# Patient Record
Sex: FEMALE | Race: WHITE | NOT HISPANIC OR LATINO | Employment: OTHER | URBAN - METROPOLITAN AREA
[De-identification: names, ages, dates, MRNs, and addresses within clinical notes are randomized per-mention and may not be internally consistent; named-entity substitution may affect disease eponyms.]

---

## 2017-01-06 ENCOUNTER — OFFICE VISIT (OUTPATIENT)
Dept: URGENT CARE | Facility: CLINIC | Age: 62
End: 2017-01-06
Payer: MEDICARE

## 2017-01-06 VITALS
TEMPERATURE: 99.2 F | OXYGEN SATURATION: 97 % | SYSTOLIC BLOOD PRESSURE: 116 MMHG | HEIGHT: 63 IN | HEART RATE: 95 BPM | RESPIRATION RATE: 18 BRPM | WEIGHT: 100.8 LBS | DIASTOLIC BLOOD PRESSURE: 76 MMHG | BODY MASS INDEX: 17.86 KG/M2

## 2017-01-06 DIAGNOSIS — J40 BRONCHITIS: ICD-10-CM

## 2017-01-06 DIAGNOSIS — R53.83 OTHER FATIGUE: ICD-10-CM

## 2017-01-06 DIAGNOSIS — R05.9 COUGH: ICD-10-CM

## 2017-01-06 PROCEDURE — 99203 OFFICE O/P NEW LOW 30 MIN: CPT | Performed by: PHYSICIAN ASSISTANT

## 2017-01-06 RX ORDER — AZITHROMYCIN 250 MG/1
TABLET, FILM COATED ORAL
Qty: 6 TAB | Refills: 0 | Status: SHIPPED | OUTPATIENT
Start: 2017-01-06 | End: 2017-04-26

## 2017-01-06 ASSESSMENT — ENCOUNTER SYMPTOMS
WHEEZING: 0
SHORTNESS OF BREATH: 0
FEVER: 0
CHILLS: 0
NAUSEA: 0
VOMITING: 0
DIARRHEA: 0
ABDOMINAL PAIN: 0

## 2017-01-06 NOTE — PROGRESS NOTES
Subjective:      Lupe Webb is a 61 y.o. female who presents with Other            Other  Pertinent negatives include no abdominal pain, chills, fever, nausea, rash or vomiting.   Pt comes to clinic c/o fatigue, PMH of sjogrens, last 4-5d of fever, body aches, neck discomfort, subj fever/chills, did get flu shot this year, has had increased oral dryness over last few weeks, did see specialist in [facility] few weeks ago, c/o chest congest, PMH of sjogrens flares - unsure what has caused this. Denies ear pain, c /o left sided salivary gland swelling. Denies fever reducing meds today. Denies nausea/vomiting/abdpain/ddiarrhea/rash. Denies PMH of asthma, c/o mild wheeze, c/o chest congestion, denies PMH of pneumonia, denies seasonal allerg. Denies much cough.     Denies sorethroat, irritated throat, denies earpain.     Has seen ENT here,     Review of Systems   Constitutional: Negative for fever and chills.   Respiratory: Negative for shortness of breath and wheezing.    Gastrointestinal: Negative for nausea, vomiting, abdominal pain and diarrhea.   Skin: Negative for rash.       PMH:  has a past medical history of Indigestion; Dental disorder; Anesthesia; Other specified disorder of intestines; Unspecified cataract; Heart burn; Pain (4/7/14); Arthritis; Lupus (HCC); Sjogren's syndrome; and Psychiatric problem.  MEDS:   Current outpatient prescriptions:   •  mycophenolate (CELLCEPT) 250 MG Cap, Take 500 mg by mouth 2 times a day. Total of 2000mg daily, Disp: , Rfl:   •  Buprenorphine 15 MCG/HR PTWK, Apply  to skin as directed every 7 days., Disp: , Rfl:   •  MAGNESIUM PO, Take  by mouth every day., Disp: , Rfl:   •  CycloSPORINE (RESTASIS OP), 1 Drop by Ophthalmic route 2 Times a Day. Both eyes, Disp: , Rfl:   •  zonisamide (ZONEGRAN) 100 MG CAPS, Take 300 mg by mouth every bedtime., Disp: , Rfl:   •  predniSONE (DELTASONE) 1 MG TABS, Take 1 mg by mouth every day. 7 pills per day, Disp: , Rfl:   •  zolpidem (AMBIEN) 5 MG  "TABS, Take 5 mg by mouth at bedtime as needed., Disp: , Rfl:   ALLERGIES:   Allergies   Allergen Reactions   • Contrast Media With Iodine [Iodine]      Rash, same with topical iodine     • Shellfish Allergy Vomiting     Rash, hives,    • Penicillins Vomiting     SURGHX:   Past Surgical History   Procedure Laterality Date   • Cataract phaco with iol  4/9/2014     Performed by Destiney Mccurdy M.D. at SURGERY SAME DAY [facility] ORS   • Cataract phaco with iol  4/18/2014     Performed by Destiney Mccurdy M.D. at SURGERY SAME DAY [facility] ORS   • Lumbar laminectomy diskectomy Left 8/5/2015     Procedure: POSTERIOR L5-S1 HEMILAMINOTOMY AND MICRODISCECTOMY;  Surgeon: Kaleb Lucia M.D.;  Location: SURGERY [facility];  Service:    • Pr enlarge breast with implant     • Tumor excision with biopsy  1998     pallet   • Breast implant revision Bilateral 11/2/2016     Procedure: BREAST IMPLANT - EXCHANGE OF SALINE TO SILICONE WITH REPOSITIONING OF LATERAL FOLDS;  Surgeon: Darryl Garcia M.D.;  Location: SURGERY [facility];  Service:      SOCHX:  reports that she has never smoked. She has never used smokeless tobacco. She reports that she drinks about 1.8 oz of alcohol per week. She reports that she does not use illicit drugs.  FH: Family history was reviewed, no pertinent findings to report    I have worn a mask for the entire encounter with this patient.      Objective:     /76 mmHg  Pulse 95  Temp(Src) 37.3 °C (99.2 °F)  Resp 18  Ht 1.588 m (5' 2.5\")  Wt 45.723 kg (100 lb 12.8 oz)  BMI 18.13 kg/m2  SpO2 97%     Physical Exam   Constitutional: She is oriented to person, place, and time. She appears well-developed and well-nourished. No distress.   HENT:   Head: Normocephalic and atraumatic.   Right Ear: Tympanic membrane, external ear and ear canal normal.   Left Ear: Tympanic membrane, external ear and ear canal normal.   Nose: Nose normal. Right sinus exhibits no maxillary sinus tenderness and no frontal " sinus tenderness. Left sinus exhibits no maxillary sinus tenderness and no frontal sinus tenderness.   Mouth/Throat: Uvula is midline and mucous membranes are normal. Posterior oropharyngeal erythema present. No oropharyngeal exudate, posterior oropharyngeal edema or tonsillar abscesses.   Eyes: Conjunctivae and lids are normal. Right eye exhibits no discharge. Left eye exhibits no discharge. No scleral icterus.   Neck: Neck supple.   Pulmonary/Chest: Effort normal. No accessory muscle usage. No respiratory distress. She has no decreased breath sounds. She has wheezes. She has rhonchi (mild). She has no rales.   Musculoskeletal: Normal range of motion.   Lymphadenopathy:     She has no cervical adenopathy.   Neurological: She is alert and oriented to person, place, and time. She is not disoriented. She exhibits normal muscle tone. Coordination normal.   Skin: Skin is warm and dry. She is not diaphoretic. No erythema. No pallor.   Psychiatric: Her speech is normal and behavior is normal.   Nursing note and vitals reviewed.              Assessment/Plan:     1. Bronchitis  Supportive care is reviewed with patient/caregiver - recommend to push PO fluids and electrolytes, Nsaids/tylenol, netti pot/saline irrig, humidifier in home, flonase, ponaris, antihistamines, f/u w/ rheumatology, full course of abx, stress to trend continued resolution following completion of abx  Return to clinic with lack of resolution or progression of symptoms.  ER precautions with any worsening symptoms are reviewed with patient/caregiver and they do express understanding  OTC medications for s/sx    - azithromycin (ZITHROMAX) 250 MG Tab; Take as directed on package. Dispense one package.  Dispense: 6 Tab; Refill: 0    2. Cough      3. Other fatigue

## 2017-01-06 NOTE — MR AVS SNAPSHOT
"        Lupe Webb   2017 2:30 PM   Office Visit   MRN: [id number]    Department:  [facility]   Dept Phone:  [phone]    Description:  Female : 1955   Provider:  Artie Saucedo PA-C           Reason for Visit     Other x has an auto-immune system disease, feels liks she is having a flare up, having pain, fatigue, some cough, having a clogged salivary duct that is starting to unclog and giving her a raw throat      Allergies as of 2017     Allergen Noted Reactions    Contrast Media With Iodine [Iodine] 2014       Rash, same with topical iodine      Shellfish Allergy 2014   Vomiting    Rash, hives,     Penicillins 2014   Vomiting      You were diagnosed with     Bronchitis   [660707]       Cough   [786.2.ICD-9-CM]       Other fatigue   [1870055]         Vital Signs     Blood Pressure Pulse Temperature Respirations Height Weight    116/76 mmHg 95 37.3 °C (99.2 °F) 18 1.588 m (5' 2.5\") 45.723 kg (100 lb 12.8 oz)    Body Mass Index Oxygen Saturation Smoking Status             18.13 kg/m2 97% Never Smoker          Basic Information     Date Of Birth Sex Race Ethnicity Preferred Language    1955 Female White Non- English      Your appointments     2017 12:30 PM   US SONOCINE with [facility] 2   [facility] ([address])    [address] [place] [id number] [phone]           Check in 30 minutes prior.            2017  1:00 PM   BONE DENSITY (DEXASCAN) with [facility] 1   [facility] ([address])    [address] [place] [id number] [phone]           No calcium supplements 24 hours prior to exam, including antacids or multivitamins w/calcium.  Pt may eat and drink normally.  No injection procedures prior to Dexa on the same day.  No barium contrast, no CTs with IV or oral contrast, no Pet/CTs and no Nuc Med injections for 7 days prior to a Dexa (including Barium " Swallow, Upper GI, Small Bowel Series).  If scheduling a Dexa on the same day as a CT with IV or oral contrast, any test with barium, Pet/CT or a Nuc Med with injection, always schedule the Dexa before the other study.              Problem List              ICD-10-CM Priority Class Noted - Resolved    Senile nuclear sclerosis H25.10   4/9/2014 - Present    Cataract H26.9   4/18/2014 - Present    Displacement of lumbar intervertebral disc without myelopathy M51.26   8/5/2015 - Present    Encounter for cosmetic procedure Z41.1   11/2/2016 - Present      Health Maintenance     Patient has no pending health maintenance at this time      Current Immunizations     No immunizations on file.      Below and/or attached are the medications your provider expects you to take. Review all of your home medications and newly ordered medications with your provider and/or pharmacist. Follow medication instructions as directed by your provider and/or pharmacist. Please keep your medication list with you and share with your provider. Update the information when medications are discontinued, doses are changed, or new medications (including over-the-counter products) are added; and carry medication information at all times in the event of emergency situations     Allergies:  CONTRAST MEDIA WITH IODINE - (reactions not documented)     SHELLFISH ALLERGY - Vomiting     PENICILLINS - Vomiting               Medications  Valid as of: January 06, 2017 -  3:20 PM    Generic Name Brand Name Tablet Size Instructions for use    Azithromycin (Tab) ZITHROMAX 250 MG Take as directed on package. Dispense one package.        Buprenorphine (PATCH WEEKLY) Buprenorphine 15 MCG/HR Apply  to skin as directed every 7 days.        CycloSPORINE   1 Drop by Ophthalmic route 2 Times a Day. Both eyes        Magnesium   Take  by mouth every day.        Mycophenolate Mofetil (Cap) CELLCEPT 250 MG Take 500 mg by mouth 2 times a day. Total of 2000mg daily         PredniSONE (Tab) DELTASONE 1 MG Take 1 mg by mouth every day. 7 pills per day        Zolpidem Tartrate (Tab) AMBIEN 5 MG Take 5 mg by mouth at bedtime as needed.        Zonisamide (Cap) ZONEGRAN 100 MG Take 300 mg by mouth every bedtime.        .                 Medicines prescribed today were sent to:     [facility] - [place], [place] - [address]    [address] [place] [address]    Phone: [phone] Fax: [phone]    Open 24 Hours?: No      Medication refill instructions:       If your prescription bottle indicates you have medication refills left, it is not necessary to call your provider’s office. Please contact your pharmacy and they will refill your medication.    If your prescription bottle indicates you do not have any refills left, you may request refills at any time through one of the following ways: The online Livelens system (except Urgent Care), by calling your provider’s office, or by asking your pharmacy to contact your provider’s office with a refill request. Medication refills are processed only during regular business hours and may not be available until the next business day. Your provider may request additional information or to have a follow-up visit with you prior to refilling your medication.   *Please Note: Medication refills are assigned a new Rx number when refilled electronically. Your pharmacy may indicate that no refills were authorized even though a new prescription for the same medication is available at the pharmacy. Please request the medicine by name with the pharmacy before contacting your provider for a refill.           Livelens Access Code: [email or web address]  Expires: 2/5/2017  3:20 PM    Livelens  A secure, online tool to manage your health information     [employer]’s Livelens® is a secure, online tool that connects you to your personalized health information from the privacy of your home -- day or night - making it very easy for you to manage your healthcare.  Once the activation process is completed, you can even access your medical information using the FirstBest jaron, which is available for free in the Apple Jaron store or Google Play store.     FirstBest provides the following levels of access (as shown below):   My Chart Features   [facility] Primary [facility] Doctor [facility]  Specialists [facility]  Urgent  Care Non-[facility]  Primary Care  Doctor   Email your healthcare team securely and privately 24/7 X X X    Manage appointments: schedule your next appointment; view details of past/upcoming appointments X      Request prescription refills. X      View recent personal medical records, including lab and immunizations X X X X   View health record, including health history, allergies, medications X X X X   Read reports about your outpatient visits, procedures, consult and ER notes X X X X   See your discharge summary, which is a recap of your hospital and/or ER visit that includes your diagnosis, lab results, and care plan. X X       How to register for FirstBest:  1. Go to  [email or web address].  2. Click on the Sign Up Now box, which takes you to the New Member Sign Up page. You will need to provide the following information:  a. Enter your FirstBest Access Code exactly as it appears at the top of this page. (You will not need to use this code after you’ve completed the sign-up process. If you do not sign up before the expiration date, you must request a new code.)   b. Enter your date of birth.   c. Enter your home email address.   d. Click Submit, and follow the next screen’s instructions.  3. Create a FirstBest ID. This will be your FirstBest login ID and cannot be changed, so think of one that is secure and easy to remember.  4. Create a FirstBest password. You can change your password at any time.  5. Enter your Password Reset Question and Answer. This can be used at a later time if you forget your password.   6. Enter your e-mail address. This allows you to receive e-mail notifications  when new information is available in [employer].  7. Click Sign Up. You can now view your health information.    For assistance activating your [employer] account, call [phone]

## 2017-01-12 ENCOUNTER — HOSPITAL ENCOUNTER (OUTPATIENT)
Dept: LAB | Facility: MEDICAL CENTER | Age: 62
End: 2017-01-12
Attending: SPECIALIST
Payer: MEDICARE

## 2017-01-12 LAB
ALBUMIN SERPL BCP-MCNC: 4.4 G/DL (ref 3.2–4.9)
ALBUMIN/GLOB SERPL: 2 G/DL
ALP SERPL-CCNC: 30 U/L (ref 30–99)
ALT SERPL-CCNC: 16 U/L (ref 2–50)
ANION GAP SERPL CALC-SCNC: 6 MMOL/L (ref 0–11.9)
APPEARANCE UR: CLEAR
AST SERPL-CCNC: 19 U/L (ref 12–45)
BASOPHILS # BLD AUTO: 0.03 K/UL (ref 0–0.12)
BASOPHILS NFR BLD AUTO: 0.5 % (ref 0–1.8)
BILIRUB SERPL-MCNC: 0.7 MG/DL (ref 0.1–1.5)
BILIRUB UR QL STRIP.AUTO: NEGATIVE
BUN SERPL-MCNC: 14 MG/DL (ref 8–22)
C3 SERPL-MCNC: 134 MG/DL (ref 87–200)
C4 SERPL-MCNC: 45 MG/DL (ref 19–52)
CALCIUM SERPL-MCNC: 9.5 MG/DL (ref 8.5–10.5)
CHLORIDE SERPL-SCNC: 108 MMOL/L (ref 96–112)
CHOLEST SERPL-MCNC: 190 MG/DL (ref 100–199)
CO2 SERPL-SCNC: 28 MMOL/L (ref 20–33)
COLOR UR AUTO: YELLOW
CREAT SERPL-MCNC: 0.75 MG/DL (ref 0.5–1.4)
EOSINOPHIL # BLD: 0.13 K/UL (ref 0–0.51)
EOSINOPHIL NFR BLD AUTO: 2.1 % (ref 0–6.9)
ERYTHROCYTE [DISTWIDTH] IN BLOOD BY AUTOMATED COUNT: 44.2 FL (ref 35.9–50)
GLOBULIN SER CALC-MCNC: 2.2 G/DL (ref 1.9–3.5)
GLUCOSE SERPL-MCNC: 93 MG/DL (ref 65–99)
GLUCOSE UR STRIP.AUTO-MCNC: NEGATIVE MG/DL
HCT VFR BLD AUTO: 42.8 % (ref 37–47)
HDLC SERPL-MCNC: 67 MG/DL
HGB BLD-MCNC: 14.2 G/DL (ref 12–16)
IMM GRANULOCYTES # BLD AUTO: 0.04 K/UL (ref 0–0.11)
IMM GRANULOCYTES NFR BLD AUTO: 0.7 % (ref 0–0.9)
KETONES UR STRIP.AUTO-MCNC: NEGATIVE MG/DL
LDLC SERPL CALC-MCNC: 99 MG/DL
LEUKOCYTE ESTERASE UR QL STRIP.AUTO: NEGATIVE
LYMPHOCYTES # BLD: 2.75 K/UL (ref 1–4.8)
LYMPHOCYTES NFR BLD AUTO: 45.4 % (ref 22–41)
MCH RBC QN AUTO: 32.6 PG (ref 27–33)
MCHC RBC AUTO-ENTMCNC: 33.2 G/DL (ref 33.6–35)
MCV RBC AUTO: 98.2 FL (ref 81.4–97.8)
MICRO URNS: NORMAL
MONOCYTES # BLD: 0.56 K/UL (ref 0–0.85)
MONOCYTES NFR BLD AUTO: 9.2 % (ref 0–13.4)
NEUTROPHILS # BLD: 2.55 K/UL (ref 2–7.15)
NEUTROPHILS NFR BLD AUTO: 42.1 % (ref 44–72)
NITRITE UR QL STRIP.AUTO: NEGATIVE
NRBC # BLD AUTO: 0 K/UL
NRBC BLD-RTO: 0 /100 WBC
PH UR: 6 [PH]
PLATELET # BLD AUTO: 400 K/UL (ref 164–446)
PMV BLD AUTO: 9.8 FL (ref 9–12.9)
POTASSIUM SERPL-SCNC: 3.9 MMOL/L (ref 3.6–5.5)
PROT SERPL-MCNC: 6.6 G/DL (ref 6–8.2)
PROT UR QL STRIP: NEGATIVE MG/DL
RBC # BLD AUTO: 4.36 M/UL (ref 4.2–5.4)
RBC UR QL AUTO: NEGATIVE
SODIUM SERPL-SCNC: 142 MMOL/L (ref 135–145)
SP GR UR STRIP.AUTO: 1.02
TRIGL SERPL-MCNC: 122 MG/DL (ref 0–149)
WBC # BLD AUTO: 6.1 K/UL (ref 4.8–10.8)

## 2017-01-12 PROCEDURE — 81003 URINALYSIS AUTO W/O SCOPE: CPT

## 2017-01-12 PROCEDURE — 86160 COMPLEMENT ANTIGEN: CPT

## 2017-01-12 PROCEDURE — 80053 COMPREHEN METABOLIC PANEL: CPT

## 2017-01-12 PROCEDURE — 85025 COMPLETE CBC W/AUTO DIFF WBC: CPT

## 2017-01-12 PROCEDURE — 80061 LIPID PANEL: CPT

## 2017-01-12 PROCEDURE — 36415 COLL VENOUS BLD VENIPUNCTURE: CPT

## 2017-01-16 ENCOUNTER — HOSPITAL ENCOUNTER (OUTPATIENT)
Dept: RADIOLOGY | Facility: MEDICAL CENTER | Age: 62
End: 2017-01-16
Attending: SPECIALIST
Payer: MEDICARE

## 2017-01-16 DIAGNOSIS — M81.0 AGE-RELATED OSTEOPOROSIS WITHOUT CURRENT PATHOLOGICAL FRACTURE: ICD-10-CM

## 2017-01-16 PROCEDURE — 77080 DXA BONE DENSITY AXIAL: CPT

## 2017-04-26 ENCOUNTER — OFFICE VISIT (OUTPATIENT)
Dept: INTERNAL MEDICINE | Facility: IMAGING CENTER | Age: 62
End: 2017-04-26
Payer: MEDICARE

## 2017-04-26 ENCOUNTER — TELEPHONE (OUTPATIENT)
Dept: MEDICAL GROUP | Facility: PHYSICIAN GROUP | Age: 62
End: 2017-04-26

## 2017-04-26 VITALS
BODY MASS INDEX: 19.14 KG/M2 | WEIGHT: 104 LBS | SYSTOLIC BLOOD PRESSURE: 130 MMHG | OXYGEN SATURATION: 100 % | DIASTOLIC BLOOD PRESSURE: 74 MMHG | RESPIRATION RATE: 14 BRPM | TEMPERATURE: 99.1 F | HEIGHT: 62 IN | HEART RATE: 64 BPM

## 2017-04-26 DIAGNOSIS — G89.4 CHRONIC PAIN SYNDROME: Chronic | ICD-10-CM

## 2017-04-26 DIAGNOSIS — Z79.52 CURRENT CHRONIC USE OF SYSTEMIC STEROIDS: Chronic | ICD-10-CM

## 2017-04-26 DIAGNOSIS — I73.00 RAYNAUD'S DISEASE WITHOUT GANGRENE: Chronic | ICD-10-CM

## 2017-04-26 DIAGNOSIS — M15.9 OSTEOARTHRITIS OF MULTIPLE JOINTS, UNSPECIFIED OSTEOARTHRITIS TYPE: Chronic | ICD-10-CM

## 2017-04-26 DIAGNOSIS — K59.1 FUNCTIONAL DIARRHEA: Chronic | ICD-10-CM

## 2017-04-26 DIAGNOSIS — M35.00 SJOGREN'S SYNDROME (HCC): Chronic | ICD-10-CM

## 2017-04-26 DIAGNOSIS — M50.30 DDD (DEGENERATIVE DISC DISEASE), CERVICAL: Chronic | ICD-10-CM

## 2017-04-26 DIAGNOSIS — G47.00 INSOMNIA, UNSPECIFIED TYPE: ICD-10-CM

## 2017-04-26 DIAGNOSIS — M32.9 LUPUS ARTHRITIS (HCC): Chronic | ICD-10-CM

## 2017-04-26 DIAGNOSIS — M85.80 OSTEOPENIA: Chronic | ICD-10-CM

## 2017-04-26 DIAGNOSIS — K21.9 GASTROESOPHAGEAL REFLUX DISEASE WITHOUT ESOPHAGITIS: Chronic | ICD-10-CM

## 2017-04-26 PROBLEM — E73.9 LACTOSE INTOLERANCE: Chronic | Status: ACTIVE | Noted: 2017-04-26

## 2017-04-26 PROCEDURE — 3017F COLORECTAL CA SCREEN DOC REV: CPT | Performed by: FAMILY MEDICINE

## 2017-04-26 PROCEDURE — 3014F SCREEN MAMMO DOC REV: CPT | Performed by: FAMILY MEDICINE

## 2017-04-26 PROCEDURE — G8432 DEP SCR NOT DOC, RNG: HCPCS | Performed by: FAMILY MEDICINE

## 2017-04-26 PROCEDURE — 1036F TOBACCO NON-USER: CPT | Performed by: FAMILY MEDICINE

## 2017-04-26 PROCEDURE — 99205 OFFICE O/P NEW HI 60 MIN: CPT | Performed by: FAMILY MEDICINE

## 2017-04-26 PROCEDURE — G8420 CALC BMI NORM PARAMETERS: HCPCS | Performed by: FAMILY MEDICINE

## 2017-04-26 RX ORDER — SILDENAFIL 25 MG/1
25 TABLET, FILM COATED ORAL DAILY
COMMUNITY
End: 2018-05-21

## 2017-04-26 RX ORDER — ALPRAZOLAM 0.5 MG/1
0.25 TABLET ORAL NIGHTLY PRN
Qty: 30 TAB | Refills: 0 | Status: SHIPPED | OUTPATIENT
Start: 2017-04-26 | End: 2017-08-04

## 2017-04-26 RX ORDER — METAXALONE 800 MG/1
800 TABLET ORAL 3 TIMES DAILY
COMMUNITY
End: 2018-08-02

## 2017-04-26 NOTE — TELEPHONE ENCOUNTER
Phone Number Called: [phone] (home)       Message: Pt would like a referral to hematologist.     Left Message for patient to call back: N\A

## 2017-04-26 NOTE — MR AVS SNAPSHOT
"        Lupe Webb   2017 4:00 PM   Office Visit   MRN: [id number]    Department:  [facility]   Dept Phone:  [phone]    Description:  Female : 1955   Provider:  Jina De Santiago M.D.           Reason for Visit     Establish Care           Allergies as of 2017     Allergen Noted Reactions    Contrast Media With Iodine [Iodine] 2014       Rash, same with topical iodine      Shellfish Allergy 2014   Vomiting    Rash, hives,     Penicillins 2014   Vomiting      You were diagnosed with     Sjogren's syndrome (CMS-HCC)   [529673]       Insomnia, unspecified type   [2464181]       DDD (degenerative disc disease), cervical   [618961]         Vital Signs     Blood Pressure Pulse Temperature Respirations Height Weight    130/74 mmHg 64 37.3 °C (99.1 °F) 14 1.575 m (5' 2\") 47.174 kg (104 lb)    Body Mass Index Oxygen Saturation Smoking Status             19.02 kg/m2 100% Never Smoker          Basic Information     Date Of Birth Sex Race Ethnicity Preferred Language    1955 Female White Non- English      Your appointments     May 01, 2017  3:00 PM   NEW MISC Infusion 2 HR with RN 4   [facility] ([facility])    [address]  [place] [address]   [phone]            2017 12:30 PM   US SONOCINE with [facility]  2   [facility] ([address])    [address]  [address]  [place] [address]   [phone]           Check in 30 minutes prior.              Problem List              ICD-10-CM Priority Class Noted - Resolved    Senile nuclear sclerosis H25.10   2014 - Present    Cataract H26.9   2014 - Present    Displacement of lumbar intervertebral disc without myelopathy M51.26   2015 - Present    Sjogren's syndrome (CMS-HCC) (Chronic) M35.00   1990 - Present    DDD (degenerative disc disease), cervical (Chronic) M50.30   2017 - Present      Health Maintenance        Date Due Completion Dates   " IMM DTaP/Tdap/Td Vaccine (1 - Tdap) 3/28/1974 ---    PAP SMEAR 3/28/1976 ---    COLONOSCOPY 3/28/2005 ---    IMM ZOSTER VACCINE 3/28/2015 ---    MAMMOGRAM 9/23/2017 9/23/2016            Current Immunizations     Influenza Vaccine Quad Inj (Preserved) 9/27/2016      Below and/or attached are the medications your provider expects you to take. Review all of your home medications and newly ordered medications with your provider and/or pharmacist. Follow medication instructions as directed by your provider and/or pharmacist. Please keep your medication list with you and share with your provider. Update the information when medications are discontinued, doses are changed, or new medications (including over-the-counter products) are added; and carry medication information at all times in the event of emergency situations     Allergies:  CONTRAST MEDIA WITH IODINE - (reactions not documented)     SHELLFISH ALLERGY - Vomiting     PENICILLINS - Vomiting               Medications  Valid as of: April 26, 2017 -  5:04 PM    Generic Name Brand Name Tablet Size Instructions for use    ALPRAZolam (Tab) XANAX 0.5 MG Take 0.5 Tabs by mouth at bedtime as needed for Sleep.        CycloSPORINE   1 Drop by Ophthalmic route 2 Times a Day. Both eyes        Metaxalone (Tab) SKELAXIN 800 MG Take 800 mg by mouth 3 times a day.        NS SOLN 50 mL with methotrexate PF 25 MG/ML SOLN 30 mg/m2   30 mg/m2 by Intravenous route Once. Indications: weekly        Omega-3 Fatty Acids   Take  by mouth.        PredniSONE (Tab) DELTASONE 1 MG Take 7 mg by mouth every day. 7 pills per day        Sildenafil Citrate (Tab) VIAGRA 25 MG Take 20 mg by mouth every day. Indications: Raynaud's Phenomenon of Unknown Cause        Zolpidem Tartrate (Tab) AMBIEN 5 MG Take  by mouth at bedtime as needed.        Zonisamide (Cap) ZONEGRAN 100 MG Take 300 mg by mouth every bedtime. Indications: neuropathy        .                 Medicines prescribed today were sent to:       [facility] - [place], [place] - [address]    [address] [place] [address]    Phone: [phone] Fax: [phone]    Open 24 Hours?: No      Medication refill instructions:       If your prescription bottle indicates you have medication refills left, it is not necessary to call your provider’s office. Please contact your pharmacy and they will refill your medication.    If your prescription bottle indicates you do not have any refills left, you may request refills at any time through one of the following ways: The online [employer] system (except Urgent Care), by calling your provider’s office, or by asking your pharmacy to contact your provider’s office with a refill request. Medication refills are processed only during regular business hours and may not be available until the next business day. Your provider may request additional information or to have a follow-up visit with you prior to refilling your medication.   *Please Note: Medication refills are assigned a new Rx number when refilled electronically. Your pharmacy may indicate that no refills were authorized even though a new prescription for the same medication is available at the pharmacy. Please request the medicine by name with the pharmacy before contacting your provider for a refill.        Referral     A referral request has been sent to our patient care coordination department. Please allow 3-5 business days for us to process this request and contact you either by phone or mail. If you do not hear from us by the 5th business day, please call us at [phone].           Observable Networks Access Code: Activation code not generated  Current Observable Networks Status: Active

## 2017-04-27 NOTE — PROGRESS NOTES
Chief Complaint   Patient presents with   • Establish Care       HPI:  Patient is a 62 y.o. female here with her  to establish care at our office. She has a very complex medical history of chronic Sjogren's/ chronic osteo and lupus arthritis of all major joints/ chronic pain syndrome previously on chronic narcotic control/ chronic Raynauds syndrome/ chronic GERD/ chronic poor appetite followed by multiple specialists here locally and in [place], [place]. She has started weekly methotrexate injections within the past month under the direction of Dr. Inessa Cruz in [place]. She had previously seen Dr. Edwards for her rheumatoid issues and taken cellcept in the past. She also weaned herself off chronic narcotic medication within the past month and became violently ill from the ensuing withdrawal. She suffers from chronic stomach pain and GERD resistant to PPI therapy. Pt is followed by a Sjogrens specialist in [place], [place] and recently went for successful left salivary gland manipulation (which she does regularly). Her chronic Raynauds syndrome is well controlled on daily sildenafil. She has chronic osteopenia and has had previous treatment with reclast and prolia - her rheumatologist would like her to do two years of forteo therapy next, and the patient is against this idea currently. Pt is lactose intolerant and maintains a long-term vegetarian diet but admits to eating very little/chronic diarrhea/ poor appetite issues. She has chronic cervical spine DDD and has had excellent success with PT from [facility] and would like a new referral for treatment to attempt to control her pain. Pt has had increased insomnia since weaning herself off her long-term narcotic medication and does not have good response from ambien. She has secondary anxiety from not sleeping and from all of the events occuring during the past month. Despite all of these medical issues, the patient is an active skier and remains very  "involved with her family and friends. She is extremely motivated to improve her overall situation in what ever manner is appropriate.     Patient Active Problem List    Diagnosis Date Noted   • DDD (degenerative disc disease), cervical 04/26/2017   • Osteoarthritis of multiple joints 04/26/2017   • Lupus arthritis (CMS-HCC) 04/26/2017   • Chronic pain syndrome 04/26/2017   • Gastroesophageal reflux disease without esophagitis 04/26/2017   • Lactose intolerance 04/26/2017   • Raynaud's disease without gangrene 04/26/2017   • Osteopenia 04/26/2017   • Sjogren's syndrome (CMS-HCC) 04/26/1990       Past medical, surgical, family, and social history was reviewed and updated in Epic chart by me today.     Medications and allergies reviewed and updated in Epic chart by me today.     ROS:  Pertinent positives listed above in HPI. All other systems have been reviewed and are negative.    PE:   /74 mmHg  Pulse 64  Temp(Src) 37.3 °C (99.1 °F)  Resp 14  Ht 1.575 m (5' 2\")  Wt 47.174 kg (104 lb)  BMI 19.02 kg/m2  SpO2 100%  Vital signs reviewed with patient.     Gen: Well developed; well nourished; no acute distress; age appropriate appearance; wears glasses  HEENT: Normocephalic; atraumatic; PEERLA b/l; sclera clear b/l; b/l external auditory canals WNL; b/l TM WNL; oropharynx clear; oral mucosa moist; tongue midline; dentition adequate; b/l mild cerumen noted  Neck: No adenopathy; no thyromegaly  CV: Regular rate and rhythm; S1/ S2 present; no murmur, gallop or rub noted  Pulm: No respiratory distress; clear to ascultation b/l; no wheezing or stridor noted b/l  Abd: Adequate bowel sounds noted; soft and nontender; no rebound, rigidity, nor distention  Extremities: No peripheral edema b/l LE extremities/ no clubbing nor cyanosis noted; very thin   Skin: Warm and dry; no rashes noted   Neuro: No focal deficits noted   Psych: AAOx4; mood and affect are appropriate    A/P:  1. Chronic sjogren's syndrome " (CMS-HCC)  Stable but pt has multiple associated sequela from this/ pt to continue f/u with specialist in [place], [place]    2. Insomnia, unspecified type  Due to recent events over past months, pt has increased anxiety and insomnia. Will trial low dose xanax for PRN QHS use.   - alprazolam (XANAX) 0.5 MG Tab; Take 0.5 Tabs by mouth at bedtime as needed for Sleep.  Dispense: 30 Tab; Refill:0    3. Chronic DDD (degenerative disc disease), cervical  Stable/ pt requesting new PT referral for ongoing therapies to control pain/ takes skelaxin PRN  - REFERRAL TO PHYSICAL THERAPY Reason for Therapy: Eval/Treat/Report    4. Chronic osteoarthritis of multiple joints  Ongoing chronic source of pain and discomfort    5. Chronic lupus arthritis (CMS-HCC)  Pt receiving weekly methotrexate under the direction of Dr. Inessa Cruz/ previously taken cellcept    6. Chronic pain syndrome  She discontinued pain medications within the past month and withdrawal period is slowly resolving. She previously saw Dr. Shankar regularly.     7. Chronic gastroesophageal reflux disease without esophagitis  Ongoing issue resistant to PPI therapy/ last EGD 2014 at [facility]    8. Chronic raynaud's disease without gangrene  Stable/ controlled with daily sildenafil    9. Chronic osteopenia  Stable on dexa results/ pt has been previously treated with reclast and prolia and does not want forteo treatment.     10. Chronic systemic steroid dependence   Stable/ pt to continue taking prednisone 7mg daily.    11. Chronic functional diarrhea/poor appetite   Unchanged despite treatments in the past.     12. Health Care Maintenance  Pt will speak with Dr. Cruz regarding Tdap vaccine/ she cannot have zoster vaccine/ she is UTD with all other health care maintenance issues at this time and was given MyChart information today. She will bring in most recent labs done at outside lab when results are available.    Face to face time: 60 minutes with greater than 50% of time  spent with direct patient care and medical management.   Recommend pt f/u in 3-6 months/ PRN sooner if current condition changes.

## 2017-05-01 ENCOUNTER — APPOINTMENT (OUTPATIENT)
Dept: ONCOLOGY | Facility: MEDICAL CENTER | Age: 62
End: 2017-05-01
Attending: INTERNAL MEDICINE
Payer: COMMERCIAL

## 2017-05-03 ENCOUNTER — OFFICE VISIT (OUTPATIENT)
Dept: INTERNAL MEDICINE | Facility: IMAGING CENTER | Age: 62
End: 2017-05-03
Payer: MEDICARE

## 2017-05-03 VITALS
RESPIRATION RATE: 14 BRPM | OXYGEN SATURATION: 99 % | BODY MASS INDEX: 19.14 KG/M2 | SYSTOLIC BLOOD PRESSURE: 120 MMHG | DIASTOLIC BLOOD PRESSURE: 66 MMHG | HEART RATE: 55 BPM | HEIGHT: 62 IN | TEMPERATURE: 98.1 F | WEIGHT: 104 LBS

## 2017-05-03 DIAGNOSIS — S21.009A INCISIONAL BREAST WOUND: ICD-10-CM

## 2017-05-03 PROCEDURE — 1036F TOBACCO NON-USER: CPT | Performed by: FAMILY MEDICINE

## 2017-05-03 PROCEDURE — G8432 DEP SCR NOT DOC, RNG: HCPCS | Performed by: FAMILY MEDICINE

## 2017-05-03 PROCEDURE — 3017F COLORECTAL CA SCREEN DOC REV: CPT | Performed by: FAMILY MEDICINE

## 2017-05-03 PROCEDURE — G8420 CALC BMI NORM PARAMETERS: HCPCS | Performed by: FAMILY MEDICINE

## 2017-05-03 PROCEDURE — 3014F SCREEN MAMMO DOC REV: CPT | Performed by: FAMILY MEDICINE

## 2017-05-03 PROCEDURE — 99213 OFFICE O/P EST LOW 20 MIN: CPT | Performed by: FAMILY MEDICINE

## 2017-05-03 NOTE — PROGRESS NOTES
"Chief Complaint   Patient presents with   • Other     check incision right breast     HPI:  Patient is a 62 y.o. female established patient who presents today with her  for evaluation of non-healing R breast implant incision. She originally had b/l breast augmentation with implants approximately 17 years ago and subsequently had them replaced under the direction of Dr. Garcia. Unfortunately due to the patient's chronic immune compromised status, she has never healed well on the R side and has had three episodes of \"rejecting sutures\"/ non resolving small incision scabs that intermittently fall off and drain clear fluid. This past friday the patient had a scab fall off of her incision line that is under her R breast and saw Dr. Garcia who subsequently sutured the small defect area closed and placed her on PO abx. The patient has another small scab along her suture line that she worries about falling off and draining again. Dr. Garcia has told the pt and her  that the R implant and subcutaneous tissues are intact and the area of concern is superficial at the skin surface. Pt denies associated N/V/D. Pt does report that she is sleeping better and feels better overall (since discontinuing her narcotic patch) since our visit last week.     Patient Active Problem List    Diagnosis Date Noted   • DDD (degenerative disc disease), cervical 04/26/2017   • Osteoarthritis of multiple joints 04/26/2017   • Lupus arthritis (CMS-HCC) 04/26/2017   • Chronic pain syndrome 04/26/2017   • Gastroesophageal reflux disease without esophagitis 04/26/2017   • Lactose intolerance 04/26/2017   • Raynaud's disease without gangrene 04/26/2017   • Osteopenia 04/26/2017   • Functional diarrhea 04/26/2017   • Current chronic use of systemic steroids 04/26/2017   • Sjogren's syndrome (CMS-HCC) 04/26/1990     Past medical, surgical, family, and social history was reviewed in Epic chart by me today.     Medications and allergies reviewed " "in Epic chart by me today.     ROS:  Pertinent positives listed above in HPI. All other systems have been reviewed and are negative.    PE:   /66 mmHg  Pulse 55  Temp(Src) 36.7 °C (98.1 °F)  Resp 14  Ht 1.575 m (5' 2.01\")  Wt 47.174 kg (104 lb)  BMI 19.02 kg/m2  SpO2 99%  Vital signs reviewed with patient.     Gen: Well developed; well nourished; no acute distress; age appropriate appearance   Chest: b/l implants present/ on underside of R breast there is a approx. 2.5 inch original incision line that is pink/ in the middle there is a very small dense white scab with no signs of surrounding infection/ more medial to sternal end of incision line are multiple fine sutures in place - suture line is well approximated but skin is slightly more erythematous/ no drainage noted/ area is sensitive to touch/ no underlying fluid collection appreciated/ L breast original incision line is well healed and WNL.  Neuro: No focal deficits noted   Psych: AAOx4; mood and affect are appropriate    A/P:  1. R incisional breast wound  Long discussion with pt and  about options for further care. I recommended starting with local wound clinic evaluation and second opinion from another plastic surgeon about incision wound healing techniques. They would like to start with wound care clinic referral and will keep their scheduled f/u next week with Dr. Garcia/ pt will finish PO abx/ pt counseled about limiting upper body workouts to minimize stretch at chest area. I reassured them that if the local wound care referral was not information, I would seek other options for them out of state if needed.  - REFERRAL TO WOUND CLINIC     Pt to f/u with me PRN for above issue or other conditions that may come up/ maintain regular wellness appointments.          "

## 2017-05-03 NOTE — MR AVS SNAPSHOT
"        Lupe Webb   5/3/2017 10:00 AM   Office Visit   MRN: [id number]    [facility]   Dept Phone:  [phone]    Description:  Female : 1955   Provider:  Jina De Santiago M.D.           Reason for Visit     Other check incision right breast      Allergies as of 5/3/2017     Allergen Noted Reactions    Contrast Media With Iodine [Iodine] 2014       Rash, same with topical iodine      Shellfish Allergy 2014   Vomiting    Rash, hives,     Penicillins 2014   Vomiting      You were diagnosed with     Incisional breast wound   [[id number]]         Vital Signs     Blood Pressure Pulse Temperature Respirations Height Weight    120/66 mmHg 55 36.7 °C (98.1 °F) 14 1.575 m (5' 2.01\") 47.174 kg (104 lb)    Body Mass Index Oxygen Saturation Smoking Status             19.02 kg/m2 99% Never Smoker          Basic Information     Date Of Birth Sex Race Ethnicity Preferred Language    1955 Female White Non- English      Your appointments     2017 12:30 PM   [facility] with [address] BREAST [facility] ([facility])    [address]  [address]  [facility] [address] [phone]           Check in 30 minutes prior.              Problem List              ICD-10-CM Priority Class Noted - Resolved    Sjogren's syndrome (CMS-HCC) (Chronic) M35.00   1990 - Present    DDD (degenerative disc disease), cervical (Chronic) M50.30   2017 - Present    Osteoarthritis of multiple joints (Chronic) M15.9   2017 - Present    Lupus arthritis (CMS-HCC) (Chronic) M32.9   2017 - Present    Chronic pain syndrome (Chronic) G89.4   2017 - Present    Gastroesophageal reflux disease without esophagitis (Chronic) K21.9   2017 - Present    Lactose intolerance (Chronic) E73.9   2017 - Present    Raynaud's disease without gangrene (Chronic) I73.00   2017 - Present    Osteopenia (Chronic) M85.80   2017 - Present    Functional " diarrhea (Chronic) K59.1   4/26/2017 - Present    Current chronic use of systemic steroids (Chronic) Z79.52   4/26/2017 - Present      Health Maintenance        Date Due Completion Dates    IMM DTaP/Tdap/Td Vaccine (1 - Tdap) 4/1/2019 (Originally 3/28/1974) ---    IMM ZOSTER VACCINE 4/1/2019 (Originally 3/28/2015) ---    MAMMOGRAM 9/23/2017 9/23/2016    PAP SMEAR 1/2/2020 1/2/2017 (Done)    Override on 1/2/2017: Done    COLONOSCOPY 1/1/2024 1/1/2014 (N/S)    Override on 1/1/2014: (N/S)            Current Immunizations     Influenza Vaccine Quad Inj (Preserved) 9/27/2016      Below and/or attached are the medications your provider expects you to take. Review all of your home medications and newly ordered medications with your provider and/or pharmacist. Follow medication instructions as directed by your provider and/or pharmacist. Please keep your medication list with you and share with your provider. Update the information when medications are discontinued, doses are changed, or new medications (including over-the-counter products) are added; and carry medication information at all times in the event of emergency situations     Allergies:  CONTRAST MEDIA WITH IODINE - (reactions not documented)     SHELLFISH ALLERGY - Vomiting     PENICILLINS - Vomiting               Medications  Valid as of: May 03, 2017 -  3:44 PM    Generic Name Brand Name Tablet Size Instructions for use    ALPRAZolam (Tab) XANAX 0.5 MG Take 0.5 Tabs by mouth at bedtime as needed for Sleep.        CycloSPORINE   1 Drop by Ophthalmic route 2 Times a Day. Both eyes        Metaxalone (Tab) SKELAXIN 800 MG Take 800 mg by mouth 3 times a day.        NS SOLN 50 mL with methotrexate PF 25 MG/ML SOLN 30 mg/m2   30 mg/m2 by Intravenous route Once. Indications: weekly        Omega-3 Fatty Acids   Take  by mouth.        PredniSONE (Tab) DELTASONE 1 MG Take 7 mg by mouth every day. 7 pills per day        Sildenafil Citrate (Tab) VIAGRA 25 MG Take 20 mg by  mouth every day. Indications: Raynaud's Phenomenon of Unknown Cause        Zolpidem Tartrate (Tab) AMBIEN 5 MG Take  by mouth at bedtime as needed.        Zonisamide (Cap) ZONEGRAN 100 MG Take 300 mg by mouth every bedtime. Indications: neuropathy        .                 Medicines prescribed today were sent to:     [id number]  [place], [place] - [address]    [address] [place] [address]    Phone: [phone] Fax: [phone]    Open 24 Hours?: No      Medication refill instructions:       If your prescription bottle indicates you have medication refills left, it is not necessary to call your provider’s office. Please contact your pharmacy and they will refill your medication.    If your prescription bottle indicates you do not have any refills left, you may request refills at any time through one of the following ways: The online [employer] system (except Urgent Care), by calling your provider’s office, or by asking your pharmacy to contact your provider’s office with a refill request. Medication refills are processed only during regular business hours and may not be available until the next business day. Your provider may request additional information or to have a follow-up visit with you prior to refilling your medication.   *Please Note: Medication refills are assigned a new Rx number when refilled electronically. Your pharmacy may indicate that no refills were authorized even though a new prescription for the same medication is available at the pharmacy. Please request the medicine by name with the pharmacy before contacting your provider for a refill.        Referral     A referral request has been sent to our patient care coordination department. Please allow 3-5 business days for us to process this request and contact you either by phone or mail. If you do not hear from us by the 5th business day, please call us at [phone].        Other Notes About Your Plan     Sjogren's specialist in  [place]  Rheum: Dr. Inessa Cruz [place]  Pain: Dr. Shankar (not actively seeing)  GYN: Dr. Sierra  GI: Dr. Clemente  PT: [facility]           MyChart Access Code: Activation code not generated  Current MyChart Status: Active

## 2017-05-15 ENCOUNTER — OFFICE VISIT (OUTPATIENT)
Dept: WOUND CARE | Facility: MEDICAL CENTER | Age: 62
End: 2017-05-15
Attending: FAMILY MEDICINE
Payer: MEDICARE

## 2017-05-15 DIAGNOSIS — S21.009A INCISIONAL BREAST WOUND: ICD-10-CM

## 2017-05-15 DIAGNOSIS — I73.00 RAYNAUD'S DISEASE WITHOUT GANGRENE: Chronic | ICD-10-CM

## 2017-05-15 DIAGNOSIS — M32.9 LUPUS ARTHRITIS (HCC): Chronic | ICD-10-CM

## 2017-05-15 DIAGNOSIS — M35.00 SJOGREN'S SYNDROME, WITH UNSPECIFIED ORGAN INVOLVEMENT (HCC): Chronic | ICD-10-CM

## 2017-05-15 PROCEDURE — 3017F COLORECTAL CA SCREEN DOC REV: CPT | Performed by: FAMILY MEDICINE

## 2017-05-15 PROCEDURE — 1036F TOBACCO NON-USER: CPT | Performed by: FAMILY MEDICINE

## 2017-05-15 PROCEDURE — G8420 CALC BMI NORM PARAMETERS: HCPCS | Performed by: FAMILY MEDICINE

## 2017-05-15 PROCEDURE — 3014F SCREEN MAMMO DOC REV: CPT | Performed by: FAMILY MEDICINE

## 2017-05-15 PROCEDURE — G8432 DEP SCR NOT DOC, RNG: HCPCS | Performed by: FAMILY MEDICINE

## 2017-05-15 PROCEDURE — 99203 OFFICE O/P NEW LOW 30 MIN: CPT | Performed by: FAMILY MEDICINE

## 2017-05-15 PROCEDURE — 302717 HCHG ABSORBANT-ANTI MICROBIAL 7 DAY

## 2017-05-15 PROCEDURE — A6402 STERILE GAUZE <= 16 SQ IN: HCPCS

## 2017-05-15 PROCEDURE — 97161 PT EVAL LOW COMPLEX 20 MIN: CPT

## 2017-05-15 ASSESSMENT — ENCOUNTER SYMPTOMS
VOMITING: 0
FEVER: 0
HEADACHES: 1
CHILLS: 0
BACK PAIN: 1
NAUSEA: 0

## 2017-05-15 NOTE — CERTIFICATION
Advanced Wound Care  [facility]  [address]  [address]  [place] [address]  [phone] Fax: [phone]      Initial Evaluation  For Certification Period: 5/15/17 - 6/15/17      Referring Physician: Jina De Santiago MD  Primary Physician:        Consulting Physicians:   Dr Garcia = plastic surgeon      Wound(s): right breast      Start of Care:     5/15/17  Subjective:        HPI:     Pt has been referred to the wound clinic for non-healing right breast implant x3 revisions.     Pt had augmentation surgery 17 years ago.  Implants were replaced by Dr Garcia 11/2016.  Pt failed to heal well on the right side.  On 4/28/17 pt had a scab on the incision line that fell off and Dr Garcia placed her on abx and sutured the site.  Pt reports that debridement and suturing have occurred on several occasions.         Pain:      0/10      Past Medical History:  Past Medical History   Diagnosis Date   • Indigestion    • Anesthesia      problem coming out of it, very shaky   • Other specified disorder of intestines      diarrhea, constipation   • Heart burn    • Pain 4/7/14     5.5/10, 10/25/16 generalized pain throughout body   • Lupus (CMS-HCC)    • Sjogren's syndrome    • Arthritis      osteo/lupus arthritis, feet, hands, neck, back   • DDD (degenerative disc disease), cervical    • Unspecified cataract      IOLOU   • History of shingles    • Chronic pain syndrome      Current Medications:    Current outpatient prescriptions:   •  NS SOLN 50 mL with methotrexate PF 25 MG/ML SOLN 30 mg/m2, 30 mg/m2 by Intravenous route Once. Indications: weekly, Disp: , Rfl:   •  Omega-3 Fatty Acids (SALMON OIL PO), Take  by mouth., Disp: , Rfl:   •  metaxalone (SKELAXIN) 800 MG Tab, Take 800 mg by mouth 3 times a day., Disp: , Rfl:   •  sildenafil citrate (VIAGRA) 25 MG Tab, Take 20 mg by mouth every day. Indications: Raynaud's Phenomenon of Unknown Cause, Disp: , Rfl:   •  alprazolam (XANAX) 0.5 MG Tab, Take 0.5 Tabs by  mouth at bedtime as needed for Sleep., Disp: 30 Tab, Rfl: 0  •  CycloSPORINE (RESTASIS OP), 1 Drop by Ophthalmic route 2 Times a Day. Both eyes, Disp: , Rfl:   •  zonisamide (ZONEGRAN) 100 MG CAPS, Take 300 mg by mouth every bedtime. Indications: neuropathy, Disp: , Rfl:   •  predniSONE (DELTASONE) 1 MG TABS, Take 7 mg by mouth every day. 7 pills per day, Disp: , Rfl:   •  zolpidem (AMBIEN) 5 MG TABS, Take  by mouth at bedtime as needed., Disp: , Rfl:   Allergies: Contrast media with iodine; Shellfish allergy; and Penicillins  Past Surgical History:   Past Surgical History   Procedure Laterality Date   • Cataract phaco with iol  4/9/2014     Performed by Destiney Mccurdy M.D. at SURGERY [facility]   • Cataract phaco with iol  4/18/2014     Performed by Destiney Mccurdy M.D. 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  She is able to stay active.      Objective:    Tests and Measures:    Orthotic, protective, supportive devices:      Wound Characteristics                                                    Location: right breast    Initial Evaluation  Date: 5/15/17   Tissue Type and %: No wound bed visable   Periwound: dry   Drainage: dry   Exposed structures Sutures - dissolvable per pt   Wound Edges:   approximated   Odor: none   S&S of Infection:   none   Edema: none   Sensation: Decreased around incision                     Measurements: Initial  Evaluation  Date: 5/15/17   Length (cm) 0   Width (cm) 4   Depth (cm) 0   Area (cm2) 0   Tract/undermine         Procedures:  New pt visit level IV   Debridement :  none   Cleansed with:        nss                                                                  Periwound protected with: non sting skin prep   Primary dressing: silver acti 7   Secondary Dressing: biatin foam and transparent picture frame   Other: pt shopping for bra with better band that will not rub sutured area     Patient Education: showed  how to do dressing change.  Pt will be out of town for 2 weeks    Professional Collaboration: Dr Ridley in to assess      Assessment:      Wound etiology: surgical, irritation from bra band    Wound Progress:  Area is stable    Rationale for Treatment: minimize local bacteria    Patient tolerance/compliance:  Pt eager for area to completely resolve without complication    Complicating factors: immunocompromised    Need for ongoing Advanced Wound Care services: wound observation to assess for poor progression to complete healing      Plan:      Treatment Plan and Recommendations:  Diagnosis/ICD9: surgical incision  Procedures/CPT: visit level 1, 2, 3      Frequency: 2x/week - reduce to 1x/week if progress is uneventful      Treatment Goals:  LTG 4 Weeks   incision resolved without complication in 4 weeks                                                        At the time of each visit a thorough assessment of the patient is completed to assure the  appropriateness of our plan of care.  The dressings or modalities may need to be adapted   from the original plan to address any significant changes in the wound environment.          Clinician Signature:_______________________________Date__________________      Physician Signature:______________________________Date:__________________

## 2017-05-15 NOTE — PROGRESS NOTES
Subjective:      Lupe Webb is a 62 y.o. female who presents with a breast wound.    HPI   Pt seen with Inessa Oneal PT for new pt evaluation.    Pt. Presents with a new breast wound.  She had breast implants in 2000.    She had new implants put in in 11/2016 with Dr. Garcia.  She tolerated the   procedure well however a few weeks later she noticed a 5mm partial thicknes   wound in the mid lateral aspect of the right breast.  Since then she has had   a lot of issues healing this wound.  She has had several debridements with re-suturing.    She has done 2 rounds of Bactrim and 2 rounds of Keflex with no resolve (the wound area has not been cultured).    She has a history of RA and has been on methotrexate and prednisone.    She saw her rheumatologist last week and she had her stopped the   methotrexate as that is causing immune suppression.    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    Medication  NS SOLN 50 mL with methotrexate PF 25 MG/ML SOLN 30 mg/m2   Omega-3 Fatty Acids (SALMON OIL PO)   metaxalone (SKELAXIN) 800 MG Tab   sildenafil citrate (VIAGRA) 25 MG Tab   alprazolam (XANAX) 0.5 MG Tab   CycloSPORINE (RESTASIS OP)   zonisamide (ZONEGRAN) 100 MG CAPS   predniSONE (DELTASONE) 1 MG TABS, 7 pills daily   zolpidem (AMBIEN) 5 MG TABS    Allergies  Iodine- vomitting  Shellfish- vomitting  PCN- vomitting    SH- Pt on disability. Previous . Lives with .  Denies any tobacco use.   No alcohol.  No drug  use.    Review of Systems   Constitutional: Negative for fever and chills.   Gastrointestinal: Negative for nausea and vomiting.   Musculoskeletal: Positive for back pain.   Neurological: Positive for headaches.          Objective:       Physical Exam   Constitutional: She is oriented to person, place, and time. She appears well-developed and well-nourished.   HENT:   Head: Normocephalic and atraumatic.   Pulmonary/Chest: Effort normal.   Musculoskeletal:        Arms:  Right breast incision.  Sutures are intact.  Wound looks clean and dry at this point.  No erythema or drainage noted.   Neurological: She is alert and oriented to person, place, and time.   Psychiatric: She has a normal mood and affect.          Assessment/Plan:     Right breast wound- likely due to immune suppression from medication (prednisone and methotrexate).     Also it appears that there is constant friction from her bra.  Advised to get surgical bra so that there isnt constant friction to the wound area.    Advised to discontinue antibiotic.  OK to cont. Methotrexate.    Pt leaving for 10 days on Friday.  When she returns will treat twice weekly.    Dressing by Inessa.

## 2017-05-18 ENCOUNTER — NON-PROVIDER VISIT (OUTPATIENT)
Dept: WOUND CARE | Facility: MEDICAL CENTER | Age: 62
End: 2017-05-18
Attending: FAMILY MEDICINE
Payer: MEDICARE

## 2017-05-18 PROCEDURE — A6212 FOAM DRG <=16 SQ IN W/BORDER: HCPCS

## 2017-05-18 PROCEDURE — 99212 OFFICE O/P EST SF 10 MIN: CPT

## 2017-05-18 NOTE — WOUND TEAM
Advanced Wound Care  [facility]  [address]  [address]  [place] [address]  [phone] Fax: [phone]      Encounter  For Certification Period: 5/15/17 - 6/15/17      Referring Physician: Jina De Santiago MD  Primary Physician:        Consulting Physicians:   Dr Garcia = plastic surgeon      Wound(s): right breast      Start of Care:     5/15/17  Subjective:        HPI:     Pt has been referred to the wound clinic for non-healing right breast implant x3 revisions.     Pt had augmentation surgery 17 years ago.  Implants were replaced by Dr Garcia 11/2016.  Pt failed to heal well on the right side.  On 4/28/17 pt had a scab on the incision line that fell off and Dr Garcia placed her on abx and sutured the site.  Pt reports that debridement and suturing have occurred on several occasions.         Pain:      0/10      Past Medical History:  Past Medical History   Diagnosis Date   • Indigestion    • Anesthesia      problem coming out of it, very shaky   • Other specified disorder of intestines      diarrhea, constipation   • Heart burn    • Pain 4/7/14     5.5/10, 10/25/16 generalized pain throughout body   • Lupus (CMS-HCC)    • Sjogren's syndrome    • Arthritis      osteo/lupus arthritis, feet, hands, neck, back   • DDD (degenerative disc disease), cervical    • Unspecified cataract      IOLOU   • History of shingles    • Chronic pain syndrome      Current Medications:    Current outpatient prescriptions:   •  NS SOLN 50 mL with methotrexate PF 25 MG/ML SOLN 30 mg/m2, 30 mg/m2 by Intravenous route Once. Indications: weekly, Disp: , Rfl:   •  Omega-3 Fatty Acids (SALMON OIL PO), Take  by mouth., Disp: , Rfl:   •  metaxalone (SKELAXIN) 800 MG Tab, Take 800 mg by mouth 3 times a day., Disp: , Rfl:   •  sildenafil citrate (VIAGRA) 25 MG Tab, Take 20 mg by mouth every day. Indications: Raynaud's Phenomenon of Unknown Cause, Disp: , Rfl:   •  alprazolam (XANAX) 0.5 MG Tab, Take 0.5 Tabs by mouth at  bedtime as needed for Sleep., Disp: 30 Tab, Rfl: 0  •  CycloSPORINE (RESTASIS OP), 1 Drop by Ophthalmic route 2 Times a Day. Both eyes, Disp: , Rfl:   •  zonisamide (ZONEGRAN) 100 MG CAPS, Take 300 mg by mouth every bedtime. Indications: neuropathy, Disp: , Rfl:   •  predniSONE (DELTASONE) 1 MG TABS, Take 7 mg by mouth every day. 7 pills per day, Disp: , Rfl:   •  zolpidem (AMBIEN) 5 MG TABS, Take  by mouth at bedtime as needed., Disp: , Rfl:   Allergies: Contrast media with iodine; Shellfish allergy; and Penicillins  Past Surgical History:   Past Surgical History   Procedure Laterality Date   • Cataract phaco with iol  4/9/2014     Performed by Destiney Mccurdy M.D. at SURGERY SAME DAY [facility]   • Cataract phaco with iol  4/18/2014     Performed by Destiney Mccurdy M.D. at SURGERY SAME DAY 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  She is able to stay active.      Objective:    Tests and Measures:    Orthotic, protective, supportive devices:      Wound Characteristics                                                    Location: right breast    Initial Evaluation  Date: 5/15/17 5/18/17   Tissue Type and %: No wound bed visable No open wound   Periwound: dry dry   Drainage: dry dry   Exposed structures Sutures - dissolvable per pt sutures   Wound Edges:   approximated approximated   Odor: none none   S&S of Infection:   none none   Edema: none none   Sensation: Decreased  around incision                      Measurements: Initial Evaluation  Date: 5/15/17   Length (cm) 0   Width (cm) 4   Depth (cm) 0   Area (cm2) 0   Tract/undermine         Procedures:  New pt visit level IV   Debridement :  none   Cleansed with:        nss                                                                  Periwound protected with: non sting skin prep   Primary dressing: silver acti 7   Secondary Dressing: biatin foam and transparent picture frame   Other: pt shopping for bra with better band that will not rub sutured area     Patient Education: showed  how to do dressing change.  Pt will be out of town for 2 weeks    Professional Collaboration: Dr Ridley in to assess      Assessment:      Wound etiology: surgical, irritation from bra band    Wound Progress:  Area is stable    Rationale for Treatment: minimize local bacteria    Patient tolerance/compliance:  Pt eager for area to completely resolve without complication    Complicating factors: immunocompromised    Need for ongoing Advanced Wound Care services: wound observation to assess for poor progression to complete healing      Plan:      Treatment Plan and Recommendations:  Diagnosis/ICD9: surgical incision  Procedures/CPT: visit level 1, 2, 3      Frequency: 2x/week - reduce to 1x/week if progress is uneventful      Treatment Goals:  LTG 4 Weeks   incision resolved without complication in 4 weeks                                                        At the time of each visit a thorough assessment of the patient is completed to assure the  appropriateness of our plan of care.  The dressings or modalities may need to be adapted   from the original plan to address any significant changes in the wound environment.          Clinician Signature:_______________________________Date__________________      Physician Signature:______________________________Date:__________________

## 2017-05-22 ENCOUNTER — APPOINTMENT (OUTPATIENT)
Dept: WOUND CARE | Facility: MEDICAL CENTER | Age: 62
End: 2017-05-22
Attending: FAMILY MEDICINE
Payer: MEDICARE

## 2017-05-31 ENCOUNTER — OFFICE VISIT (OUTPATIENT)
Dept: WOUND CARE | Facility: MEDICAL CENTER | Age: 62
End: 2017-05-31
Attending: FAMILY MEDICINE
Payer: MEDICARE

## 2017-05-31 DIAGNOSIS — S21.009A INCISIONAL BREAST WOUND: ICD-10-CM

## 2017-05-31 DIAGNOSIS — T81.89XS NONHEALING SURGICAL WOUND, SEQUELA: ICD-10-CM

## 2017-05-31 PROBLEM — T81.89XA NONHEALING SURGICAL WOUND: Status: ACTIVE | Noted: 2017-05-31

## 2017-05-31 PROCEDURE — 1036F TOBACCO NON-USER: CPT | Performed by: FAMILY MEDICINE

## 2017-05-31 PROCEDURE — 99212 OFFICE O/P EST SF 10 MIN: CPT | Performed by: FAMILY MEDICINE

## 2017-05-31 PROCEDURE — G8432 DEP SCR NOT DOC, RNG: HCPCS | Performed by: FAMILY MEDICINE

## 2017-05-31 PROCEDURE — 97602 WOUND(S) CARE NON-SELECTIVE: CPT

## 2017-05-31 PROCEDURE — G8420 CALC BMI NORM PARAMETERS: HCPCS | Performed by: FAMILY MEDICINE

## 2017-05-31 PROCEDURE — A6212 FOAM DRG <=16 SQ IN W/BORDER: HCPCS

## 2017-05-31 PROCEDURE — A6402 STERILE GAUZE <= 16 SQ IN: HCPCS

## 2017-05-31 PROCEDURE — 304879 HCHG DRESSING-COLLAGEN 4.34 SHEET

## 2017-05-31 PROCEDURE — 3017F COLORECTAL CA SCREEN DOC REV: CPT | Performed by: FAMILY MEDICINE

## 2017-05-31 PROCEDURE — 3014F SCREEN MAMMO DOC REV: CPT | Performed by: FAMILY MEDICINE

## 2017-05-31 PROCEDURE — A6209 FOAM DRSG <=16 SQ IN W/O BDR: HCPCS

## 2017-05-31 NOTE — WOUND TEAM
Advanced Wound Care  [facility]  [address]  [address]  [place] [address]  [phone] Fax: [phone]      Encounter  For Certification Period: 5/15/17 - 6/15/17      Referring Physician: Jina De Santiago MD  Primary Physician:        Consulting Physicians:   Dr Garcia = plastic surgeon      Wound(s): right breast      Start of Care:     5/15/17  Subjective:        HPI:     Pt has been referred to the wound clinic for non-healing right breast implant x3 revisions.     Pt had augmentation surgery 17 years ago.  Implants were replaced by Dr Garcia 11/2016.  Pt failed to heal well on the right side.  On 4/28/17 pt had a scab on the incision line that fell off and Dr Garcia placed her on abx and sutured the site.  Pt reports that debridement and suturing have occurred on several occasions.         Pain: denies today    Past Medical History:  Past Medical History   Diagnosis Date   • Indigestion    • Anesthesia      problem coming out of it, very shaky   • Other specified disorder of intestines      diarrhea, constipation   • Heart burn    • Pain 4/7/14     5.5/10, 10/25/16 generalized pain throughout body   • Lupus (CMS-HCC)    • Sjogren's syndrome    • Arthritis      osteo/lupus arthritis, feet, hands, neck, back   • DDD (degenerative disc disease), cervical    • Unspecified cataract      IOLOU   • History of shingles    • Chronic pain syndrome      Current Medications:    Current outpatient prescriptions:   •  NS SOLN 50 mL with methotrexate PF 25 MG/ML SOLN 30 mg/m2, 30 mg/m2 by Intravenous route Once. Indications: weekly, Disp: , Rfl:   •  Omega-3 Fatty Acids (SALMON OIL PO), Take  by mouth., Disp: , Rfl:   •  metaxalone (SKELAXIN) 800 MG Tab, Take 800 mg by mouth 3 times a day., Disp: , Rfl:   •  sildenafil citrate (VIAGRA) 25 MG Tab, Take 20 mg by mouth every day. Indications: Raynaud's Phenomenon of Unknown Cause, Disp: , Rfl:   •  alprazolam (XANAX) 0.5 MG Tab, Take 0.5 Tabs by mouth at  bedtime as needed for Sleep., Disp: 30 Tab, Rfl: 0  •  CycloSPORINE (RESTASIS OP), 1 Drop by Ophthalmic route 2 Times a Day. Both eyes, Disp: , Rfl:   •  zonisamide (ZONEGRAN) 100 MG CAPS, Take 300 mg by mouth every bedtime. Indications: neuropathy, Disp: , Rfl:   •  predniSONE (DELTASONE) 1 MG TABS, Take 7 mg by mouth every day. 7 pills per day, Disp: , Rfl:   •  zolpidem (AMBIEN) 5 MG TABS, Take  by mouth at bedtime as needed., Disp: , Rfl:   Allergies: Contrast media with iodine; Shellfish allergy; and Penicillins  Past Surgical History:   Past Surgical History   Procedure Laterality Date   • Cataract phaco with iol  4/9/2014     Performed by Destiney Mccurdy M.D. at SURGERY SAME DAY [facility]   • Cataract phaco with iol  4/18/2014     Performed by Destiney Mccurdy M.D. at SURGERY SAME DAY 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  She is able to stay active.      Objective:    Tests and Measures: n/a today    Orthotic, protective, supportive devices: none     Wound Characteristics                                                    Location: right breast    Initial Evaluation  Date: 5/15/17 Encounter note  Date: 5/31/17   Tissue Type and %: No wound bed visable Capsule visible at wound bed, no granular tissue visible. Flashlight used to see intact capsule, 2 small open draining areas at medial and lateral edge of incision, epithelial tissue bridge between   Periwound:  dry Intact, scar tissue   Drainage: dry Minimal ss   Exposed structures Sutures - dissolvable per pt capsule   Wound Edges:   Approximated Open and either end of incision, closed between   Odor: None None   S&S of Infection:   None None   Edema: None None   Sensation: Decreased around incision inc                     Measurements: Initial Evaluation  Date: 5/15/17 Encounter note  Date: 05/31/2017   Length (cm) 0 0.3   Width (cm) 4 3   Depth (cm) 0 ~ 0.1   Area (cm2) 0 0.9 cm2   Tract/undermine  Apparent undermining circumferentially ~ 0.3, unable to probe due to capsule in place     Do not probe into wound area, capsule present   Procedures:                Debridement :  Non selective with gauze to remove crusted biofilm   Cleansed with:  NS                                                                 Periwound protected with: non sting skin prep, moisture barrier cream   Primary dressing: endoform moistened with NS and HFB ready   Secondary Dressing: biatin foam, hypafix tape and transparent tegaderm picture frame               Other: none     Patient Education: Pt just returned from 10 day trip and surgical incision has opened and is draining. Spouse had been applying acticoat 7 and adhesive foam while they were gone. Pt informed clinician that she has not told Dr. Garcia that she has been coming here for care. Pt was referred here by her PCP.  Clinician called MD and informed him that incision is open and revealing an intact capsule. Instructed by MD to fax most recent progress note to him and he will see her in the office tomorrow. Pt to keep dressing CDI and depending on assessment of surgeon, will continue with once weekly wound care if surgeon approves of dressing care. Pt and spouse verbalized understanding to all.    Professional Collaboration: Dr Ridley in to assess wound and consult on progress. Will trial new dressing as outlined above and pt to return to surgeon tomorrow.      Assessment:      Wound  etiology: surgical, irritation from bra band    Wound Progress:  Incision open    Rationale for Treatment: HFB ready for antimicrobial properties and absorption; and endoform to provide matrix scaffolding to facilitate granulation.      Patient tolerance/compliance:  Pt eager for area to completely resolve without complication    Complicating factors: immunocompromised    Need for ongoing Advanced Wound Care services: wound observation to assess for poor progression to complete healing      Plan:      Treatment Plan and Recommendations:  Diagnosis/ICD9: surgical incision  Procedures/CPT: non selective 12149      Frequency: 2x/week - reduce to 1x/week if progress is uneventful      Treatment Goals:  LTG 4 Weeks   incision resolved without complication in 4 weeks                                                        At the time of each visit a thorough assessment of the patient is completed to assure the  appropriateness of our plan of care.  The dressings or modalities may need to be adapted   from the original plan to address any significant changes in the wound environment.          Clinician Signature:_______________________________Date__________________      Physician Signature:______________________________Date:__________________

## 2017-06-02 ENCOUNTER — OFFICE VISIT (OUTPATIENT)
Dept: WOUND CARE | Facility: MEDICAL CENTER | Age: 62
End: 2017-06-02
Attending: FAMILY MEDICINE
Payer: MEDICARE

## 2017-06-02 VITALS
OXYGEN SATURATION: 97 % | TEMPERATURE: 100 F | SYSTOLIC BLOOD PRESSURE: 130 MMHG | DIASTOLIC BLOOD PRESSURE: 67 MMHG | HEART RATE: 74 BPM

## 2017-06-02 DIAGNOSIS — M50.30 DDD (DEGENERATIVE DISC DISEASE), CERVICAL: Chronic | ICD-10-CM

## 2017-06-02 DIAGNOSIS — S21.009A INCISIONAL BREAST WOUND: ICD-10-CM

## 2017-06-02 DIAGNOSIS — R68.2 DRY MOUTH: ICD-10-CM

## 2017-06-02 DIAGNOSIS — K21.9 GASTROESOPHAGEAL REFLUX DISEASE WITHOUT ESOPHAGITIS: Chronic | ICD-10-CM

## 2017-06-02 DIAGNOSIS — Z79.52 CURRENT CHRONIC USE OF SYSTEMIC STEROIDS: Chronic | ICD-10-CM

## 2017-06-02 DIAGNOSIS — M32.9 LUPUS ARTHRITIS (HCC): Chronic | ICD-10-CM

## 2017-06-02 DIAGNOSIS — M35.00 SJOGREN'S SYNDROME, WITH UNSPECIFIED ORGAN INVOLVEMENT (HCC): Chronic | ICD-10-CM

## 2017-06-02 DIAGNOSIS — T81.89XA NONHEALING SURGICAL WOUND, INITIAL ENCOUNTER: Primary | ICD-10-CM

## 2017-06-02 DIAGNOSIS — I73.00 RAYNAUD'S DISEASE WITHOUT GANGRENE: Chronic | ICD-10-CM

## 2017-06-02 PROCEDURE — G8432 DEP SCR NOT DOC, RNG: HCPCS | Performed by: FAMILY MEDICINE

## 2017-06-02 PROCEDURE — 1036F TOBACCO NON-USER: CPT | Performed by: FAMILY MEDICINE

## 2017-06-02 PROCEDURE — G8420 CALC BMI NORM PARAMETERS: HCPCS | Performed by: FAMILY MEDICINE

## 2017-06-02 PROCEDURE — 3017F COLORECTAL CA SCREEN DOC REV: CPT | Performed by: FAMILY MEDICINE

## 2017-06-02 PROCEDURE — 3014F SCREEN MAMMO DOC REV: CPT | Performed by: FAMILY MEDICINE

## 2017-06-02 PROCEDURE — 99205 OFFICE O/P NEW HI 60 MIN: CPT | Performed by: FAMILY MEDICINE

## 2017-06-02 PROCEDURE — 99202 OFFICE O/P NEW SF 15 MIN: CPT | Performed by: FAMILY MEDICINE

## 2017-06-02 ASSESSMENT — ENCOUNTER SYMPTOMS
FOCAL WEAKNESS: 0
FALLS: 0
DOUBLE VISION: 0
COUGH: 0
NERVOUS/ANXIOUS: 0
PALPITATIONS: 0
EYE DISCHARGE: 0
SHORTNESS OF BREATH: 0
CHILLS: 0
MYALGIAS: 0
VOMITING: 0
FEVER: 0
NAUSEA: 0
POLYDIPSIA: 0
BRUISES/BLEEDS EASILY: 0
SORE THROAT: 0
SPUTUM PRODUCTION: 0
ABDOMINAL PAIN: 0
BLURRED VISION: 0
SEIZURES: 0
DEPRESSION: 0

## 2017-06-02 ASSESSMENT — PAIN SCALES - GENERAL: PAINLEVEL_OUTOF13: 0

## 2017-06-02 NOTE — PROGRESS NOTES
"Subjective:      Lupe Webb is a 62 y.o. disabled female who presents with non-healing R inferior breast incisional wound          HPI  Lupe referred to [facility] by Dr. Jina De Santiago @ [facility] for the evaluation and treatment of her persistent R inferior breast wound, s/p BK Mammoplasty Agumentation re-do in 11/2/2016 by Dr. Garcia.  Since the time of the surgery, the patient has had difficulty healing her inferior R breast incision.  She has had multiple debridements and closure attempts (6-7x) with Dr. Garcia, last 6/1/17, in which the wound edges were glued together.  She states that the wound has opened without provocation in the past.  She has been treated with Bactrim > 2 rounds and Keflex > 2 rounds. In addition, she continues to be seen at [facility].  Last measurements at [facility] prior to debridement and closure attempt on 6/1 was 0.3 x 3 cm.      Lupe has multiple complicating factors to her wound healing including Lupus and Sjogren's syndrome, and is currently maintained on methotrexate and prednisone for these, under the direction of Inessa Cruz MD in [place], [place].  She notes that she did go off of Cellcept for her initial surgery with Dr. Garcia (1 week buffer at both ends) and she currently remains off cellcept.    MTX seems to help much better than cellcept.  She is using this to wean off prednisone (8mg --> currently @ 5 mg).  She notes that she's been on Prednisone for  >8 years.      She states that she sees the dermatologist frequently for skin cancer - and has had difficult healing from their excisional procedures.  She states she originally thought this was due to Raynaud's and Peripheral Neuropathy.   However, she does note that she healed well from back surgery when her incision was \"glued together\".  She notes that her skin is very thin.    My personal communication with Dr. Garcia on 6/1 revealed that there were very few surgical options left to improve the " patient's wound healing.  He suspects the chronic inflmmation and vasculitis from her Lupus is impairing her ability to heal.  He noted that the patient's wound is not infected at this time.  My personal communication with Dr. Cruz' office finds that they are OK with the patient stopping MTX for the time being that she is undergoing HBOT treatment.     Consults:  Plastics - Darryl Garcia MD - [address], [place] [address].  [phone]  PCP - Jina De Santiago MD - [address] [facility] [address] [facility] [address], [place] [address].  [phone]  Rheumatology - Inessa Cruz MD -  [address], [place] [address].  [phone]    Radiology   9/23/16 - Mammo with CAD BK  IMPRESSION:      1.  Breasts are heterogeneously dense, which may obscure small masses. No gross evidence of malignancy.     2. The visible implant contours are intact.     3.  Screening mammogram in one year is recommended.     R2 - Category 2:  Benign Finding(s)    Past Medical History   Diagnosis Date   • Indigestion    • Anesthesia      problem coming out of it, very shaky   • Other specified disorder of intestines      diarrhea, constipation   • Heart burn    • Pain 4/7/14     5.5/10, 10/25/16 generalized pain throughout body   • Lupus (CMS-HCC)    • Sjogren's syndrome    • Arthritis      osteo/lupus arthritis, feet, hands, neck, back   • DDD (degenerative disc disease), cervical    • Unspecified cataract      IOLOU   • History of shingles    • Chronic pain syndrome      Past Surgical History   Procedure Laterality Date   • Cataract phaco with iol  4/9/2014     Performed by Destiney Mccurdy M.D. at SURGERY SAME DAY [facility]   • Cataract phaco with iol  4/18/2014     Performed by Destiney Mccurdy M.D. at SURGERY SAME DAY [facility]   • Lumbar laminectomy diskectomy Left 8/5/2015     Procedure: POSTERIOR L5-S1 HEMILAMINOTOMY AND MICRODISCECTOMY;  Surgeon: Kaleb Lucia M.D.;  Location: SURGERY [facility];  Service:    • Pr enlarge breast with  implant     • Tumor excision with biopsy  1998     pallet   • Breast implant revision Bilateral 11/2/2016     Procedure: BREAST IMPLANT - EXCHANGE OF SALINE TO SILICONE WITH REPOSITIONING OF LATERAL FOLDS;  Surgeon: Darryl Garcia M.D.;  Location: SURGERY [facility];  Service:      Allergies   Allergen Reactions   • Contrast Media With Iodine [Iodine]      Rash, same with topical iodine     • Shellfish Allergy Vomiting     Rash, hives,    • Penicillins Vomiting       Current outpatient prescriptions:   •  NS SOLN 50 mL with methotrexate PF 25 MG/ML SOLN 30 mg/m2, 30 mg/m2 by Intravenous route Once. Indications: weekly, Disp: , Rfl:   •  Omega-3 Fatty Acids (SALMON OIL PO), Take  by mouth., Disp: , Rfl:   •  metaxalone (SKELAXIN) 800 MG Tab, Take 800 mg by mouth 3 times a day., Disp: , Rfl:   •  sildenafil citrate (VIAGRA) 25 MG Tab, Take 20 mg by mouth every day. Indications: Raynaud's Phenomenon of Unknown Cause, Disp: , Rfl:   •  alprazolam (XANAX) 0.5 MG Tab, Take 0.5 Tabs by mouth at bedtime as needed for Sleep., Disp: 30 Tab, Rfl: 0  •  CycloSPORINE (RESTASIS OP), 1 Drop by Ophthalmic route 2 Times a Day. Both eyes, Disp: , Rfl:   •  zonisamide (ZONEGRAN) 100 MG CAPS, Take 300 mg by mouth every bedtime. Indications: neuropathy, Disp: , Rfl:   •  predniSONE (DELTASONE) 1 MG TABS, Take 7 mg by mouth every day. 7 pills per day, Disp: , Rfl:   •  zolpidem (AMBIEN) 5 MG TABS, Take  by mouth at bedtime as needed., Disp: , Rfl:     Social History     Social History   • Marital Status:      Spouse Name: Otis    • Number of Children: 3   • Years of Education: N/A     Occupational History   • Not on file.     Social History Main Topics   • Smoking status: Never Smoker    • Smokeless tobacco: Never Used   • Alcohol Use: No   • Drug Use: No   • Sexual Activity:     Partners: Female     Other Topics Concern   • Not on file     Social History Narrative    She is on Disability - Lupus / Sjogren's /  Raynaud's / Fibromyalgia / DJD    5 grandkids.      Originally from the [facility]  [place]         Family History   Problem Relation Age of Onset   • Cancer Mother 78     lymphoma   • Heart Disease Father    • Cancer Sister      breast cancer treated with radiation   • Other Sister      eye melanoma treated       Review of Systems   Constitutional: Negative for fever, chills and malaise/fatigue.   HENT: Negative for congestion, ear pain, nosebleeds, sore throat and tinnitus.         She experiences pressure in her salivary glands   Eyes: Negative for blurred vision, double vision and discharge.   Respiratory: Negative for cough, sputum production and shortness of breath.    Cardiovascular: Negative for chest pain and palpitations.   Gastrointestinal: Negative for nausea, vomiting and abdominal pain.   Musculoskeletal: Negative for myalgias and falls.   Neurological: Negative for focal weakness and seizures.   Endo/Heme/Allergies: Negative for polydipsia. Does not bruise/bleed easily.   Psychiatric/Behavioral: Negative for depression. The patient is not nervous/anxious.        Hyperbaric specific ROS    CLOTHING  I spoke to the patient about prohibited clothing and items within the chamber. Specifically, we discussed 100% cotton clothing only.  Patient will remove/omit cosmetics and hair products (including wigs, hair pieces and weaves), jewelry, prosthetics (such as dentures and hearing aides) and contact lenses, dentures, hearing aids, medication patches.    HEENT  The patient denies recent dental work or infected teeth.  There is no history of recent sinus infection or surgeries.  The patient does not have a current URI.  The patient reports that they are able to clear their ears with pressure changes without difficulty.      PSYCH  The patient denies any history of confinement anxiety or claustrophobia in the past.    GI  The patient will avoid carbonated beverages and caffeinated substances for  24 hours prior to HBOT treatment    SKIN  The patient has avoided the use of skin creams / lotions, cosmetics, hair products, and medication patches    PULMONARY  The patient denies history of untreated pneumothorax or spontaneous pneumothorax.      MEDICATIONS  Patient is not currently taking chemotherapy medications (Adriamycin, Bleomycin, Cis Platinum) or Antabuse / Disulfiram         Objective:     /67 mmHg  Pulse 74  Temp(Src) 37.8 °C (100 °F)  SpO2 97%     Physical Exam   Constitutional: She is oriented to person, place, and time. Vital signs are normal. She appears well-developed and well-nourished. She is active and cooperative.  Non-toxic appearance. She does not have a sickly appearance. She does not appear ill. No distress. She is not intubated.   HENT:   Head: Normocephalic and atraumatic.   Right Ear: Hearing, tympanic membrane, external ear and ear canal normal.   Left Ear: Hearing, tympanic membrane, external ear and ear canal normal.   Nose: Nose normal. No rhinorrhea. Right sinus exhibits no maxillary sinus tenderness and no frontal sinus tenderness. Left sinus exhibits no maxillary sinus tenderness and no frontal sinus tenderness.   Mouth/Throat: Uvula is midline, oropharynx is clear and moist and mucous membranes are normal. No oral lesions. No uvula swelling. No oropharyngeal exudate, posterior oropharyngeal edema, posterior oropharyngeal erythema or tonsillar abscesses.   Eyes: Conjunctivae, EOM and lids are normal. Pupils are equal, round, and reactive to light. Right eye exhibits no discharge and no hordeolum. Left eye exhibits no discharge and no hordeolum. No scleral icterus.   Neck: Normal range of motion and phonation normal. Carotid bruit is not present. No tracheal deviation present. No thyroid mass and no thyromegaly present.   Cardiovascular: Normal rate, regular rhythm, S1 normal, S2 normal and normal heart sounds.   No extrasystoles are present. Exam reveals no gallop.    No  murmur heard.  Pulmonary/Chest: Effort normal and breath sounds normal. No accessory muscle usage. No apnea, no tachypnea and no bradypnea. She is not intubated. No respiratory distress. She has no decreased breath sounds. She has no wheezes. She has no rhonchi. She has no rales.   Abdominal: Soft. Normal appearance and bowel sounds are normal. She exhibits no pulsatile liver and no ascites. There is no hepatosplenomegaly, splenomegaly or hepatomegaly. There is no tenderness.   Musculoskeletal: She exhibits no edema.   Lymphadenopathy:        Head (right side): Submandibular adenopathy present. No submental, no tonsillar, no preauricular and no posterior auricular adenopathy present.        Head (left side): Submandibular adenopathy present. No submental, no tonsillar, no preauricular and no posterior auricular adenopathy present.     She has no cervical adenopathy.        Right cervical: No superficial cervical adenopathy present.       Left cervical: No superficial cervical adenopathy present.     She has no axillary adenopathy.        Right: No supraclavicular adenopathy present.        Left: No supraclavicular adenopathy present.   Neurological: She is alert and oriented to person, place, and time. She has normal reflexes. No cranial nerve deficit.   Skin: Skin is warm and dry. No rash noted. She is not diaphoretic. No erythema. No pallor.   R inferior breast wound not examined today   Psychiatric: She has a normal mood and affect. Her speech is normal and behavior is normal.   Vitals reviewed.              Assessment/Plan:   1. Nonhealing surgical wound, initial encounter  Appropriate candidate for HBOT - likely that the patient's wound healing is impeded by a multitude of factors, including vasculitis from Sjogren's / Raynaud's / Lupus, as well as her chronic use of Methotrexate and Prednisone, in addition to her age, postmenopausal status, and prior surgery & scar to the wound site.  HBOT should be beneficial  for her wound healing in terms of it's angiogenic and anti-vasculitic properties.  Have recommended to the patient that she d/c MTX and continue to decrease prednisone, per instructions from Dr. Cruz.  Anticipate HBOT treatment at pressure of 2.4 BRISEIDA x 90 minutes x 40 treatments or until wound has closed.     2. Incisional breast wound  Per Dr. Garcia, s/p Bilateral Saline-->Silicone breast implant Re-do 11/2/16 with recurrent non-healing surgical wound    3. Sjogren's syndrome, with unspecified organ involvement (CMS-HCC)  On Methotrexate and Prednisone per Dr. Cruz    4. Raynaud's disease without gangrene  On Methotrexate and Prednisone per Dr. Cruz    5. Lupus arthritis (CMS-HCC)  On Methotrexate and Prednisone per Dr. Cruz    6. Current chronic use of systemic steroids  On Methotrexate and Prednisone per Dr. Cruz    7. Dry mouth      8. DDD (degenerative disc disease), cervical      9. Gastroesophageal reflux disease without esophagitis          RISKS  The patient has been informed of the risk of fire within the chamber, barotrauma (especially involving the ears, sinuses, teeth).  They have been informed of the possible worsening of cataracts and of the transient myopia associated with long term hyperbaric treatment.  They have been informed of the possibility of O2 seizures and O2 toxicity. In addition, I have recommended Vitamin E, 400 IU PO daily for prophylaxis of O2 toxicity.   They have been informed of the possibility of Pulmonary toxicity and CNS toxicity, including N/V, Seizures, Muscle twitches, Vertigo, and other mental status changes.

## 2017-06-02 NOTE — MR AVS SNAPSHOT
Lupe Webb   2017 10:30 AM   Office Visit   MRN: [id number]    Department:  Hyperbaric Oxygen Ther   Dept Phone:  [phone]    Description:  Female : 1955   Provider:  Sean Espinoza M.D.           Allergies as of 2017     Allergen Noted Reactions    Contrast Media With Iodine [Iodine] 2014       Rash, same with topical iodine      Shellfish Allergy 2014   Vomiting    Rash, hives,     Penicillins 2014   Vomiting      You were diagnosed with     Nonhealing surgical wound, initial encounter   [222654]  -  Primary     Incisional breast wound   [521110]       Sjogren's syndrome, with unspecified organ involvement (CMS-HCC)   [5406171]       Raynaud's disease without gangrene   [7123471]       Lupus arthritis (CMS-HCC)   [339120]       Current chronic use of systemic steroids   [2863925]       Dry mouth   [638237]       DDD (degenerative disc disease), cervical   [149339]       Gastroesophageal reflux disease without esophagitis   [051516]         Vital Signs     Blood Pressure Pulse Temperature Oxygen Saturation Smoking Status       130/67 mmHg 74 37.8 °C (100 °F) 97% Never Smoker        Basic Information     Date Of Birth Sex Race Ethnicity Preferred Language    1955 Female White Non- English      Your appointments     Sep 08, 2017  2:30 PM   [facility] with [address] ([facility] [address])    [address] [id number] [phone]           Some exams require specific prep instructions that would have been given to you at time of scheduling. If you have any additional questions about the prep instructions, please call Imaging Scheduling at [phone] and press #[phone].              Problem List              ICD-10-CM Priority Class Noted - Resolved    Sjogren's syndrome (CMS-HCC) (Chronic) M35.00   1990 - Present    DDD (degenerative disc disease), cervical (Chronic) M50.30   2017 - Present    Osteoarthritis of multiple joints (Chronic) M15.9   4/26/2017 - Present    Lupus arthritis (CMS-HCC) (Chronic) M32.9   4/26/2017 - Present    Chronic pain syndrome (Chronic) G89.4   4/26/2017 - Present    Gastroesophageal reflux disease without esophagitis (Chronic) K21.9   4/26/2017 - Present    Lactose intolerance (Chronic) E73.9   4/26/2017 - Present    Raynaud's disease without gangrene (Chronic) I73.00   4/26/2017 - Present    Osteopenia (Chronic) M85.80   4/26/2017 - Present    Functional diarrhea (Chronic) K59.1   4/26/2017 - Present    Current chronic use of systemic steroids (Chronic) Z79.52   4/26/2017 - Present    Incisional breast wound S21.009A   5/15/2017 - Present    Nonhealing surgical wound T81.89XA   5/31/2017 - Present      Health Maintenance        Date Due Completion Dates    IMM DTaP/Tdap/Td Vaccine (1 - Tdap) 4/1/2019 (Originally 3/28/1974) ---    IMM ZOSTER VACCINE 4/1/2019 (Originally 3/28/2015) ---    MAMMOGRAM 9/23/2017 9/23/2016    PAP SMEAR 1/2/2020 1/2/2017 (Done)    Override on 1/2/2017: Done    COLONOSCOPY 8/21/2022 8/21/2012            Current Immunizations     Influenza Vaccine Quad Inj (Preserved) 9/27/2016      Below and/or attached are the medications your provider expects you to take. Review all of your home medications and newly ordered medications with your provider and/or pharmacist. Follow medication instructions as directed by your provider and/or pharmacist. Please keep your medication list with you and share with your provider. Update the information when medications are discontinued, doses are changed, or new medications (including over-the-counter products) are added; and carry medication information at all times in the event of emergency situations     Allergies:  CONTRAST MEDIA WITH IODINE - (reactions not documented)     SHELLFISH ALLERGY - Vomiting     PENICILLINS - Vomiting               Medications  Valid as of: June 02, 2017 - 12:16 PM    Generic Name Brand Name  Tablet Size Instructions for use    ALPRAZolam (Tab) XANAX 0.5 MG Take 0.5 Tabs by mouth at bedtime as needed for Sleep.        CycloSPORINE   1 Drop by Ophthalmic route 2 Times a Day. Both eyes        Metaxalone (Tab) SKELAXIN 800 MG Take 800 mg by mouth 3 times a day.        NS SOLN 50 mL with methotrexate PF 25 MG/ML SOLN 30 mg/m2   30 mg/m2 by Intravenous route Once. Indications: weekly        Omega-3 Fatty Acids   Take  by mouth.        PredniSONE (Tab) DELTASONE 1 MG Take 7 mg by mouth every day. 7 pills per day        Sildenafil Citrate (Tab) VIAGRA 25 MG Take 20 mg by mouth every day. Indications: Raynaud's Phenomenon of Unknown Cause        Zolpidem Tartrate (Tab) AMBIEN 5 MG Take  by mouth at bedtime as needed.        Zonisamide (Cap) ZONEGRAN 100 MG Take 300 mg by mouth every bedtime. Indications: neuropathy        .                 Medicines prescribed today were sent to:     [facility]  [facility] - [place], [place] - [address] [facility]    [address] [place] [address]    Phone: [phone] Fax: [phone]    Open 24 Hours?: No      Medication refill instructions:       If your prescription bottle indicates you have medication refills left, it is not necessary to call your provider’s office. Please contact your pharmacy and they will refill your medication.    If your prescription bottle indicates you do not have any refills left, you may request refills at any time through one of the following ways: The online [employer] system (except Urgent Care), by calling your provider’s office, or by asking your pharmacy to contact your provider’s office with a refill request. Medication refills are processed only during regular business hours and may not be available until the next business day. Your provider may request additional information or to have a follow-up visit with you prior to refilling your medication.   *Please Note: Medication refills are assigned a new Rx number when refilled electronically. Your pharmacy  may indicate that no refills were authorized even though a new prescription for the same medication is available at the pharmacy. Please request the medicine by name with the pharmacy before contacting your provider for a refill.        Your To Do List     Future Labs/Procedures Complete By Expires    AMB HYPERBARIC TREATMENT  As directed 6/2/2018      Instructions    After hyperbaric oxygen therapy treatment, it is normal to experience some congestion to the ears, muffling of sounds, or abnormal sounds to one or both ears.    If you experience pain or discomfort to one or both ears that does not resolve spontaneously, please contact the hyperbaric department at [phone].           Other Notes About Your Plan     Sjogren's specialist in [place]  Rheum: Dr. Inessa Cruz Gheens  Pain: Dr. Shankar (not actively seeing)  GYN: Dr. Sierra  GI: Dr. Clemente  PT: [facility]           MyChart Access Code: Activation code not generated  Current MyChart Status: Active

## 2017-06-02 NOTE — PATIENT INSTRUCTIONS
After hyperbaric oxygen therapy treatment, it is normal to experience some congestion to the ears, muffling of sounds, or abnormal sounds to one or both ears.    If you experience pain or discomfort to one or both ears that does not resolve spontaneously, please contact the hyperbaric department at [phone].

## 2017-06-06 ENCOUNTER — OFFICE VISIT (OUTPATIENT)
Dept: WOUND CARE | Facility: MEDICAL CENTER | Age: 62
End: 2017-06-06
Attending: FAMILY MEDICINE
Payer: MEDICARE

## 2017-06-06 VITALS
TEMPERATURE: 98.3 F | HEART RATE: 54 BPM | SYSTOLIC BLOOD PRESSURE: 132 MMHG | DIASTOLIC BLOOD PRESSURE: 85 MMHG | OXYGEN SATURATION: 99 %

## 2017-06-06 DIAGNOSIS — M35.00 SJOGREN'S SYNDROME, WITH UNSPECIFIED ORGAN INVOLVEMENT (HCC): Chronic | ICD-10-CM

## 2017-06-06 DIAGNOSIS — M32.9 LUPUS ARTHRITIS (HCC): Chronic | ICD-10-CM

## 2017-06-06 DIAGNOSIS — Z79.52 CURRENT CHRONIC USE OF SYSTEMIC STEROIDS: Chronic | ICD-10-CM

## 2017-06-06 DIAGNOSIS — S21.009A INCISIONAL BREAST WOUND: ICD-10-CM

## 2017-06-06 DIAGNOSIS — T81.89XA NONHEALING SURGICAL WOUND, INITIAL ENCOUNTER: ICD-10-CM

## 2017-06-06 PROCEDURE — 99183 HYPERBARIC OXYGEN THERAPY: CPT | Performed by: FAMILY MEDICINE

## 2017-06-06 PROCEDURE — G0277 HBOT, FULL BODY CHAMBER, 30M: HCPCS | Performed by: FAMILY MEDICINE

## 2017-06-06 PROCEDURE — G8420 CALC BMI NORM PARAMETERS: HCPCS | Performed by: FAMILY MEDICINE

## 2017-06-06 ASSESSMENT — ENCOUNTER SYMPTOMS
SHORTNESS OF BREATH: 0
DOUBLE VISION: 0
BLURRED VISION: 0
PALPITATIONS: 0
HEADACHES: 0
COUGH: 0
FEVER: 0
CHILLS: 0
DIZZINESS: 0

## 2017-06-06 ASSESSMENT — PAIN SCALES - GENERAL: PAINLEVEL_OUTOF13: 0

## 2017-06-06 NOTE — MR AVS SNAPSHOT
Lupe SAWYER Webb   2017 9:30 AM   Office Visit   MRN: [id number]    Department:  [facility]   Dept Phone:  [phone]    Description:  Female : 1955   Provider:  Moni Ridley M.D.           Allergies as of 2017     Allergen Noted Reactions    Contrast Media With Iodine [Iodine] 2014       Rash, same with topical iodine      Shellfish Allergy 2014   Vomiting    Rash, hives,     Penicillins 2014   Vomiting      You were diagnosed with     Nonhealing surgical wound, initial encounter   [295947]       Incisional breast wound   [148969]       Current chronic use of systemic steroids   [8606820]       Lupus arthritis (CMS-[facility])   [802129]       Sjogren's syndrome, with unspecified organ involvement (CMS-[facility])   [3502601]         Vital Signs     Blood Pressure Pulse Temperature Oxygen Saturation Smoking Status       132/85 mmHg 54 36.8 °C (98.3 °F) 99% Never Smoker        Basic Information     Date Of Birth Sex Race Ethnicity Preferred Language    1955 Female White Non- English      Your appointments     2017  9:30 AM   HBOT Supervision with HYPERBARIC CHAMBER 2   [facility] ([facility] [place] [address])    [address]  [place] [address]   [phone]            2017  9:30 AM   HBOT Supervision with HYPERBARIC CHAMBER 2   [facility] ([address] [place] [address])    [address]  [place] [address]   [phone]            2017  9:30 AM   HBOT Supervision with HYPERBARIC CHAMBER 2   [facility] ([facility] [address])    [address]  [place] [address]   [phone]            2017  9:30 AM   HBOT Supervision with HYPERBARIC CHAMBER 2   [facility] ([facility] [address])    [address]  [place] [address]   [phone]            2017  9:30 AM   HBOT Supervision with HYPERBARIC CHAMBER 2   [facility] ([address])    [address]  [place] [address]   [phone]            Jun 14, 2017  9:30 AM   HBOT Supervision with HYPERBARIC CHAMBER 2   [facility] ([address])    [address]  [place] [address]   [phone]            Medhat 15, 2017  9:30 AM   HBOT Supervision with HYPERBARIC CHAMBER 2   [facility] ([facility])    [address]  [place] [address]   [phone]            Jun 16, 2017  9:30 AM   HBOT Supervision with HYPERBARIC CHAMBER 2   [facility] ([address])    [address]  [place] [address]   [phone]            Jun 19, 2017  9:30 AM   HBOT Supervision with HYPERBARIC CHAMBER 2   [facility] ([facility])    [address]  [place] [address]   [phone]            Jun 20, 2017  9:30 AM   HBOT Supervision with HYPERBARIC CHAMBER 2   [facility] ([facility])    [address]  [place] [address]   [phone]            Jun 21, 2017  9:30 AM   HBOT Supervision with HYPERBARIC CHAMBER 2   [facility] ([address])    [address]  [place] [address]   [phone]            Jun 22, 2017  9:30 AM   HBOT Supervision with HYPERBARIC CHAMBER 2   [facility] ([address])    [address]  [place] [address]   [phone]            Jun 23, 2017  9:30 AM   HBOT Supervision with HYPERBARIC CHAMBER 2   [facility] ([address])    [address]  [place] [address]   [phone]            Jun 26, 2017  9:30 AM   HBOT Supervision with HYPERBARIC CHAMBER 2   [facility] ([facility])    [address]  [place] [address]   [phone]            Jun 27, 2017  9:30 AM   HBOT Supervision with HYPERBARIC CHAMBER 2   [facility] ([address])    [address]  [place] [address]   [phone]            Jun 28, 2017  9:30 AM   HBOT Supervision with HYPERBARIC CHAMBER 2   [facility] ([address])    [address]  [place] [address]   [phone]            Jun 29, 2017  9:30 AM   HBOT Supervision with HYPERBARIC CHAMBER 2   [facility] ([address])    [address]  [place] [address]   [phone]            Jun 30, 2017  9:30 AM   HBOT Supervision with HYPERBARIC CHAMBER 2   [facility] ([address])    [address]  [place] [address]   [phone]            Jul 03, 2017  9:30 AM   HBOT Supervision with HYPERBARIC CHAMBER 2   [facility] ([address])    [address]  [place] [address]   [phone]            Jul 04, 2017  9:30 AM   HBOT Supervision with HYPERBARIC CHAMBER 2   [facility] ([address])    [address]  [place] [address]   [phone]            Jul 05, 2017  9:30 AM   HBOT Supervision with HYPERBARIC CHAMBER 2   [facility] ([address])    [address]  [place] [address]   [phone]            Jul 06, 2017  9:30 AM   HBOT Supervision with HYPERBARIC CHAMBER 2   [facility] ([address])    [address]  [place] [address]   [phone]            Jul 07, 2017  9:30 AM   HBOT Supervision with HYPERBARIC CHAMBER 2   [facility] ([address])    [address]  [place] [address]   [phone]            Jul 10, 2017  9:30 AM   HBOT Supervision with HYPERBARIC CHAMBER 2   [facility] ([address])    [address]  [place] [address]   [phone]            Jul 11, 2017  9:30 AM   HBOT Supervision with HYPERBARIC CHAMBER 2   [facility] ([address])    [address]  [place] [address]   [phone]            Jul 12, 2017  9:30 AM   HBOT Supervision with HYPERBARIC CHAMBER 2   [facility] ([address])    [address]  [place] [address]   [phone]            Jul 13, 2017  9:30 AM   HBOT Supervision with HYPERBARIC CHAMBER 2   [facility] ([address])    [address]  [place] [address]   [phone]            Jul 14, 2017  9:30 AM   HBOT Supervision with HYPERBARIC CHAMBER 2   [facility] ([address])    [address]  [place] [address]   [phone]            Jul 17, 2017  9:30 AM   HBOT Supervision with HYPERBARIC CHAMBER 2   [facility] ([address])    [address]  [place] [address]   [phone]            Jul 18, 2017  9:30 AM   HBOT Supervision with HYPERBARIC CHAMBER 2   [facility] ([address])    [address]  [place] [address]   [phone]            Jul 19, 2017  9:30 AM   HBOT Supervision with HYPERBARIC CHAMBER 2   [facility] ([address])    [address]  [place] [address]   [phone]            Jul 20, 2017  9:30 AM   HBOT Supervision with HYPERBARIC CHAMBER 2   [facility] ([address])    [address]  [place] [address]   [phone]            Jul 21, 2017  9:30 AM   HBOT Supervision with HYPERBARIC CHAMBER 2   [facility] ([address])    [address]  [place] [address]   [phone]            Jul 24, 2017  9:30 AM   HBOT Supervision with HYPERBARIC CHAMBER 2   [facility] ([address])    [address]  [place] [address]   [phone]            Jul 25, 2017  9:30 AM   HBOT Supervision with HYPERBARIC CHAMBER 2   [facility] ([address])    [address]  [place] [address]   [phone]            Jul 26, 2017  9:30 AM   HBOT Supervision with HYPERBARIC CHAMBER 2   [facility] ([facility])    [address]  [place] [address]   [phone]            Jul 27, 2017  9:30 AM   HBOT Supervision with HYPERBARIC CHAMBER 2   [facility] ([facility])    [address]  [place] [address]   [phone]            Jul 28, 2017  9:30 AM   HBOT Supervision with HYPERBARIC CHAMBER 2   [facility] ([facility])    [address]  [place] [address]   [phone]            Jul 31, 2017  9:30 AM   HBOT Supervision with HYPERBARIC CHAMBER 2   [facility] ([facility])    [address]  [place] [address]   [phone]            Sep 08, 2017  2:30 PM   US SONOCINE with [facility] US 2   [facility] IMAGING BREAST [facility] ([address] [facility] [address])    [address]  [place] [address]   [phone]           Some exams require specific prep instructions that would have been given to you at time of scheduling. If you have any additional questions about the prep instructions, please call Imaging Scheduling at [phone] and press #[phone].              Problem List              ICD-10-CM Priority Class Noted - Resolved    Sjogren's syndrome (CMS-HCC) (Chronic) M35.00   4/26/1990 - Present    DDD (degenerative disc disease), cervical (Chronic) M50.30   4/26/2017 - Present    Osteoarthritis of multiple joints (Chronic) M15.9   4/26/2017 - Present    Lupus arthritis (CMS-HCC) (Chronic) M32.9   4/26/2017 - Present    Chronic pain syndrome (Chronic) G89.4   4/26/2017 - Present    Gastroesophageal reflux disease without esophagitis (Chronic) K21.9   4/26/2017 - Present    Lactose intolerance (Chronic) E73.9   4/26/2017 - Present    Raynaud's disease without gangrene (Chronic) I73.00    4/26/2017 - Present    Osteopenia (Chronic) M85.80   4/26/2017 - Present    Functional diarrhea (Chronic) K59.1   4/26/2017 - Present    Current chronic use of systemic steroids (Chronic) Z79.52   4/26/2017 - Present    Incisional breast wound S21.009A   5/15/2017 - Present    Nonhealing surgical wound T81.89XA   5/31/2017 - Present      Health Maintenance        Date Due Completion Dates    IMM DTaP/Tdap/Td Vaccine (1 - Tdap) 4/1/2019 (Originally 3/28/1974) ---    IMM ZOSTER VACCINE 4/1/2019 (Originally 3/28/2015) ---    MAMMOGRAM 9/23/2017 9/23/2016    PAP SMEAR 1/2/2020 1/2/2017 (Done)    Override on 1/2/2017: Done    COLONOSCOPY 8/21/2022 8/21/2012            Current Immunizations     Influenza Vaccine Quad Inj (Preserved) 9/27/2016      Below and/or attached are the medications your provider expects you to take. Review all of your home medications and newly ordered medications with your provider and/or pharmacist. Follow medication instructions as directed by your provider and/or pharmacist. Please keep your medication list with you and share with your provider. Update the information when medications are discontinued, doses are changed, or new medications (including over-the-counter products) are added; and carry medication information at all times in the event of emergency situations     Allergies:  CONTRAST MEDIA WITH IODINE - (reactions not documented)     SHELLFISH ALLERGY - Vomiting     PENICILLINS - Vomiting               Medications  Valid as of: June 06, 2017 -  1:45 PM    Generic Name Brand Name Tablet Size Instructions for use    ALPRAZolam (Tab) XANAX 0.5 MG Take 0.5 Tabs by mouth at bedtime as needed for Sleep.        CycloSPORINE   1 Drop by Ophthalmic route 2 Times a Day. Both eyes        Metaxalone (Tab) SKELAXIN 800 MG Take 800 mg by mouth 3 times a day.        NS SOLN 50 mL with methotrexate PF 25 MG/ML SOLN 30 mg/m2   30 mg/m2 by Intravenous route Once. Indications: weekly        Omega-3  Fatty Acids   Take  by mouth.        PredniSONE (Tab) DELTASONE 1 MG Take 7 mg by mouth every day. 7 pills per day        Sildenafil Citrate (Tab) VIAGRA 25 MG Take 20 mg by mouth every day. Indications: Raynaud's Phenomenon of Unknown Cause        Zolpidem Tartrate (Tab) AMBIEN 5 MG Take  by mouth at bedtime as needed.        Zonisamide (Cap) ZONEGRAN 100 MG Take 300 mg by mouth every bedtime. Indications: neuropathy        .                 Medicines prescribed today were sent to:     [id number] - [place], [place] - [address]    [address] [place] [address]    Phone: [phone] Fax: [phone]    Open 24 Hours?: No      Medication refill instructions:       If your prescription bottle indicates you have medication refills left, it is not necessary to call your provider’s office. Please contact your pharmacy and they will refill your medication.    If your prescription bottle indicates you do not have any refills left, you may request refills at any time through one of the following ways: The online [employer] system (except Urgent Care), by calling your provider’s office, or by asking your pharmacy to contact your provider’s office with a refill request. Medication refills are processed only during regular business hours and may not be available until the next business day. Your provider may request additional information or to have a follow-up visit with you prior to refilling your medication.   *Please Note: Medication refills are assigned a new Rx number when refilled electronically. Your pharmacy may indicate that no refills were authorized even though a new prescription for the same medication is available at the pharmacy. Please request the medicine by name with the pharmacy before contacting your provider for a refill.        Instructions    After hyperbaric oxygen therapy treatment, it is normal to experience some congestion to the ears, muffling of sounds, or abnormal sounds to one or both  ears.    If you experience pain or discomfort to one or both ears that does not resolve spontaneously, please contact the hyperbaric department at [phone].         Other Notes About Your Plan     Sjogren's specialist in [place]  Rheum: Dr. Inessa Cruz [place]  Pain: Dr. Shankar (not actively seeing)  GYN: Dr. Sierra  GI: Dr. Clemente  PT: [facility]           MyChart Access Code: Activation code not generated  Current MyChart Status: Active

## 2017-06-06 NOTE — PATIENT INSTRUCTIONS
After hyperbaric oxygen therapy treatment, it is normal to experience some congestion to the ears, muffling of sounds, or abnormal sounds to one or both ears.    If you experience pain or discomfort to one or both ears that does not resolve spontaneously, please contact the hyperbaric department at [phone].

## 2017-06-06 NOTE — PROGRESS NOTES
Subjective:      Lupe Webb is a 62 y.o. female who presents with a non healing breast wound.    HPI  Lupe presents for her first HBOT treatment today. She tolerated treatment well with no issues.  She was able to clear her ears with no pain or pressure.    Review of Systems   Constitutional: Negative for fever and chills.   HENT: Negative for ear discharge, ear pain and hearing loss.    Eyes: Negative for blurred vision and double vision.   Respiratory: Negative for cough and shortness of breath.    Cardiovascular: Negative for chest pain and palpitations.   Neurological: Negative for dizziness and headaches.          Objective:     Blood pressure 132/85, pulse 54, temperature 36.8 °C (98.3 °F), SpO2 99 %.     Physical Exam   Constitutional: She is oriented to person, place, and time. She appears well-developed and well-nourished.   HENT:   Head: Normocephalic and atraumatic.   Right Ear: Tympanic membrane, external ear and ear canal normal.   Left Ear: Tympanic membrane, external ear and ear canal normal.   Pulmonary/Chest: Effort normal.   Neurological: She is alert and oriented to person, place, and time.   Psychiatric: She has a normal mood and affect.   Vitals reviewed.         Assessment/Plan:     1. Nonhealing surgical wound, initial encounter  Appropriate candidate for HBOT - likely that the patient's wound healing is impeded by a multitude of factors,   including vasculitis from Sjogren's / Raynaud's / Lupus, as well as her chronic use of Methotrexate and Prednisone,   in addition to her age, postmenopausal status, and prior surgery & scar to the wound site.  HBOT should be   beneficial for her wound healing in terms of it's angiogenic and anti-vasculitic properties. Have recommended   to the patient that she d/c MTX and continue to decrease prednisone, per instructions from Dr. Cruz.   Patient completed her first HBOT treatment at a pressure of 2.0 BRISEIDA x 90 minutes at a descent/ascent of 1.0  PSI/minute.      2. Incisional breast wound  Per Dr. Garcia, s/p Bilateral Saline-->Silicone breast implant Re-do 11/2/16 with recurrent non-healing surgical wound    3. Sjogren's syndrome, with unspecified organ involvement (CMS-HCC)  On Methotrexate and Prednisone per Dr. Cruz    4. Raynaud's disease without gangrene  On Methotrexate and Prednisone per Dr. Cruz    5. Lupus arthritis (CMS-HCC)  On Methotrexate and Prednisone per Dr. Cruz    6. Current chronic use of systemic steroids  On Methotrexate and Prednisone per Dr. Cruz    7. Dry mouth      8. DDD (degenerative disc disease), cervical      9. Gastroesophageal reflux disease without esophagitis

## 2017-06-07 ENCOUNTER — APPOINTMENT (OUTPATIENT)
Dept: WOUND CARE | Facility: MEDICAL CENTER | Age: 62
End: 2017-06-07
Payer: COMMERCIAL

## 2017-06-07 ENCOUNTER — OFFICE VISIT (OUTPATIENT)
Dept: WOUND CARE | Facility: MEDICAL CENTER | Age: 62
End: 2017-06-07
Attending: FAMILY MEDICINE
Payer: MEDICARE

## 2017-06-07 VITALS
TEMPERATURE: 98.8 F | HEART RATE: 54 BPM | DIASTOLIC BLOOD PRESSURE: 80 MMHG | SYSTOLIC BLOOD PRESSURE: 140 MMHG | OXYGEN SATURATION: 100 %

## 2017-06-07 DIAGNOSIS — S21.009A INCISIONAL BREAST WOUND: ICD-10-CM

## 2017-06-07 DIAGNOSIS — M32.9 LUPUS ARTHRITIS (HCC): Chronic | ICD-10-CM

## 2017-06-07 DIAGNOSIS — M15.9 OSTEOARTHRITIS OF MULTIPLE JOINTS, UNSPECIFIED OSTEOARTHRITIS TYPE: Chronic | ICD-10-CM

## 2017-06-07 DIAGNOSIS — T81.89XA NONHEALING SURGICAL WOUND, INITIAL ENCOUNTER: ICD-10-CM

## 2017-06-07 DIAGNOSIS — G89.4 CHRONIC PAIN SYNDROME: Chronic | ICD-10-CM

## 2017-06-07 DIAGNOSIS — I73.00 RAYNAUD'S DISEASE WITHOUT GANGRENE: Chronic | ICD-10-CM

## 2017-06-07 PROCEDURE — 99183 HYPERBARIC OXYGEN THERAPY: CPT | Performed by: FAMILY MEDICINE

## 2017-06-07 PROCEDURE — G0277 HBOT, FULL BODY CHAMBER, 30M: HCPCS | Performed by: FAMILY MEDICINE

## 2017-06-07 PROCEDURE — G8420 CALC BMI NORM PARAMETERS: HCPCS | Performed by: FAMILY MEDICINE

## 2017-06-07 ASSESSMENT — ENCOUNTER SYMPTOMS
WEAKNESS: 1
COUGH: 0
SHORTNESS OF BREATH: 0
CHILLS: 0
FEVER: 0
DOUBLE VISION: 0
HEADACHES: 0
DIZZINESS: 0

## 2017-06-07 ASSESSMENT — PAIN SCALES - GENERAL: PAINLEVEL_OUTOF13: 7

## 2017-06-07 NOTE — PATIENT INSTRUCTIONS
After hyperbaric oxygen therapy treatment, it is normal to experience some congestion to the ears, muffling of sounds, or abnormal sounds to one or both ears.    If you experience pain or discomfort to one or both ears that does not resolve spontaneously, please contact the hyperbaric department at [phone].

## 2017-06-07 NOTE — MR AVS SNAPSHOT
Lupe Webb   2017 9:30 AM   Office Visit   MRN: [id number]    Department:  [facility]   Dept Phone:  [phone]    Description:  Female : 1955   Provider:  Moni Ridley M.D.           Allergies as of 2017     Allergen Noted Reactions    Contrast Media With Iodine [Iodine] 2014       Rash, same with topical iodine      Shellfish Allergy 2014   Vomiting    Rash, hives,     Penicillins 2014   Vomiting      You were diagnosed with     Nonhealing surgical wound, initial encounter   [675347]       Incisional breast wound   [832036]       Chronic pain syndrome   [338.4.ICD-9-CM]       Lupus arthritis (CMS-HCC)   [147663]       Osteoarthritis of multiple joints, unspecified osteoarthritis type   [0100362]       Raynaud's disease without gangrene   [0912922]         Vital Signs     Blood Pressure Pulse Temperature Oxygen Saturation Smoking Status       140/80 mmHg 54 37.1 °C (98.8 °F) 100% Never Smoker        Basic Information     Date Of Birth Sex Race Ethnicity Preferred Language    1955 Female White Non- English      Your appointments     2017  9:30 AM   HBOT Supervision with HYPERBARIC CHAMBER 2   [facility] ([facility] [address])    [address]  [place] [address]   [phone]            2017  9:30 AM   HBOT Supervision with HYPERBARIC CHAMBER 2   [facility] ([facility])    [address]  [place] [address]   [phone]            2017  9:30 AM   HBOT Supervision with HYPERBARIC CHAMBER 2   [facility] ([facility])    [address]  [place] [address]   [phone]            2017  9:30 AM   HBOT Supervision with HYPERBARIC CHAMBER 2   [facility] ([facility])    [address]  [place] [address]   [phone]            2017  9:30 AM   HBOT Supervision with  HYPERBARIC CHAMBER 2   [facility] ([address])    [address]  [place] [address]   [phone]            Medhat 15, 2017  9:30 AM   HBOT Supervision with HYPERBARIC CHAMBER 2   [facility] ([address])    [address]  [place] [address]   [phone]            Jun 16, 2017  9:30 AM   HBOT Supervision with HYPERBARIC CHAMBER 2   [facility] ([facility])    [address]  [place] [address]   [phone]            Jun 19, 2017  9:30 AM   HBOT Supervision with HYPERBARIC CHAMBER 2   [facility] ([facility])    [address]  [place] [address]   [phone]            Jun 20, 2017  9:30 AM   HBOT Supervision with HYPERBARIC CHAMBER 2   [facility] ([facility])    [address]  [place] [address]   [phone]            Jun 21, 2017  9:30 AM   HBOT Supervision with HYPERBARIC CHAMBER 2   [facility] ([address])    [address]  [place] [address]   [phone]            Jun 22, 2017  9:30 AM   HBOT Supervision with HYPERBARIC CHAMBER 2   [facility] ([address])    [address]  [place] [address]   [phone]            Jun 23, 2017  9:30 AM   HBOT Supervision with HYPERBARIC CHAMBER 2   [facility] ([address])    [address]  [place] [address]   [phone]            Jun 26, 2017  9:30 AM   HBOT Supervision with HYPERBARIC CHAMBER 2   [facility] ([address])    [address]  [place] [address]   [phone]            Jun 27, 2017  9:30 AM   HBOT Supervision with HYPERBARIC CHAMBER 2   [facility] ([facility])    [address]  [place] [address]   [phone]            Jun 28, 2017  9:30 AM   HBOT Supervision with  HYPERBARIC CHAMBER 2   [facility] ([address])    [address]  [place] [address]   [phone]            Jun 29, 2017  9:30 AM   HBOT Supervision with HYPERBARIC CHAMBER 2   [facility] ([address])    [address]  [place] [address]   [phone]            Jun 30, 2017  9:30 AM   HBOT Supervision with HYPERBARIC CHAMBER 2   [facility] ([address])    [address]  [place] [address]   [phone]            Jul 03, 2017  9:30 AM   HBOT Supervision with HYPERBARIC CHAMBER 2   [facility] ([address])    [address]  [place] [address]   [phone]            Jul 04, 2017  9:30 AM   HBOT Supervision with HYPERBARIC CHAMBER 2   [facility] ([address])    [address]  [place] [address]   [phone]            Jul 05, 2017  9:30 AM   HBOT Supervision with HYPERBARIC CHAMBER 2   [facility] ([address])    [address]  [place] [address]   [phone]            Jul 06, 2017  9:30 AM   HBOT Supervision with HYPERBARIC CHAMBER 2   [facility] ([address])    [address]  [place] [address]   [phone]            Jul 07, 2017  9:30 AM   HBOT Supervision with HYPERBARIC CHAMBER 2   [facility] ([address])    [address]  [place] [address]   [phone]            Jul 10, 2017  9:30 AM   HBOT Supervision with HYPERBARIC CHAMBER 2   [facility] ([address])    [address]  [place] [address]   [phone]            Jul 11, 2017  9:30 AM   HBOT Supervision with HYPERBARIC CHAMBER 2   [facility] ([address])    [address]  [place] [address]   [phone]            Jul 12, 2017  9:30 AM   HBOT Supervision with  HYPERBARIC CHAMBER 2   [facility] ([address])    [address]  [place] [address]   [phone]            Jul 13, 2017  9:30 AM   HBOT Supervision with HYPERBARIC CHAMBER 2   [facility] ([address])    [address]  [place] [address]   [phone]            Jul 14, 2017  9:30 AM   HBOT Supervision with HYPERBARIC CHAMBER 2   [facility] ([address])    [address]  [place] [address]   [phone]            Jul 17, 2017  9:30 AM   HBOT Supervision with HYPERBARIC CHAMBER 2   [facility] ([address])    [address]  [place] [address]   [phone]            Jul 18, 2017  9:30 AM   HBOT Supervision with HYPERBARIC CHAMBER 2   [facility] ([address])    [address]  [place] [address]   [phone]            Jul 19, 2017  9:30 AM   HBOT Supervision with HYPERBARIC CHAMBER 2   [facility] ([address])    [address]  [place] [address]   [phone]            Jul 20, 2017  9:30 AM   HBOT Supervision with HYPERBARIC CHAMBER 2   [facility] ([address])    [address]  [place] [address]   [phone]            Jul 21, 2017  9:30 AM   HBOT Supervision with HYPERBARIC CHAMBER 2   [facility] ([address])    [address]  [place] [address]   [phone]            Jul 24, 2017  9:30 AM   HBOT Supervision with HYPERBARIC CHAMBER 2   [facility] ([address])    [address]  [place] [address]   [phone]            Jul 25, 2017  9:30 AM   HBOT Supervision with HYPERBARIC CHAMBER 2   [facility] ([address])    [address]  [place] [address]   [phone]            Jul 26, 2017  9:30 AM   HBOT Supervision with  HYPERBARIC CHAMBER 2   [facility] ([address])    [address]  [place] [address]   [phone]            Jul 27, 2017  9:30 AM   HBOT Supervision with HYPERBARIC CHAMBER 2   [facility] ([facility])    [address]  [place] [address]   [phone]            Jul 28, 2017  9:30 AM   HBOT Supervision with HYPERBARIC CHAMBER 2   [facility] ([facility])    [address]  [place] [address]   [phone]            Jul 31, 2017  9:30 AM   HBOT Supervision with HYPERBARIC CHAMBER 2   [facility] ([facility])    [address]  [place] [address]   [phone]            Sep 08, 2017  2:30 PM   US SONOCINE with [facility] US 2   [facility] [place] [facility] ([address] [facility] [address])    [address]  [place] [address]   [phone]           Some exams require specific prep instructions that would have been given to you at time of scheduling. If you have any additional questions about the prep instructions, please call Imaging Scheduling at [phone] and press #[phone].              Problem List              ICD-10-CM Priority Class Noted - Resolved    Sjogren's syndrome (CMS-HCC) (Chronic) M35.00   4/26/1990 - Present    DDD (degenerative disc disease), cervical (Chronic) M50.30   4/26/2017 - Present    Osteoarthritis of multiple joints (Chronic) M15.9   4/26/2017 - Present    Lupus arthritis (CMS-HCC) (Chronic) M32.9   4/26/2017 - Present    Chronic pain syndrome (Chronic) G89.4   4/26/2017 - Present    Gastroesophageal reflux disease without esophagitis (Chronic) K21.9   4/26/2017 - Present    Lactose intolerance (Chronic) E73.9   4/26/2017 - Present    Raynaud's disease without gangrene (Chronic) I73.00   4/26/2017 - Present    Osteopenia (Chronic) M85.80   4/26/2017 - Present    Functional diarrhea (Chronic) K59.1   4/26/2017 - Present    Current chronic use of  systemic steroids (Chronic) Z79.52   4/26/2017 - Present    Incisional breast wound S21.009A   5/15/2017 - Present    Nonhealing surgical wound T81.89XA   5/31/2017 - Present      Health Maintenance        Date Due Completion Dates    IMM DTaP/Tdap/Td Vaccine (1 - Tdap) 4/1/2019 (Originally 3/28/1974) ---    IMM ZOSTER VACCINE 4/1/2019 (Originally 3/28/2015) ---    MAMMOGRAM 9/23/2017 9/23/2016    PAP SMEAR 1/2/2020 1/2/2017 (Done)    Override on 1/2/2017: Done    COLONOSCOPY 8/21/2022 8/21/2012            Current Immunizations     Influenza Vaccine Quad Inj (Preserved) 9/27/2016      Below and/or attached are the medications your provider expects you to take. Review all of your home medications and newly ordered medications with your provider and/or pharmacist. Follow medication instructions as directed by your provider and/or pharmacist. Please keep your medication list with you and share with your provider. Update the information when medications are discontinued, doses are changed, or new medications (including over-the-counter products) are added; and carry medication information at all times in the event of emergency situations     Allergies:  CONTRAST MEDIA WITH IODINE - (reactions not documented)     SHELLFISH ALLERGY - Vomiting     PENICILLINS - Vomiting               Medications  Valid as of: June 07, 2017 -  1:02 PM    Generic Name Brand Name Tablet Size Instructions for use    ALPRAZolam (Tab) XANAX 0.5 MG Take 0.5 Tabs by mouth at bedtime as needed for Sleep.        CycloSPORINE   1 Drop by Ophthalmic route 2 Times a Day. Both eyes        Metaxalone (Tab) SKELAXIN 800 MG Take 800 mg by mouth 3 times a day.        NS SOLN 50 mL with methotrexate PF 25 MG/ML SOLN 30 mg/m2   30 mg/m2 by Intravenous route Once. Indications: weekly        Omega-3 Fatty Acids   Take  by mouth.        PredniSONE (Tab) DELTASONE 1 MG Take 7 mg by mouth every day. 7 pills per day        Sildenafil Citrate (Tab) VIAGRA 25 MG Take  20 mg by mouth every day. Indications: Raynaud's Phenomenon of Unknown Cause        Zolpidem Tartrate (Tab) AMBIEN 5 MG Take  by mouth at bedtime as needed.        Zonisamide (Cap) ZONEGRAN 100 MG Take 300 mg by mouth every bedtime. Indications: neuropathy        .                 Medicines prescribed today were sent to:     [id number] - [place], [place] - [address]    [address] [place] [address]    Phone: [phone] Fax: [phone]    Open 24 Hours?: No      Medication refill instructions:       If your prescription bottle indicates you have medication refills left, it is not necessary to call your provider’s office. Please contact your pharmacy and they will refill your medication.    If your prescription bottle indicates you do not have any refills left, you may request refills at any time through one of the following ways: The online [employer] system (except Urgent Care), by calling your provider’s office, or by asking your pharmacy to contact your provider’s office with a refill request. Medication refills are processed only during regular business hours and may not be available until the next business day. Your provider may request additional information or to have a follow-up visit with you prior to refilling your medication.   *Please Note: Medication refills are assigned a new Rx number when refilled electronically. Your pharmacy may indicate that no refills were authorized even though a new prescription for the same medication is available at the pharmacy. Please request the medicine by name with the pharmacy before contacting your provider for a refill.        Instructions    After hyperbaric oxygen therapy treatment, it is normal to experience some congestion to the ears, muffling of sounds, or abnormal sounds to one or both ears.    If you experience pain or discomfort to one or both ears that does not resolve spontaneously, please contact the hyperbaric department at [phone].            Other Notes About Your Plan     Sjogren's specialist in [place]  Rheum: Dr. Inessa Cruz Edwards  Pain: Dr. Shankar (not actively seeing)  GYN: Dr. Sierra  GI: Dr. Clemente  PT: [facility]           MyChart Access Code: Activation code not generated  Current MyChart Status: Active

## 2017-06-07 NOTE — PROGRESS NOTES
Subjective:   Lupe Webb is a 62 y.o. female who presents with a non healing breast wound.      HPI  Lupe presents for HBOT treatment today.   She tolerated treatment well with no issues.     She was able to clear her ears with no pain or pressure.    She has an appt with Dr. Garcia tomorrow.     Review of Systems   Constitutional: Negative for fever and chills.   HENT: Negative for ear discharge, ear pain and hearing loss.    Eyes: Negative for double vision.   Respiratory: Negative for cough and shortness of breath.    Neurological: Positive for weakness. Negative for dizziness and headaches.          Objective:     /80 mmHg  Pulse 54  Temp(Src) 37.1 °C (98.8 °F)  SpO2 100%     Physical Exam   Constitutional: She is oriented to person, place, and time. She appears well-developed and well-nourished.   HENT:   Head: Normocephalic and atraumatic.   Right Ear: Tympanic membrane, external ear and ear canal normal.   Left Ear: Tympanic membrane, external ear and ear canal normal.   Pulmonary/Chest: Effort normal.   Neurological: She is alert and oriented to person, place, and time.   Psychiatric: She has a normal mood and affect.   Vitals reviewed.      Assessment/Plan:   1. Nonhealing surgical wound, initial encounter   Appropriate candidate for HBOT - likely that the patient's wound healing is impeded by a multitude of factors,   including vasculitis from Sjogren's / Raynaud's / Lupus, as well as her chronic use of Methotrexate and Prednisone,   in addition to her age, postmenopausal status, and prior surgery & scar to the wound site. HBOT should be   beneficial for her wound healing in terms of it's angiogenic and anti-vasculitic properties. Have recommended   to the patient that she d/c MTX and continue to decrease prednisone, per instructions from Dr. Cruz.   Patient completed her 2nd HBOT treatment at a pressure of 2.0 BRISEIDA x 90 minutes at a pressure of   2.0 BRISIEDA x 90 minutes at a descent/ascent of  1.5/2.0 PSI/minute      2. Incisional breast wound   Per Dr. Garcia, s/p Bilateral Saline-->Silicone breast implant Re-do 11/2/16 with recurrent non-healing surgical wound     3. Sjogren's syndrome, with unspecified organ involvement (CMS-HCC)   On Methotrexate and Prednisone per Dr. Cruz     4. Raynaud's disease without gangrene   On Methotrexate and Prednisone per Dr. Cruz     5. Lupus arthritis (CMS-[facility])   On Methotrexate and Prednisone per Dr. Cruz     6. Current chronic use of systemic steroids   On Methotrexate and Prednisone per Dr. Cruz     7. Dry mouth     8. DDD (degenerative disc disease), cervical     9. Gastroesophageal reflux disease without esophagitis

## 2017-06-08 ENCOUNTER — OFFICE VISIT (OUTPATIENT)
Dept: WOUND CARE | Facility: MEDICAL CENTER | Age: 62
End: 2017-06-08
Attending: FAMILY MEDICINE
Payer: MEDICARE

## 2017-06-08 VITALS
OXYGEN SATURATION: 99 % | TEMPERATURE: 98.3 F | SYSTOLIC BLOOD PRESSURE: 122 MMHG | DIASTOLIC BLOOD PRESSURE: 85 MMHG | HEART RATE: 65 BPM

## 2017-06-08 DIAGNOSIS — M32.9 LUPUS ARTHRITIS (HCC): Chronic | ICD-10-CM

## 2017-06-08 DIAGNOSIS — G89.4 CHRONIC PAIN SYNDROME: Chronic | ICD-10-CM

## 2017-06-08 DIAGNOSIS — Z79.52 CURRENT CHRONIC USE OF SYSTEMIC STEROIDS: Chronic | ICD-10-CM

## 2017-06-08 DIAGNOSIS — M15.9 OSTEOARTHRITIS OF MULTIPLE JOINTS, UNSPECIFIED OSTEOARTHRITIS TYPE: Chronic | ICD-10-CM

## 2017-06-08 DIAGNOSIS — F40.240 CLAUSTROPHOBIA: ICD-10-CM

## 2017-06-08 DIAGNOSIS — T81.89XA NONHEALING SURGICAL WOUND, INITIAL ENCOUNTER: Primary | ICD-10-CM

## 2017-06-08 DIAGNOSIS — M35.00 SJOGREN'S SYNDROME, WITH UNSPECIFIED ORGAN INVOLVEMENT (HCC): Chronic | ICD-10-CM

## 2017-06-08 DIAGNOSIS — I73.00 RAYNAUD'S DISEASE WITHOUT GANGRENE: Chronic | ICD-10-CM

## 2017-06-08 PROCEDURE — 99183 HYPERBARIC OXYGEN THERAPY: CPT | Performed by: FAMILY MEDICINE

## 2017-06-08 PROCEDURE — G0277 HBOT, FULL BODY CHAMBER, 30M: HCPCS | Performed by: FAMILY MEDICINE

## 2017-06-08 PROCEDURE — G8420 CALC BMI NORM PARAMETERS: HCPCS | Performed by: FAMILY MEDICINE

## 2017-06-08 ASSESSMENT — PAIN SCALES - GENERAL: PAINLEVEL_OUTOF13: 7

## 2017-06-08 NOTE — PROGRESS NOTES
Subjective:   Lupe Webb is a 62 y.o. female who presents with a non healing breast wound.      HPI  Lupe returns to HBOT.  She states that she is having difficulty with tolerating the enclosed space of the HBOT chamber. She has difficulty lying still due to her multiple pains and she experiences eye irritation in the chamber as well.  Denies ear pain, not interested in taking anxiolytics.  No other medication changes.  Follows up with Dr. Garcia this afternoon.     Review of Systems   Constitutional: Negative for fever and chills.   HENT: Negative for ear discharge, ear pain and hearing loss.    Eyes: Negative for double vision.   Respiratory: Negative for cough and shortness of breath.    Neurological: Positive for weakness. Negative for dizziness and headaches.          Objective:     /85 mmHg  Pulse 65  Temp(Src) 36.8 °C (98.3 °F)  SpO2 99%     Physical Exam   Constitutional: She is oriented to person, place, and time. She appears well-developed and well-nourished.   HENT:   Head: Normocephalic and atraumatic.   Right Ear: Tympanic membrane, external ear and ear canal normal.   Left Ear: Tympanic membrane, external ear and ear canal normal.   Pulmonary/Chest: Effort normal.   Neurological: She is alert and oriented to person, place, and time.   Psychiatric: She has a normal mood and affect.   Vitals reviewed.      Assessment/Plan:   1. Nonhealing surgical wound, initial encounter   Appropriate candidate for HBOT - likely that the patient's wound healing is impeded by a multitude of factors,   including vasculitis from Sjogren's / Raynaud's / Lupus, as well as her chronic use of Methotrexate and Prednisone,   in addition to her age, postmenopausal status, and prior surgery & scar to the wound site. HBOT should be   beneficial for her wound healing in terms of it's angiogenic and anti-vasculitic properties. Have recommended   to the patient that she d/c MTX and continue to decrease prednisone, per  instructions from Dr. Cruz.   Patient completed her 3rd HBOT treatment at a pressure of 2.4 BRISEIDA x 90 minutes at a descent/ascent of 1.5/3.0 PSI/minute      2. Incisional breast wound   Per Dr. Garcia, s/p Bilateral Saline-->Silicone breast implant Re-do 11/2/16 with recurrent non-healing surgical wound     3. Sjogren's syndrome, with unspecified organ involvement (CMS-HCC)   On Methotrexate and Prednisone per Dr. Cruz     4. Raynaud's disease without gangrene   On Methotrexate and Prednisone per Dr. Cruz     5. Lupus arthritis (CMS-[facility])   On Methotrexate and Prednisone per Dr. Cruz     6. Current chronic use of systemic steroids   On Methotrexate and Prednisone per Dr. Cruz     7. Dry mouth     8. DDD (degenerative disc disease), cervical     9. Gastroesophageal reflux disease without esophagitis

## 2017-06-08 NOTE — MR AVS SNAPSHOT
Lupe SAWYER Webb   2017 9:30 AM   Office Visit   MRN: [id number]    Department:  [facility]   Dept Phone:  [phone]    Description:  Female : 1955   Provider:  Sean Espinoza M.D.           Allergies as of 2017     Allergen Noted Reactions    Contrast Media With Iodine [Iodine] 2014       Rash, same with topical iodine      Shellfish Allergy 2014   Vomiting    Rash, hives,     Penicillins 2014   Vomiting      You were diagnosed with     Nonhealing surgical wound, initial encounter   [656527]  -  Primary     Sjogren's syndrome, with unspecified organ involvement (CMS-HCC)   [7018853]       Current chronic use of systemic steroids   [0542538]       Raynaud's disease without gangrene   [0757107]       Lupus arthritis (CMS-HCC)   [753345]       Osteoarthritis of multiple joints, unspecified osteoarthritis type   [5453364]       Chronic pain syndrome   [338.4.ICD-9-CM]       Claustrophobia   [585461]         Vital Signs     Blood Pressure Pulse Temperature Oxygen Saturation Smoking Status       122/85 mmHg 65 36.8 °C (98.3 °F) 99% Never Smoker        Basic Information     Date Of Birth Sex Race Ethnicity Preferred Language    1955 Female White Non- English      Your appointments     2017  9:30 AM   HBOT Supervision with HYPERBARIC CHAMBER 2   [facility] ([address] [place] [address])    [address]  [facility] [address]   [phone]            2017  9:30 AM   HBOT Supervision with HYPERBARIC CHAMBER 2   [facility] ([address] [place] [address])    [address]  [facility] [address]   [phone]            2017  9:30 AM   HBOT Supervision with HYPERBARIC CHAMBER 2   [facility] ([address] [place] [address])    [address]  [facility] [address]   [phone]            2017  9:30 AM   HBOT Supervision with HYPERBARIC CHAMBER 2   [facility]  ([address])    [address]  [place] [address]   [phone]            Medhat 15, 2017  9:30 AM   HBOT Supervision with HYPERBARIC CHAMBER 2   [facility] ([address])    [address]  [place] [address]   [phone]            Jun 16, 2017  9:30 AM   HBOT Supervision with HYPERBARIC CHAMBER 2   [facility] ([address])    [address]  [place] [address]   [phone]            Jun 19, 2017  9:30 AM   HBOT Supervision with HYPERBARIC CHAMBER 2   [facility] ([address])    [address]  [place] [address]   [phone]            Jun 20, 2017  9:30 AM   HBOT Supervision with HYPERBARIC CHAMBER 2   [facility] ([facility] [address])    [address]  [place] [address]   [phone]            Jun 21, 2017  9:30 AM   HBOT Supervision with HYPERBARIC CHAMBER 2   [facility] ([address])    [address]  [place] [address]   [phone]            Jun 22, 2017  9:30 AM   HBOT Supervision with HYPERBARIC CHAMBER 2   [facility] ([address])    [address]  [place] [address]   [phone]            Jun 23, 2017  9:30 AM   HBOT Supervision with HYPERBARIC CHAMBER 2   [facility] ([address])    [address]  [place] [address]   [phone]            Jun 26, 2017  9:30 AM   HBOT Supervision with HYPERBARIC CHAMBER 2   [facility] ([address])    [address]  [place] [address]   [phone]            Jun 27, 2017  9:30 AM   HBOT Supervision with HYPERBARIC CHAMBER 2   [facility] ([facility] [address])    [address]  [place] [address]   [phone]            Jun 28, 2017  9:30 AM   HBOT Supervision with HYPERBARIC CHAMBER 2   [facility]  ([address])    [address]  [place] [address]   [phone]            Jun 29, 2017  9:30 AM   HBOT Supervision with HYPERBARIC CHAMBER 2   [facility] ([address])    [address]  [place] [address]   [phone]            Jun 30, 2017  9:30 AM   HBOT Supervision with HYPERBARIC CHAMBER 2   [facility] ([address])    [address]  [place] [address]   [phone]            Jul 03, 2017  9:30 AM   HBOT Supervision with HYPERBARIC CHAMBER 2   [facility] ([address])    [address]  [place] [address]   [phone]            Jul 04, 2017  9:30 AM   HBOT Supervision with HYPERBARIC CHAMBER 2   [facility] ([address])    [address]  [place] [address]   [phone]            Jul 05, 2017  9:30 AM   HBOT Supervision with HYPERBARIC CHAMBER 2   [facility] ([address])    [address]  [place] [address]   [phone]            Jul 06, 2017  9:30 AM   HBOT Supervision with HYPERBARIC CHAMBER 2   [facility] ([address])    [address]  [place] [address]   [phone]            Jul 07, 2017  9:30 AM   HBOT Supervision with HYPERBARIC CHAMBER 2   [facility] ([address])    [address]  [place] [address]   [phone]            Jul 10, 2017  9:30 AM   HBOT Supervision with HYPERBARIC CHAMBER 2   [facility] ([address])    [address]  [place] [address]   [phone]            Jul 11, 2017  9:30 AM   HBOT Supervision with HYPERBARIC CHAMBER 2   [facility] ([address])    [address]  [place] [address]   [phone]            Jul 12, 2017  9:30 AM   HBOT Supervision with HYPERBARIC CHAMBER 2   [facility]  ([address])    [address]  [place] [address]   [phone]            Jul 13, 2017  9:30 AM   HBOT Supervision with HYPERBARIC CHAMBER 2   [facility] ([address])    [address]  [place] [address]   [phone]            Jul 14, 2017  9:30 AM   HBOT Supervision with HYPERBARIC CHAMBER 2   [facility] ([address])    [address]  [place] [address]   [phone]            Jul 17, 2017  9:30 AM   HBOT Supervision with HYPERBARIC CHAMBER 2   [facility] ([address])    [address]  [place] [address]   [phone]            Jul 18, 2017  9:30 AM   HBOT Supervision with HYPERBARIC CHAMBER 2   [facility] ([address])    [address]  [place] [address]   [phone]            Jul 19, 2017  9:30 AM   HBOT Supervision with HYPERBARIC CHAMBER 2   [facility] ([address])    [address]  [place] [address]   [phone]            Jul 20, 2017  9:30 AM   HBOT Supervision with HYPERBARIC CHAMBER 2   [facility] ([address])    [address]  [place] [address]   [phone]            Jul 21, 2017  9:30 AM   HBOT Supervision with HYPERBARIC CHAMBER 2   [facility] ([address])    [address]  [place] [address]   [phone]            Jul 24, 2017  9:30 AM   HBOT Supervision with HYPERBARIC CHAMBER 2   [facility] ([address])    [address]  [place] [address]   [phone]            Jul 25, 2017  9:30 AM   HBOT Supervision with HYPERBARIC CHAMBER 2   [facility] ([address])    [address]  [place] [address]   [phone]            Jul 26, 2017  9:30 AM   HBOT Supervision with HYPERBARIC CHAMBER 2   [facility]  ([address])    [address]  [place] [address]   [phone]            Jul 27, 2017  9:30 AM   HBOT Supervision with HYPERBARIC CHAMBER 2   [facility] ([address])    [address]  [place] [address]   [phone]            Jul 28, 2017  9:30 AM   HBOT Supervision with HYPERBARIC CHAMBER 2   [facility] ([facility] [address])    [address]  [place] [address]   [phone]            Jul 31, 2017  9:30 AM   HBOT Supervision with HYPERBARIC CHAMBER 2   [facility] ([facility] [address])    [address]  [place] [address]   [phone]            Sep 08, 2017  2:30 PM   US SONOCINE with [facility] US 2   [facility] ([address] [facility] [address])    [address]  [place] [address]   [phone]           Some exams require specific prep instructions that would have been given to you at time of scheduling. If you have any additional questions about the prep instructions, please call Imaging Scheduling at [phone] and press #[phone].              Problem List              ICD-10-CM Priority Class Noted - Resolved    Sjogren's syndrome (CMS-HCC) (Chronic) M35.00   4/26/1990 - Present    DDD (degenerative disc disease), cervical (Chronic) M50.30   4/26/2017 - Present    Osteoarthritis of multiple joints (Chronic) M15.9   4/26/2017 - Present    Lupus arthritis (CMS-HCC) (Chronic) M32.9   4/26/2017 - Present    Chronic pain syndrome (Chronic) G89.4   4/26/2017 - Present    Gastroesophageal reflux disease without esophagitis (Chronic) K21.9   4/26/2017 - Present    Lactose intolerance (Chronic) E73.9   4/26/2017 - Present    Raynaud's disease without gangrene (Chronic) I73.00   4/26/2017 - Present    Osteopenia (Chronic) M85.80   4/26/2017 - Present    Functional diarrhea (Chronic) K59.1   4/26/2017 - Present    Current chronic use of systemic steroids (Chronic) Z79.52   4/26/2017 -  Present    Incisional breast wound S21.009A   5/15/2017 - Present    Nonhealing surgical wound T81.89XA   5/31/2017 - Present      Health Maintenance        Date Due Completion Dates    IMM DTaP/Tdap/Td Vaccine (1 - Tdap) 4/1/2019 (Originally 3/28/1974) ---    IMM ZOSTER VACCINE 4/1/2019 (Originally 3/28/2015) ---    MAMMOGRAM 9/23/2017 9/23/2016    PAP SMEAR 1/2/2020 1/2/2017 (Done)    Override on 1/2/2017: Done    COLONOSCOPY 8/21/2022 8/21/2012            Current Immunizations     Influenza Vaccine Quad Inj (Preserved) 9/27/2016      Below and/or attached are the medications your provider expects you to take. Review all of your home medications and newly ordered medications with your provider and/or pharmacist. Follow medication instructions as directed by your provider and/or pharmacist. Please keep your medication list with you and share with your provider. Update the information when medications are discontinued, doses are changed, or new medications (including over-the-counter products) are added; and carry medication information at all times in the event of emergency situations     Allergies:  CONTRAST MEDIA WITH IODINE - (reactions not documented)     SHELLFISH ALLERGY - Vomiting     PENICILLINS - Vomiting               Medications  Valid as of: June 08, 2017 - 10:16 AM    Generic Name Brand Name Tablet Size Instructions for use    ALPRAZolam (Tab) XANAX 0.5 MG Take 0.5 Tabs by mouth at bedtime as needed for Sleep.        CycloSPORINE   1 Drop by Ophthalmic route 2 Times a Day. Both eyes        Metaxalone (Tab) SKELAXIN 800 MG Take 800 mg by mouth 3 times a day.        NS SOLN 50 mL with methotrexate PF 25 MG/ML SOLN 30 mg/m2   30 mg/m2 by Intravenous route Once. Indications: weekly        Omega-3 Fatty Acids   Take  by mouth.        PredniSONE (Tab) DELTASONE 1 MG Take 7 mg by mouth every day. 7 pills per day        Sildenafil Citrate (Tab) VIAGRA 25 MG Take 20 mg by mouth every day. Indications: Raynaud's  Phenomenon of Unknown Cause        Zolpidem Tartrate (Tab) AMBIEN 5 MG Take  by mouth at bedtime as needed.        Zonisamide (Cap) ZONEGRAN 100 MG Take 300 mg by mouth every bedtime. Indications: neuropathy        .                 Medicines prescribed today were sent to:     [id number] - [place], [place] - [address]    [address] [place] [address]    Phone: [phone] Fax: [phone]    Open 24 Hours?: No      Medication refill instructions:       If your prescription bottle indicates you have medication refills left, it is not necessary to call your provider’s office. Please contact your pharmacy and they will refill your medication.    If your prescription bottle indicates you do not have any refills left, you may request refills at any time through one of the following ways: The online [facility] system (except Urgent Care), by calling your provider’s office, or by asking your pharmacy to contact your provider’s office with a refill request. Medication refills are processed only during regular business hours and may not be available until the next business day. Your provider may request additional information or to have a follow-up visit with you prior to refilling your medication.   *Please Note: Medication refills are assigned a new Rx number when refilled electronically. Your pharmacy may indicate that no refills were authorized even though a new prescription for the same medication is available at the pharmacy. Please request the medicine by name with the pharmacy before contacting your provider for a refill.        Instructions    After hyperbaric oxygen therapy treatment, it is normal to experience some congestion to the ears, muffling of sounds, or abnormal sounds to one or both ears.    If you experience pain or discomfort to one or both ears that does not resolve spontaneously, please contact the hyperbaric department at [phone].           Other Notes About Your Plan     Sjogren's  specialist in [place]  Rheum: Dr. Inessa Cruz [place]  Pain: Dr. Shankar (not actively seeing)  GYN: Dr. Sierra  GI: Dr. Clemente  PT: [facility]           MyChart Access Code: Activation code not generated  Current MyChart Status: Active

## 2017-06-09 ENCOUNTER — OFFICE VISIT (OUTPATIENT)
Dept: WOUND CARE | Facility: MEDICAL CENTER | Age: 62
End: 2017-06-09
Attending: FAMILY MEDICINE
Payer: MEDICARE

## 2017-06-09 VITALS
DIASTOLIC BLOOD PRESSURE: 71 MMHG | OXYGEN SATURATION: 100 % | SYSTOLIC BLOOD PRESSURE: 135 MMHG | TEMPERATURE: 98.7 F | HEART RATE: 58 BPM

## 2017-06-09 DIAGNOSIS — M32.9 LUPUS ARTHRITIS (HCC): Chronic | ICD-10-CM

## 2017-06-09 DIAGNOSIS — S21.009A INCISIONAL BREAST WOUND: ICD-10-CM

## 2017-06-09 DIAGNOSIS — Z79.52 CURRENT CHRONIC USE OF SYSTEMIC STEROIDS: Chronic | ICD-10-CM

## 2017-06-09 DIAGNOSIS — I73.00 RAYNAUD'S DISEASE WITHOUT GANGRENE: Chronic | ICD-10-CM

## 2017-06-09 DIAGNOSIS — M35.00 SJOGREN'S SYNDROME, WITH UNSPECIFIED ORGAN INVOLVEMENT (HCC): Chronic | ICD-10-CM

## 2017-06-09 DIAGNOSIS — T81.89XD NONHEALING SURGICAL WOUND, SUBSEQUENT ENCOUNTER: Primary | ICD-10-CM

## 2017-06-09 PROBLEM — T81.89XA NONHEALING SURGICAL WOUND: Status: ACTIVE | Noted: 2017-06-09

## 2017-06-09 PROCEDURE — G0277 HBOT, FULL BODY CHAMBER, 30M: HCPCS | Performed by: FAMILY MEDICINE

## 2017-06-09 PROCEDURE — 99213 OFFICE O/P EST LOW 20 MIN: CPT | Performed by: FAMILY MEDICINE

## 2017-06-09 ASSESSMENT — PAIN SCALES - GENERAL: PAINLEVEL_OUTOF13: 7

## 2017-06-09 NOTE — PROGRESS NOTES
Subjective:   Lupe Webb is a 62 y.o. female who presents with a non healing breast wound.      HPI  Lupe returns to HBOT.    Denies ear pain, no medication changes.  Tolerating HBOT treatment  No changes from her meeting with Dr. Garcia yesterday.  She will follow up with him on Thursday.     Review of Systems   Constitutional: Negative for fever and chills.   HENT: Negative for ear discharge, ear pain and hearing loss.    Eyes: Negative for double vision.   Respiratory: Negative for cough and shortness of breath.    Neurological: Positive for weakness. Negative for dizziness and headaches.          Objective:     /71 mmHg  Pulse 58  Temp(Src) 37.1 °C (98.7 °F)  SpO2 100%     Physical Exam   Constitutional: She is oriented to person, place, and time. She appears well-developed and well-nourished.   HENT:   Head: Normocephalic and atraumatic.   Right Ear: Tympanic membrane, external ear and ear canal normal.   Left Ear: Tympanic membrane, external ear and ear canal normal.   Pulmonary/Chest: Effort normal.   Neurological: She is alert and oriented to person, place, and time.   Psychiatric: She has a normal mood and affect.   Vitals reviewed.      Assessment/Plan:   1. Nonhealing surgical wound, initial encounter   Appropriate candidate for HBOT - likely that the patient's wound healing is impeded by a multitude of factors,   including vasculitis from Sjogren's / Raynaud's / Lupus, as well as her chronic use of Methotrexate and Prednisone,   in addition to her age, postmenopausal status, and prior surgery & scar to the wound site. HBOT should be   beneficial for her wound healing in terms of it's angiogenic and anti-vasculitic properties. Have recommended   to the patient that she d/c MTX and continue to decrease prednisone, per instructions from Dr. Cruz.   Patient completed her 4th HBOT treatment at a pressure of 2.4 BRISEIDA x 90 minutes at a descent/ascent of 1.5/3.0 PSI/minute      2. Incisional breast  wound   Per Dr. Garcia, s/p Bilateral Saline-->Silicone breast implant Re-do 11/2/16 with recurrent non-healing surgical wound     3. Sjogren's syndrome, with unspecified organ involvement (CMS-HCC)   On Methotrexate and Prednisone per Dr. Cruz     4. Raynaud's disease without gangrene   On Methotrexate and Prednisone per Dr. Cruz     5. Lupus arthritis (CMS-[facility])   On Methotrexate and Prednisone per Dr. Cruz     6. Current chronic use of systemic steroids   On Methotrexate and Prednisone per Dr. Cruz     7. Dry mouth     8. DDD (degenerative disc disease), cervical     9. Gastroesophageal reflux disease without esophagitis

## 2017-06-09 NOTE — MR AVS SNAPSHOT
Lupe SAWYER Webb   2017 9:30 AM   Office Visit   MRN: [id number]    Department:  [facility]   Dept Phone:  [phone]    Description:  Female : 1955   Provider:  Sean Espinoza M.D.           Allergies as of 2017     Allergen Noted Reactions    Contrast Media With Iodine [Iodine] 2014       Rash, same with topical iodine      Shellfish Allergy 2014   Vomiting    Rash, hives,     Penicillins 2014   Vomiting      You were diagnosed with     Nonhealing surgical wound, subsequent encounter   [108148]  -  Primary     Incisional breast wound   [773763]       Raynaud's disease without gangrene   [4217013]       Lupus arthritis (CMS-HCC)   [976286]       Sjogren's syndrome, with unspecified organ involvement (CMS-[facility])   [9606978]       Current chronic use of systemic steroids   [7571557]         Vital Signs     Blood Pressure Pulse Temperature Oxygen Saturation Smoking Status       135/71 mmHg 58 37.1 °C (98.7 °F) 100% Never Smoker        Basic Information     Date Of Birth Sex Race Ethnicity Preferred Language    1955 Female White Non- English      Your appointments     2017  9:30 AM   HBOT Supervision with HYPERBARIC CHAMBER 2   [facility] ([facility] [address])    [address]  [place] [address]   [phone]            2017  9:30 AM   HBOT Supervision with HYPERBARIC CHAMBER 2   [facility] ([facility])    [address]  [place] [address]   [phone]            2017  9:30 AM   HBOT Supervision with HYPERBARIC CHAMBER 2   [facility] ([facility])    [address]  [place] [address]   [phone]            Medhat 15, 2017  9:30 AM   HBOT Supervision with HYPERBARIC CHAMBER 2   [facility] ([facility])    [address]  [place] [address]   [phone]            2017  9:30 AM      HBOT Supervision with HYPERBARIC CHAMBER 2   [facility] ([address])    [address]  [place] [address]   [phone]            Jun 19, 2017  9:30 AM   HBOT Supervision with HYPERBARIC CHAMBER 2   [facility] ([address])    [address]  [place] [address]   [phone]            Jun 20, 2017  9:30 AM   HBOT Supervision with HYPERBARIC CHAMBER 2   [facility] ([address])    [address]  [place] [address]   [phone]            Jun 21, 2017  9:30 AM   HBOT Supervision with HYPERBARIC CHAMBER 2   [facility] ([address])    [address]  [place] [address]   [phone]            Jun 22, 2017  9:30 AM   HBOT Supervision with HYPERBARIC CHAMBER 2   [facility] ([address])    [address]  [place] [address]   [phone]            Jun 23, 2017  9:30 AM   HBOT Supervision with HYPERBARIC CHAMBER 2   [facility] ([address])    [address]  [place] [address]   [phone]            Jun 26, 2017  9:30 AM   HBOT Supervision with HYPERBARIC CHAMBER 2   [facility] ([address])    [address]  [place] [address]   [phone]            Jun 27, 2017  9:30 AM   HBOT Supervision with HYPERBARIC CHAMBER 2   [facility] ([address])    [address]  [place] [address]   [phone]            Jun 28, 2017  9:30 AM   HBOT Supervision with HYPERBARIC CHAMBER 2   [facility] ([address])    [address]  [place] [address]   [phone]            Jun 29, 2017  9:30 AM   HBOT Supervision with HYPERBARIC CHAMBER 2   [facility] ([address])    [address]  [place] [address]   [phone]            Jun 30, 2017  9:30 AM    HBOT Supervision with HYPERBARIC CHAMBER 2   [facility] ([address])    [address]  [place] [address]   [phone]            Jul 03, 2017  9:30 AM   HBOT Supervision with HYPERBARIC CHAMBER 2   [facility] ([address])    [address]  [place] [address]   [phone]            Jul 04, 2017  9:30 AM   HBOT Supervision with HYPERBARIC CHAMBER 2   [facility] ([address])    [address]  [place] [address]   [phone]            Jul 05, 2017  9:30 AM   HBOT Supervision with HYPERBARIC CHAMBER 2   [facility] ([address])    [address]  [place] [address]   [phone]            Jul 06, 2017  9:30 AM   HBOT Supervision with HYPERBARIC CHAMBER 2   [facility] ([address])    [address]  [place] [address]   [phone]            Jul 07, 2017  9:30 AM   HBOT Supervision with HYPERBARIC CHAMBER 2   [facility] ([address])    [address]  [place] [address]   [phone]            Jul 10, 2017  9:30 AM   HBOT Supervision with HYPERBARIC CHAMBER 2   [facility] ([address])    [address]  [place] [address]   [phone]            Jul 11, 2017  9:30 AM   HBOT Supervision with HYPERBARIC CHAMBER 2   [facility] ([address])    [address]  [place] [address]   [phone]            Jul 12, 2017  9:30 AM   HBOT Supervision with HYPERBARIC CHAMBER 2   [facility] ([address])    [address]  [place] [address]   [phone]            Jul 13, 2017  9:30 AM   HBOT Supervision with HYPERBARIC CHAMBER 2   [facility] ([address])    [address]  [place] [address]   [phone]            Jul 14, 2017  9:30 AM    HBOT Supervision with HYPERBARIC CHAMBER 2   [facility] ([address])    [address]  [place] [address]   [phone]            Jul 17, 2017  9:30 AM   HBOT Supervision with HYPERBARIC CHAMBER 2   [facility] ([address])    [address]  [place] [address]   [phone]            Jul 18, 2017  9:30 AM   HBOT Supervision with HYPERBARIC CHAMBER 2   [facility] ([address])    [address]  [place] [address]   [phone]            Jul 19, 2017  9:30 AM   HBOT Supervision with HYPERBARIC CHAMBER 2   [facility] ([address])    [address]  [place] [address]   [phone]            Jul 20, 2017  9:30 AM   HBOT Supervision with HYPERBARIC CHAMBER 2   [facility] ([address])    [address]  [place] [address]   [phone]            Jul 21, 2017  9:30 AM   HBOT Supervision with HYPERBARIC CHAMBER 2   [facility] ([address])    [address]  [place] [address]   [phone]            Jul 24, 2017  9:30 AM   HBOT Supervision with HYPERBARIC CHAMBER 2   [facility] ([address])    [address]  [place] [address]   [phone]            Jul 25, 2017  9:30 AM   HBOT Supervision with HYPERBARIC CHAMBER 2   [facility] ([address])    [address]  [place] [address]   [phone]            Jul 26, 2017  9:30 AM   HBOT Supervision with HYPERBARIC CHAMBER 2   [facility] ([address])    [address]  [place] [address]   [phone]            Jul 27, 2017  9:30 AM   HBOT Supervision with HYPERBARIC CHAMBER 2   [facility] ([address])    [address]  [place] [address]   [phone]            Jul 28, 2017  9:30 AM    HBOT Supervision with HYPERBARIC CHAMBER 2   [facility] ([address])    [address]  [place] [address]   [phone]            Jul 31, 2017  9:30 AM   HBOT Supervision with HYPERBARIC CHAMBER 2   [facility] ([facility])    [address]  [place] [address]   [phone]            Sep 08, 2017  2:30 PM   US SONOCINE with RBHC US 2   [facility] [place] [facility] ([address] [facility] [address])    [address]  [place] [address]   [phone]           Some exams require specific prep instructions that would have been given to you at time of scheduling. If you have any additional questions about the prep instructions, please call Imaging Scheduling at [phone] and press #[phone].              Problem List              ICD-10-CM Priority Class Noted - Resolved    Sjogren's syndrome (CMS-HCC) (Chronic) M35.00   4/26/1990 - Present    DDD (degenerative disc disease), cervical (Chronic) M50.30   4/26/2017 - Present    Osteoarthritis of multiple joints (Chronic) M15.9   4/26/2017 - Present    Lupus arthritis (CMS-HCC) (Chronic) M32.9   4/26/2017 - Present    Chronic pain syndrome (Chronic) G89.4   4/26/2017 - Present    Gastroesophageal reflux disease without esophagitis (Chronic) K21.9   4/26/2017 - Present    Lactose intolerance (Chronic) E73.9   4/26/2017 - Present    Raynaud's disease without gangrene (Chronic) I73.00   4/26/2017 - Present    Osteopenia (Chronic) M85.80   4/26/2017 - Present    Functional diarrhea (Chronic) K59.1   4/26/2017 - Present    Current chronic use of systemic steroids (Chronic) Z79.52   4/26/2017 - Present    Incisional breast wound S21.009A   5/15/2017 - Present    Nonhealing surgical wound T81.89XA   6/9/2017 - Present      Health Maintenance        Date Due Completion Dates    IMM DTaP/Tdap/Td Vaccine (1 - Tdap) 4/1/2019 (Originally 3/28/1974) ---    IMM ZOSTER VACCINE 4/1/2019 (Originally 3/28/2015) ---     MAMMOGRAM 9/23/2017 9/23/2016    PAP SMEAR 1/2/2020 1/2/2017 (Done)    Override on 1/2/2017: Done    COLONOSCOPY 8/21/2022 8/21/2012            Current Immunizations     Influenza Vaccine Quad Inj (Preserved) 9/27/2016      Below and/or attached are the medications your provider expects you to take. Review all of your home medications and newly ordered medications with your provider and/or pharmacist. Follow medication instructions as directed by your provider and/or pharmacist. Please keep your medication list with you and share with your provider. Update the information when medications are discontinued, doses are changed, or new medications (including over-the-counter products) are added; and carry medication information at all times in the event of emergency situations     Allergies:  CONTRAST MEDIA WITH IODINE - (reactions not documented)     SHELLFISH ALLERGY - Vomiting     PENICILLINS - Vomiting               Medications  Valid as of: June 09, 2017 -  9:52 AM    Generic Name Brand Name Tablet Size Instructions for use    ALPRAZolam (Tab) XANAX 0.5 MG Take 0.5 Tabs by mouth at bedtime as needed for Sleep.        CycloSPORINE   1 Drop by Ophthalmic route 2 Times a Day. Both eyes        Metaxalone (Tab) SKELAXIN 800 MG Take 800 mg by mouth 3 times a day.        NS SOLN 50 mL with methotrexate PF 25 MG/ML SOLN 30 mg/m2   30 mg/m2 by Intravenous route Once. Indications: weekly        Omega-3 Fatty Acids   Take  by mouth.        PredniSONE (Tab) DELTASONE 1 MG Take 7 mg by mouth every day. 7 pills per day        Sildenafil Citrate (Tab) VIAGRA 25 MG Take 20 mg by mouth every day. Indications: Raynaud's Phenomenon of Unknown Cause        Zolpidem Tartrate (Tab) AMBIEN 5 MG Take  by mouth at bedtime as needed.        Zonisamide (Cap) ZONEGRAN 100 MG Take 300 mg by mouth every bedtime. Indications: neuropathy        .                 Medicines prescribed today were sent to:     [id number] - [place] -  [address]    [address] [place] [address]    Phone: [phone] Fax: [phone]    Open 24 Hours?: No      Medication refill instructions:       If your prescription bottle indicates you have medication refills left, it is not necessary to call your provider’s office. Please contact your pharmacy and they will refill your medication.    If your prescription bottle indicates you do not have any refills left, you may request refills at any time through one of the following ways: The online [employer] system (except Urgent Care), by calling your provider’s office, or by asking your pharmacy to contact your provider’s office with a refill request. Medication refills are processed only during regular business hours and may not be available until the next business day. Your provider may request additional information or to have a follow-up visit with you prior to refilling your medication.   *Please Note: Medication refills are assigned a new Rx number when refilled electronically. Your pharmacy may indicate that no refills were authorized even though a new prescription for the same medication is available at the pharmacy. Please request the medicine by name with the pharmacy before contacting your provider for a refill.        Instructions    The side effects of Acupuncture needle insertion include: minor bruising, bleeding, or pain at the site of needle insertion.  If more worrisome symptoms, such as continued bleeding, severe bruising, or continue pain or altered sensation persist, please contact [facility] office @ [phone]         Other Notes About Your Plan     [facility] in [place]  Rheum: Dr. Inessa Cruz [place]  Pain: Dr. Shankar (not actively seeing)  GYN: Dr. Sierra  GI: Dr. Clemente  PT: [facility]           [employer] Access Code: Activation code not generated  Current [employer] Status: Active

## 2017-06-09 NOTE — PATIENT INSTRUCTIONS
The side effects of Acupuncture needle insertion include: minor bruising, bleeding, or pain at the site of needle insertion.  If more worrisome symptoms, such as continued bleeding, severe bruising, or continue pain or altered sensation persist, please contact [facility] office @ [phone]

## 2017-06-12 ENCOUNTER — OFFICE VISIT (OUTPATIENT)
Dept: WOUND CARE | Facility: MEDICAL CENTER | Age: 62
End: 2017-06-12
Attending: FAMILY MEDICINE
Payer: MEDICARE

## 2017-06-12 VITALS
TEMPERATURE: 98.3 F | OXYGEN SATURATION: 100 % | DIASTOLIC BLOOD PRESSURE: 78 MMHG | HEART RATE: 65 BPM | SYSTOLIC BLOOD PRESSURE: 126 MMHG

## 2017-06-12 DIAGNOSIS — S21.009A INCISIONAL BREAST WOUND: ICD-10-CM

## 2017-06-12 DIAGNOSIS — I73.00 RAYNAUD'S DISEASE WITHOUT GANGRENE: Chronic | ICD-10-CM

## 2017-06-12 DIAGNOSIS — T81.89XD NONHEALING SURGICAL WOUND, SUBSEQUENT ENCOUNTER: ICD-10-CM

## 2017-06-12 DIAGNOSIS — M35.00 SJOGREN'S SYNDROME, WITH UNSPECIFIED ORGAN INVOLVEMENT (HCC): Chronic | ICD-10-CM

## 2017-06-12 DIAGNOSIS — M32.9 LUPUS ARTHRITIS (HCC): Chronic | ICD-10-CM

## 2017-06-12 PROCEDURE — G0277 HBOT, FULL BODY CHAMBER, 30M: HCPCS | Performed by: FAMILY MEDICINE

## 2017-06-12 PROCEDURE — 99183 HYPERBARIC OXYGEN THERAPY: CPT | Performed by: FAMILY MEDICINE

## 2017-06-12 ASSESSMENT — ENCOUNTER SYMPTOMS
BLURRED VISION: 0
SHORTNESS OF BREATH: 0
DOUBLE VISION: 0
PALPITATIONS: 0
COUGH: 0
DIZZINESS: 0
HEADACHES: 0
FEVER: 0
FLANK PAIN: 1
CHILLS: 0

## 2017-06-12 ASSESSMENT — PAIN SCALES - GENERAL: PAINLEVEL_OUTOF13: 7

## 2017-06-12 NOTE — MR AVS SNAPSHOT
Lupe SAWYER Webb   2017 9:30 AM   Office Visit   MRN: [id number]    Department:  [facility]   Dept Phone:  [phone]    Description:  Female : 1955   Provider:  Moni Ridley M.D.           Allergies as of 2017     Allergen Noted Reactions    Contrast Media With Iodine [Iodine] 2014       Rash, same with topical iodine      Shellfish Allergy 2014   Vomiting    Rash, hives,     Penicillins 2014   Vomiting      You were diagnosed with     Nonhealing surgical wound, subsequent encounter   [637172]       Incisional breast wound   [924052]       Lupus arthritis (CMS-HCC)   [441699]       Raynaud's disease without gangrene   [3397593]       Sjogren's syndrome, with unspecified organ involvement (CMS-HCC)   [6944026]         Vital Signs     Blood Pressure Pulse Temperature Oxygen Saturation Smoking Status       126/78 mmHg 65 36.8 °C (98.3 °F) 100% Never Smoker        Basic Information     Date Of Birth Sex Race Ethnicity Preferred Language    1955 Female White Non- English      Your appointments     2017  9:30 AM   HBOT Supervision with HYPERBARIC CHAMBER 2   [facility] ([address] [place] [address])    [address]  [place] [address]   [phone]            2017  9:30 AM   HBOT Supervision with HYPERBARIC CHAMBER 2   [facility] ([address] [place] [address])    [address]  [place] [address]   [phone]            Medhat 15, 2017  9:30 AM   HBOT Supervision with HYPERBARIC CHAMBER 2   [facility] ([address] [place] [address])    [address]  [place] [address]   [phone]            2017  9:30 AM   HBOT Supervision with HYPERBARIC CHAMBER 2   [facility] ([facility] [address])    [address]  [place] [address]   [phone]            2017  9:30 AM   HBOT Supervision with HYPERBARIC CHAMBER 2   [facility] ([address])    [address]  [place] [address]   [phone]            Jun 20, 2017  9:30 AM   HBOT Supervision with HYPERBARIC CHAMBER 2   [facility] ([address])    [address]  [place] [address]   [phone]            Jun 21, 2017  9:30 AM   HBOT Supervision with HYPERBARIC CHAMBER 2   [facility] ([address])    [address]  [place] [address]   [phone]            Jun 22, 2017  9:30 AM   HBOT Supervision with HYPERBARIC CHAMBER 2   [facility] ([address])    [address]  [place] [address]   [phone]            Jun 23, 2017  9:30 AM   HBOT Supervision with HYPERBARIC CHAMBER 2   [facility] ([address])    [address]  [place] [address]   [phone]            Jun 26, 2017  9:30 AM   HBOT Supervision with HYPERBARIC CHAMBER 2   [facility] ([address])    [address]  [place] [address]   [phone]            Jun 27, 2017  9:30 AM   HBOT Supervision with HYPERBARIC CHAMBER 2   [facility] ([address])    [address]  [place] [address]   [phone]            Jun 28, 2017  9:30 AM   HBOT Supervision with HYPERBARIC CHAMBER 2   [facility] ([address])    [address]  [place] [address]   [phone]            Jun 29, 2017  9:30 AM   HBOT Supervision with HYPERBARIC CHAMBER 2   [facility] ([address])    [address]  [place] [address]   [phone]            Jun 30, 2017  9:30 AM   HBOT Supervision with HYPERBARIC CHAMBER 2   [facility] ([address])    [address]  [place] [address]   [phone]            Jul 03, 2017  9:30 AM   HBOT Supervision with HYPERBARIC CHAMBER 2   [facility] ([address])    [address]  [place] [address]   [phone]            Jul 04, 2017  9:30 AM   HBOT Supervision with HYPERBARIC CHAMBER 2   [facility] ([address])    [address]  [place] [address]   [phone]            Jul 05, 2017  9:30 AM   HBOT Supervision with HYPERBARIC CHAMBER 2   [facility] ([address])    [address]  [place] [address]   [phone]            Jul 06, 2017  9:30 AM   HBOT Supervision with HYPERBARIC CHAMBER 2   [facility] ([address])    [address]  [place] [address]   [phone]            Jul 07, 2017  9:30 AM   HBOT Supervision with HYPERBARIC CHAMBER 2   [facility] ([address])    [address]  [place] [address]   [phone]            Jul 10, 2017  9:30 AM   HBOT Supervision with HYPERBARIC CHAMBER 2   [facility] ([address])    [address]  [place] [address]   [phone]            Jul 11, 2017  9:30 AM   HBOT Supervision with HYPERBARIC CHAMBER 2   [facility] ([address])    [address]  [place] [address]   [phone]            Jul 12, 2017  9:30 AM   HBOT Supervision with HYPERBARIC CHAMBER 2   [facility] ([address])    [address]  [place] [address]   [phone]            Jul 13, 2017  9:30 AM   HBOT Supervision with HYPERBARIC CHAMBER 2   [facility] ([address])    [address]  [place] [address]   [phone]            Jul 14, 2017  9:30 AM   HBOT Supervision with HYPERBARIC CHAMBER 2   [facility] ([address])    [address]  [place] [address]   [phone]            Jul 17, 2017  9:30 AM   HBOT Supervision with HYPERBARIC CHAMBER 2   [facility] ([address])    [address]  [place] [address]   [phone]            Jul 18, 2017  9:30 AM   HBOT Supervision with HYPERBARIC CHAMBER 2   [facility] ([address])    [address]  [place] [address]   [phone]            Jul 19, 2017  9:30 AM   HBOT Supervision with HYPERBARIC CHAMBER 2   [facility] ([address])    [address]  [place] [address]   [phone]            Jul 20, 2017  9:30 AM   HBOT Supervision with HYPERBARIC CHAMBER 2   [facility] ([address])    [address]  [place] [address]   [phone]            Jul 21, 2017  9:30 AM   HBOT Supervision with HYPERBARIC CHAMBER 2   [facility] ([address])    [address]  [place] [address]   [phone]            Jul 24, 2017  9:30 AM   HBOT Supervision with HYPERBARIC CHAMBER 2   [facility] ([address])    [address]  [place] [address]   [phone]            Jul 25, 2017  9:30 AM   HBOT Supervision with HYPERBARIC CHAMBER 2   [facility] ([address])    [address]  [place] [address]   [phone]            Jul 26, 2017  9:30 AM   HBOT Supervision with HYPERBARIC CHAMBER 2   [facility] ([address])    [address]  [place] [address]   [phone]            Jul 27, 2017  9:30 AM   HBOT Supervision with HYPERBARIC CHAMBER 2   [facility] ([address])    [address]  [place] [address]   [phone]            Jul 28, 2017  9:30 AM   HBOT Supervision with HYPERBARIC CHAMBER 2   [facility] ([address])    [address]  [place] [address]   [phone]            Jul 31, 2017  9:30 AM   HBOT Supervision with HYPERBARIC CHAMBER 2   [facility] ([address])    [address]  [place] [address]   [phone]            Sep 08, 2017  2:30 PM   US MINNA with [facility] US 2   [facility] ([address])    [address]  [place] [address]   [phone]           Some exams require specific prep instructions that would have been given to you at time of scheduling. If you have any additional questions about the prep instructions, please call Imaging Scheduling at [phone] and press #[phone].              Problem List              ICD-10-CM Priority Class Noted - Resolved    Sjogren's syndrome (CMS-HCC) (Chronic) M35.00   4/26/1990 - Present    DDD (degenerative disc disease), cervical (Chronic) M50.30   4/26/2017 - Present    Osteoarthritis of multiple joints (Chronic) M15.9   4/26/2017 - Present    Lupus arthritis (CMS-HCC) (Chronic) M32.9   4/26/2017 - Present    Chronic pain syndrome (Chronic) G89.4   4/26/2017 - Present    Gastroesophageal reflux disease without esophagitis (Chronic) K21.9   4/26/2017 - Present    Lactose intolerance (Chronic) E73.9   4/26/2017 - Present    Raynaud's disease without gangrene (Chronic) I73.00   4/26/2017 - Present    Osteopenia (Chronic) M85.80   4/26/2017 - Present    Functional diarrhea (Chronic) K59.1   4/26/2017 - Present    Current chronic use of systemic steroids (Chronic) Z79.52   4/26/2017 - Present    Incisional breast wound S21.009A   5/15/2017 - Present    Nonhealing surgical wound T81.89XA   6/9/2017 - Present      Health Maintenance        Date Due Completion Dates    IMM DTaP/Tdap/Td Vaccine (1 - Tdap) 4/1/2019 (Originally 3/28/1974) ---    IMM ZOSTER VACCINE 4/1/2019 (Originally 3/28/2015) ---    MAMMOGRAM 9/23/2017 9/23/2016    PAP SMEAR 1/2/2020 1/2/2017 (Done)    Override on 1/2/2017: Done    COLONOSCOPY 8/21/2022 8/21/2012            Current Immunizations     Influenza Vaccine Quad Inj (Preserved) 9/27/2016      Below and/or attached are the  medications your provider expects you to take. Review all of your home medications and newly ordered medications with your provider and/or pharmacist. Follow medication instructions as directed by your provider and/or pharmacist. Please keep your medication list with you and share with your provider. Update the information when medications are discontinued, doses are changed, or new medications (including over-the-counter products) are added; and carry medication information at all times in the event of emergency situations     Allergies:  CONTRAST MEDIA WITH IODINE - (reactions not documented)     SHELLFISH ALLERGY - Vomiting     PENICILLINS - Vomiting               Medications  Valid as of: June 12, 2017 -  9:53 AM    Generic Name Brand Name Tablet Size Instructions for use    ALPRAZolam (Tab) XANAX 0.5 MG Take 0.5 Tabs by mouth at bedtime as needed for Sleep.        CycloSPORINE   1 Drop by Ophthalmic route 2 Times a Day. Both eyes        Metaxalone (Tab) SKELAXIN 800 MG Take 800 mg by mouth 3 times a day.        NS SOLN 50 mL with methotrexate PF 25 MG/ML SOLN 30 mg/m2   30 mg/m2 by Intravenous route Once. Indications: weekly        Omega-3 Fatty Acids   Take  by mouth.        PredniSONE (Tab) DELTASONE 1 MG Take 7 mg by mouth every day. 7 pills per day        Sildenafil Citrate (Tab) VIAGRA 25 MG Take 20 mg by mouth every day. Indications: Raynaud's Phenomenon of Unknown Cause        Zolpidem Tartrate (Tab) AMBIEN 5 MG Take  by mouth at bedtime as needed.        Zonisamide (Cap) ZONEGRAN 100 MG Take 300 mg by mouth every bedtime. Indications: neuropathy        .                 Medicines prescribed today were sent to:     [id number]  [place], [place] - [address]    [address] [address]    Phone: [phone] Fax: [phone]    Open 24 Hours?: No      Medication refill instructions:       If your prescription bottle indicates you have medication refills left, it is not necessary to call  your provider’s office. Please contact your pharmacy and they will refill your medication.    If your prescription bottle indicates you do not have any refills left, you may request refills at any time through one of the following ways: The online [employer] system (except Urgent Care), by calling your provider’s office, or by asking your pharmacy to contact your provider’s office with a refill request. Medication refills are processed only during regular business hours and may not be available until the next business day. Your provider may request additional information or to have a follow-up visit with you prior to refilling your medication.   *Please Note: Medication refills are assigned a new Rx number when refilled electronically. Your pharmacy may indicate that no refills were authorized even though a new prescription for the same medication is available at the pharmacy. Please request the medicine by name with the pharmacy before contacting your provider for a refill.        Instructions    After hyperbaric oxygen therapy treatment, it is normal to experience some congestion to the ears, muffling of sounds, or abnormal sounds to one or both ears.    If you experience pain or discomfort to one or both ears that does not resolve spontaneously, please contact the hyperbaric department at [phone].         Other Notes About Your Plan     Sjogren's specialist in [place]  Rheum: Dr. Inessa Cruz [place]  Pain: Dr. Shankar (not actively seeing)  GYN: Dr. Sierra  GI: Dr. Clemente  PT: [facility]           MyChart Access Code: Activation code not generated  Current MyChart Status: Active

## 2017-06-12 NOTE — PROGRESS NOTES
Lupe Webb is a 62 y.o. female who presents with a non healing breast wound.    HPI  Lupe presents for HBOT treatment today.   She tolerated treatment well with no issues.   She was able to clear her ears with no pain or pressure.     She notes some right flank discomfort today.  Will keep an eye on it.    Review of Systems   Constitutional: Negative for fever and chills.   HENT: Negative for ear discharge, ear pain and hearing loss.    Eyes: Negative for blurred vision and double vision.   Respiratory: Negative for cough and shortness of breath.    Cardiovascular: Negative for chest pain and palpitations.   Genitourinary: Positive for flank pain (Rt. flank discomfort.).   Neurological: Negative for dizziness and headaches.        Objective:     /78 mmHg  Pulse 65  Temp(Src) 36.8 °C (98.3 °F)  SpO2 100%     Physical Exam   Constitutional: She is oriented to person, place, and time. She appears well-developed and well-nourished.   HENT:   Head: Normocephalic and atraumatic.   Right Ear: Tympanic membrane, external ear and ear canal normal.   Left Ear: Tympanic membrane, external ear and ear canal normal.   Pulmonary/Chest: Effort normal.   Neurological: She is alert and oriented to person, place, and time.   Psychiatric: She has a normal mood and affect.   Vitals reviewed.      Assessment/Plan:     . Nonhealing surgical wound, initial encounter    Appropriate candidate for HBOT - likely that the patient's wound healing is impeded by a multitude of factors,    including vasculitis from Sjogren's / Raynaud's / Lupus, as well as her chronic use of Methotrexate and Prednisone,    in addition to her age, postmenopausal status, and prior surgery & scar to the wound site. HBOT should be    beneficial for her wound healing in terms of it's angiogenic and anti-vasculitic properties. Have recommended    to the patient that she d/c MTX and continue to decrease prednisone, per instructions from Dr. Cruz.     Patient completed her 5th HBOT treatment at a pressure of 2.4 BRISEIDA x 90 minutes at a descent/ascent of 1.5/3.0 PSI/minute      2. Incisional breast wound    Per Dr. Garcia, s/p Bilateral Saline-->Silicone breast implant Re-do 11/2/16 with recurrent non-healing surgical wound     3. Sjogren's syndrome, with unspecified organ involvement (CMS-[facility])    On Methotrexate and Prednisone per Dr. Cruz     4. Raynaud's disease without gangrene    On Methotrexate and Prednisone per Dr. Cruz     5. Lupus arthritis (CMS-[facility])    On Methotrexate and Prednisone per Dr. Cruz     6. Current chronic use of systemic steroids    On Methotrexate and Prednisone per Dr. Cruz     7. Dry mouth     8. DDD (degenerative disc disease), cervical     9. Gastroesophageal reflux disease without esophagitis

## 2017-06-12 NOTE — PATIENT INSTRUCTIONS
After hyperbaric oxygen therapy treatment, it is normal to experience some congestion to the ears, muffling of sounds, or abnormal sounds to one or both ears.    If you experience pain or discomfort to one or both ears that does not resolve spontaneously, please contact the hyperbaric department at [phone].

## 2017-06-13 ENCOUNTER — OFFICE VISIT (OUTPATIENT)
Dept: WOUND CARE | Facility: MEDICAL CENTER | Age: 62
End: 2017-06-13
Attending: FAMILY MEDICINE
Payer: MEDICARE

## 2017-06-13 VITALS
SYSTOLIC BLOOD PRESSURE: 125 MMHG | DIASTOLIC BLOOD PRESSURE: 76 MMHG | TEMPERATURE: 98.8 F | HEART RATE: 54 BPM | OXYGEN SATURATION: 100 %

## 2017-06-13 DIAGNOSIS — T81.89XD NONHEALING SURGICAL WOUND, SUBSEQUENT ENCOUNTER: ICD-10-CM

## 2017-06-13 DIAGNOSIS — M32.9 LUPUS ARTHRITIS (HCC): Chronic | ICD-10-CM

## 2017-06-13 DIAGNOSIS — S21.009A INCISIONAL BREAST WOUND: ICD-10-CM

## 2017-06-13 PROCEDURE — 99183 HYPERBARIC OXYGEN THERAPY: CPT | Performed by: FAMILY MEDICINE

## 2017-06-13 PROCEDURE — G0277 HBOT, FULL BODY CHAMBER, 30M: HCPCS | Performed by: FAMILY MEDICINE

## 2017-06-13 ASSESSMENT — ENCOUNTER SYMPTOMS
COUGH: 0
CHILLS: 0
PALPITATIONS: 0
NAUSEA: 0
CONSTIPATION: 0
DOUBLE VISION: 0
BLURRED VISION: 0
DIARRHEA: 0
HEADACHES: 0
DIZZINESS: 0
FLANK PAIN: 1
SHORTNESS OF BREATH: 0
VOMITING: 0
FEVER: 0

## 2017-06-13 ASSESSMENT — PAIN SCALES - GENERAL: PAINLEVEL_OUTOF13: 5

## 2017-06-13 NOTE — PATIENT INSTRUCTIONS
After hyperbaric oxygen therapy treatment, it is normal to experience some congestion to the ears, muffling of sounds, or abnormal sounds to one or both ears.    If you experience pain or discomfort to one or both ears that does not resolve spontaneously, please contact the hyperbaric department at [phone].

## 2017-06-13 NOTE — PROGRESS NOTES
Subjective:   Lupe Webb is a 62 y.o. female who presents with a non healing breast wound.    HPI  Lupe presents for HBOT treatment today.     She tolerated treatment well with no issues.   She was able to clear her ears with no pain or pressure.     She still complains of right flank discomfort today.     Review of Systems   Constitutional: Negative for fever and chills.   HENT: Negative for ear discharge, ear pain and hearing loss.    Eyes: Negative for blurred vision and double vision.   Respiratory: Negative for cough and shortness of breath.    Cardiovascular: Negative for chest pain and palpitations.   Gastrointestinal: Negative for nausea, vomiting, diarrhea and constipation.   Genitourinary: Positive for flank pain (Rt flank pain). Negative for dysuria, urgency and frequency.   Neurological: Negative for dizziness and headaches.          Objective:     /76 mmHg  Pulse 54  Temp(Src) 37.1 °C (98.8 °F)  SpO2 100%     Physical Exam   Constitutional: She is oriented to person, place, and time. She appears well-developed and well-nourished.   HENT:   Head: Normocephalic and atraumatic.   Right Ear: Tympanic membrane, external ear and ear canal normal.   Left Ear: Tympanic membrane, external ear and ear canal normal.   Pulmonary/Chest: Effort normal.   Neurological: She is alert and oriented to person, place, and time.   Psychiatric: She has a normal mood and affect.   Vitals reviewed.      Assessment/Plan:   . Nonhealing surgical wound, initial encounter   Appropriate candidate for HBOT - likely that the patient's wound healing is impeded by a multitude of factors,   including vasculitis from Sjogren's / Raynaud's / Lupus, as well as her chronic use of Methotrexate and Prednisone,   in addition to her age, postmenopausal status, and prior surgery & scar to the wound site. HBOT should be   beneficial for her wound healing in terms of it's angiogenic and anti-vasculitic properties. Have recommended   to  the patient that she d/c MTX and continue to decrease prednisone, per instructions from Dr. Cruz.   Patient completed her 6th HBOT treatment at a pressure of 2.4 BRISEIDA x 90 minutes at a descent/ascent of 1.5/3.0 PSI/minute   2. Incisional breast wound   Per Dr. Garcia, s/p Bilateral Saline-->Silicone breast implant Re-do 11/2/16 with recurrent non-healing surgical wound   3. Sjogren's syndrome, with unspecified organ involvement (CMS-HCC)   On Methotrexate and Prednisone per Dr. Cruz   4. Raynaud's disease without gangrene   On Methotrexate and Prednisone per Dr. Cruz   5. Lupus arthritis (CMS-[facility])   On Methotrexate and Prednisone per Dr. Cruz   6. Current chronic use of systemic steroids   On Methotrexate and Prednisone per Dr. Cruz   7. Dry mouth   8. DDD (degenerative disc disease), cervical   9. Gastroesophageal reflux disease without esophagitis

## 2017-06-13 NOTE — MR AVS SNAPSHOT
Lupe SAWYER Webb   2017 9:30 AM   Office Visit   MRN: [id number]    Department:  [facility] O[facility]   Dept Phone:  [phone]    Description:  Female : 1955   Provider:  Moni Ridley M.D.           Allergies as of 2017     Allergen Noted Reactions    Contrast Media With Iodine [Iodine] 2014       Rash, same with topical iodine      Shellfish Allergy 2014   Vomiting    Rash, hives,     Penicillins 2014   Vomiting      You were diagnosed with     Nonhealing surgical wound, subsequent encounter   [808297]       Incisional breast wound   [212949]       Lupus arthritis (CMS-HCC)   [621926]         Vital Signs     Blood Pressure Pulse Temperature Oxygen Saturation Smoking Status       125/76 mmHg 54 37.1 °C (98.8 °F) 100% Never Smoker        Basic Information     Date Of Birth Sex Race Ethnicity Preferred Language    1955 Female White Non- English      Your appointments     2017  9:30 AM   HBOT Supervision with HYPERBARIC CHAMBER 2   [facility] ([address])    [address]  [place] [address]   [phone]            Medhat 15, 2017  9:30 AM   HBOT Supervision with HYPERBARIC CHAMBER 2   [facility] ([address])    [address]  [place] [address]   [phone]            2017  9:30 AM   HBOT Supervision with HYPERBARIC CHAMBER 2   [facility] ([address])    [address]  [place] [address]   [phone]            2017  9:30 AM   HBOT Supervision with HYPERBARIC CHAMBER 2   [facility] ([address])    [address]  [place] [address]   [phone]            2017  9:30 AM   HBOT Supervision with HYPERBARIC CHAMBER 2   [facility] ([address])    [address]  [place] [address]   [phone]            2017  9:30 AM   HBOT  Supervision with HYPERBARIC CHAMBER 2   [facility] ([address])    [address]  [place] [address]   [phone]            Jun 22, 2017  9:30 AM   HBOT Supervision with HYPERBARIC CHAMBER 2   [facility] ([address])    [address]  [place] [address]   [phone]            Jun 23, 2017  9:30 AM   HBOT Supervision with HYPERBARIC CHAMBER 2   [facility] ([address])    [address]  [place] [address]   [phone]            Jun 26, 2017  9:30 AM   HBOT Supervision with HYPERBARIC CHAMBER 2   [facility] ([address])    [address]  [place] [address]   [phone]            Jun 27, 2017  9:30 AM   HBOT Supervision with HYPERBARIC CHAMBER 2   [facility] ([address])    [address]  [place] [address]   [phone]            Jun 28, 2017  9:30 AM   HBOT Supervision with HYPERBARIC CHAMBER 2   [facility] ([address])    [address]  [place] [address]   [phone]            Jun 29, 2017  9:30 AM   HBOT Supervision with HYPERBARIC CHAMBER 2   [facility] ([address])    [address]  [place] [address]   [phone]            Jun 30, 2017  9:30 AM   HBOT Supervision with HYPERBARIC CHAMBER 2   [facility] ([address])    [address]  [place] [address]   [phone]            Jul 03, 2017  9:30 AM   HBOT Supervision with HYPERBARIC CHAMBER 2   [facility] ([address])    [address]  [place] [address]   [phone]            Jul 04, 2017  9:30 AM   HBOT Supervision with HYPERBARIC CHAMBER 2   [facility] ([address])    [address]  [place] [address]   [phone]            Jul 05, 2017  9:30 AM   HBOT  Supervision with HYPERBARIC CHAMBER 2   [facility] ([address])    [address]  [place] [address]   [phone]            Jul 06, 2017  9:30 AM   HBOT Supervision with HYPERBARIC CHAMBER 2   [facility] ([address])    [address]  [place] [address]   [phone]            Jul 07, 2017  9:30 AM   HBOT Supervision with HYPERBARIC CHAMBER 2   [facility] ([address])    [address]  [place] [address]   [phone]            Jul 10, 2017  9:30 AM   HBOT Supervision with HYPERBARIC CHAMBER 2   [facility] ([address])    [address]  [place] [address]   [phone]            Jul 11, 2017  9:30 AM   HBOT Supervision with HYPERBARIC CHAMBER 2   [facility] ([address])    [address]  [place] [address]   [phone]            Jul 12, 2017  9:30 AM   HBOT Supervision with HYPERBARIC CHAMBER 2   [facility] ([address])    [address]  [place] [address]   [phone]            Jul 13, 2017  9:30 AM   HBOT Supervision with HYPERBARIC CHAMBER 2   [facility] ([address])    [address]  [place] [address]   [phone]            Jul 14, 2017  9:30 AM   HBOT Supervision with HYPERBARIC CHAMBER 2   [facility] ([address])    [address]  [place] [address]   [phone]            Jul 17, 2017  9:30 AM   HBOT Supervision with HYPERBARIC CHAMBER 2   [facility] ([address])    [address]  [place] [address]   [phone]            Jul 18, 2017  9:30 AM   HBOT Supervision with HYPERBARIC CHAMBER 2   [facility] ([address])    [address]  [place] [address]   [phone]            Jul 19, 2017  9:30 AM   HBOT  Supervision with HYPERBARIC CHAMBER 2   [facility] ([address])    [address]  [place] [address]   [phone]            Jul 20, 2017  9:30 AM   HBOT Supervision with HYPERBARIC CHAMBER 2   [facility] ([address])    [address]  [place] [address]   [phone]            Jul 21, 2017  9:30 AM   HBOT Supervision with HYPERBARIC CHAMBER 2   [facility] ([address])    [address]  [place] [address]   [phone]            Jul 24, 2017  9:30 AM   HBOT Supervision with HYPERBARIC CHAMBER 2   [facility] ([address])    [address]  [place] [address]   [phone]            Jul 25, 2017  9:30 AM   HBOT Supervision with HYPERBARIC CHAMBER 2   [facility] ([facility] [facility] [address])    [address]  [place] [address]   [phone]            Jul 26, 2017  9:30 AM   HBOT Supervision with HYPERBARIC CHAMBER 2   [facility] ([address])    [address]  [place] [address]   [phone]            Jul 27, 2017  9:30 AM   HBOT Supervision with HYPERBARIC CHAMBER 2   [facility] ([address])    [address]  [place] [address]   [phone]            Jul 28, 2017  9:30 AM   HBOT Supervision with HYPERBARIC CHAMBER 2   [facility] ([address])    [address]  [place] [address]   [phone]            Jul 31, 2017  9:30 AM   HBOT Supervision with HYPERBARIC CHAMBER 2   [facility] ([address])    [address]  [place] [address]   [phone]            Sep 08, 2017  2:30 PM   US SONOCINE with [facility] US 2   [facility] ([address])    [address]  [place] [address]   [phone]           Some exams require specific prep instructions  that would have been given to you at time of scheduling. If you have any additional questions about the prep instructions, please call Imaging Scheduling at [phone] and press #[phone].              Problem List              ICD-10-CM Priority Class Noted - Resolved    Sjogren's syndrome (CMS-HCC) (Chronic) M35.00   4/26/1990 - Present    DDD (degenerative disc disease), cervical (Chronic) M50.30   4/26/2017 - Present    Osteoarthritis of multiple joints (Chronic) M15.9   4/26/2017 - Present    Lupus arthritis (CMS-HCC) (Chronic) M32.9   4/26/2017 - Present    Chronic pain syndrome (Chronic) G89.4   4/26/2017 - Present    Gastroesophageal reflux disease without esophagitis (Chronic) K21.9   4/26/2017 - Present    Lactose intolerance (Chronic) E73.9   4/26/2017 - Present    Raynaud's disease without gangrene (Chronic) I73.00   4/26/2017 - Present    Osteopenia (Chronic) M85.80   4/26/2017 - Present    Functional diarrhea (Chronic) K59.1   4/26/2017 - Present    Current chronic use of systemic steroids (Chronic) Z79.52   4/26/2017 - Present    Incisional breast wound S21.009A   5/15/2017 - Present    Nonhealing surgical wound T81.89XA   6/9/2017 - Present      Health Maintenance        Date Due Completion Dates    IMM DTaP/Tdap/Td Vaccine (1 - Tdap) 4/1/2019 (Originally 3/28/1974) ---    IMM ZOSTER VACCINE 4/1/2019 (Originally 3/28/2015) ---    MAMMOGRAM 9/23/2017 9/23/2016    PAP SMEAR 1/2/2020 1/2/2017 (Done)    Override on 1/2/2017: Done    COLONOSCOPY 8/21/2022 8/21/2012            Current Immunizations     Influenza Vaccine Quad Inj (Preserved) 9/27/2016      Below and/or attached are the medications your provider expects you to take. Review all of your home medications and newly ordered medications with your provider and/or pharmacist. Follow medication instructions as directed by your provider and/or pharmacist. Please keep your medication list with you and share with your provider. Update the information when  medications are discontinued, doses are changed, or new medications (including over-the-counter products) are added; and carry medication information at all times in the event of emergency situations     Allergies:  CONTRAST MEDIA WITH IODINE - (reactions not documented)     SHELLFISH ALLERGY - Vomiting     PENICILLINS - Vomiting               Medications  Valid as of: June 13, 2017 -  1:09 PM    Generic Name Brand Name Tablet Size Instructions for use    ALPRAZolam (Tab) XANAX 0.5 MG Take 0.5 Tabs by mouth at bedtime as needed for Sleep.        CycloSPORINE   1 Drop by Ophthalmic route 2 Times a Day. Both eyes        Metaxalone (Tab) SKELAXIN 800 MG Take 800 mg by mouth 3 times a day.        NS SOLN 50 mL with methotrexate PF 25 MG/ML SOLN 30 mg/m2   30 mg/m2 by Intravenous route Once. Indications: weekly        Omega-3 Fatty Acids   Take  by mouth.        PredniSONE (Tab) DELTASONE 1 MG Take 7 mg by mouth every day. 7 pills per day        Sildenafil Citrate (Tab) VIAGRA 25 MG Take 20 mg by mouth every day. Indications: Raynaud's Phenomenon of Unknown Cause        Zolpidem Tartrate (Tab) AMBIEN 5 MG Take  by mouth at bedtime as needed.        Zonisamide (Cap) ZONEGRAN 100 MG Take 300 mg by mouth every bedtime. Indications: neuropathy        .                 Medicines prescribed today were sent to:     [id number] [place] - [address]    [address] [place] [address]    Phone: [phone] Fax: [phone]    Open 24 Hours?: No      Medication refill instructions:       If your prescription bottle indicates you have medication refills left, it is not necessary to call your provider’s office. Please contact your pharmacy and they will refill your medication.    If your prescription bottle indicates you do not have any refills left, you may request refills at any time through one of the following ways: The online [employer] system (except Urgent Care), by calling your provider’s office, or by asking  your pharmacy to contact your provider’s office with a refill request. Medication refills are processed only during regular business hours and may not be available until the next business day. Your provider may request additional information or to have a follow-up visit with you prior to refilling your medication.   *Please Note: Medication refills are assigned a new Rx number when refilled electronically. Your pharmacy may indicate that no refills were authorized even though a new prescription for the same medication is available at the pharmacy. Please request the medicine by name with the pharmacy before contacting your provider for a refill.        Instructions    After hyperbaric oxygen therapy treatment, it is normal to experience some congestion to the ears, muffling of sounds, or abnormal sounds to one or both ears.    If you experience pain or discomfort to one or both ears that does not resolve spontaneously, please contact the hyperbaric department at [phone].         Other Notes About Your Plan     Sjogren's specialist in [place]  Rheum: Dr. Inessa Cruz Chase  Pain: Dr. Shankar (not actively seeing)  GYN: Dr. Sierra  GI: Dr. Clemente  PT: [facility]           MyChart Access Code: Activation code not generated  Current MyChart Status: Active

## 2017-06-14 ENCOUNTER — APPOINTMENT (OUTPATIENT)
Dept: WOUND CARE | Facility: MEDICAL CENTER | Age: 62
End: 2017-06-14
Attending: FAMILY MEDICINE
Payer: COMMERCIAL

## 2017-06-14 ENCOUNTER — OFFICE VISIT (OUTPATIENT)
Dept: WOUND CARE | Facility: MEDICAL CENTER | Age: 62
End: 2017-06-14
Attending: FAMILY MEDICINE
Payer: MEDICARE

## 2017-06-14 VITALS
SYSTOLIC BLOOD PRESSURE: 118 MMHG | TEMPERATURE: 99 F | DIASTOLIC BLOOD PRESSURE: 72 MMHG | HEART RATE: 65 BPM | OXYGEN SATURATION: 97 %

## 2017-06-14 DIAGNOSIS — M35.00 SJOGREN'S SYNDROME, WITH UNSPECIFIED ORGAN INVOLVEMENT (HCC): Chronic | ICD-10-CM

## 2017-06-14 DIAGNOSIS — I73.00 RAYNAUD'S DISEASE WITHOUT GANGRENE: Chronic | ICD-10-CM

## 2017-06-14 DIAGNOSIS — Z79.52 CURRENT CHRONIC USE OF SYSTEMIC STEROIDS: Chronic | ICD-10-CM

## 2017-06-14 DIAGNOSIS — S21.009A INCISIONAL BREAST WOUND: ICD-10-CM

## 2017-06-14 DIAGNOSIS — T81.89XD NONHEALING SURGICAL WOUND, SUBSEQUENT ENCOUNTER: ICD-10-CM

## 2017-06-14 DIAGNOSIS — M32.9 LUPUS ARTHRITIS (HCC): Chronic | ICD-10-CM

## 2017-06-14 PROCEDURE — G0277 HBOT, FULL BODY CHAMBER, 30M: HCPCS | Performed by: FAMILY MEDICINE

## 2017-06-14 PROCEDURE — 99183 HYPERBARIC OXYGEN THERAPY: CPT | Performed by: FAMILY MEDICINE

## 2017-06-14 ASSESSMENT — PAIN SCALES - GENERAL: PAINLEVEL_OUTOF13: 6

## 2017-06-14 ASSESSMENT — ENCOUNTER SYMPTOMS
DOUBLE VISION: 0
COUGH: 0
SHORTNESS OF BREATH: 0
DIZZINESS: 0
BLURRED VISION: 0
CHILLS: 0
HEADACHES: 0
FEVER: 0
FLANK PAIN: 1
PALPITATIONS: 0

## 2017-06-14 NOTE — MR AVS SNAPSHOT
Lupe SAWYER Webb   2017 9:30 AM   Office Visit   MRN: [id number]    Department:  [facility]   Dept Phone:  [phone]    Description:  Female : 1955   Provider:  Moni Ridley M.D.           Allergies as of 2017     Allergen Noted Reactions    Contrast Media With Iodine [Iodine] 2014       Rash, same with topical iodine      Shellfish Allergy 2014   Vomiting    Rash, hives,     Penicillins 2014   Vomiting      You were diagnosed with     Nonhealing surgical wound, subsequent encounter   [833002]       Incisional breast wound   [051068]       Sjogren's syndrome, with unspecified organ involvement (CMS-[facility])   [9480925]       Current chronic use of systemic steroids   [3876899]       Raynaud's disease without gangrene   [9767478]       Lupus arthritis (CMS-HCC)   [392892]         Vital Signs     Blood Pressure Pulse Temperature Oxygen Saturation Smoking Status       118/72 mmHg 65 37.2 °C (99 °F) 97% Never Smoker        Basic Information     Date Of Birth Sex Race Ethnicity Preferred Language    1955 Female White Non- English      Your appointments     Medhat 15, 2017  9:30 AM   HBOT Supervision with HYPERBARIC CHAMBER 2   [facility] ([address])    [address]  [place] [address]   [phone]            2017  9:30 AM   HBOT Supervision with HYPERBARIC CHAMBER 2   [facility] ([address])    [address]  [place] [address]   [phone]            2017  9:30 AM   HBOT Supervision with HYPERBARIC CHAMBER 2   [facility] ([address])    [address]  [place] [address]   [phone]            2017  9:30 AM   HBOT Supervision with HYPERBARIC CHAMBER 2   [facility] ([address])    [address]  [place] [address]   [phone]            2017  9:30 AM   HBOT  Supervision with HYPERBARIC CHAMBER 2   [facility] ([address])    [address]  [place] [address]   [phone]            Jun 22, 2017  9:30 AM   HBOT Supervision with HYPERBARIC CHAMBER 2   [facility] ([address])    [address]  [place] [address]   [phone]            Jun 23, 2017  9:30 AM   HBOT Supervision with HYPERBARIC CHAMBER 2   [facility] ([address])    [address]  [place] [address]   [phone]            Jun 26, 2017  9:30 AM   HBOT Supervision with HYPERBARIC CHAMBER 2   [facility] ([address])    [address]  [place] [address]   [phone]            Jun 27, 2017  9:30 AM   HBOT Supervision with HYPERBARIC CHAMBER 2   [facility] ([address])    [address]  [place] [address]   [phone]            Jun 28, 2017  9:30 AM   HBOT Supervision with HYPERBARIC CHAMBER 2   [facility] ([address])    [address]  [place] [address]   [phone]            Jun 29, 2017  9:30 AM   HBOT Supervision with HYPERBARIC CHAMBER 2   [facility] ([address])    [address]  [place] [address]   [phone]            Jun 30, 2017  9:30 AM   HBOT Supervision with HYPERBARIC CHAMBER 2   [facility] ([address])    [address]  [place] [address]   [phone]            Jul 03, 2017  9:30 AM   HBOT Supervision with HYPERBARIC CHAMBER 2   [facility] ([address])    [address]  [place] [address]   [phone]            Jul 04, 2017  9:30 AM   HBOT Supervision with HYPERBARIC CHAMBER 2   [facility] ([address])    [address]  [place] [address]   [phone]            Jul 05, 2017  9:30 AM   HBOT  Supervision with HYPERBARIC CHAMBER 2   [facility] ([address])    [address]  [place] [address]   [phone]            Jul 06, 2017  9:30 AM   HBOT Supervision with HYPERBARIC CHAMBER 2   [facility] ([address])    [address]  [place] [address]   [phone]            Jul 07, 2017  9:30 AM   HBOT Supervision with HYPERBARIC CHAMBER 2   [facility] ([address])    [address]  [place] [address]   [phone]            Jul 10, 2017  9:30 AM   HBOT Supervision with HYPERBARIC CHAMBER 2   [facility] ([address])    [address]  [place] [address]   [phone]            Jul 11, 2017  9:30 AM   HBOT Supervision with HYPERBARIC CHAMBER 2   [facility] ([address])    [address]  [place] [address]   [phone]            Jul 12, 2017  9:30 AM   HBOT Supervision with HYPERBARIC CHAMBER 2   [facility] ([address])    [address]  [place] [address]   [phone]            Jul 13, 2017  9:30 AM   HBOT Supervision with HYPERBARIC CHAMBER 2   [facility] ([address])    [address]  [place] [address]   [phone]            Jul 14, 2017  9:30 AM   HBOT Supervision with HYPERBARIC CHAMBER 2   [facility] ([address])    [address]  [place] [address]   [phone]            Jul 17, 2017  9:30 AM   HBOT Supervision with HYPERBARIC CHAMBER 2   [facility] ([address])    [address]  [place] [address]   [phone]            Jul 18, 2017  9:30 AM   HBOT Supervision with HYPERBARIC CHAMBER 2   [facility] ([address])    [address]  [place] [address]   [phone]            Jul 19, 2017  9:30 AM   HBOT  Supervision with HYPERBARIC CHAMBER 2   [facility] ([address])    [address]  [place] [address]   [phone]            Jul 20, 2017  9:30 AM   HBOT Supervision with HYPERBARIC CHAMBER 2   [facility] ([address])    [address]  [place] [address]   [phone]            Jul 21, 2017  9:30 AM   HBOT Supervision with HYPERBARIC CHAMBER 2   [facility] ([address])    [address]  [place] [address]   [phone]            Jul 24, 2017  9:30 AM   HBOT Supervision with HYPERBARIC CHAMBER 2   [facility] ([address])    [address]  [place] [address]   [phone]            Jul 25, 2017  9:30 AM   HBOT Supervision with HYPERBARIC CHAMBER 2   [facility] ([facility] [address])    [address]  [place] [address]   [phone]            Jul 26, 2017  9:30 AM   HBOT Supervision with HYPERBARIC CHAMBER 2   [facility] ([address])    [address]  [place] [address]   [phone]            Jul 27, 2017  9:30 AM   HBOT Supervision with HYPERBARIC CHAMBER 2   [facility] ([address])    [address]  [place] [address]   [phone]            Jul 28, 2017  9:30 AM   HBOT Supervision with HYPERBARIC CHAMBER 2   [facility] ([address])    [address]  [place] [address]   [phone]            Jul 31, 2017  9:30 AM   HBOT Supervision with HYPERBARIC CHAMBER 2   [facility] ([address])    [address]  [place] [address]   [phone]            Sep 08, 2017  2:30 PM   US SONOCINE with [facility] US 2   [facility] [place] ([address])    [address]  [place] [address]   [phone]           Some exams require specific prep instructions  that would have been given to you at time of scheduling. If you have any additional questions about the prep instructions, please call Imaging Scheduling at [phone] and press #[phone].              Problem List              ICD-10-CM Priority Class Noted - Resolved    Sjogren's syndrome (CMS-HCC) (Chronic) M35.00   4/26/1990 - Present    DDD (degenerative disc disease), cervical (Chronic) M50.30   4/26/2017 - Present    Osteoarthritis of multiple joints (Chronic) M15.9   4/26/2017 - Present    Lupus arthritis (CMS-HCC) (Chronic) M32.9   4/26/2017 - Present    Chronic pain syndrome (Chronic) G89.4   4/26/2017 - Present    Gastroesophageal reflux disease without esophagitis (Chronic) K21.9   4/26/2017 - Present    Lactose intolerance (Chronic) E73.9   4/26/2017 - Present    Raynaud's disease without gangrene (Chronic) I73.00   4/26/2017 - Present    Osteopenia (Chronic) M85.80   4/26/2017 - Present    Functional diarrhea (Chronic) K59.1   4/26/2017 - Present    Current chronic use of systemic steroids (Chronic) Z79.52   4/26/2017 - Present    Incisional breast wound S21.009A   5/15/2017 - Present    Nonhealing surgical wound T81.89XA   6/9/2017 - Present      Health Maintenance        Date Due Completion Dates    IMM DTaP/Tdap/Td Vaccine (1 - Tdap) 4/1/2019 (Originally 3/28/1974) ---    IMM ZOSTER VACCINE 4/1/2019 (Originally 3/28/2015) ---    MAMMOGRAM 9/23/2017 9/23/2016    PAP SMEAR 1/2/2020 1/2/2017 (Done)    Override on 1/2/2017: Done    COLONOSCOPY 8/21/2022 8/21/2012            Current Immunizations     Influenza Vaccine Quad Inj (Preserved) 9/27/2016      Below and/or attached are the medications your provider expects you to take. Review all of your home medications and newly ordered medications with your provider and/or pharmacist. Follow medication instructions as directed by your provider and/or pharmacist. Please keep your medication list with you and share with your provider. Update the information when  medications are discontinued, doses are changed, or new medications (including over-the-counter products) are added; and carry medication information at all times in the event of emergency situations     Allergies:  CONTRAST MEDIA WITH IODINE - (reactions not documented)     SHELLFISH ALLERGY - Vomiting     PENICILLINS - Vomiting               Medications  Valid as of: June 14, 2017 - 12:05 PM    Generic Name Brand Name Tablet Size Instructions for use    ALPRAZolam (Tab) XANAX 0.5 MG Take 0.5 Tabs by mouth at bedtime as needed for Sleep.        CycloSPORINE   1 Drop by Ophthalmic route 2 Times a Day. Both eyes        Metaxalone (Tab) SKELAXIN 800 MG Take 800 mg by mouth 3 times a day.        NS SOLN 50 mL with methotrexate PF 25 MG/ML SOLN 30 mg/m2   30 mg/m2 by Intravenous route Once. Indications: weekly        Omega-3 Fatty Acids   Take  by mouth.        PredniSONE (Tab) DELTASONE 1 MG Take 7 mg by mouth every day. 7 pills per day        Sildenafil Citrate (Tab) VIAGRA 25 MG Take 20 mg by mouth every day. Indications: Raynaud's Phenomenon of Unknown Cause        Zolpidem Tartrate (Tab) AMBIEN 5 MG Take  by mouth at bedtime as needed.        Zonisamide (Cap) ZONEGRAN 100 MG Take 300 mg by mouth every bedtime. Indications: neuropathy        .                 Medicines prescribed today were sent to:     [facility] [place] - [address] [facility]    [address] [place] [address]    Phone: [phone] Fax: [phone]    Open 24 Hours?: No      Medication refill instructions:       If your prescription bottle indicates you have medication refills left, it is not necessary to call your provider’s office. Please contact your pharmacy and they will refill your medication.    If your prescription bottle indicates you do not have any refills left, you may request refills at any time through one of the following ways: The online [employer] system (except Urgent Care), by calling your provider’s office, or by asking  your pharmacy to contact your provider’s office with a refill request. Medication refills are processed only during regular business hours and may not be available until the next business day. Your provider may request additional information or to have a follow-up visit with you prior to refilling your medication.   *Please Note: Medication refills are assigned a new Rx number when refilled electronically. Your pharmacy may indicate that no refills were authorized even though a new prescription for the same medication is available at the pharmacy. Please request the medicine by name with the pharmacy before contacting your provider for a refill.        Instructions    After hyperbaric oxygen therapy treatment, it is normal to experience some congestion to the ears, muffling of sounds, or abnormal sounds to one or both ears.    If you experience pain or discomfort to one or both ears that does not resolve spontaneously, please contact the hyperbaric department at [phone].         Other Notes About Your Plan     Sjogren's specialist in [place]  Rheum: Dr. Inessa Cruz [place]  Pain: Dr. Shankar (not actively seeing)  GYN: Dr. Sierra  GI: Dr. Clemente  PT: [facility]           MyChart Access Code: Activation code not generated  Current MyChart Status: Active

## 2017-06-14 NOTE — PROGRESS NOTES
Subjective:   Lupe Webb is a 62 y.o. female who presents with a non healing breast wound.    HPI  Lupe presents for HBOT treatment today.     She tolerated treatment well with no issues.   She was able to clear her ears with no pain or pressure.     She has a follow up appointment with Dr. Lopez tomorrow.    Review of Systems   Constitutional: Negative for fever and chills.   HENT: Negative for ear discharge, ear pain and hearing loss.    Eyes: Negative for blurred vision and double vision.   Respiratory: Negative for cough and shortness of breath.    Cardiovascular: Negative for chest pain and palpitations.   Genitourinary: Positive for flank pain.   Neurological: Negative for dizziness and headaches.        Objective:     /72 mmHg  Pulse 65  Temp(Src) 37.2 °C (99 °F)  SpO2 97%     Physical Exam   Constitutional: She is oriented to person, place, and time. She appears well-developed and well-nourished.   HENT:   Head: Normocephalic and atraumatic.   Right Ear: Tympanic membrane, external ear and ear canal normal.   Left Ear: Tympanic membrane, external ear and ear canal normal.   Pulmonary/Chest: Effort normal.   Neurological: She is alert and oriented to person, place, and time.   Skin: Skin is warm and dry.   Psychiatric: She has a normal mood and affect.   Vitals reviewed.      Assessment/Plan:     Nonhealing surgical wound, initial encounter   Appropriate candidate for HBOT - likely that the patient's wound healing is impeded by a multitude of factors,   including vasculitis from Sjogren's / Raynaud's / Lupus, as well as her chronic use of Methotrexate and Prednisone,   in addition to her age, postmenopausal status, and prior surgery & scar to the wound site. HBOT should be   beneficial for her wound healing in terms of it's angiogenic and anti-vasculitic properties. Have recommended   to the patient that she d/c MTX and continue to decrease prednisone, per instructions from Dr. Cruz.    Patient completed her 7th HBOT treatment at a pressure of 2.4 BRISEIDA x 90 minutes at a descent/ascent of 1.5/3.0 PSI/minute   2. Incisional breast wound   Per Dr. Garcia, s/p Bilateral Saline-->Silicone breast implant Re-do 11/2/16 with recurrent non-healing surgical wound   3. Sjogren's syndrome, with unspecified organ involvement (CMS-[facility])   On Methotrexate and Prednisone per Dr. Cruz   4. Raynaud's disease without gangrene   On Methotrexate and Prednisone per Dr. Cruz   5. Lupus arthritis (CMS-[facility])   On Methotrexate and Prednisone per Dr. Cruz   6. Current chronic use of systemic steroids   On Methotrexate and Prednisone per Dr. Cruz   7. Dry mouth   8. DDD (degenerative disc disease), cervical   9. Gastroesophageal reflux disease without esophagitis

## 2017-06-14 NOTE — PATIENT INSTRUCTIONS
After hyperbaric oxygen therapy treatment, it is normal to experience some congestion to the ears, muffling of sounds, or abnormal sounds to one or both ears.    If you experience pain or discomfort to one or both ears that does not resolve spontaneously, please contact the hyperbaric department at [phone].

## 2017-06-15 ENCOUNTER — NON-PROVIDER VISIT (OUTPATIENT)
Dept: WOUND CARE | Facility: MEDICAL CENTER | Age: 62
End: 2017-06-15
Attending: FAMILY MEDICINE
Payer: MEDICARE

## 2017-06-15 DIAGNOSIS — I73.00 RAYNAUD'S DISEASE WITHOUT GANGRENE: Chronic | ICD-10-CM

## 2017-06-15 DIAGNOSIS — Z79.52 CURRENT CHRONIC USE OF SYSTEMIC STEROIDS: Chronic | ICD-10-CM

## 2017-06-15 DIAGNOSIS — S21.009A INCISIONAL BREAST WOUND: ICD-10-CM

## 2017-06-15 DIAGNOSIS — T81.89XD NONHEALING SURGICAL WOUND, SUBSEQUENT ENCOUNTER: Primary | ICD-10-CM

## 2017-06-15 DIAGNOSIS — M35.00 SJOGREN'S SYNDROME, WITH UNSPECIFIED ORGAN INVOLVEMENT (HCC): Chronic | ICD-10-CM

## 2017-06-15 DIAGNOSIS — M32.9 LUPUS ARTHRITIS (HCC): Chronic | ICD-10-CM

## 2017-06-15 PROCEDURE — G0277 HBOT, FULL BODY CHAMBER, 30M: HCPCS | Performed by: FAMILY MEDICINE

## 2017-06-15 PROCEDURE — 99183 HYPERBARIC OXYGEN THERAPY: CPT | Performed by: FAMILY MEDICINE

## 2017-06-15 ASSESSMENT — PAIN SCALES - GENERAL: PAINLEVEL_OUTOF13: 6

## 2017-06-15 NOTE — PATIENT INSTRUCTIONS
After hyperbaric oxygen therapy treatment, it is normal to experience some congestion to the ears, muffling of sounds, or abnormal sounds to one or both ears.    If you experience pain or discomfort to one or both ears that does not resolve spontaneously, please contact the hyperbaric department at [phone].

## 2017-06-15 NOTE — PROGRESS NOTES
Subjective:   Lupe Webb is a 62 y.o. female who presents with a non healing breast wound.    HPI  Lupe returns to HBOT.  No ear pain, no medication changes. Tolerating HBOT better than previously in terms of her claustrophobia.  Sees Dr. Garcia today for breast wound followup.     Review of Systems   Constitutional: Negative for fever and chills.   HENT: Negative for ear discharge, ear pain and hearing loss.    Eyes: Negative for blurred vision and double vision.   Respiratory: Negative for cough and shortness of breath.    Cardiovascular: Negative for chest pain and palpitations.   Genitourinary: Positive for flank pain.   Neurological: Negative for dizziness and headaches.        Objective:     /76 mmHg  Pulse 52  Temp(Src) -12.7 °C (9.1 °F)  SpO2 98%     Physical Exam   Constitutional: She is oriented to person, place, and time. She appears well-developed and well-nourished.   HENT:   Head: Normocephalic and atraumatic.   Right Ear: Tympanic membrane, external ear and ear canal normal.   Left Ear: Tympanic membrane, external ear and ear canal normal.   Pulmonary/Chest: Effort normal.   Neurological: She is alert and oriented to person, place, and time.   Skin: Skin is warm and dry.   Psychiatric: She has a normal mood and affect.   Vitals reviewed.      Assessment/Plan:     Nonhealing surgical wound, initial encounter   Appropriate candidate for HBOT - likely that the patient's wound healing is impeded by a multitude of factors,   including vasculitis from Sjogren's / Raynaud's / Lupus, as well as her chronic use of Methotrexate and Prednisone,   in addition to her age, postmenopausal status, and prior surgery & scar to the wound site. HBOT should be   beneficial for her wound healing in terms of it's angiogenic and anti-vasculitic properties. Have recommended   to the patient that she d/c MTX and continue to decrease prednisone, per instructions from Dr. Cruz.   Patient completed her 8th HBOT  treatment at a pressure of 2.4 BRISEIDA x 90 minutes at a descent/ascent of 1.5/3.0 PSI/minute   2. Incisional breast wound   Per Dr. Garcia, s/p Bilateral Saline-->Silicone breast implant Re-do 11/2/16 with recurrent non-healing surgical wound   3. Sjogren's syndrome, with unspecified organ involvement (CMS-[facility])   On Methotrexate and Prednisone per Dr. Cruz   4. Raynaud's disease without gangrene   On Methotrexate and Prednisone per Dr. Cruz   5. Lupus arthritis (CMS-[facility])   On Methotrexate and Prednisone per Dr. Cruz   6. Current chronic use of systemic steroids   On Methotrexate and Prednisone per Dr. Cruz   7. Dry mouth   8. DDD (degenerative disc disease), cervical   9. Gastroesophageal reflux disease without esophagitis

## 2017-06-15 NOTE — MR AVS SNAPSHOT
Lupe Webb   6/15/2017 10:00 AM   Non-Provider Visit   MRN: [id number]    Department:  [facility]   Dept Phone:  [phone]    Description:  Female : 1955   Provider:  HYPERBARIC CHAMBER 2           Allergies as of 6/15/2017     Allergen Noted Reactions    Contrast Media With Iodine [Iodine] 2014       Rash, same with topical iodine      Shellfish Allergy 2014   Vomiting    Rash, hives,     Penicillins 2014   Vomiting      You were diagnosed with     Nonhealing surgical wound, subsequent encounter   [866704]  -  Primary     Incisional breast wound   [932606]       Lupus arthritis (CMS-HCC)   [329850]       Raynaud's disease without gangrene   [0579093]       Current chronic use of systemic steroids   [5083024]       Sjogren's syndrome, with unspecified organ involvement (CMS-HCC)   [0091643]         Vital Signs     Blood Pressure Pulse Temperature Oxygen Saturation Smoking Status       118/76 mmHg 52 -12.7 °C (9.1 °F) 98% Never Smoker        Basic Information     Date Of Birth Sex Race Ethnicity Preferred Language    1955 Female White Non- English      Your appointments     2017 10:00 AM   HBOT Supervision with HYPERBARIC CHAM[facility] 2   [facility] ([address] [place] [address])    [address]  [place] [address]   [phone]            2017  9:30 AM   HBOT Supervision with HYPERBARIC CHAMBER 2   [facility] ([address] [place] [address])    [address]  [place] [address]   [phone]            2017  9:30 AM   HBOT Supervision with HYPERBARIC CHAMBER 2   [facility] ([address] [place] [address])    [address]  [place] [address]   [phone]            2017  9:30 AM   HBOT Supervision with HYPERBARIC CHAMBER 2   [facility] ([address] [place] [address])    [address]  [place] [address]   [phone]            2017   9:30 AM   HBOT Supervision with HYPERBARIC CHAMBER 2   [facility] ([address])    [address]  [place] [address]   [phone]            Jun 23, 2017  9:30 AM   HBOT Supervision with HYPERBARIC CHAMBER 2   [facility] ([address])    [address]  [place] [address]   [phone]            Jun 26, 2017  9:30 AM   HBOT Supervision with HYPERBARIC CHAMBER 2   [facility] ([address])    [address]  [place] [address]   [phone]            Jun 27, 2017  9:30 AM   HBOT Supervision with HYPERBARIC CHAMBER 2   [facility] ([address])    [address]  [place] [address]   [phone]            Jun 28, 2017  9:30 AM   HBOT Supervision with HYPERBARIC CHAMBER 2   [facility] ([address])    [address]  [place] [address]   [phone]            Jun 29, 2017  9:30 AM   HBOT Supervision with HYPERBARIC CHAMBER 2   [facility] ([address])    [address]  [place] [address]   [phone]            Jun 30, 2017  9:30 AM   HBOT Supervision with HYPERBARIC CHAMBER 2   [facility] ([address])    [address]  [place] [address]   [phone]            Jul 03, 2017  9:30 AM   HBOT Supervision with HYPERBARIC CHAMBER 2   [facility] ([address])    [address]  [place] [address]   [phone]            Jul 04, 2017  9:30 AM   HBOT Supervision with HYPERBARIC CHAMBER 2   [facility] ([address])    [address]  [place] [address]   [phone]            Jul 05, 2017  9:30 AM   HBOT Supervision with HYPERBARIC CHAMBER 2   [facility] ([address])    [address]  [place] [address]   [phone]            Jul 06, 2017   9:30 AM   HBOT Supervision with HYPERBARIC CHAMBER 2   [facility] ([address])    [address]  [place] [address]   [phone]            Jul 07, 2017  9:30 AM   HBOT Supervision with HYPERBARIC CHAMBER 2   [facility] ([address])    [address]  [place] [address]   [phone]            Jul 10, 2017  9:30 AM   HBOT Supervision with HYPERBARIC CHAMBER 2   [facility] ([address])    [address]  [place] [address]   [phone]            Jul 11, 2017  9:30 AM   HBOT Supervision with HYPERBARIC CHAMBER 2   [facility] ([address])    [address]  [place] [address]   [phone]            Jul 12, 2017  9:30 AM   HBOT Supervision with HYPERBARIC CHAMBER 2   [facility] ([address])    [address]  [place] [address]   [phone]            Jul 13, 2017  9:30 AM   HBOT Supervision with HYPERBARIC CHAMBER 2   [facility] ([address])    [address]  [place] [address]   [phone]            Jul 14, 2017  9:30 AM   HBOT Supervision with HYPERBARIC CHAMBER 2   [facility] ([address])    [address]  [place] [address]   [phone]            Jul 17, 2017  9:30 AM   HBOT Supervision with HYPERBARIC CHAMBER 2   [facility] ([address])    [address]  [place] [address]   [phone]            Jul 18, 2017  9:30 AM   HBOT Supervision with HYPERBARIC CHAMBER 2   [facility] ([address])    [address]  [place] [address]   [phone]            Jul 19, 2017  9:30 AM   HBOT Supervision with HYPERBARIC CHAMBER 2   [facility] ([address])    [address]  [place] [address]   [phone]            Jul 20, 2017   9:30 AM   HBOT Supervision with HYPERBARIC CHAMBER 2   [facility] ([address])    [address]  [place] [address]   [phone]            Jul 21, 2017  9:30 AM   HBOT Supervision with HYPERBARIC CHAMBER 2   [facility] ([address])    [address]  [place] [address]   [phone]            Jul 24, 2017  9:30 AM   HBOT Supervision with HYPERBARIC CHAMBER 2   [facility] ([address])    [address]  [place] [address]   [phone]            Jul 25, 2017  9:30 AM   HBOT Supervision with HYPERBARIC CHAMBER 2   [facility] ([address])    [address]  [place] [address]   [phone]            Jul 26, 2017  9:30 AM   HBOT Supervision with HYPERBARIC CHAMBER 2   [facility] ([address])    [address]  [place] [address]   [phone]            Jul 27, 2017  9:30 AM   HBOT Supervision with HYPERBARIC CHAMBER 2   [facility] ([address])    [address]  [place] [address]   [phone]            Jul 28, 2017  9:30 AM   HBOT Supervision with HYPERBARIC CHAMBER 2   [facility] ([address])    [address]  [place] [address]   [phone]            Jul 31, 2017  9:30 AM   HBOT Supervision with HYPERBARIC CHAMBER 2   [facility] ([address])    [address]  [place] [address]   [phone]            Sep 08, 2017  2:30 PM   US SONOCINE with RB US 2   [facility] ([address])    [address]  [place] [address]   [phone]           Some exams require specific prep instructions that would have been given to you at time of scheduling. If you have any additional questions about the prep instructions, please call Imaging Scheduling at [phone] and press #[phone].               Problem List              ICD-10-CM Priority Class Noted - Resolved    Sjogren's syndrome (CMS-HCC) (Chronic) M35.00   4/26/1990 - Present    DDD (degenerative disc disease), cervical (Chronic) M50.30   4/26/2017 - Present    Osteoarthritis of multiple joints (Chronic) M15.9   4/26/2017 - Present    Lupus arthritis (CMS-HCC) (Chronic) M32.9   4/26/2017 - Present    Chronic pain syndrome (Chronic) G89.4   4/26/2017 - Present    Gastroesophageal reflux disease without esophagitis (Chronic) K21.9   4/26/2017 - Present    Lactose intolerance (Chronic) E73.9   4/26/2017 - Present    Raynaud's disease without gangrene (Chronic) I73.00   4/26/2017 - Present    Osteopenia (Chronic) M85.80   4/26/2017 - Present    Functional diarrhea (Chronic) K59.1   4/26/2017 - Present    Current chronic use of systemic steroids (Chronic) Z79.52   4/26/2017 - Present    Incisional breast wound S21.009A   5/15/2017 - Present    Nonhealing surgical wound T81.89XA   6/9/2017 - Present      Health Maintenance        Date Due Completion Dates    IMM DTaP/Tdap/Td Vaccine (1 - Tdap) 4/1/2019 (Originally 3/28/1974) ---    IMM ZOSTER VACCINE 4/1/2019 (Originally 3/28/2015) ---    MAMMOGRAM 9/23/2017 9/23/2016    PAP SMEAR 1/2/2020 1/2/2017 (Done)    Override on 1/2/2017: Done    COLONOSCOPY 8/21/2022 8/21/2012            Current Immunizations     Influenza Vaccine Quad Inj (Preserved) 9/27/2016      Below and/or attached are the medications your provider expects you to take. Review all of your home medications and newly ordered medications with your provider and/or pharmacist. Follow medication instructions as directed by your provider and/or pharmacist. Please keep your medication list with you and share with your provider. Update the information when medications are discontinued, doses are changed, or new medications (including over-the-counter products) are added; and carry medication information at all times in the event of  emergency situations     Allergies:  CONTRAST MEDIA WITH IODINE - (reactions not documented)     SHELLFISH ALLERGY - Vomiting     PENICILLINS - Vomiting               Medications  Valid as of: Abbie 15, 2017 - 12:26 PM    Generic Name Brand Name Tablet Size Instructions for use    ALPRAZolam (Tab) XANAX 0.5 MG Take 0.5 Tabs by mouth at bedtime as needed for Sleep.        CycloSPORINE   1 Drop by Ophthalmic route 2 Times a Day. Both eyes        Metaxalone (Tab) SKELAXIN 800 MG Take 800 mg by mouth 3 times a day.        NS SOLN 50 mL with methotrexate PF 25 MG/ML SOLN 30 mg/m2   30 mg/m2 by Intravenous route Once. Indications: weekly        Omega-3 Fatty Acids   Take  by mouth.        PredniSONE (Tab) DELTASONE 1 MG Take 7 mg by mouth every day. 7 pills per day        Sildenafil Citrate (Tab) VIAGRA 25 MG Take 20 mg by mouth every day. Indications: Raynaud's Phenomenon of Unknown Cause        Zolpidem Tartrate (Tab) AMBIEN 5 MG Take  by mouth at bedtime as needed.        Zonisamide (Cap) ZONEGRAN 100 MG Take 300 mg by mouth every bedtime. Indications: neuropathy        .                 Medicines prescribed today were sent to:     [facility] [place] [place] - [address]    [address] [address]    Phone: [phone] Fax: [phone]    Open 24 Hours?: No      Medication refill instructions:       If your prescription bottle indicates you have medication refills left, it is not necessary to call your provider’s office. Please contact your pharmacy and they will refill your medication.    If your prescription bottle indicates you do not have any refills left, you may request refills at any time through one of the following ways: The online [employer] system (except Urgent Care), by calling your provider’s office, or by asking your pharmacy to contact your provider’s office with a refill request. Medication refills are processed only during regular business hours and may not be available until the next  business day. Your provider may request additional information or to have a follow-up visit with you prior to refilling your medication.   *Please Note: Medication refills are assigned a new Rx number when refilled electronically. Your pharmacy may indicate that no refills were authorized even though a new prescription for the same medication is available at the pharmacy. Please request the medicine by name with the pharmacy before contacting your provider for a refill.        Instructions    After hyperbaric oxygen therapy treatment, it is normal to experience some congestion to the ears, muffling of sounds, or abnormal sounds to one or both ears.    If you experience pain or discomfort to one or both ears that does not resolve spontaneously, please contact the hyperbaric department at [phone].           Other Notes About Your Plan     Sjogren's specialist in [place]  Rheum: Dr. Inessa Cruz [place]  Pain: Dr. Shankar (not actively seeing)  GYN: Dr. Sierra  GI: Dr. Clemente  PT: [facility]           MyChart Access Code: Activation code not generated  Current MyChart Status: Active

## 2017-06-16 ENCOUNTER — OFFICE VISIT (OUTPATIENT)
Dept: WOUND CARE | Facility: MEDICAL CENTER | Age: 62
End: 2017-06-16
Attending: FAMILY MEDICINE
Payer: MEDICARE

## 2017-06-16 VITALS
SYSTOLIC BLOOD PRESSURE: 118 MMHG | OXYGEN SATURATION: 98 % | DIASTOLIC BLOOD PRESSURE: 76 MMHG | OXYGEN SATURATION: 99 % | DIASTOLIC BLOOD PRESSURE: 79 MMHG | TEMPERATURE: 98.8 F | HEART RATE: 52 BPM | SYSTOLIC BLOOD PRESSURE: 117 MMHG | HEART RATE: 56 BPM | TEMPERATURE: 99.1 F

## 2017-06-16 DIAGNOSIS — Z79.52 CURRENT CHRONIC USE OF SYSTEMIC STEROIDS: Chronic | ICD-10-CM

## 2017-06-16 DIAGNOSIS — I73.00 RAYNAUD'S DISEASE WITHOUT GANGRENE: Chronic | ICD-10-CM

## 2017-06-16 DIAGNOSIS — S21.009A INCISIONAL BREAST WOUND: ICD-10-CM

## 2017-06-16 DIAGNOSIS — T81.89XD NONHEALING SURGICAL WOUND, SUBSEQUENT ENCOUNTER: Primary | ICD-10-CM

## 2017-06-16 DIAGNOSIS — M35.00 SJOGREN'S SYNDROME, WITH UNSPECIFIED ORGAN INVOLVEMENT (HCC): Chronic | ICD-10-CM

## 2017-06-16 DIAGNOSIS — M32.9 LUPUS ARTHRITIS (HCC): Chronic | ICD-10-CM

## 2017-06-16 PROCEDURE — 99183 HYPERBARIC OXYGEN THERAPY: CPT | Performed by: FAMILY MEDICINE

## 2017-06-16 PROCEDURE — G0277 HBOT, FULL BODY CHAMBER, 30M: HCPCS | Performed by: FAMILY MEDICINE

## 2017-06-16 ASSESSMENT — PAIN SCALES - GENERAL: PAINLEVEL_OUTOF13: 5

## 2017-06-16 NOTE — PATIENT INSTRUCTIONS
The side effects of Acupuncture needle insertion include: minor bruising, bleeding, or pain at the site of needle insertion.  If more worrisome symptoms, such as continued bleeding, severe bruising, or continue pain or altered sensation persist, please contact [facility] office @ [phone]

## 2017-06-16 NOTE — PROGRESS NOTES
Subjective:   Lupe Webb is a 62 y.o. female who presents with a non healing breast wound.    HPI  Lupe returns to HBOT.  She notes that she followed up with Dr. Garcia yesterday, and there are no changes to her treatment plan.  No medications prescribed.  No ear pain.  Tolerating HBOT well.     Review of Systems   Constitutional: Negative for fever and chills.   HENT: Negative for ear discharge, ear pain and hearing loss.    Eyes: Negative for blurred vision and double vision.   Respiratory: Negative for cough and shortness of breath.    Cardiovascular: Negative for chest pain and palpitations.   Genitourinary: Positive for flank pain.   Neurological: Negative for dizziness and headaches.        Objective:     /79 mmHg  Pulse 56  Temp(Src) 37.1 °C (98.8 °F)  SpO2 99%     Physical Exam   Constitutional: She is oriented to person, place, and time. She appears well-developed and well-nourished.   HENT:   Head: Normocephalic and atraumatic.   Right Ear: Tympanic membrane, external ear and ear canal normal.   Left Ear: Tympanic membrane, external ear and ear canal normal.   Pulmonary/Chest: Effort normal.   Neurological: She is alert and oriented to person, place, and time.   Skin: Skin is warm and dry.   Psychiatric: She has a normal mood and affect.   Vitals reviewed.      Assessment/Plan:     Nonhealing surgical wound, initial encounter   Appropriate candidate for HBOT - likely that the patient's wound healing is impeded by a multitude of factors,   including vasculitis from Sjogren's / Raynaud's / Lupus, as well as her chronic use of Methotrexate and Prednisone,   in addition to her age, postmenopausal status, and prior surgery & scar to the wound site. HBOT should be   beneficial for her wound healing in terms of it's angiogenic and anti-vasculitic properties. Have recommended   to the patient that she d/c MTX and continue to decrease prednisone, per instructions from Dr. Cruz.   Patient completed  her 9th HBOT treatment at a pressure of 2.4 BRISEIDA x 90 minutes at a descent/ascent of 1.5/3.0 PSI/minute   2. Incisional breast wound   Per Dr. Garcia, s/p Bilateral Saline-->Silicone breast implant Re-do 11/2/16 with recurrent non-healing surgical wound   3. Sjogren's syndrome, with unspecified organ involvement (CMS-[facility])   On Methotrexate and Prednisone per Dr. Cruz   4. Raynaud's disease without gangrene   On Methotrexate and Prednisone per Dr. Cruz   5. Lupus arthritis (CMS-[facility])   On Methotrexate and Prednisone per Dr. Cruz   6. Current chronic use of systemic steroids   On Methotrexate and Prednisone per Dr. Cruz   7. Dry mouth   8. DDD (degenerative disc disease), cervical   9. Gastroesophageal reflux disease without esophagitis

## 2017-06-16 NOTE — MR AVS SNAPSHOT
Lupe SAWYER Webb   2017 10:00 AM   Office Visit   MRN: [id number]    Department:  [facility]   Dept Phone:  [phone]    Description:  Female : 1955   Provider:  Sean Espinoza M.D.           Allergies as of 2017     Allergen Noted Reactions    Contrast Media With Iodine [Iodine] 2014       Rash, same with topical iodine      Shellfish Allergy 2014   Vomiting    Rash, hives,     Penicillins 2014   Vomiting      You were diagnosed with     Nonhealing surgical wound, subsequent encounter   [389021]  -  Primary     Incisional breast wound   [348008]       Raynaud's disease without gangrene   [7821672]       Lupus arthritis (CMS-HCC)   [511512]       Sjogren's syndrome, with unspecified organ involvement (CMS-[facility])   [5381752]       Current chronic use of systemic steroids   [9129224]         Vital Signs     Blood Pressure Pulse Temperature Oxygen Saturation Smoking Status       117/79 mmHg 56 37.1 °C (98.8 °F) 99% Never Smoker        Basic Information     Date Of Birth Sex Race Ethnicity Preferred Language    1955 Female White Non- English      Your appointments     2017  9:30 AM   HBOT Supervision with [facility]   [facility] ([facility] [address])    [address]  [place] [address]   [phone]            2017  9:30 AM   HBOT Supervision with HYPERBARIC CHAMBER 2   [facility] ([facility] [address])    [address]  [place] [address]   [phone]            2017  9:30 AM   HBOT Supervision with HYPERBARIC CHAMBER 2   [facility] ([facility] [address])    [address]  [place] [address]   [phone]            2017  9:30 AM   HBOT Supervision with HYPERBARIC CHAMBER 2   [facility] ([address] [place] [address])    [address]  [place] [address]   [phone]            2017  9:30 AM      HBOT Supervision with HYPERBARIC CHAMBER 2   [facility] ([address])    [address]  [place] [address]   [phone]            Jun 26, 2017  9:30 AM   HBOT Supervision with HYPERBARIC CHAMBER 2   [facility] ([address])    [address]  [place] [address]   [phone]            Jun 27, 2017  9:30 AM   HBOT Supervision with HYPERBARIC CHAMBER 2   [facility] ([address])    [address]  [place] [address]   [phone]            Jun 28, 2017  9:30 AM   HBOT Supervision with HYPERBARIC CHAMBER 2   [facility] ([address])    [address]  [place] [address]   [phone]            Jun 29, 2017  9:30 AM   HBOT Supervision with HYPERBARIC CHAMBER 2   [facility] ([address])    [address]  [place] [address]   [phone]            Jun 30, 2017  9:30 AM   HBOT Supervision with HYPERBARIC CHAMBER 2   [facility] ([address])    [address]  [place] [address]   [phone]            Jul 03, 2017  9:30 AM   HBOT Supervision with HYPERBARIC CHAMBER 2   [facility] ([address])    [address]  [place] [address]   [phone]            Jul 04, 2017  9:30 AM   HBOT Supervision with HYPERBARIC CHAMBER 2   [facility] ([address])    [address]  [place] [address]   [phone]            Jul 05, 2017  9:30 AM   HBOT Supervision with HYPERBARIC CHAMBER 2   [facility] ([address])    [address]  [place] [address]   [phone]            Jul 06, 2017  9:30 AM   HBOT Supervision with HYPERBARIC CHAMBER 2   [facility] ([address])    [address]  [place] [address]   [phone]            Jul 07, 2017  9:30 AM    HBOT Supervision with HYPERBARIC CHAMBER 2   [facility] ([address])    [address]  [place] [address]   [phone]            Jul 10, 2017  9:30 AM   HBOT Supervision with HYPERBARIC CHAMBER 2   [facility] ([address])    [address]  [place] [address]   [phone]            Jul 11, 2017  9:30 AM   HBOT Supervision with HYPERBARIC CHAMBER 2   [facility] ([address])    [address]  [place] [address]   [phone]            Jul 12, 2017  9:30 AM   HBOT Supervision with HYPERBARIC CHAMBER 2   [facility] ([address])    [address]  [place] [address]   [phone]            Jul 13, 2017  9:30 AM   HBOT Supervision with HYPERBARIC CHAMBER 2   [facility] ([address])    [address]  [place] [address]   [phone]            Jul 14, 2017  9:30 AM   HBOT Supervision with HYPERBARIC CHAMBER 2   [facility] ([address])    [address]  [place] [address]   [phone]            Jul 17, 2017  9:30 AM   HBOT Supervision with HYPERBARIC CHAMBER 2   [facility] ([address])    [address]  [place] [address]   [phone]            Jul 18, 2017  9:30 AM   HBOT Supervision with HYPERBARIC CHAMBER 2   [facility] ([address])    [address]  [place] [address]   [phone]            Jul 19, 2017  9:30 AM   HBOT Supervision with HYPERBARIC CHAMBER 2   [facility] ([address])    [address]  [place] [address]   [phone]            Jul 20, 2017  9:30 AM   HBOT Supervision with HYPERBARIC CHAMBER 2   [facility] ([address])    [address]  [place] [address]   [phone]            Jul 21, 2017  9:30 AM    HBOT Supervision with HYPERBARIC CHAMBER 2   [facility] ([facility])    [address]  [place] [address]   [phone]            Jul 24, 2017  9:30 AM   HBOT Supervision with HYPERBARIC CHAMBER 2   [facility] ([facility])    [address]  [place] [address]   [phone]            Jul 25, 2017  9:30 AM   HBOT Supervision with HYPERBARIC CHAMBER 2   [facility] ([facility])    [address]  [place] [address]   [phone]            Jul 26, 2017  9:30 AM   HBOT Supervision with HYPERBARIC CHAMBER 2   [facility] ([facility] [address])    [address] [facility] [address]  [place] [address]   [phone]            Jul 27, 2017  9:30 AM   HBOT Supervision with HYPERBARIC CHAMBER 2   [facility] ([facility] [address])    [address] [facility] [address]  [place] [address]   [phone]            Jul 28, 2017  9:30 AM   HBOT Supervision with HYPERBARIC CHAMBER 2   [facility] ([facility] [address])    [address] [facility] [address]  [place] [address]   [phone]            Jul 31, 2017  9:30 AM   HBOT Supervision with HYPERBARIC CHAMBER 2   [facility] ([facility])    [address] [facility] [address]  [place] [address]   [phone]            Sep 08, 2017  2:30 PM   [facility]INE with [facility]   [facility] ([address] [place] [address])    [address] [facility] [address]  [place] [address]   [phone]           Some exams require specific prep instructions that would have been given to you at time of scheduling. If you have any additional questions about the prep instructions, please call Imaging Scheduling at [phone] and press #[phone].              Problem List              ICD-10-CM Priority Class Noted - Resolved    Sjogren's syndrome (CMS-HCC) (Chronic) M35.00   4/26/1990 - Present    DDD (degenerative disc disease), cervical  (Chronic) M50.30   4/26/2017 - Present    Osteoarthritis of multiple joints (Chronic) M15.9   4/26/2017 - Present    Lupus arthritis (CMS-HCC) (Chronic) M32.9   4/26/2017 - Present    Chronic pain syndrome (Chronic) G89.4   4/26/2017 - Present    Gastroesophageal reflux disease without esophagitis (Chronic) K21.9   4/26/2017 - Present    Lactose intolerance (Chronic) E73.9   4/26/2017 - Present    Raynaud's disease without gangrene (Chronic) I73.00   4/26/2017 - Present    Osteopenia (Chronic) M85.80   4/26/2017 - Present    Functional diarrhea (Chronic) K59.1   4/26/2017 - Present    Current chronic use of systemic steroids (Chronic) Z79.52   4/26/2017 - Present    Incisional breast wound S21.009A   5/15/2017 - Present    Nonhealing surgical wound T81.89XA   6/9/2017 - Present      Health Maintenance        Date Due Completion Dates    IMM DTaP/Tdap/Td Vaccine (1 - Tdap) 4/1/2019 (Originally 3/28/1974) ---    IMM ZOSTER VACCINE 4/1/2019 (Originally 3/28/2015) ---    MAMMOGRAM 9/23/2017 9/23/2016    PAP SMEAR 1/2/2020 1/2/2017 (Done)    Override on 1/2/2017: Done    COLONOSCOPY 8/21/2022 8/21/2012            Current Immunizations     Influenza Vaccine Quad Inj (Preserved) 9/27/2016      Below and/or attached are the medications your provider expects you to take. Review all of your home medications and newly ordered medications with your provider and/or pharmacist. Follow medication instructions as directed by your provider and/or pharmacist. Please keep your medication list with you and share with your provider. Update the information when medications are discontinued, doses are changed, or new medications (including over-the-counter products) are added; and carry medication information at all times in the event of emergency situations     Allergies:  CONTRAST MEDIA WITH IODINE - (reactions not documented)     SHELLFISH ALLERGY - Vomiting     PENICILLINS - Vomiting               Medications  Valid as of: June 16,  2017 - 11:03 AM    Generic Name Brand Name Tablet Size Instructions for use    ALPRAZolam (Tab) XANAX 0.5 MG Take 0.5 Tabs by mouth at bedtime as needed for Sleep.        CycloSPORINE   1 Drop by Ophthalmic route 2 Times a Day. Both eyes        Metaxalone (Tab) SKELAXIN 800 MG Take 800 mg by mouth 3 times a day.        NS SOLN 50 mL with methotrexate PF 25 MG/ML SOLN 30 mg/m2   30 mg/m2 by Intravenous route Once. Indications: weekly        Omega-3 Fatty Acids   Take  by mouth.        PredniSONE (Tab) DELTASONE 1 MG Take 7 mg by mouth every day. 7 pills per day        Sildenafil Citrate (Tab) VIAGRA 25 MG Take 20 mg by mouth every day. Indications: Raynaud's Phenomenon of Unknown Cause        Zolpidem Tartrate (Tab) AMBIEN 5 MG Take  by mouth at bedtime as needed.        Zonisamide (Cap) ZONEGRAN 100 MG Take 300 mg by mouth every bedtime. Indications: neuropathy        .                 Medicines prescribed today were sent to:     [facility] [address] [facility], [place] - [address] [facility]    [address] [place] [address]    Phone: [phone] Fax: [phone]    Open 24 Hours?: No      Medication refill instructions:       If your prescription bottle indicates you have medication refills left, it is not necessary to call your provider’s office. Please contact your pharmacy and they will refill your medication.    If your prescription bottle indicates you do not have any refills left, you may request refills at any time through one of the following ways: The online ADOP system (except Urgent Care), by calling your provider’s office, or by asking your pharmacy to contact your provider’s office with a refill request. Medication refills are processed only during regular business hours and may not be available until the next business day. Your provider may request additional information or to have a follow-up visit with you prior to refilling your medication.   *Please Note: Medication refills are assigned a new Rx number  when refilled electronically. Your pharmacy may indicate that no refills were authorized even though a new prescription for the same medication is available at the pharmacy. Please request the medicine by name with the pharmacy before contacting your provider for a refill.        Instructions    The side effects of Acupuncture needle insertion include: minor bruising, bleeding, or pain at the site of needle insertion.  If more worrisome symptoms, such as continued bleeding, severe bruising, or continue pain or altered sensation persist, please contact [facility] office @ [phone]         Other Notes About Your Plan     Sjogren's specialist in [place]  Rheum: Dr. Inessa Cruz Wilson  Pain: Dr. Shankar (not actively seeing)  GYN: Dr. Sierra  GI: Dr. Clemente  PT: [facility]           MyChart Access Code: Activation code not generated  Current MyChart Status: Active

## 2017-06-19 ENCOUNTER — OFFICE VISIT (OUTPATIENT)
Dept: WOUND CARE | Facility: MEDICAL CENTER | Age: 62
End: 2017-06-19
Attending: FAMILY MEDICINE
Payer: MEDICARE

## 2017-06-19 VITALS
OXYGEN SATURATION: 99 % | HEART RATE: 55 BPM | DIASTOLIC BLOOD PRESSURE: 63 MMHG | TEMPERATURE: 98 F | SYSTOLIC BLOOD PRESSURE: 115 MMHG

## 2017-06-19 DIAGNOSIS — G89.4 CHRONIC PAIN SYNDROME: Chronic | ICD-10-CM

## 2017-06-19 DIAGNOSIS — M35.00 SJOGREN'S SYNDROME, WITH UNSPECIFIED ORGAN INVOLVEMENT (HCC): Chronic | ICD-10-CM

## 2017-06-19 DIAGNOSIS — M32.9 LUPUS ARTHRITIS (HCC): Chronic | ICD-10-CM

## 2017-06-19 DIAGNOSIS — S21.009A INCISIONAL BREAST WOUND: ICD-10-CM

## 2017-06-19 DIAGNOSIS — I73.00 RAYNAUD'S DISEASE WITHOUT GANGRENE: Chronic | ICD-10-CM

## 2017-06-19 DIAGNOSIS — Z79.52 CURRENT CHRONIC USE OF SYSTEMIC STEROIDS: Chronic | ICD-10-CM

## 2017-06-19 DIAGNOSIS — T81.89XD NONHEALING SURGICAL WOUND, SUBSEQUENT ENCOUNTER: ICD-10-CM

## 2017-06-19 PROCEDURE — 99183 HYPERBARIC OXYGEN THERAPY: CPT | Performed by: FAMILY MEDICINE

## 2017-06-19 PROCEDURE — G0277 HBOT, FULL BODY CHAMBER, 30M: HCPCS | Performed by: FAMILY MEDICINE

## 2017-06-19 ASSESSMENT — ENCOUNTER SYMPTOMS
BLURRED VISION: 0
DIZZINESS: 0
PALPITATIONS: 0
COUGH: 0
HEADACHES: 0
CHILLS: 0
FEVER: 0
SHORTNESS OF BREATH: 0
DOUBLE VISION: 0

## 2017-06-19 ASSESSMENT — PAIN SCALES - GENERAL: PAINLEVEL_OUTOF13: 5

## 2017-06-19 NOTE — PROGRESS NOTES
Subjective:   Lupe Webb is a 62 y.o. female who presents with a non healing breast wound.    HPI   Lupe presents for HBOT treatment today.   She notes some fatigue but otherwise tolerated treatment well with no issues.     She was able to clear her ears with no pain or pressure.         Review of Systems   Constitutional: Positive for malaise/fatigue. Negative for fever and chills.   HENT: Negative for ear discharge, ear pain and hearing loss.    Eyes: Negative for blurred vision and double vision.   Respiratory: Negative for cough and shortness of breath.    Cardiovascular: Negative for chest pain and palpitations.   Neurological: Negative for dizziness and headaches.          Objective:     /63 mmHg  Pulse 55  Temp(Src) 36.7 °C (98 °F)  SpO2 99%     Physical Exam   Constitutional: She is oriented to person, place, and time. She appears well-developed and well-nourished.   HENT:   Head: Normocephalic and atraumatic.   Right Ear: Tympanic membrane, external ear and ear canal normal.   Left Ear: Tympanic membrane, external ear and ear canal normal.   Pulmonary/Chest: Effort normal.   Neurological: She is alert and oriented to person, place, and time.   Psychiatric: She has a normal mood and affect.   Vitals reviewed.      Assessment/Plan:     Nonhealing surgical wound, initial encounter    Appropriate candidate for HBOT - likely that the patient's wound healing is impeded by a multitude of factors,    including vasculitis from Sjogren's / Raynaud's / Lupus, as well as her chronic use of Methotrexate and Prednisone,    in addition to her age, postmenopausal status, and prior surgery & scar to the wound site. HBOT should be    beneficial for her wound healing in terms of it's angiogenic and anti-vasculitic properties. Have recommended    to the patient that she d/c MTX and continue to decrease prednisone, per instructions from Dr. Cruz.    Patient completed her 10th HBOT treatment at a pressure of 2.4  BRISEIDA x 90 minutes at a descent/ascent of 1.5/3.0 PSI/minute    2. Incisional breast wound    Per Dr. Garcia, s/p Bilateral Saline-->Silicone breast implant Re-do 11/2/16 with recurrent non-healing surgical wound    3. Sjogren's syndrome, with unspecified organ involvement (CMS-[facility])    On Methotrexate and Prednisone per Dr. Cruz    4. Raynaud's disease without gangrene    On Methotrexate and Prednisone per Dr. Cruz    5. Lupus arthritis (CMS-[facility])    On Methotrexate and Prednisone per Dr. Cruz    6. Current chronic use of systemic steroids    On Methotrexate and Prednisone per Dr. Cruz    7. Dry mouth    8. DDD (degenerative disc disease), cervical    9. Gastroesophageal reflux disease without esophagitis

## 2017-06-19 NOTE — MR AVS SNAPSHOT
Lupe SAWYER Webb   2017 9:30 AM   Office Visit   MRN: [id number]    Department:  [facility]   Dept Phone:  [phone]    Description:  Female : 1955   Provider:  Moni Ridley M.D.           Allergies as of 2017     Allergen Noted Reactions    Contrast Media With Iodine [Iodine] 2014       Rash, same with topical iodine      Shellfish Allergy 2014   Vomiting    Rash, hives,     Penicillins 2014   Vomiting      You were diagnosed with     Nonhealing surgical wound, subsequent encounter   [525935]       Incisional breast wound   [206782]       Sjogren's syndrome, with unspecified organ involvement (CMS-HCC)   [2540240]       Current chronic use of systemic steroids   [1181940]       Raynaud's disease without gangrene   [8957782]       Chronic pain syndrome   [338.4.ICD-9-CM]       Lupus arthritis (CMS-[facility])   [749740]         Vital Signs     Blood Pressure Pulse Temperature Oxygen Saturation Smoking Status       115/63 mmHg 55 36.7 °C (98 °F) 99% Never Smoker        Basic Information     Date Of Birth Sex Race Ethnicity Preferred Language    1955 Female White Non- English      Your appointments     2017  9:30 AM   HBOT Supervision with HYPERBARIC CHAMBER 2   [facility] ([address] [place] [address])    [address]  [facility] [address]   [phone]            2017  9:30 AM   HBOT Supervision with HYPERBARIC CHAMBER 2   [facility] ([address] [place] [address])    [address]  [facility] [address]   [phone]            2017  9:30 AM   HBOT Supervision with HYPERBARIC CHAMBER 2   [facility] ([address] [place] [address])    [address]  [facility] [address]   [phone]            2017  9:30 AM   HBOT Supervision with HYPERBARIC CHAMBER 2   [facility] ([address] [place] [address])    [address]  [facility] [address]      [phone]            Jun 26, 2017  9:30 AM   HBOT Supervision with HYPERBARIC CHAMBER 2   [facility] ([address])    [address]  [place] [address]   [phone]            Jun 27, 2017  9:30 AM   HBOT Supervision with HYPERBARIC CHAMBER 2   [facility] ([address])    [address]  [place] [address]   [phone]            Jun 28, 2017  9:30 AM   HBOT Supervision with HYPERBARIC CHAMBER 2   [facility] ([address])    [address]  [place] [address]   [phone]            Jun 29, 2017  9:30 AM   HBOT Supervision with HYPERBARIC CHAMBER 2   [facility] ([address])    [address]  [place] [address]   [phone]            Jun 30, 2017  9:30 AM   HBOT Supervision with HYPERBARIC CHAMBER 2   [facility] ([address])    [address]  [place] [address]   [phone]            Jul 03, 2017  9:30 AM   HBOT Supervision with HYPERBARIC CHAMBER 2   [facility] ([address])    [address]  [place] [address]   [phone]            Jul 05, 2017  9:30 AM   HBOT Supervision with HYPERBARIC CHAMBER 2   [facility] ([address])    [address]  [place] [address]   [phone]            Jul 06, 2017  9:30 AM   HBOT Supervision with HYPERBARIC CHAMBER 2   [facility] ([address])    [address]  [place] [address]   [phone]            Jul 07, 2017  9:30 AM   HBOT Supervision with HYPERBARIC CHAMBER 2   [facility] ([address])    [address]  [place] [address]   [phone]            Jul 10, 2017  9:30 AM   HBOT Supervision with HYPERBARIC CHAMBER 2   [facility] ([address])    [address]  [place] [address]    [phone]            Jul 11, 2017  9:30 AM   HBOT Supervision with HYPERBARIC CHAMBER 2   [facility] ([address])    [address]  [place] [address]   [phone]            Jul 12, 2017  9:30 AM   HBOT Supervision with HYPERBARIC CHAMBER 2   [facility] ([address])    [address]  [place] [address]   [phone]            Jul 13, 2017  9:30 AM   HBOT Supervision with HYPERBARIC CHAMBER 2   [facility] ([address])    [address]  [place] [address]   [phone]            Jul 14, 2017  9:30 AM   HBOT Supervision with HYPERBARIC CHAMBER 2   [facility] ([address])    [address]  [place] [address]   [phone]            Jul 17, 2017  9:30 AM   HBOT Supervision with HYPERBARIC CHAMBER 2   [facility] ([address])    [address]  [place] [address]   [phone]            Jul 18, 2017  9:30 AM   HBOT Supervision with HYPERBARIC CHAMBER 2   [facility] ([address])    [address]  [place] [address]   [phone]            Jul 19, 2017  9:30 AM   HBOT Supervision with HYPERBARIC CHAMBER 2   [facility] ([address])    [address]  [place] [address]   [phone]            Jul 20, 2017  9:30 AM   HBOT Supervision with HYPERBARIC CHAMBER 2   [facility] ([address])    [address]  [place] [address]   [phone]            Jul 21, 2017  9:30 AM   HBOT Supervision with HYPERBARIC CHAMBER 2   [facility] ([address])    [address]  [place] [address]   [phone]            Jul 24, 2017  9:30 AM   HBOT Supervision with HYPERBARIC CHAMBER 2   [facility] ([address])    [address]  [place] [address]    [phone]            Jul 25, 2017  9:30 AM   HBOT Supervision with HYPERBARIC CHAMBER 2   [facility] ([address])    [address]  [place] [address]   [phone]            Jul 26, 2017  9:30 AM   HBOT Supervision with HYPERBARIC CHAMBER 2   [facility] ([address])    [address]  [place] [address]   [phone]            Jul 27, 2017  9:30 AM   HBOT Supervision with HYPERBARIC CHAMBER 2   [facility] ([address])    [address]  [place] [address]   [phone]            Jul 28, 2017  9:30 AM   HBOT Supervision with HYPERBARIC CHAMBER 2   [facility] ([address])    [address]  [place] [address]   [phone]            Jul 31, 2017  9:30 AM   HBOT Supervision with HYPERBARIC CHAMBER 2   [facility] ([address])    [address]  [place] [address]   [phone]            Aug 01, 2017  9:30 AM   HBOT Supervision with HYPERBARIC CHAMBER 2   [facility] ([address])    [address]  [place] [address]   [phone]            Sep 08, 2017  2:30 PM   US SONOCINE with [facility]  2   [facility] BREAST [facility] ([address] [place] [address])    [address]  [place] [address]   [phone]           Some exams require specific prep instructions that would have been given to you at time of scheduling. If you have any additional questions about the prep instructions, please call Imaging Scheduling at [phone] and press #[phone].              Problem List              ICD-10-CM Priority Class Noted - Resolved    Sjogren's syndrome (CMS-HCC) (Chronic) M35.00   4/26/1990 - Present    DDD (degenerative disc disease), cervical (Chronic) M50.30   4/26/2017 - Present    Osteoarthritis of multiple joints (Chronic) M15.9   4/26/2017 - Present    Lupus arthritis (CMS-HCC) (Chronic)  M32.9   4/26/2017 - Present    Chronic pain syndrome (Chronic) G89.4   4/26/2017 - Present    Gastroesophageal reflux disease without esophagitis (Chronic) K21.9   4/26/2017 - Present    Lactose intolerance (Chronic) E73.9   4/26/2017 - Present    Raynaud's disease without gangrene (Chronic) I73.00   4/26/2017 - Present    Osteopenia (Chronic) M85.80   4/26/2017 - Present    Functional diarrhea (Chronic) K59.1   4/26/2017 - Present    Current chronic use of systemic steroids (Chronic) Z79.52   4/26/2017 - Present    Incisional breast wound S21.009A   5/15/2017 - Present    Nonhealing surgical wound T81.89XA   6/9/2017 - Present      Health Maintenance        Date Due Completion Dates    IMM DTaP/Tdap/Td Vaccine (1 - Tdap) 4/1/2019 (Originally 3/28/1974) ---    IMM ZOSTER VACCINE 4/1/2019 (Originally 3/28/2015) ---    MAMMOGRAM 9/23/2017 9/23/2016    PAP SMEAR 1/2/2020 1/2/2017 (Done)    Override on 1/2/2017: Done    COLONOSCOPY 8/21/2022 8/21/2012            Current Immunizations     Influenza Vaccine Quad Inj (Preserved) 9/27/2016      Below and/or attached are the medications your provider expects you to take. Review all of your home medications and newly ordered medications with your provider and/or pharmacist. Follow medication instructions as directed by your provider and/or pharmacist. Please keep your medication list with you and share with your provider. Update the information when medications are discontinued, doses are changed, or new medications (including over-the-counter products) are added; and carry medication information at all times in the event of emergency situations     Allergies:  CONTRAST MEDIA WITH IODINE - (reactions not documented)     SHELLFISH ALLERGY - Vomiting     PENICILLINS - Vomiting               Medications  Valid as of: June 19, 2017 - 11:33 AM    Generic Name Brand Name Tablet Size Instructions for use    ALPRAZolam (Tab) XANAX 0.5 MG Take 0.5 Tabs by mouth at bedtime as needed  for Sleep.        CycloSPORINE   1 Drop by Ophthalmic route 2 Times a Day. Both eyes        Metaxalone (Tab) SKELAXIN 800 MG Take 800 mg by mouth 3 times a day.        NS SOLN 50 mL with methotrexate PF 25 MG/ML SOLN 30 mg/m2   30 mg/m2 by Intravenous route Once. Indications: weekly        Omega-3 Fatty Acids   Take  by mouth.        PredniSONE (Tab) DELTASONE 1 MG Take 7 mg by mouth every day. 7 pills per day        Sildenafil Citrate (Tab) VIAGRA 25 MG Take 20 mg by mouth every day. Indications: Raynaud's Phenomenon of Unknown Cause        Zolpidem Tartrate (Tab) AMBIEN 5 MG Take  by mouth at bedtime as needed.        Zonisamide (Cap) ZONEGRAN 100 MG Take 300 mg by mouth every bedtime. Indications: neuropathy        .                 Medicines prescribed today were sent to:     [id number] - [place], [place] - [address]    [address] [place] [address]    Phone: [phone] Fax: [phone]    Open 24 Hours?: No      Medication refill instructions:       If your prescription bottle indicates you have medication refills left, it is not necessary to call your provider’s office. Please contact your pharmacy and they will refill your medication.    If your prescription bottle indicates you do not have any refills left, you may request refills at any time through one of the following ways: The online [employer] system (except Urgent Care), by calling your provider’s office, or by asking your pharmacy to contact your provider’s office with a refill request. Medication refills are processed only during regular business hours and may not be available until the next business day. Your provider may request additional information or to have a follow-up visit with you prior to refilling your medication.   *Please Note: Medication refills are assigned a new Rx number when refilled electronically. Your pharmacy may indicate that no refills were authorized even though a new prescription for the same medication is  available at the pharmacy. Please request the medicine by name with the pharmacy before contacting your provider for a refill.        Instructions    After hyperbaric oxygen therapy treatment, it is normal to experience some congestion to the ears, muffling of sounds, or abnormal sounds to one or both ears.    If you experience pain or discomfort to one or both ears that does not resolve spontaneously, please contact the hyperbaric department at [phone].         Other Notes About Your Plan     Sjogren's specialist in [place]  Rheum: Dr. Inessa Cruz [place]  Pain: Dr. Shankar (not actively seeing)  GYN: Dr. Sierra  GI: Dr. Clemente  PT: [facility]           MyChart Access Code: Activation code not generated  Current MyChart Status: Active

## 2017-06-19 NOTE — PATIENT INSTRUCTIONS
After hyperbaric oxygen therapy treatment, it is normal to experience some congestion to the ears, muffling of sounds, or abnormal sounds to one or both ears.    If you experience pain or discomfort to one or both ears that does not resolve spontaneously, please contact the hyperbaric department at [phone].

## 2017-06-20 ENCOUNTER — OFFICE VISIT (OUTPATIENT)
Dept: WOUND CARE | Facility: MEDICAL CENTER | Age: 62
End: 2017-06-20
Attending: FAMILY MEDICINE
Payer: MEDICARE

## 2017-06-20 VITALS
TEMPERATURE: 98.8 F | OXYGEN SATURATION: 100 % | SYSTOLIC BLOOD PRESSURE: 113 MMHG | HEART RATE: 55 BPM | DIASTOLIC BLOOD PRESSURE: 67 MMHG

## 2017-06-20 DIAGNOSIS — T81.89XD NONHEALING SURGICAL WOUND, SUBSEQUENT ENCOUNTER: ICD-10-CM

## 2017-06-20 DIAGNOSIS — S21.009A INCISIONAL BREAST WOUND: ICD-10-CM

## 2017-06-20 DIAGNOSIS — I73.00 RAYNAUD'S DISEASE WITHOUT GANGRENE: Chronic | ICD-10-CM

## 2017-06-20 DIAGNOSIS — Z79.52 CURRENT CHRONIC USE OF SYSTEMIC STEROIDS: Chronic | ICD-10-CM

## 2017-06-20 DIAGNOSIS — M35.00 SJOGREN'S SYNDROME, WITH UNSPECIFIED ORGAN INVOLVEMENT (HCC): Chronic | ICD-10-CM

## 2017-06-20 DIAGNOSIS — M32.9 LUPUS ARTHRITIS (HCC): Chronic | ICD-10-CM

## 2017-06-20 PROCEDURE — G0277 HBOT, FULL BODY CHAMBER, 30M: HCPCS | Performed by: FAMILY MEDICINE

## 2017-06-20 PROCEDURE — 99183 HYPERBARIC OXYGEN THERAPY: CPT | Performed by: FAMILY MEDICINE

## 2017-06-20 ASSESSMENT — ENCOUNTER SYMPTOMS
COUGH: 0
DIZZINESS: 0
DEPRESSION: 0
DOUBLE VISION: 0
CHILLS: 0
BLURRED VISION: 0
FEVER: 0
PALPITATIONS: 0
HEADACHES: 0

## 2017-06-20 ASSESSMENT — PAIN SCALES - GENERAL: PAINLEVEL_OUTOF13: 8

## 2017-06-20 NOTE — PATIENT INSTRUCTIONS
After hyperbaric oxygen therapy treatment, it is normal to experience some congestion to the ears, muffling of sounds, or abnormal sounds to one or both ears.    If you experience pain or discomfort to one or both ears that does not resolve spontaneously, please contact the hyperbaric department at [phone].

## 2017-06-20 NOTE — MR AVS SNAPSHOT
Lupe SAWYER Webb   2017 9:30 AM   Office Visit   MRN: [id number]    Department:  [facility]   Dept Phone:  [phone]    Description:  Female : 1955   Provider:  Moni Ridley M.D.           Allergies as of 2017     Allergen Noted Reactions    Contrast Media With Iodine [Iodine] 2014       Rash, same with topical iodine      Shellfish Allergy 2014   Vomiting    Rash, hives,     Penicillins 2014   Vomiting      You were diagnosed with     Nonhealing surgical wound, subsequent encounter   [642306]       Incisional breast wound   [039532]       Current chronic use of systemic steroids   [7002255]       Sjogren's syndrome, with unspecified organ involvement (CMS-HCC)   [2018994]       Raynaud's disease without gangrene   [0892061]       Lupus arthritis (CMS-HCC)   [692472]         Vital Signs     Blood Pressure Pulse Temperature Oxygen Saturation Smoking Status       113/67 mmHg 55 37.1 °C (98.8 °F) 100% Never Smoker        Basic Information     Date Of Birth Sex Race Ethnicity Preferred Language    1955 Female White Non- English      Your appointments     2017  9:30 AM   HBOT Supervision with HYPERBARIC CHAMBER 2   [facility] ([address] [place] [address])    [address] [facility] [address]  [facility] [address]   [phone]            2017  9:30 AM   HBOT Supervision with HYPERBARIC CHAMBER 2   [facility] ([address] [place] [address])    [address] [facility] [address]  [place] [address]   [phone]            2017  3:00 PM   Established Patient with Jina De Santiago M.D.   [facility] (--)    [address] [place] [address].  [place] [address] [phone]           You will be receiving a confirmation call a few days before your appointment from our automated call confirmation system.            2017  9:30 AM   HBOT Supervision with HYPERBARIC CHAMBER 2   [facility] ([address])    [address]  [place] [address]   [phone]            Jun 26, 2017  9:30 AM   HBOT Supervision with HYPERBARIC CHAMBER 2   [facility] ([address])    [address]  [place] [address]   [phone]            Jun 27, 2017  9:30 AM   HBOT Supervision with HYPERBARIC CHAMBER 2   [facility] ([address])    [address]  [place] [address]   [phone]            Jun 28, 2017  9:30 AM   HBOT Supervision with HYPERBARIC CHAMBER 2   [facility] ([address])    [address]  [place] [address]   [phone]            Jun 29, 2017  9:30 AM   HBOT Supervision with HYPERBARIC CHAMBER 2   [facility] ([address])    [address]  [place] [address]   [phone]            Jun 30, 2017  9:30 AM   HBOT Supervision with HYPERBARIC CHAMBER 2   [facility] ([address])    [address]  [place] [address]   [phone]            Jul 03, 2017  9:30 AM   HBOT Supervision with HYPERBARIC CHAMBER 2   [facility] ([address])    [address]  [place] [address]   [phone]            Jul 05, 2017  9:30 AM   HBOT Supervision with HYPERBARIC CHAMBER 2   [facility] ([address])    [address]  [place] [address]   [phone]            Jul 06, 2017  9:30 AM   HBOT Supervision with HYPERBARIC CHAMBER 2   [facility] ([address])    [address]  [place] [address]   [phone]            Jul 07, 2017  9:30 AM   HBOT Supervision with HYPERBARIC CHAMBER 2   [facility] ([address])    [address]  [place] [address]   [phone]            Jul 10, 2017  9:30 AM   HBOT Supervision with HYPERBARIC CHAMBER 2   [facility] ([address])    [address]  [place] [address]   [phone]            Jul 11, 2017  9:30 AM   HBOT Supervision with HYPERBARIC CHAMBER 2   [facility] ([address])    [address]  [place] [address]   [phone]            Jul 12, 2017  9:30 AM   HBOT Supervision with HYPERBARIC CHAMBER 2   [facility] ([address])    [address]  [place] [address]   [phone]            Jul 13, 2017  9:30 AM   HBOT Supervision with HYPERBARIC CHAMBER 2   [facility] ([address])    [address]  [place] [address]   [phone]            Jul 14, 2017  9:30 AM   HBOT Supervision with HYPERBARIC CHAMBER 2   [facility] ([address])    [address]  [place] [address]   [phone]            Jul 17, 2017  9:30 AM   HBOT Supervision with HYPERBARIC CHAMBER 2   [facility] ([address])    [address]  [place] [address]   [phone]            Jul 18, 2017  9:30 AM   HBOT Supervision with HYPERBARIC CHAMBER 2   [facility] ([address])    [address]  [place] [address]   [phone]            Jul 19, 2017  9:30 AM   HBOT Supervision with HYPERBARIC CHAMBER 2   [facility] ([address])    [address]  [place] [address]   [phone]            Jul 20, 2017  9:30 AM   HBOT Supervision with HYPERBARIC CHAMBER 2   [facility] ([address])    [address]  [place] [address]   [phone]            Jul 21, 2017  9:30 AM   HBOT Supervision with HYPERBARIC CHAMBER 2   [facility] ([address])    [address]  [place] [address]   [phone]            Jul 24, 2017  9:30 AM   HBOT Supervision with HYPERBARIC CHAMBER 2   [facility] ([facility])    [address]  [place] [address]   [phone]            Jul 25, 2017  9:30 AM   HBOT Supervision with HYPERBARIC CHAMBER 2   [facility] ([facility] [address])    [address]  [place] [address]   [phone]            Jul 26, 2017  9:30 AM   HBOT Supervision with HYPERBARIC CHAMBER 2   [facility] ([facility])    [address] [facility] [address]  [place] [address]   [phone]            Jul 27, 2017  9:30 AM   HBOT Supervision with HYPERBARIC CHAMBER 2   [facility] ([facility])    [address] [facility] [address]  [place] [address]   [phone]            Jul 28, 2017  9:30 AM   HBOT Supervision with HYPERBARIC CHAMBER 2   [facility] ([facility])    [address] [facility] [address]  [place] [address]   [phone]            Jul 31, 2017  9:30 AM   HBOT Supervision with HYPERBARIC CHAMBER 2   [facility] ([facility])    [address] [facility] [address]  [place] [address]   [phone]            Aug 01, 2017  9:30 AM   HBOT Supervision with HYPERBARIC CHAMBER 2   [facility] ([facility])    [address] [facility] [address]  [place] [address]   [phone]            Sep 08, 2017  2:30 PM   [facility]   [facility] ([address] [place] [address])    [address] [facility] [address]  [place] [address]   [phone]           Some exams require specific prep instructions that would have been given to you at time of scheduling. If you have any additional questions about the prep instructions, please call Imaging Scheduling at [phone] and press #[phone].              Problem List              ICD-10-CM Priority Class Noted - Resolved    Sjogren's syndrome (CMS-HCC) (Chronic) M35.00   4/26/1990 - Present    DDD (degenerative disc disease), cervical (Chronic) M50.30   4/26/2017 - Present    Osteoarthritis of multiple joints  (Chronic) M15.9   4/26/2017 - Present    Lupus arthritis (CMS-HCC) (Chronic) M32.9   4/26/2017 - Present    Chronic pain syndrome (Chronic) G89.4   4/26/2017 - Present    Gastroesophageal reflux disease without esophagitis (Chronic) K21.9   4/26/2017 - Present    Lactose intolerance (Chronic) E73.9   4/26/2017 - Present    Raynaud's disease without gangrene (Chronic) I73.00   4/26/2017 - Present    Osteopenia (Chronic) M85.80   4/26/2017 - Present    Functional diarrhea (Chronic) K59.1   4/26/2017 - Present    Current chronic use of systemic steroids (Chronic) Z79.52   4/26/2017 - Present    Incisional breast wound S21.009A   5/15/2017 - Present    Nonhealing surgical wound T81.89XA   6/9/2017 - Present      Health Maintenance        Date Due Completion Dates    IMM DTaP/Tdap/Td Vaccine (1 - Tdap) 4/1/2019 (Originally 3/28/1974) ---    IMM ZOSTER VACCINE 4/1/2019 (Originally 3/28/2015) ---    MAMMOGRAM 9/23/2017 9/23/2016    PAP SMEAR 1/2/2020 1/2/2017 (Done)    Override on 1/2/2017: Done    COLONOSCOPY 8/21/2022 8/21/2012            Current Immunizations     Influenza Vaccine Quad Inj (Preserved) 9/27/2016      Below and/or attached are the medications your provider expects you to take. Review all of your home medications and newly ordered medications with your provider and/or pharmacist. Follow medication instructions as directed by your provider and/or pharmacist. Please keep your medication list with you and share with your provider. Update the information when medications are discontinued, doses are changed, or new medications (including over-the-counter products) are added; and carry medication information at all times in the event of emergency situations     Allergies:  CONTRAST MEDIA WITH IODINE - (reactions not documented)     SHELLFISH ALLERGY - Vomiting     PENICILLINS - Vomiting               Medications  Valid as of: June 20, 2017 - 10:30 AM    Generic Name Brand Name Tablet Size Instructions for use     ALPRAZolam (Tab) XANAX 0.5 MG Take 0.5 Tabs by mouth at bedtime as needed for Sleep.        CycloSPORINE   1 Drop by Ophthalmic route 2 Times a Day. Both eyes        Metaxalone (Tab) SKELAXIN 800 MG Take 800 mg by mouth 3 times a day.        NS SOLN 50 mL with methotrexate PF 25 MG/ML SOLN 30 mg/m2   30 mg/m2 by Intravenous route Once. Indications: weekly        Omega-3 Fatty Acids   Take  by mouth.        PredniSONE (Tab) DELTASONE 1 MG Take 7 mg by mouth every day. 7 pills per day        Sildenafil Citrate (Tab) VIAGRA 25 MG Take 20 mg by mouth every day. Indications: Raynaud's Phenomenon of Unknown Cause        Zolpidem Tartrate (Tab) AMBIEN 5 MG Take  by mouth at bedtime as needed.        Zonisamide (Cap) ZONEGRAN 100 MG Take 300 mg by mouth every bedtime. Indications: neuropathy        .                 Medicines prescribed today were sent to:     [id number] - [place], [place] - [address]    [address] [place] [id number]    Phone: [phone] Fax: [phone]    Open 24 Hours?: No      Medication refill instructions:       If your prescription bottle indicates you have medication refills left, it is not necessary to call your provider’s office. Please contact your pharmacy and they will refill your medication.    If your prescription bottle indicates you do not have any refills left, you may request refills at any time through one of the following ways: The online [employer] system (except Urgent Care), by calling your provider’s office, or by asking your pharmacy to contact your provider’s office with a refill request. Medication refills are processed only during regular business hours and may not be available until the next business day. Your provider may request additional information or to have a follow-up visit with you prior to refilling your medication.   *Please Note: Medication refills are assigned a new Rx number when refilled electronically. Your pharmacy may indicate that no refills were  authorized even though a new prescription for the same medication is available at the pharmacy. Please request the medicine by name with the pharmacy before contacting your provider for a refill.        Instructions    After hyperbaric oxygen therapy treatment, it is normal to experience some congestion to the ears, muffling of sounds, or abnormal sounds to one or both ears.    If you experience pain or discomfort to one or both ears that does not resolve spontaneously, please contact the hyperbaric department at [phone].         Other Notes About Your Plan     Sjogren's specialist in [place]  Rheum: Dr. Inessa Cruz [place]  Pain: Dr. Shankar (not actively seeing)  GYN: Dr. Sierra  GI: Dr. Clemente  PT: [facility]           MyChart Access Code: Activation code not generated  Current MyChart Status: Active

## 2017-06-20 NOTE — PROGRESS NOTES
Subjective:   Lupe Webb is a 62 y.o. female who presents with a non healing breast wound.    HPI  Lupe presents for HBOT treatment today.   Other than some fatigue, she is tolerating treatment well.    She was able to clear her ears with no pain or pressure.     Review of Systems   Constitutional: Positive for malaise/fatigue. Negative for fever and chills.   HENT: Negative for ear discharge, ear pain and hearing loss.    Eyes: Negative for blurred vision and double vision.   Respiratory: Negative for cough.    Cardiovascular: Negative for chest pain and palpitations.   Neurological: Negative for dizziness and headaches.   Psychiatric/Behavioral: Negative for depression.          Objective:     /67 mmHg  Pulse 55  Temp(Src) 37.1 °C (98.8 °F)  SpO2 100%     Physical Exam   Constitutional: She is oriented to person, place, and time. She appears well-developed and well-nourished.   HENT:   Head: Normocephalic and atraumatic.   Right Ear: Tympanic membrane, external ear and ear canal normal.   Left Ear: Tympanic membrane, external ear and ear canal normal.   Pulmonary/Chest: Effort normal.   Neurological: She is alert and oriented to person, place, and time.   Psychiatric: She has a normal mood and affect.   Vitals reviewed.              Assessment/Plan:     Nonhealing surgical wound, initial encounter   Appropriate candidate for HBOT - likely that the patient's wound healing is impeded by a multitude of factors,   including vasculitis from Sjogren's / Raynaud's / Lupus, as well as her chronic use of Methotrexate and Prednisone,   in addition to her age, postmenopausal status, and prior surgery & scar to the wound site. HBOT should be   beneficial for her wound healing in terms of it's angiogenic and anti-vasculitic properties. Have recommended   to the patient that she d/c MTX and continue to decrease prednisone, per instructions from Dr. Cruz.   Patient completed her 11th HBOT treatment at a pressure  of 2.4 BRISEIDA x 90 minutes at a descent/ascent of 1.5/3.0 PSI/minute   2. Incisional breast wound   Per Dr. Garcia, s/p Bilateral Saline-->Silicone breast implant Re-do 11/2/16 with recurrent non-healing surgical wound   3. Sjogren's syndrome, with unspecified organ involvement (CMS-[facility])   On Methotrexate and Prednisone per Dr. Cruz   4. Raynaud's disease without gangrene   On Methotrexate and Prednisone per Dr. Cruz   5. Lupus arthritis (CMS-[facility])   On Methotrexate and Prednisone per Dr. Cruz   6. Current chronic use of systemic steroids   On Methotrexate and Prednisone per Dr. Cruz   7. Dry mouth   8. DDD (degenerative disc disease), cervical   9. Gastroesophageal reflux disease without esophagitis

## 2017-06-21 ENCOUNTER — OFFICE VISIT (OUTPATIENT)
Dept: WOUND CARE | Facility: MEDICAL CENTER | Age: 62
End: 2017-06-21
Attending: FAMILY MEDICINE
Payer: MEDICARE

## 2017-06-21 VITALS
TEMPERATURE: 99.3 F | DIASTOLIC BLOOD PRESSURE: 67 MMHG | OXYGEN SATURATION: 99 % | SYSTOLIC BLOOD PRESSURE: 112 MMHG | HEART RATE: 50 BPM

## 2017-06-21 DIAGNOSIS — T81.89XD NONHEALING SURGICAL WOUND, SUBSEQUENT ENCOUNTER: ICD-10-CM

## 2017-06-21 DIAGNOSIS — Z79.52 CURRENT CHRONIC USE OF SYSTEMIC STEROIDS: Chronic | ICD-10-CM

## 2017-06-21 DIAGNOSIS — M32.9 LUPUS ARTHRITIS (HCC): Chronic | ICD-10-CM

## 2017-06-21 DIAGNOSIS — M35.00 SJOGREN'S SYNDROME, WITH UNSPECIFIED ORGAN INVOLVEMENT (HCC): Chronic | ICD-10-CM

## 2017-06-21 DIAGNOSIS — G89.4 CHRONIC PAIN SYNDROME: Chronic | ICD-10-CM

## 2017-06-21 DIAGNOSIS — S21.009A INCISIONAL BREAST WOUND: ICD-10-CM

## 2017-06-21 PROCEDURE — G0277 HBOT, FULL BODY CHAMBER, 30M: HCPCS | Performed by: FAMILY MEDICINE

## 2017-06-21 PROCEDURE — 99183 HYPERBARIC OXYGEN THERAPY: CPT | Performed by: FAMILY MEDICINE

## 2017-06-21 ASSESSMENT — ENCOUNTER SYMPTOMS
PALPITATIONS: 0
CHILLS: 0
DIZZINESS: 0
FEVER: 0
SHORTNESS OF BREATH: 0
COUGH: 0
HEADACHES: 0
DOUBLE VISION: 0
BLURRED VISION: 0

## 2017-06-21 ASSESSMENT — PAIN SCALES - GENERAL: PAINLEVEL_OUTOF13: 5

## 2017-06-21 NOTE — PATIENT INSTRUCTIONS
After hyperbaric oxygen therapy treatment, it is normal to experience some congestion to the ears, muffling of sounds, or abnormal sounds to one or both ears.    If you experience pain or discomfort to one or both ears that does not resolve spontaneously, please contact the hyperbaric department at [phone].

## 2017-06-21 NOTE — MR AVS SNAPSHOT
Lupe Webb   2017 9:30 AM   Non-Provider Visit   MRN: [id number]    Department:  [facility]   Dept Phone:  [phone]    Description:  Female : 1955   Provider:  [facility] 2           Allergies as of 2017     Allergen Noted Reactions    Contrast Media With Iodine [Iodine] 2014       Rash, same with topical iodine      Shellfish Allergy 2014   Vomiting    Rash, hives,     Penicillins 2014   Vomiting      You were diagnosed with     Nonhealing surgical wound, subsequent encounter   [938871]       Incisional breast wound   [594965]       Current chronic use of systemic steroids   [5851916]       Chronic pain syndrome   [338.4.ICD-9-CM]       Lupus arthritis (CMS-HCC)   [601151]       Sjogren's syndrome, with unspecified organ involvement (CMS-[facility])   [9348901]         Vital Signs     Blood Pressure Pulse Temperature Oxygen Saturation Smoking Status       112/67 mmHg 50 37.4 °C (99.3 °F) 99% Never Smoker        Basic Information     Date Of Birth Sex Race Ethnicity Preferred Language    1955 Female White Non- English      Your appointments     2017  9:30 AM   HBOT Supervision with [facility]   [facility] ([facility] [address])    [address]  [place] [address]   [phone]            2017  9:30 AM   HBOT Supervision with [facility] 2   [facility] ([facility] [address])    [address]  [place] [address]   [phone]            2017  9:30 AM   HBOT Supervision with HY[facility]BARIC CHAM[facility] 2   [facility] ([facility] [address])    [address]  [place] [address]   [phone]            2017  9:30 AM   HBOT Supervision with HYPERBARIC CHAM[facility] 2   [facility] ([facility] [address])    [address]  [place] [address]   [phone]            2017  9:30 AM   HBOT  Supervision with HYPERBARIC CHAM[facility] 2   [facility] ([address])    [address]  [place] [address]   [phone]            Jun 29, 2017  9:30 AM   HBOT Supervision with HYPERBARIC CHAM[facility] 2   [facility] ([address])    [address]  [place] [address]   [phone]            Jun 30, 2017  9:30 AM   HBOT Supervision with HYPERBARIC CHAMBER 2   [facility] ([address])    [address]  [place] [address]   [phone]            Jul 03, 2017  9:30 AM   HBOT Supervision with HYPERBARIC CHAMBER 2   [facility] ([address])    [address]  [place] [address]   [phone]            Jul 05, 2017  9:30 AM   HBOT Supervision with HYPERBARIC CHAMBER 2   [facility] ([address])    [address]  [place] [address]   [phone]            Jul 06, 2017  9:30 AM   HBOT Supervision with HYPERBARIC CHAM[facility] 2   [facility] ([address])    [address]  [place] [address]   [phone]            Jul 07, 2017  9:30 AM   HBOT Supervision with HYPERBARIC CHAM[facility] 2   [facility] ([address])    [address]  [place] [address]   [phone]            Jul 10, 2017  9:30 AM   HBOT Supervision with HYPERBARIC CHAMBER 2   [facility] ([address])    [address]  [place] [address]   [phone]            Jul 11, 2017  9:30 AM   HBOT Supervision with HYPERBARIC CHAMBER 2   [facility] ([address])    [address]  [place] [address]   [phone]            Jul 12, 2017  9:30 AM   HBOT Supervision with HYPERBARIC CHAMBER 2   [facility] ([address])    [address]  [place] [address]   [phone]            Jul 13, 2017  9:30 AM   HBOT  Supervision with HYPERBARIC CHAMBER 2   [facility] ([address])    [address]  [place] [address]   [phone]            Jul 14, 2017  9:30 AM   HBOT Supervision with HYPERBARIC CHAMBER 2   [facility] ([address])    [address]  [place] [address]   [phone]            Jul 17, 2017  9:30 AM   HBOT Supervision with HYPERBARIC CHAMBER 2   [facility] ([address])    [address]  [place] [address]   [phone]            Jul 18, 2017  9:30 AM   HBOT Supervision with HYPERBARIC CHAMBER 2   [facility] ([address])    [address]  [place] [address]   [phone]            Jul 19, 2017  9:30 AM   HBOT Supervision with HYPERBARIC CHAMBER 2   [facility] ([address])    [address]  [place] [address]   [phone]            Jul 20, 2017  9:30 AM   HBOT Supervision with HYPERBARIC CHAMBER 2   [facility] ([address])    [address]  [place] [address]   [phone]            Jul 21, 2017  9:30 AM   HBOT Supervision with HYPERBARIC CHAMBER 2   [facility] ([address])    [address]  [place] [address]   [phone]            Jul 24, 2017  9:30 AM   HBOT Supervision with HYPERBARIC CHAMBER 2   [facility] ([address])    [address]  [place] [address]   [phone]            Jul 25, 2017  9:30 AM   HBOT Supervision with HYPERBARIC CHAMBER 2   [facility] ([address])    [address]  [place] [address]   [phone]            Jul 26, 2017  9:30 AM   HBOT Supervision with HYPERBARIC CHAMBER 2   [facility] ([address])    [address]  [place] [address]   [phone]            Jul 27, 2017  9:30 AM   HBOT  Supervision with HYPERBARIC CHAMBER 2   [facility] ([address])    [address]  [place] [address]   [phone]            Jul 28, 2017  9:30 AM   HBOT Supervision with HYPERBARIC CHAMBER 2   [facility] ([facility] [address])    [address]  [place] [address]   [phone]            Jul 31, 2017  9:30 AM   HBOT Supervision with HYPERBARIC CHAMBER 2   [facility] ([facility] [address])    [address]  [place] [address]   [phone]            Aug 01, 2017  9:30 AM   HBOT Supervision with HYPERBARIC CHAMBER 2   [facility] ([facility] [address])    [address]  [place] [address]   [phone]            Sep 08, 2017  2:30 PM   [facility]   [facility] BREAST [facility] ([address] [place] [address])    [address]  [place] [address]   [phone]           Some exams require specific prep instructions that would have been given to you at time of scheduling. If you have any additional questions about the prep instructions, please call Imaging Scheduling at [phone] and press #[phone].              Problem List              ICD-10-CM Priority Class Noted - Resolved    Sjogren's syndrome (CMS-HCC) (Chronic) M35.00   4/26/1990 - Present    DDD (degenerative disc disease), cervical (Chronic) M50.30   4/26/2017 - Present    Osteoarthritis of multiple joints (Chronic) M15.9   4/26/2017 - Present    Lupus arthritis (CMS-HCC) (Chronic) M32.9   4/26/2017 - Present    Chronic pain syndrome (Chronic) G89.4   4/26/2017 - Present    Gastroesophageal reflux disease without esophagitis (Chronic) K21.9   4/26/2017 - Present    Lactose intolerance (Chronic) E73.9   4/26/2017 - Present    Raynaud's disease without gangrene (Chronic) I73.00   4/26/2017 - Present    Osteopenia (Chronic) M85.80   4/26/2017 - Present    Functional diarrhea (Chronic) K59.1   4/26/2017 - Present     Current chronic use of systemic steroids (Chronic) Z79.52   4/26/2017 - Present    Incisional breast wound S21.009A   5/15/2017 - Present    Nonhealing surgical wound T81.89XA   6/9/2017 - Present      Health Maintenance        Date Due Completion Dates    IMM DTaP/Tdap/Td Vaccine (1 - Tdap) 4/1/2019 (Originally 3/28/1974) ---    IMM ZOSTER VACCINE 4/1/2019 (Originally 3/28/2015) ---    MAMMOGRAM 9/23/2017 9/23/2016    PAP SMEAR 1/2/2020 1/2/2017 (Done)    Override on 1/2/2017: Done    COLONOSCOPY 8/21/2022 8/21/2012            Current Immunizations     Influenza Vaccine Quad Inj (Preserved) 9/27/2016      Below and/or attached are the medications your provider expects you to take. Review all of your home medications and newly ordered medications with your provider and/or pharmacist. Follow medication instructions as directed by your provider and/or pharmacist. Please keep your medication list with you and share with your provider. Update the information when medications are discontinued, doses are changed, or new medications (including over-the-counter products) are added; and carry medication information at all times in the event of emergency situations     Allergies:  CONTRAST MEDIA WITH IODINE - (reactions not documented)     SHELLFISH ALLERGY - Vomiting     PENICILLINS - Vomiting               Medications  Valid as of: June 21, 2017 -  1:53 PM    Generic Name Brand Name Tablet Size Instructions for use    ALPRAZolam (Tab) XANAX 0.5 MG Take 0.5 Tabs by mouth at bedtime as needed for Sleep.        CycloSPORINE   1 Drop by Ophthalmic route 2 Times a Day. Both eyes        Metaxalone (Tab) SKELAXIN 800 MG Take 800 mg by mouth 3 times a day.        NS SOLN 50 mL with methotrexate PF 25 MG/ML SOLN 30 mg/m2   30 mg/m2 by Intravenous route Once. Indications: weekly        Omega-3 Fatty Acids   Take  by mouth.        PredniSONE (Tab) DELTASONE 1 MG Take 7 mg by mouth every day. 7 pills per day        Sildenafil Citrate  (Tab) VIAGRA 25 MG Take 20 mg by mouth every day. Indications: Raynaud's Phenomenon of Unknown Cause        Zolpidem Tartrate (Tab) AMBIEN 5 MG Take  by mouth at bedtime as needed.        Zonisamide (Cap) ZONEGRAN 100 MG Take 300 mg by mouth every bedtime. Indications: neuropathy        .                 Medicines prescribed today were sent to:     [id number] - [place], [place] - [address]    [address] [place] [address]    Phone: [phone] Fax: [phone]    Open 24 Hours?: No      Medication refill instructions:       If your prescription bottle indicates you have medication refills left, it is not necessary to call your provider’s office. Please contact your pharmacy and they will refill your medication.    If your prescription bottle indicates you do not have any refills left, you may request refills at any time through one of the following ways: The online [employer] system (except Urgent Care), by calling your provider’s office, or by asking your pharmacy to contact your provider’s office with a refill request. Medication refills are processed only during regular business hours and may not be available until the next business day. Your provider may request additional information or to have a follow-up visit with you prior to refilling your medication.   *Please Note: Medication refills are assigned a new Rx number when refilled electronically. Your pharmacy may indicate that no refills were authorized even though a new prescription for the same medication is available at the pharmacy. Please request the medicine by name with the pharmacy before contacting your provider for a refill.        Instructions    After hyperbaric oxygen therapy treatment, it is normal to experience some congestion to the ears, muffling of sounds, or abnormal sounds to one or both ears.    If you experience pain or discomfort to one or both ears that does not resolve spontaneously, please contact the hyperbaric department  at [phone].         Other Notes About Your Plan     Sjogren's specialist in [place]  Rheum: Dr. Inessa Cruz [place]  Pain: Dr. Shankar (not actively seeing)  GYN: Dr. Sierra  GI: Dr. Clemente  PT: [facility]           MyChart Access Code: Activation code not generated  Current MyChart Status: Active

## 2017-06-21 NOTE — PROGRESS NOTES
Subjective:   Lupe Webb is a 62 y.o. female who presents with a non healing breast wound.    HPI  Lupe presents for HBOT treatment today.   She continues to note some fatigue but is otherwise tolerating treatment well.     She was able to clear her ears with no pain or pressure.     She follows up with Dr. Garcia next week.    Review of Systems   Constitutional: Positive for malaise/fatigue. Negative for fever and chills.   HENT: Negative for ear discharge, ear pain and hearing loss.    Eyes: Negative for blurred vision and double vision.   Respiratory: Negative for cough and shortness of breath.    Cardiovascular: Negative for chest pain and palpitations.   Neurological: Negative for dizziness and headaches.          Objective:     /67 mmHg  Pulse 50  Temp(Src) 37.4 °C (99.3 °F)  SpO2 99%     Physical Exam   Constitutional: She is oriented to person, place, and time. She appears well-developed and well-nourished.   HENT:   Head: Normocephalic and atraumatic.   Right Ear: Tympanic membrane, external ear and ear canal normal.   Left Ear: Tympanic membrane, external ear and ear canal normal.   Pulmonary/Chest: Effort normal.   Neurological: She is alert and oriented to person, place, and time.   Psychiatric: She has a normal mood and affect.   Vitals reviewed.      Assessment/Plan:     Nonhealing surgical wound, initial encounter   Appropriate candidate for HBOT - likely that the patient's wound healing is impeded by a multitude of factors,   including vasculitis from Sjogren's / Raynaud's / Lupus, as well as her chronic use of Methotrexate and Prednisone,   in addition to her age, postmenopausal status, and prior surgery & scar to the wound site. HBOT should be   beneficial for her wound healing in terms of it's angiogenic and anti-vasculitic properties. Have recommended   to the patient that she d/c MTX and continue to decrease prednisone, per instructions from Dr. Cruz.   Patient completed her 12th  HBOT treatment at a pressure of 2.4 BRISEIDA x 90 minutes at a descent/ascent of 1.5/3.0 PSI/minute   2. Incisional breast wound   Per Dr. Garcia, s/p Bilateral Saline-->Silicone breast implant Re-do 11/2/16 with recurrent non-healing surgical wound   3. Sjogren's syndrome, with unspecified organ involvement (CMS-[facility])   On Methotrexate and Prednisone per Dr. Cruz   4. Raynaud's disease without gangrene   On Methotrexate and Prednisone per Dr. Cruz   5. Lupus arthritis (CMS-[facility])   On Methotrexate and Prednisone per Dr. Cruz   6. Current chronic use of systemic steroids   On Methotrexate and Prednisone per Dr. Cruz   7. Dry mouth   8. DDD (degenerative disc disease), cervical   9. Gastroesophageal reflux disease without esophagitis

## 2017-06-22 ENCOUNTER — OFFICE VISIT (OUTPATIENT)
Dept: WOUND CARE | Facility: MEDICAL CENTER | Age: 62
End: 2017-06-22
Attending: FAMILY MEDICINE
Payer: MEDICARE

## 2017-06-22 VITALS
HEART RATE: 53 BPM | DIASTOLIC BLOOD PRESSURE: 68 MMHG | OXYGEN SATURATION: 99 % | TEMPERATURE: 98.8 F | SYSTOLIC BLOOD PRESSURE: 107 MMHG

## 2017-06-22 DIAGNOSIS — M32.9 LUPUS ARTHRITIS (HCC): Chronic | ICD-10-CM

## 2017-06-22 DIAGNOSIS — S21.009A INCISIONAL BREAST WOUND: ICD-10-CM

## 2017-06-22 DIAGNOSIS — T81.89XD NONHEALING SURGICAL WOUND, SUBSEQUENT ENCOUNTER: Primary | ICD-10-CM

## 2017-06-22 DIAGNOSIS — I73.00 RAYNAUD'S DISEASE WITHOUT GANGRENE: Chronic | ICD-10-CM

## 2017-06-22 DIAGNOSIS — M35.00 SJOGREN'S SYNDROME, WITH UNSPECIFIED ORGAN INVOLVEMENT (HCC): Chronic | ICD-10-CM

## 2017-06-22 PROCEDURE — G0277 HBOT, FULL BODY CHAMBER, 30M: HCPCS | Performed by: FAMILY MEDICINE

## 2017-06-22 PROCEDURE — 99183 HYPERBARIC OXYGEN THERAPY: CPT | Performed by: FAMILY MEDICINE

## 2017-06-22 ASSESSMENT — PAIN SCALES - GENERAL: PAINLEVEL_OUTOF13: 5

## 2017-06-22 NOTE — PATIENT INSTRUCTIONS
After hyperbaric oxygen therapy treatment, it is normal to experience some congestion to the ears, muffling of sounds, or abnormal sounds to one or both ears.    If you experience pain or discomfort to one or both ears that does not resolve spontaneously, please contact the hyperbaric department at [phone].

## 2017-06-22 NOTE — PROGRESS NOTES
Subjective:   Lupe Webb is a 62 y.o. female who presents with a non healing breast wound.    HPI  Lupe returns to HBOT.  No ear pain, no medication changes. She follows with Dr. Garcia next thursday, in 7 days.  Tolerating treatments well    Review of Systems   Constitutional: Positive for malaise/fatigue. Negative for fever and chills.   HENT: Negative for ear discharge, ear pain and hearing loss.    Eyes: Negative for blurred vision and double vision.   Respiratory: Negative for cough and shortness of breath.    Cardiovascular: Negative for chest pain and palpitations.   Neurological: Negative for dizziness and headaches.          Objective:     /68 mmHg  Pulse 53  Temp(Src) 37.1 °C (98.8 °F)  SpO2 99%     Physical Exam   Constitutional: She is oriented to person, place, and time. She appears well-developed and well-nourished.   HENT:   Head: Normocephalic and atraumatic.   Right Ear: Tympanic membrane, external ear and ear canal normal.   Left Ear: Tympanic membrane, external ear and ear canal normal.   Pulmonary/Chest: Effort normal.   Neurological: She is alert and oriented to person, place, and time.   Psychiatric: She has a normal mood and affect.   Vitals reviewed.      Assessment/Plan:     Nonhealing surgical wound, initial encounter   Appropriate candidate for HBOT - likely that the patient's wound healing is impeded by a multitude of factors,   including vasculitis from Sjogren's / Raynaud's / Lupus, as well as her chronic use of Methotrexate and Prednisone,   in addition to her age, postmenopausal status, and prior surgery & scar to the wound site. HBOT should be   beneficial for her wound healing in terms of it's angiogenic and anti-vasculitic properties. Have recommended   to the patient that she d/c MTX and continue to decrease prednisone, per instructions from Dr. Cruz.   Patient completed her 13th HBOT treatment at a pressure of 2.4 BRISEIDA x 90 minutes at a descent/ascent of 1.5/3.0  PSI/minute   2. Incisional breast wound   Per Dr. Garcia, s/p Bilateral Saline-->Silicone breast implant Re-do 11/2/16 with recurrent non-healing surgical wound   3. Sjogren's syndrome, with unspecified organ involvement (CMS-HCC)   On Methotrexate and Prednisone per Dr. Cruz   4. Raynaud's disease without gangrene   On Methotrexate and Prednisone per Dr. Cruz   5. Lupus arthritis (CMS-HCC)   On Methotrexate and Prednisone per Dr. Cruz   6. Current chronic use of systemic steroids   On Methotrexate and Prednisone per Dr. Cruz   7. Dry mouth   8. DDD (degenerative disc disease), cervical   9. Gastroesophageal reflux disease without esophagitis

## 2017-06-23 ENCOUNTER — OFFICE VISIT (OUTPATIENT)
Dept: WOUND CARE | Facility: MEDICAL CENTER | Age: 62
End: 2017-06-23
Attending: FAMILY MEDICINE
Payer: MEDICARE

## 2017-06-23 ENCOUNTER — TELEPHONE (OUTPATIENT)
Dept: INTERNAL MEDICINE | Facility: IMAGING CENTER | Age: 62
End: 2017-06-23

## 2017-06-23 VITALS
TEMPERATURE: 98.6 F | OXYGEN SATURATION: 100 % | HEART RATE: 51 BPM | DIASTOLIC BLOOD PRESSURE: 69 MMHG | SYSTOLIC BLOOD PRESSURE: 100 MMHG

## 2017-06-23 DIAGNOSIS — T81.89XD NONHEALING SURGICAL WOUND, SUBSEQUENT ENCOUNTER: Primary | ICD-10-CM

## 2017-06-23 DIAGNOSIS — S21.009A INCISIONAL BREAST WOUND: ICD-10-CM

## 2017-06-23 DIAGNOSIS — M32.9 LUPUS ARTHRITIS (HCC): Chronic | ICD-10-CM

## 2017-06-23 DIAGNOSIS — M35.00 SJOGREN'S SYNDROME, WITH UNSPECIFIED ORGAN INVOLVEMENT (HCC): Chronic | ICD-10-CM

## 2017-06-23 DIAGNOSIS — I73.00 RAYNAUD'S DISEASE WITHOUT GANGRENE: Chronic | ICD-10-CM

## 2017-06-23 PROCEDURE — G0277 HBOT, FULL BODY CHAMBER, 30M: HCPCS | Performed by: FAMILY MEDICINE

## 2017-06-23 PROCEDURE — 99183 HYPERBARIC OXYGEN THERAPY: CPT | Performed by: FAMILY MEDICINE

## 2017-06-23 ASSESSMENT — PAIN SCALES - GENERAL: PAINLEVEL_OUTOF13: 5

## 2017-06-23 NOTE — TELEPHONE ENCOUNTER
Otis and Lupe called me to update me on her non healing breast wound. The site that was surgical glued by Dr. Garcia has now opened - he is aware and is in [place] this weekend. He is planning to schedule surgery for definitive suturing in the near future and advised patient to use neosporin on open sites. Support provided to patient and her  - pt is not going to attend hyperbaric session Monday due to this wound change, and she will f/u with Dr. Garcia Monday for further evaluation. They were given signs and symptoms that would warrant immediate ER evaluation over the weekend.

## 2017-06-23 NOTE — MR AVS SNAPSHOT
Lupe SAWYER Webb   2017 9:30 AM   Office Visit   MRN: [id number]    Department:  [facility]   Dept Phone:  [phone]    Description:  Female : 1955   Provider:  Sean Espinoza M.D.           Allergies as of 2017     Allergen Noted Reactions    Contrast Media With Iodine [Iodine] 2014       Rash, same with topical iodine      Shellfish Allergy 2014   Vomiting    Rash, hives,     Penicillins 2014   Vomiting      You were diagnosed with     Nonhealing surgical wound, subsequent encounter   [581907]  -  Primary     Incisional breast wound   [259947]       Lupus arthritis (CMS-HCC)   [571460]       Raynaud's disease without gangrene   [1816153]       Sjogren's syndrome, with unspecified organ involvement (CMS-HCC)   [6148352]         Vital Signs     Blood Pressure Pulse Temperature Oxygen Saturation Smoking Status       100/69 mmHg 51 37 °C (98.6 °F) 100% Never Smoker        Basic Information     Date Of Birth Sex Race Ethnicity Preferred Language    1955 Female White Non- English      Your appointments     2017  9:30 AM   HBOT Supervision with HYPERBARIC CHAMBER 2   [facility] ([address] [place] [address])    [address]  [place] [address]   [phone]            2017  9:30 AM   HBOT Supervision with HYPERBARIC CHAMBER 2   [facility] ([address] [place] [address])    [address]  [place] [address]   [phone]            2017  9:30 AM   HBOT Supervision with HYPERBARIC CHAMBER 2   [facility] ([address] [place] [address])    [address]  [place] [address]   [phone]            2017  9:30 AM   HBOT Supervision with HYPERBARIC CHAMBER 2   [facility] ([facility] [address])    [address]  [place] [address]   [phone]            2017  9:30 AM   HBOT Supervision with HYPERBARIC CHAMBER 2   [facility] ([address])    [address]  [place] [address]   [phone]            Jul 03, 2017  9:30 AM   HBOT Supervision with HYPERBARIC CHAMBER 2   [facility] ([address])    [address]  [place] [address]   [phone]            Jul 05, 2017  9:30 AM   HBOT Supervision with HYPERBARIC CHAMBER 2   [facility] ([address])    [address]  [place] [address]   [phone]            Jul 06, 2017  9:30 AM   HBOT Supervision with HYPERBARIC CHAMBER 2   [facility] ([address])    [address]  [place] [address]   [phone]            Jul 07, 2017  9:30 AM   HBOT Supervision with HYPERBARIC CHAMBER 2   [facility] ([address])    [address]  [place] [address]   [phone]            Jul 10, 2017  9:30 AM   HBOT Supervision with HYPERBARIC CHAMBER 2   [facility] ([address])    [address]  [place] [address]   [phone]            Jul 11, 2017  9:30 AM   HBOT Supervision with HYPERBARIC CHAMBER 2   [facility] ([address])    [address]  [place] [address]   [phone]            Jul 12, 2017  9:30 AM   HBOT Supervision with HYPERBARIC CHAMBER 2   [facility] ([address])    [address]  [place] [address]   [phone]            Jul 13, 2017  9:30 AM   HBOT Supervision with HYPERBARIC CHAMBER 2   [facility] ([address])    [address]  [place] [address]   [phone]            Jul 14, 2017  9:30 AM   HBOT Supervision with HYPERBARIC CHAMBER 2   [facility] ([address])    [address]  [place] [address]   [phone]            Jul 17, 2017  9:30 AM   HBOT Supervision with HYPERBARIC CHAMBER 2   [facility] ([address])    [address]  [place] [address]   [phone]            Jul 18, 2017  9:30 AM   HBOT Supervision with HYPERBARIC CHAMBER 2   [facility] ([address])    [address]  [place] [address]   [phone]            Jul 19, 2017  9:30 AM   HBOT Supervision with HYPERBARIC CHAMBER 2   [facility] ([address])    [address]  [place] [address]   [phone]            Jul 20, 2017  9:30 AM   HBOT Supervision with HYPERBARIC CHAMBER 2   [facility] ([address])    [address]  [place] [address]   [phone]            Jul 21, 2017  9:30 AM   HBOT Supervision with HYPERBARIC CHAMBER 2   [facility] ([address])    [address]  [place] [address]   [phone]            Jul 24, 2017  9:30 AM   HBOT Supervision with HYPERBARIC CHAMBER 2   [facility] ([address])    [address]  [place] [address]   [phone]            Jul 25, 2017  9:30 AM   HBOT Supervision with HYPERBARIC CHAMBER 2   [facility] ([address])    [address]  [place] [address]   [phone]            Jul 26, 2017  9:30 AM   HBOT Supervision with HYPERBARIC CHAMBER 2   [facility] ([address])    [address]  [place] [address]   [phone]            Jul 27, 2017  9:30 AM   HBOT Supervision with HYPERBARIC CHAMBER 2   [facility] ([address])    [address]  [place] [address]   [phone]            Jul 28, 2017  9:30 AM   HBOT Supervision with HYPERBARIC CHAMBER 2   [facility] ([address])    [address]  [place] [address]   [phone]            Jul 31, 2017  9:30 AM   HBOT Supervision with HYPERBARIC CHAMBER 2   [facility] ([address] [place] [address])    [address]  [place] [address]   [phone]            Aug 01, 2017  9:30 AM   HBOT Supervision with HYPERBARIC CHAMBER 2   [facility] ([address] [place] [address])    [address]  [place] [address]   [phone]            Sep 08, 2017  2:30 PM   US SONOCINE with RB[facility] US 2   [facility] ([address] [place] [address])    [address]  [place] [address]   [phone]           Some exams require specific prep instructions that would have been given to you at time of scheduling. If you have any additional questions about the prep instructions, please call Imaging Scheduling at [phone] and press #[phone].              Problem List              ICD-10-CM Priority Class Noted - Resolved    Sjogren's syndrome (CMS-HCC) (Chronic) M35.00   4/26/1990 - Present    DDD (degenerative disc disease), cervical (Chronic) M50.30   4/26/2017 - Present    Osteoarthritis of multiple joints (Chronic) M15.9   4/26/2017 - Present    Lupus arthritis (CMS-HCC) (Chronic) M32.9   4/26/2017 - Present    Chronic pain syndrome (Chronic) G89.4   4/26/2017 - Present    Gastroesophageal reflux disease without esophagitis (Chronic) K21.9   4/26/2017 - Present    Lactose intolerance (Chronic) E73.9   4/26/2017 - Present    Raynaud's disease without gangrene (Chronic) I73.00   4/26/2017 - Present    Osteopenia (Chronic) M85.80   4/26/2017 - Present    Functional diarrhea (Chronic) K59.1   4/26/2017 - Present    Current chronic use of systemic steroids (Chronic) Z79.52   4/26/2017 - Present    Incisional breast wound S21.009A   5/15/2017 - Present    Nonhealing surgical wound T81.89XA   6/9/2017 - Present      Health Maintenance        Date Due Completion Dates    IMM DTaP/Tdap/Td Vaccine (1 - Tdap) 4/1/2019 (Originally 3/28/1974) ---    IMM ZOSTER VACCINE 4/1/2019 (Originally 3/28/2015) ---    MAMMOGRAM 9/23/2017 9/23/2016    PAP SMEAR 1/2/2020  1/2/2017 (Done)    Override on 1/2/2017: Done    COLONOSCOPY 8/21/2022 8/21/2012            Current Immunizations     Influenza Vaccine Quad Inj (Preserved) 9/27/2016      Below and/or attached are the medications your provider expects you to take. Review all of your home medications and newly ordered medications with your provider and/or pharmacist. Follow medication instructions as directed by your provider and/or pharmacist. Please keep your medication list with you and share with your provider. Update the information when medications are discontinued, doses are changed, or new medications (including over-the-counter products) are added; and carry medication information at all times in the event of emergency situations     Allergies:  CONTRAST MEDIA WITH IODINE - (reactions not documented)     SHELLFISH ALLERGY - Vomiting     PENICILLINS - Vomiting               Medications  Valid as of: June 23, 2017 -  9:46 AM    Generic Name Brand Name Tablet Size Instructions for use    ALPRAZolam (Tab) XANAX 0.5 MG Take 0.5 Tabs by mouth at bedtime as needed for Sleep.        CycloSPORINE   1 Drop by Ophthalmic route 2 Times a Day. Both eyes        Metaxalone (Tab) SKELAXIN 800 MG Take 800 mg by mouth 3 times a day.        NS SOLN 50 mL with methotrexate PF 25 MG/ML SOLN 30 mg/m2   30 mg/m2 by Intravenous route Once. Indications: weekly        Omega-3 Fatty Acids   Take  by mouth.        PredniSONE (Tab) DELTASONE 1 MG Take 7 mg by mouth every day. 7 pills per day        Sildenafil Citrate (Tab) VIAGRA 25 MG Take 20 mg by mouth every day. Indications: Raynaud's Phenomenon of Unknown Cause        Zolpidem Tartrate (Tab) AMBIEN 5 MG Take  by mouth at bedtime as needed.        Zonisamide (Cap) ZONEGRAN 100 MG Take 300 mg by mouth every bedtime. Indications: neuropathy        .                 Medicines prescribed today were sent to:     [id number] - [place], [place] - [address]    [address] [place] [address]     Phone: [phone] Fax: [phone]    Open 24 Hours?: No      Medication refill instructions:       If your prescription bottle indicates you have medication refills left, it is not necessary to call your provider’s office. Please contact your pharmacy and they will refill your medication.    If your prescription bottle indicates you do not have any refills left, you may request refills at any time through one of the following ways: The online [employer] system (except Urgent Care), by calling your provider’s office, or by asking your pharmacy to contact your provider’s office with a refill request. Medication refills are processed only during regular business hours and may not be available until the next business day. Your provider may request additional information or to have a follow-up visit with you prior to refilling your medication.   *Please Note: Medication refills are assigned a new Rx number when refilled electronically. Your pharmacy may indicate that no refills were authorized even though a new prescription for the same medication is available at the pharmacy. Please request the medicine by name with the pharmacy before contacting your provider for a refill.        Instructions    After hyperbaric oxygen therapy treatment, it is normal to experience some congestion to the ears, muffling of sounds, or abnormal sounds to one or both ears.    If you experience pain or discomfort to one or both ears that does not resolve spontaneously, please contact the hyperbaric department at [phone].           Other Notes About Your Plan     Sjogren's specialist in [place]  Rheum: Dr. Inessa Cruz [place]  Pain: Dr. Shankar (not actively seeing)  GYN: Dr. Sierra  GI: Dr. Clemente  PT: [facility]           MyChart Access Code: Activation code not generated  Current [facility] Status: Active

## 2017-06-23 NOTE — PATIENT INSTRUCTIONS
After hyperbaric oxygen therapy treatment, it is normal to experience some congestion to the ears, muffling of sounds, or abnormal sounds to one or both ears.    If you experience pain or discomfort to one or both ears that does not resolve spontaneously, please contact the hyperbaric department at [phone].

## 2017-06-23 NOTE — PROGRESS NOTES
Subjective:   Lupe Webb is a 62 y.o. female who presents with a non healing breast wound.    HPI  Lupe returns to HBOT.  Denies medication changes or ear pain.  Tolerating HBOT well.       Review of Systems   Constitutional: Positive for malaise/fatigue. Negative for fever and chills.   HENT: Negative for ear discharge, ear pain and hearing loss.    Eyes: Negative for blurred vision and double vision.   Respiratory: Negative for cough and shortness of breath.    Cardiovascular: Negative for chest pain and palpitations.   Neurological: Negative for dizziness and headaches.          Objective:     /69 mmHg  Pulse 51  Temp(Src) 37 °C (98.6 °F)  SpO2 100%     Physical Exam   Constitutional: She is oriented to person, place, and time. She appears well-developed and well-nourished.   HENT:   Head: Normocephalic and atraumatic.   Right Ear: Tympanic membrane, external ear and ear canal normal.   Left Ear: Tympanic membrane, external ear and ear canal normal.   Pulmonary/Chest: Effort normal.   Neurological: She is alert and oriented to person, place, and time.   Psychiatric: She has a normal mood and affect.   Vitals reviewed.      Assessment/Plan:     Nonhealing surgical wound, initial encounter   Appropriate candidate for HBOT - likely that the patient's wound healing is impeded by a multitude of factors,   including vasculitis from Sjogren's / Raynaud's / Lupus, as well as her chronic use of Methotrexate and Prednisone,   in addition to her age, postmenopausal status, and prior surgery & scar to the wound site. HBOT should be   beneficial for her wound healing in terms of it's angiogenic and anti-vasculitic properties. Have recommended   to the patient that she d/c MTX and continue to decrease prednisone, per instructions from Dr. Cruz.   Patient completed her 14th HBOT treatment at a pressure of 2.4 BRISEIDA x 90 minutes at a descent/ascent of 1.5/3.0 PSI/minute   2. Incisional breast wound   Per Dr. Garcia,  s/p Bilateral Saline-->Silicone breast implant Re-do 11/2/16 with recurrent non-healing surgical wound   3. Sjogren's syndrome, with unspecified organ involvement (CMS-HCC)   On Methotrexate and Prednisone per Dr. Cruz   4. Raynaud's disease without gangrene   On Methotrexate and Prednisone per Dr. Cruz   5. Lupus arthritis (CMS-[facility])   On Methotrexate and Prednisone per Dr. Cruz   6. Current chronic use of systemic steroids   On Methotrexate and Prednisone per Dr. Cruz   7. Dry mouth   8. DDD (degenerative disc disease), cervical   9. Gastroesophageal reflux disease without esophagitis

## 2017-06-26 ENCOUNTER — APPOINTMENT (OUTPATIENT)
Dept: WOUND CARE | Facility: MEDICAL CENTER | Age: 62
End: 2017-06-26
Attending: FAMILY MEDICINE
Payer: MEDICARE

## 2017-06-26 ENCOUNTER — HOSPITAL ENCOUNTER (OUTPATIENT)
Facility: MEDICAL CENTER | Age: 62
End: 2017-06-26
Attending: SPECIALIST | Admitting: SPECIALIST
Payer: MEDICARE

## 2017-06-26 VITALS
WEIGHT: 104.06 LBS | BODY MASS INDEX: 19.15 KG/M2 | HEART RATE: 52 BPM | DIASTOLIC BLOOD PRESSURE: 53 MMHG | RESPIRATION RATE: 16 BRPM | HEIGHT: 62 IN | SYSTOLIC BLOOD PRESSURE: 103 MMHG | TEMPERATURE: 97.7 F | OXYGEN SATURATION: 97 %

## 2017-06-26 PROBLEM — S21.001A OPEN WOUND OF RIGHT BREAST: Status: ACTIVE | Noted: 2017-06-26

## 2017-06-26 LAB
GRAM STN SPEC: NORMAL
PATHOLOGY CONSULT NOTE: NORMAL
SIGNIFICANT IND 70042: NORMAL
SITE SITE: NORMAL
SOURCE SOURCE: NORMAL

## 2017-06-26 PROCEDURE — A6403 STERILE GAUZE>16 <= 48 SQ IN: HCPCS | Performed by: SPECIALIST

## 2017-06-26 PROCEDURE — 700105 HCHG RX REV CODE 258: Performed by: SPECIALIST

## 2017-06-26 PROCEDURE — 87070 CULTURE OTHR SPECIMN AEROBIC: CPT

## 2017-06-26 PROCEDURE — 500002 HCHG ADHESIVE, DERMABOND: Performed by: SPECIALIST

## 2017-06-26 PROCEDURE — 87075 CULTR BACTERIA EXCEPT BLOOD: CPT

## 2017-06-26 PROCEDURE — 160038 HCHG SURGERY MINUTES - EA ADDL 1 MIN LEVEL 2: Performed by: SPECIALIST

## 2017-06-26 PROCEDURE — 700102 HCHG RX REV CODE 250 W/ 637 OVERRIDE(OP)

## 2017-06-26 PROCEDURE — 160027 HCHG SURGERY MINUTES - 1ST 30 MINS LEVEL 2: Performed by: SPECIALIST

## 2017-06-26 PROCEDURE — 700101 HCHG RX REV CODE 250

## 2017-06-26 PROCEDURE — 88304 TISSUE EXAM BY PATHOLOGIST: CPT

## 2017-06-26 PROCEDURE — 501838 HCHG SUTURE GENERAL: Performed by: SPECIALIST

## 2017-06-26 PROCEDURE — 502575 HCHG PACK, BREAST: Performed by: SPECIALIST

## 2017-06-26 PROCEDURE — 160048 HCHG OR STATISTICAL LEVEL 1-5: Performed by: SPECIALIST

## 2017-06-26 PROCEDURE — 160047 HCHG PACU  - EA ADDL 30 MINS PHASE II: Performed by: SPECIALIST

## 2017-06-26 PROCEDURE — 500423 HCHG DRESSING, ABD COMBINE: Performed by: SPECIALIST

## 2017-06-26 PROCEDURE — 500054 HCHG BANDAGE, ELASTIC 6: Performed by: SPECIALIST

## 2017-06-26 PROCEDURE — 700111 HCHG RX REV CODE 636 W/ 250 OVERRIDE (IP)

## 2017-06-26 PROCEDURE — 160009 HCHG ANES TIME/MIN: Performed by: SPECIALIST

## 2017-06-26 PROCEDURE — 160046 HCHG PACU - 1ST 60 MINS PHASE II: Performed by: SPECIALIST

## 2017-06-26 PROCEDURE — 87205 SMEAR GRAM STAIN: CPT

## 2017-06-26 PROCEDURE — A9270 NON-COVERED ITEM OR SERVICE: HCPCS

## 2017-06-26 PROCEDURE — 500126 HCHG BOVIE, NEEDLE TIP: Performed by: SPECIALIST

## 2017-06-26 PROCEDURE — 160025 RECOVERY II MINUTES (STATS): Performed by: SPECIALIST

## 2017-06-26 RX ORDER — SODIUM CHLORIDE, SODIUM LACTATE, POTASSIUM CHLORIDE, CALCIUM CHLORIDE 600; 310; 30; 20 MG/100ML; MG/100ML; MG/100ML; MG/100ML
INJECTION, SOLUTION INTRAVENOUS
Status: DISCONTINUED | OUTPATIENT
Start: 2017-06-26 | End: 2017-06-26 | Stop reason: HOSPADM

## 2017-06-26 RX ORDER — BUPIVACAINE HYDROCHLORIDE AND EPINEPHRINE 2.5; 5 MG/ML; UG/ML
INJECTION, SOLUTION EPIDURAL; INFILTRATION; INTRACAUDAL; PERINEURAL
Status: DISCONTINUED | OUTPATIENT
Start: 2017-06-26 | End: 2017-06-26 | Stop reason: HOSPADM

## 2017-06-26 RX ORDER — OXYCODONE HCL 5 MG/5 ML
SOLUTION, ORAL ORAL
Status: COMPLETED
Start: 2017-06-26 | End: 2017-06-26

## 2017-06-26 RX ORDER — GENTAMICIN SULFATE 40 MG/ML
INJECTION, SOLUTION INTRAMUSCULAR; INTRAVENOUS
Status: DISCONTINUED | OUTPATIENT
Start: 2017-06-26 | End: 2017-06-26 | Stop reason: HOSPADM

## 2017-06-26 RX ORDER — BACITRACIN 50000 [IU]/1
INJECTION, POWDER, FOR SOLUTION INTRAMUSCULAR
Status: DISCONTINUED | OUTPATIENT
Start: 2017-06-26 | End: 2017-06-26 | Stop reason: HOSPADM

## 2017-06-26 RX ORDER — M-VIT,TX,IRON,MINS/CALC/FOLIC 27MG-0.4MG
1 TABLET ORAL DAILY
COMMUNITY

## 2017-06-26 RX ORDER — SODIUM CHLORIDE, SODIUM LACTATE, POTASSIUM CHLORIDE, CALCIUM CHLORIDE 600; 310; 30; 20 MG/100ML; MG/100ML; MG/100ML; MG/100ML
INJECTION, SOLUTION INTRAVENOUS
Status: COMPLETED | OUTPATIENT
Start: 2017-06-26 | End: 2017-06-26

## 2017-06-26 RX ORDER — CEFAZOLIN SODIUM 1 G/3ML
INJECTION, POWDER, FOR SOLUTION INTRAMUSCULAR; INTRAVENOUS
Status: DISCONTINUED | OUTPATIENT
Start: 2017-06-26 | End: 2017-06-26 | Stop reason: HOSPADM

## 2017-06-26 RX ORDER — ASCORBIC ACID 500 MG
500 TABLET ORAL DAILY
COMMUNITY
End: 2019-10-11

## 2017-06-26 RX ORDER — VITAMIN E 268 MG
400 CAPSULE ORAL DAILY
COMMUNITY
End: 2018-05-21

## 2017-06-26 RX ORDER — LIDOCAINE HYDROCHLORIDE AND EPINEPHRINE 5; 5 MG/ML; UG/ML
INJECTION, SOLUTION INFILTRATION; PERINEURAL
Status: DISCONTINUED | OUTPATIENT
Start: 2017-06-26 | End: 2017-06-26 | Stop reason: HOSPADM

## 2017-06-26 RX ORDER — LIDOCAINE HYDROCHLORIDE 10 MG/ML
INJECTION, SOLUTION INFILTRATION; PERINEURAL
Status: COMPLETED
Start: 2017-06-26 | End: 2017-06-26

## 2017-06-26 RX ADMIN — OXYCODONE HYDROCHLORIDE 5 MG: 5 SOLUTION ORAL at 13:45

## 2017-06-26 RX ADMIN — SODIUM CHLORIDE, SODIUM LACTATE, POTASSIUM CHLORIDE, CALCIUM CHLORIDE: 600; 310; 30; 20 INJECTION, SOLUTION INTRAVENOUS at 12:10

## 2017-06-26 RX ADMIN — LIDOCAINE HYDROCHLORIDE: 10 INJECTION, SOLUTION INFILTRATION; PERINEURAL at 12:09

## 2017-06-26 ASSESSMENT — PAIN SCALES - GENERAL: PAINLEVEL_OUTOF10: 8

## 2017-06-26 NOTE — IP AVS SNAPSHOT
" Home Care Instructions                                                                                                                Name:Lupe Webb  Medical Record Number:[id number]  CSN: [id number]    YOB: 1955   Age: 62 y.o.  Sex: female  HT:1.575 m (5' 2.01\") WT: 47.2 kg (104 lb 0.9 oz)          Admit Date: 6/26/2017     Discharge Date:   Today's Date: 6/26/2017  Attending Doctor:  Darryl Garcia M.D.                  Allergies:  Contrast media with iodine; Shellfish allergy; and Penicillins                Discharge Instructions         ACTIVITY: Rest and take it easy for the first 24 hours.  A responsible adult is recommended to remain with you during that time.  It is normal to feel sleepy.  We encourage you to not do anything that requires balance, judgment or coordination.    MILD FLU-LIKE SYMPTOMS ARE NORMAL. YOU MAY EXPERIENCE GENERALIZED MUSCLE ACHES, THROAT IRRITATION, HEADACHE AND/OR SOME NAUSEA.    FOR 24 HOURS DO NOT:  Drive, operate machinery or run household appliances.  Drink beer or alcoholic beverages.   Make important decisions or sign legal documents.    SPECIAL INSTRUCTIONS: sleep/rest with Head of bed elevated.  Use pillows to elevate or recliner.  Use Incentive Spirometer at home every hour while awake.    DIET: To avoid nausea, slowly advance diet as tolerated, avoiding spicy or greasy foods for the first day.  Add more substantial food to your diet according to your physician's instructions.  Babies can be fed formula or breast milk as soon as they are hungry.  INCREASE FLUIDS AND FIBER TO AVOID CONSTIPATION.    SURGICAL DRESSING/BATHING: Keep dressing clean dry and intact, No showering    FOLLOW-UP APPOINTMENT:  A follow-up appointment should be arranged with your doctor Tomorrow; call to schedule.    You should CALL YOUR PHYSICIAN if you develop:  Fever greater than 101 degrees F.  Pain not relieved by medication, or persistent nausea or vomiting.  Excessive bleeding " (blood soaking through dressing) or unexpected drainage from the wound.  Extreme redness or swelling around the incision site, drainage of pus or foul smelling drainage.  Inability to urinate or empty your bladder within 8 hours return to emergency room.  Problems with breathing or chest pain.    You should call 911 if you develop problems with breathing or chest pain.  If you are unable to contact your doctor or surgical center, you should go to the nearest emergency room or urgent care center.  Physician's telephone #: ***    If any questions arise, call your doctor.  If your doctor is not available, please feel free to call the Surgical Center at [phone].  The Center is open Monday through Friday from 7AM to 7PM.  You can also call the HEALTH HOTLINE open 24 hours/day, 7 days/week and speak to a nurse at [phone], or toll free at [phone].    A registered nurse may call you a few days after your surgery to see how you are doing after your procedure.    MEDICATIONS: Resume taking daily medication.  Take prescribed pain medication with food.  If no medication is prescribed, you may take non-aspirin pain medication if needed.  PAIN MEDICATION CAN BE VERY CONSTIPATING.  Take a stool softener or laxative such as senokot, pericolace, or milk of magnesia if needed.    Prescription given for N/A.  Last pain medication given at 1:45pm oxycodone 5mg. Start antibiotic at 8:30pm tonight.    If your physician has prescribed pain medication that includes Acetaminophen (Tylenol), do not take additional Acetaminophen (Tylenol) while taking the prescribed medication.    Depression / Suicide Risk    As you are discharged from this [facility] facility, it is important to learn how to keep safe from harming yourself.    Recognize the warning signs:  · Abrupt changes in personality, positive or negative- including increase in energy   · Giving away possessions  · Change in eating patterns- significant weight  changes-  positive or negative  · Change in sleeping patterns- unable to sleep or sleeping all the time   · Unwillingness or inability to communicate  · Depression  · Unusual sadness, discouragement and loneliness  · Talk of wanting to die  · Neglect of personal appearance   · Rebelliousness- reckless behavior  · Withdrawal from people/activities they love  · Confusion- inability to concentrate     If you or a loved one observes any of these behaviors or has concerns about self-harm, here's what you can do:  · Talk about it- your feelings and reasons for harming yourself  · Remove any means that you might use to hurt yourself (examples: pills, rope, extension cords, firearm)  · Get professional help from the community (Mental Health, Substance Abuse, psychological counseling)  · Do not be alone:Call your Safe Contact- someone whom you trust who will be there for you.  · Call your local CRISIS HOTLINE 098-8384 or 783-491-1713  · Call your local Children's Mobile Crisis Response Team [place] (988) 319-9634 or www.[employer]  · Call the toll free National Suicide Prevention Hotlines   · National Suicide Prevention Lifeline 096-341-DHZV (6369)  · National Hope Line Network 800-SUICIDE (969-9091)       Medication List      CONTINUE taking these medications        Instructions    Morning Afternoon Evening Bedtime    alprazolam 0.5 MG Tabs   Commonly known as:  XANAX        Take 0.5 Tabs by mouth at bedtime as needed for Sleep.   Dose:  0.25 mg                        metaxalone 800 MG Tabs   Commonly known as:  SKELAXIN        Take 800 mg by mouth 3 times a day.   Dose:  800 mg                        predniSONE 1 MG Tabs   Commonly known as:  DELTASONE        Take 7 mg by mouth every day. 7 pills per day   Dose:  7 mg                        RESTASIS OP        1 Drop by Ophthalmic route 2 Times a Day. Both eyes   Dose:  1 Drop                        sildenafil citrate 25 MG Tabs   Commonly known as:  VIAGRA          Take 20 mg by mouth every day. Indications: Raynaud's Phenomenon of Unknown Cause   Dose:  20 mg                        zolpidem 5 MG Tabs   Commonly known as:  AMBIEN        Take  by mouth at bedtime as needed.                        ZONEGRAN 100 MG Caps   Generic drug:  zonisamide        Take 300 mg by mouth every bedtime. Indications: neuropathy   Dose:  300 mg                          STOP taking these medications     NS SOLN 50 mL with methotrexate PF 25 MG/ML SOLN 30 mg/m2               SALMON OIL PO                 ASK your doctor about these medications        Instructions    Morning Afternoon Evening Bedtime    ascorbic acid 500 MG Tabs   Commonly known as:  ascorbic acid        Take 500 mg by mouth every day.   Dose:  500 mg                        Non Formulary Request        Biotin                        Non Formulary Request        Flaxseed oil OTC                        non-formulary med        Cranberry extract OTC                        therapeutic multivitamin-minerals Tabs        Take 1 Tab by mouth every day.   Dose:  1 Tab                        vitamin e 400 UNIT Caps   Commonly known as:  VITAMIN E        Take 400 Units by mouth every day.   Dose:  400 Units                                Medication Information     Above and/or attached are the medications your physician expects you to take upon discharge. Review all of your home medications and newly ordered medications with your doctor and/or pharmacist. Follow medication instructions as directed by your doctor and/or pharmacist. Please keep your medication list with you and share with your physician. Update the information when medications are discontinued, doses are changed, or new medications (including over-the-counter products) are added; and carry medication information at all times in the event of emergency situations.        Resources     Quit Smoking / Tobacco Use:    I understand the use of any tobacco products increases my chance  of suffering from future heart disease or stroke and could cause other illnesses which may shorten my life. Quitting the use of tobacco products is the single most important thing I can do to improve my health. For further information on smoking / tobacco cessation call a Toll Free Quit Line at 1-828.475.2151 (*[employer]) or 1-262.303.5801 (American Lung Association) or you can access the web based program at www.lungusa.org.    [place] Tobacco Users Help Line:  (395) 650-9884       Toll Free: 1-384.916.4296  Quit Tobacco Program [facility] (100)669-7584    Crisis Hotline:    [place] Crisis Hotline:  5-177-YDJPHRU or 1-470.959.9931    [place] Crisis Hotline:    1-507.670.6005 or 771-347-8317    Discharge Survey:   Thank you for choosing [facility]. We hope we did everything we could to make your hospital stay a pleasant one. You may be receiving a survey and we would appreciate your time and participation in answering the questions. Your input is very valuable to us in our efforts to improve our service to our patients and their families.            Signatures     My signature on this form indicates that:    1. I acknowledge receipt and understanding of these Home Care Instruction.  2. My questions regarding this information have been answered to my satisfaction.  3. I have formulated a plan with my discharge nurse to obtain my prescribed medications for home.    __________________________________      __________________________________                   Patient Signature                                 Guardian/Responsible Adult Signature      __________________________________                 __________       ________                       Nurse Signature                                               Date                 Time

## 2017-06-26 NOTE — IP AVS SNAPSHOT
6/26/2017    Lupe Webb  [address] [place] [address]    Dear Lupe:    [facility] wants to ensure your discharge home is safe and you or your loved ones have had all of your questions answered regarding your care after you leave the hospital.    Below is a list of resources and contact information should you have any questions regarding your hospital stay, follow-up instructions, or active medical symptoms.    Questions or Concerns Regarding… Contact   Medical Questions Related to Your Discharge  (7 days a week, 8am-5pm) Contact a Nurse Care Coordinator   [phone]   Medical Questions Not Related to Your Discharge  (24 hours a day / 7 days a week)  Contact the Nurse Health Line   [phone]    Medications or Discharge Instructions Refer to your discharge packet   or contact your [facility] Primary Care Provider   [phone]   Follow-up Appointment(s) Schedule your appointment via [employer]   or contact Scheduling [phone]   Billing Review your statement via [employer]  or contact Billing [phone]   Medical Records Review your records via [employer]   or contact Medical Records [phone]     You may receive a telephone call within two days of discharge. This call is to make certain you understand your discharge instructions and have the opportunity to have any questions answered. You can also easily access your medical information, test results and upcoming appointments via the [employer] free online health management tool. You can learn more and sign up at [email or web address]. For assistance setting up your [employer] account, please call [phone].    Once again, we want to ensure your discharge home is safe and that you have a clear understanding of any next steps in your care. If you have any questions or concerns, please do not hesitate to contact us, we are here for you. Thank you for choosing [facility] for your healthcare needs.    Sincerely,    Your [facility] Healthcare Team

## 2017-06-26 NOTE — DISCHARGE INSTRUCTIONS
ACTIVITY: Rest and take it easy for the first 24 hours.  A responsible adult is recommended to remain with you during that time.  It is normal to feel sleepy.  We encourage you to not do anything that requires balance, judgment or coordination.    MILD FLU-LIKE SYMPTOMS ARE NORMAL. YOU MAY EXPERIENCE GENERALIZED MUSCLE ACHES, THROAT IRRITATION, HEADACHE AND/OR SOME NAUSEA.    FOR 24 HOURS DO NOT:  Drive, operate machinery or run household appliances.  Drink beer or alcoholic beverages.   Make important decisions or sign legal documents.    SPECIAL INSTRUCTIONS: sleep/rest with Head of bed elevated.  Use pillows to elevate or recliner.  Use Incentive Spirometer at home every hour while awake.    DIET: To avoid nausea, slowly advance diet as tolerated, avoiding spicy or greasy foods for the first day.  Add more substantial food to your diet according to your physician's instructions.  Babies can be fed formula or breast milk as soon as they are hungry.  INCREASE FLUIDS AND FIBER TO AVOID CONSTIPATION.    SURGICAL DRESSING/BATHING: Keep dressing clean dry and intact, No showering    FOLLOW-UP APPOINTMENT:  A follow-up appointment should be arranged with your doctor Tomorrow; call to schedule.    You should CALL YOUR PHYSICIAN if you develop:  Fever greater than 101 degrees F.  Pain not relieved by medication, or persistent nausea or vomiting.  Excessive bleeding (blood soaking through dressing) or unexpected drainage from the wound.  Extreme redness or swelling around the incision site, drainage of pus or foul smelling drainage.  Inability to urinate or empty your bladder within 8 hours return to emergency room.  Problems with breathing or chest pain.    You should call 911 if you develop problems with breathing or chest pain.  If you are unable to contact your doctor or surgical center, you should go to the nearest emergency room or urgent care center.  Physician's telephone #: ***    If any questions arise, call  your doctor.  If your doctor is not available, please feel free to call the Surgical Center at [phone].  The Center is open Monday through Friday from 7AM to 7PM.  You can also call the HEALTH HOTLINE open 24 hours/day, 7 days/week and speak to a nurse at [phone], or toll free at [phone].    A registered nurse may call you a few days after your surgery to see how you are doing after your procedure.    MEDICATIONS: Resume taking daily medication.  Take prescribed pain medication with food.  If no medication is prescribed, you may take non-aspirin pain medication if needed.  PAIN MEDICATION CAN BE VERY CONSTIPATING.  Take a stool softener or laxative such as senokot, pericolace, or milk of magnesia if needed.    Prescription given for N/A.  Last pain medication given at 1:45pm oxycodone 5mg. Start antibiotic at 8:30pm tonight.    If your physician has prescribed pain medication that includes Acetaminophen (Tylenol), do not take additional Acetaminophen (Tylenol) while taking the prescribed medication.    Depression / Suicide Risk    As you are discharged from this [facility] facility, it is important to learn how to keep safe from harming yourself.    Recognize the warning signs:  · Abrupt changes in personality, positive or negative- including increase in energy   · Giving away possessions  · Change in eating patterns- significant weight changes-  positive or negative  · Change in sleeping patterns- unable to sleep or sleeping all the time   · Unwillingness or inability to communicate  · Depression  · Unusual sadness, discouragement and loneliness  · Talk of wanting to die  · Neglect of personal appearance   · Rebelliousness- reckless behavior  · Withdrawal from people/activities they love  · Confusion- inability to concentrate     If you or a loved one observes any of these behaviors or has concerns about self-harm, here's what you can do:  · Talk about it- your feelings and reasons for  harming yourself  · Remove any means that you might use to hurt yourself (examples: pills, rope, extension cords, firearm)  · Get professional help from the community (Mental Health, Substance Abuse, psychological counseling)  · Do not be alone:Call your Safe Contact- someone whom you trust who will be there for you.  · Call your local CRISIS HOTLINE 271-7680 or 823-516-7796  · Call your local Children's Mobile Crisis Response Team [place] (734) 577-0529 or www.[employer]  · Call the toll free National Suicide Prevention Hotlines   · National Suicide Prevention Lifeline 263-114-GAUF (9232)  · National Hope Line Network 800-SUICIDE (765-9972)

## 2017-06-26 NOTE — OR NURSING
"1330- received pt from OR via [place].  Pt awake, respirations spontaneous.  VSS.  Dressing to chest cdi.  Pt denies nausea, c/o \"sore\" R breast.  Unable to rate pain at this time.  1400- Pt dressed up to recliner.  VSS.   to stage 2.  See Mar for oral pain med given.  1440- Pt Dc'd home via w/c to private vehicle.  VSS. Pt stated pain was tolerable, denied nausea.  Pt and  verbalized understanding of DC instructions. Dressing CDI.  "

## 2017-06-26 NOTE — OP REPORT
DATE OF SERVICE:  06/26/2017    SERVICE:  Plastic surgery.      SURGEON:  Darryl Garcia MD      ANESTHESIOLOGIST:  Roddy Crane MD      ANESTHESIA:  General.      PREOPERATIVE DIAGNOSIS:  Nonhealing wounds, right breast.      POSTOPERATIVE DIAGNOSIS:  Nonhealing wounds, right breast.      OPERATIVE PROCEDURE:  Excision of nonhealing wounds and scar, right breast with   advancement flap closure.      INDICATIONS:  A 62-year-old female who has had previous bilateral augmentation   mammoplasty years earlier.  Months ago, the patient underwent bilateral   exchange to silicone breast implants with repositioning of lateral folds.  The   patient had an excellent outcome; however, over time, she developed recurrent   nonhealing wounds of the right breast inframammary scar.  The patient has a   history of Sjogren's burns syndrome as well as Raynaud's disease and other,   likely mixed connective tissue disorder and was on CellCept and prednisone.    She is now no longer on CellCept, but has been treated with methotrexate   instead and this combination of medications have played a significant role in   her inability to heal.  The patient has not suffered from infection of the   silicone implant nor injury to the implant.  She has undergone approximately 7   attempts of closure of recurrent nonhealing wounds with very limited success.   She has also had numerous hyperbaric oxygen treatments. Recently, a lateral   small wound did heal, but is very thin and the medial   wounds recurred in 2 small areas, especially after the Dermabond skin glue   .  The patient is not septic and otherwise is in stable condition.    We have obtained authorization from [employer], her medical insurance to   undergo this procedure under general anesthesia as this is considered   medically necessary.  The patient understands risks, benefits, and   alternatives including but not limited to the risks of bleeding, hematoma,   seroma,  infection, wound dehiscence, painful or unsightly scarring,   hypertrophic or keloid scarring, painful neuroma, sensory loss or decrease,   nipple sensory loss or decrease, nipple necrosis, nipple malposition,   asymmetry or irregularity, skin fat and breast necrosis, benign or malignant   breast disease, injury to muscle, injury to ribs, pneumothorax, DVT, pulmonary   embolism, fat embolism, implant infection, capsular contracture, deflation,   rupture, leakage, extrusion or exposure, complications related to silicone,   silicone lymphadenitis, silicone granuloma, silicone extrusion, migration,   palpability or visibility, ALCL, persistent or recurrent nonhealing wound or   wound dehiscence, implant rippling, implant firmness or tenderness, chronic   breast or chest pain, lymphedema, pigmentation changes, stretch marks, ptosis,   telangiectasias, lateral displacement of implant, bottoming out, ptosis,   complications related to medications, complications related to medical   conditions or diseases, changes with weight gain or loss, changes with aging,   medications, health condition, trauma, infection, sun exposure, cardiovascular   or cardiorespiratory compromise, aspiration pneumonitis, hemorrhage, need for   transfusion, complications related to monitored anesthesia care, mortality,   and need for future revision.  Patient is motivated and signed informed   consent.      OPERATIVE REPORT:  The patient was marked preoperatively in sitting position.    She has a 6 cm transversely oriented scar with 2 small wounds measuring about   5 mm each with exposed silicone breast implant and almost no capsule in that   area.  This was more in the medial third.  There is lateral third wound that   healed and still has some Dermabond from more recent repair, but when the   Dermabond was removed, this scar was very thin.  The patient was then taken to   the OR where she was prepped and draped in supine position after receiving    satisfactory monitored care by Dr. Crane.  Local anesthesia infiltration   with 0.5% Xylocaine with 1:200,000 dilution epinephrine and 0.5% Marcaine was   placed in the skin on either side of the scar and wound medially and   laterally.  An 8x2 cm transversely oriented full thickness ellipse of skin,   subcutaneous fat, scar and underlying capsule were removed without injuring   the implant.  There is no sign of infection or free fluid.  No obvious   abnormality of the implant.  Hemostasis secured using minimal electrocautery.    Most of this area had almost no breast implant capsule or grade I capsule.    Further in the breast, there is more of a grade I-II capsule.  No grade III or   IV capsule.  The implant was removed and noted to be in Enthuse SRM-405 smooth round moderate plus profile 405 mL silicone implant,   which was then soaked for at least 15 minutes in triple antibiotic saline   solution.  A liter of triple antibiotic saline solution was irrigated into the   pocket.  There is no sign of infection or foreign body.  No abscesses or   sequestrum.  Some gentle capsulotomies were performed in the upper pole and   medially to accommodate the implant as the 16 square cm of skin and scar   resection will likely tighten the pocket.  Therefore, this was done to reduce   tension on the incision.  Please note that aerobic and anaerobic culture swabs   were taken of the pocket and the implant prior to irrigation and soaking and   the scar that was completely excised was sent to pathology for analysis.  At   this point, the 405 mL silicone implant was placed back into the partial   submuscular pocket where some modifications were made.  Closure was then   performed in multiple layers protecting the implant using interrupted buried   4-0 Monocryl suture to reapproximate the grade II capsule.  The skin was   closed with interrupted buried dermal 4-0 Monocryl suture and finally with   running  subcuticular V-Loc 90 resorbable suture with no knots on either side.    Dermabond skin glue was then applied over the incision and adjacent skin as a   fourth layer of closure.  The suture was tied over on itself and secured to   the inframammary fold below the incision with Steri-Strips, but no benzoin was   used.  It was noted that the patient had much improved bleeding at the skin   edges as a result of the wound excision and scar excision.  She also had   thicker capsule where this was reapproximated.  She had approximately 5 mL   blood loss, and no complications.  All counts correct at the end of procedure.    No complications.  The breasts were dressed with fluff dry gauze and ABD   pads, secured snug but not too tightly with 6-inch Ace wrap.  She was awakened   from IV sedation and returned to recovery room in stable condition.       ____________________________________     MD JG FOSTER / NTS    DD:  06/26/2017 13:40:50  DT:  06/26/2017 16:30:22    D#:  [id number]  Job#:  [id number]    cc: VANE SHAFER MD

## 2017-06-26 NOTE — OR NURSING
1215 Patient allergies and NPO status verified, home medication reconciliation completed and belongings secured. Surgical site verified with patient. Patient verbalizes understanding of pain scale, expected course of stay and plan of care; patient and family state verbal understanding at this time. IV access established. Sequentials/teds in place as ordered. Dr Jose HUYNH no scopolamine patch and no labs to draw today. Dr Gary HUYNH no preop labs needed. Pt/family aware and state understanding.

## 2017-06-26 NOTE — OR SURGEON
Immediate Post-Operative Note      PreOp Diagnosis: Nonhealing wound right breast    PostOp Diagnosis: same    Procedure(s):  FLAP CLOSURE  SCAR EXCISION - BREAST WOUND EXCISION    Surgeon(s):  Darryl Garcia M.D.    Anesthesiologist/Type of Anesthesia:  No anesthesia staff entered./General    Surgical Staff:  Circulator: Yamile Santos R.N.  Scrub Person: Jamie Taveras    Specimen: wound/scar    Estimated Blood Loss: 5 cc    Findings: see dictation    Complications: none        6/26/2017 1:27 PM Darryl Garcia

## 2017-06-27 ENCOUNTER — APPOINTMENT (OUTPATIENT)
Dept: WOUND CARE | Facility: MEDICAL CENTER | Age: 62
End: 2017-06-27
Attending: FAMILY MEDICINE
Payer: MEDICARE

## 2017-06-28 ENCOUNTER — OFFICE VISIT (OUTPATIENT)
Dept: WOUND CARE | Facility: MEDICAL CENTER | Age: 62
End: 2017-06-28
Attending: FAMILY MEDICINE
Payer: MEDICARE

## 2017-06-28 DIAGNOSIS — T81.89XD NONHEALING SURGICAL WOUND, SUBSEQUENT ENCOUNTER: ICD-10-CM

## 2017-06-28 DIAGNOSIS — S21.001S OPEN WOUND OF RIGHT BREAST, SEQUELA: ICD-10-CM

## 2017-06-28 NOTE — PROGRESS NOTES
Pt arrived heavily bandaged from surgery and in severe discomfort.     Elected to resume treatments starting on 7/3.

## 2017-06-29 ENCOUNTER — APPOINTMENT (OUTPATIENT)
Dept: WOUND CARE | Facility: MEDICAL CENTER | Age: 62
End: 2017-06-29
Attending: FAMILY MEDICINE
Payer: MEDICARE

## 2017-06-29 LAB
BACTERIA WND AEROBE CULT: NORMAL
GRAM STN SPEC: NORMAL
SIGNIFICANT IND 70042: NORMAL
SITE SITE: NORMAL
SOURCE SOURCE: NORMAL

## 2017-06-30 ENCOUNTER — APPOINTMENT (OUTPATIENT)
Dept: WOUND CARE | Facility: MEDICAL CENTER | Age: 62
End: 2017-06-30
Attending: FAMILY MEDICINE
Payer: MEDICARE

## 2017-07-01 LAB
BACTERIA SPEC ANAEROBE CULT: NORMAL
SIGNIFICANT IND 70042: NORMAL
SITE SITE: NORMAL
SOURCE SOURCE: NORMAL

## 2017-07-03 ENCOUNTER — OFFICE VISIT (OUTPATIENT)
Dept: WOUND CARE | Facility: MEDICAL CENTER | Age: 62
End: 2017-07-03
Attending: FAMILY MEDICINE
Payer: MEDICARE

## 2017-07-03 VITALS
SYSTOLIC BLOOD PRESSURE: 124 MMHG | TEMPERATURE: 99 F | DIASTOLIC BLOOD PRESSURE: 77 MMHG | OXYGEN SATURATION: 100 % | HEART RATE: 56 BPM

## 2017-07-03 DIAGNOSIS — M32.9 LUPUS ARTHRITIS (HCC): Chronic | ICD-10-CM

## 2017-07-03 DIAGNOSIS — I73.00 RAYNAUD'S DISEASE WITHOUT GANGRENE: Chronic | ICD-10-CM

## 2017-07-03 DIAGNOSIS — T81.89XD NONHEALING SURGICAL WOUND, SUBSEQUENT ENCOUNTER: ICD-10-CM

## 2017-07-03 DIAGNOSIS — S21.009A INCISIONAL BREAST WOUND: ICD-10-CM

## 2017-07-03 DIAGNOSIS — S21.001S OPEN WOUND OF RIGHT BREAST, SEQUELA: ICD-10-CM

## 2017-07-03 DIAGNOSIS — M35.00 SJOGREN'S SYNDROME, WITH UNSPECIFIED ORGAN INVOLVEMENT (HCC): Chronic | ICD-10-CM

## 2017-07-03 PROCEDURE — G0277 HBOT, FULL BODY CHAMBER, 30M: HCPCS | Performed by: FAMILY MEDICINE

## 2017-07-03 PROCEDURE — 99183 HYPERBARIC OXYGEN THERAPY: CPT | Performed by: FAMILY MEDICINE

## 2017-07-03 ASSESSMENT — ENCOUNTER SYMPTOMS
PALPITATIONS: 0
FALLS: 0
SHORTNESS OF BREATH: 0
BLURRED VISION: 0
HEADACHES: 0
COUGH: 0
CHILLS: 0
DOUBLE VISION: 0
FEVER: 0

## 2017-07-03 ASSESSMENT — PAIN SCALES - GENERAL: PAINLEVEL_OUTOF13: 9

## 2017-07-03 NOTE — PROGRESS NOTES
Subjective:      Lupe Webb is a 62 y.o. female who presents with a nonhealing breast wound.      HPI  Lupe returns to HBOT after having another surgery on 6/26/17 to try and close her   nonhealing incisional breast wound. She states she is still in a lot of discomfort.    She has been off of Methotrexate for about a week and is now down to Prednisone 5mg.    She denies any ear pain or pressure and otherwise tolerated treatment well.    Review of Systems   Constitutional: Positive for malaise/fatigue. Negative for fever and chills.   HENT: Negative for ear discharge, ear pain and hearing loss.    Eyes: Negative for blurred vision and double vision.   Respiratory: Negative for cough and shortness of breath.    Cardiovascular: Positive for chest pain (From incisional wound.). Negative for palpitations.   Musculoskeletal: Negative for falls.   Neurological: Negative for headaches.          Objective:     There were no vitals taken for this visit.     Physical Exam   Constitutional: She is oriented to person, place, and time. She appears well-developed and well-nourished.   HENT:   Head: Normocephalic and atraumatic.   Right Ear: Tympanic membrane, external ear and ear canal normal.   Left Ear: Tympanic membrane, external ear and ear canal normal.   Pulmonary/Chest: Effort normal.   Neurological: She is alert and oriented to person, place, and time.   Psychiatric: She has a normal mood and affect.   Vitals reviewed.      Assessment/Plan:   Nonhealing surgical wound, initial encounter    Appropriate candidate for HBOT - likely that the patient's wound healing is impeded by a multitude of factors,    including vasculitis from Sjogren's / Raynaud's / Lupus, as well as her chronic use of Methotrexate and Prednisone,    in addition to her age, postmenopausal status, and prior surgery & scar to the wound site. HBOT should be    beneficial for her wound healing in terms of it's angiogenic and anti-vasculitic properties.  Have recommended    to the patient that she d/c MTX and continue to decrease prednisone, per instructions from Dr. Cruz.    Patient completed her 15th HBOT treatment at a pressure of 2.4 BRISEIDA x 90 minutes at a descent/ascent of 1.5/3.0 PSI/minute    Events  6/28- no treatment today as heavily bandaged s/p surgery on 6/26.  2. Incisional breast wound    Per Dr. Garcia, s/p Bilateral Saline-->Silicone breast implant Re-do 11/2/16 with recurrent non-healing surgical wound    3. Sjogren's syndrome, with unspecified organ involvement (CMS-HCC)    On Methotrexate and Prednisone per Dr. Cruz    4. Raynaud's disease without gangrene    On Methotrexate and Prednisone per Dr. Cruz    5. Lupus arthritis (CMS-HCC)    On Methotrexate and Prednisone per Dr. Cruz    6. Current chronic use of systemic steroids    On Methotrexate and Prednisone per Dr. Cruz    7. Dry mouth    8. DDD (degenerative disc disease), cervical    9. Gastroesophageal reflux disease without esophagitis

## 2017-07-03 NOTE — PATIENT INSTRUCTIONS
After hyperbaric oxygen therapy treatment, it is normal to experience some congestion to the ears, muffling of sounds, or abnormal sounds to one or both ears.    If you experience pain or discomfort to one or both ears that does not resolve spontaneously, please contact the hyperbaric department at [phone].

## 2017-07-03 NOTE — MR AVS SNAPSHOT
Lupe SAWYER Webb   7/3/2017 9:30 AM   Office Visit   MRN: [id number]    Department:  [facility]   Dept Phone:  [phone]    Description:  Female : 1955   Provider:  Moni Ridley M.D.           Allergies as of 7/3/2017     Allergen Noted Reactions    Contrast Media With Iodine [Iodine] 2014       Rash, same with topical iodine      Shellfish Allergy 2014   Vomiting    Rash, hives,     Penicillins 2014   Vomiting      You were diagnosed with     Nonhealing surgical wound, subsequent encounter   [014002]       Incisional breast wound   [112430]       Open wound of right breast, sequela   [044277]       Sjogren's syndrome, with unspecified organ involvement (CMS-HCC)   [1156456]       Raynaud's disease without gangrene   [3856773]       Lupus arthritis (CMS-HCC)   [124956]         Vital Signs     Blood Pressure Pulse Temperature Oxygen Saturation Smoking Status       124/77 mmHg 56 37.2 °C (99 °F) 100% Never Smoker        Basic Information     Date Of Birth Sex Race Ethnicity Preferred Language    1955 Female White Non- English      Your appointments     2017  9:30 AM   HBOT Supervision with HYPERBARIC CHAMBER 2   [facility] ([address])    [address]  [place] [address]   [phone]            2017  9:30 AM   HBOT Supervision with HYPERBARIC CHAMBER 2   [facility] ([address])    [address]  [place] [address]   [phone]            2017  9:30 AM   HBOT Supervision with HYPERBARIC CHAMBER 2   [facility] ([address])    [address]  [place] [address]   [phone]            Jul 10, 2017  9:30 AM   HBOT Supervision with HYPERBARIC CHAMBER 2   [facility] ([address])    [address]  [place] [address]   [phone]            2017  9:30 AM   HBOT Supervision  with HYPERBARIC CHAMBER 2   [facility] ([address])    [address]  [place] [address]   [phone]            Jul 12, 2017  9:30 AM   HBOT Supervision with HYPERBARIC CHAMBER 2   [facility] ([address])    [address]  [place] [address]   [phone]            Jul 13, 2017  9:30 AM   HBOT Supervision with HYPERBARIC CHAMBER 2   [facility] ([address])    [address]  [place] [address]   [phone]            Jul 14, 2017  9:30 AM   HBOT Supervision with HYPERBARIC CHAMBER 2   [facility] ([address])    [address]  [place] [address]   [phone]            Jul 17, 2017  9:30 AM   HBOT Supervision with HYPERBARIC CHAMBER 2   [facility] ([address])    [address]  [place] [address]   [phone]            Jul 18, 2017  9:30 AM   HBOT Supervision with HYPERBARIC CHAMBER 2   [facility] ([address])    [address]  [place] [address]   [phone]            Jul 19, 2017  9:30 AM   HBOT Supervision with HYPERBARIC CHAMBER 2   [facility] ([address])    [address]  [place] [address]   [phone]            Jul 20, 2017  9:30 AM   HBOT Supervision with HYPERBARIC CHAMBER 2   [facility] ([address])    [address]  [place] [address]   [phone]            Jul 21, 2017  9:30 AM   HBOT Supervision with HYPERBARIC CHAMBER 2   [facility] ([address])    [address]  [place] [address]   [phone]            Jul 24, 2017  9:30 AM   HBOT Supervision with HYPERBARIC CHAMBER 2   [facility] ([address])    [address]  [place] [address]   [phone]            Jul 25, 2017  9:30 AM   HBOT Supervision  with HYPERBARIC CHAMBER 2   [facility] ([address])    [address]  [place] [address]   [phone]            Jul 26, 2017  9:30 AM   HBOT Supervision with HYPERBARIC CHAMBER 2   [facility] ([address])    [address]  [place] [address]   [phone]            Jul 27, 2017  9:30 AM   HBOT Supervision with HYPERBARIC CHAMBER 2   [facility] ([address])    [address]  [place] [address]   [phone]            Jul 28, 2017  9:30 AM   HBOT Supervision with HYPERBARIC CHAMBER 2   [facility] ([address])    [address]  [place] [address]   [phone]            Jul 31, 2017  9:30 AM   HBOT Supervision with HYPERBARIC CHAMBER 2   [facility] ([address])    [address]  [place] [address]   [phone]            Aug 01, 2017  9:30 AM   HBOT Supervision with HYPERBARIC CHAMBER 2   [facility] ([address])    [address]  [place] [address]   [phone]            Aug 02, 2017 10:00 AM   HBOT Supervision with HYPERBARIC CHAMBER 2   [facility] ([address])    [address]  [place] [address]   [phone]            Aug 03, 2017 10:00 AM   HBOT Supervision with HYPERBARIC CHAMBER 2   [facility] ([address])    [address]  [place] [address]   [phone]            Aug 04, 2017 10:00 AM   HBOT Supervision with HYPERBARIC CHAMBER 2   [facility] ([address])    [address]  [place] [address]   [phone]            Aug 07, 2017 10:00 AM   HBOT Supervision with HYPERBARIC CHAMBER 2   [facility] ([address])    [address]  [place] [address]   [phone]            Aug 08, 2017 10:00 AM   HBOT Supervision  with HYPERBARIC CHAMBER 2   [facility] ([address])    [address]  [place] [address]   [phone]            Sep 08, 2017  2:30 PM   US MINNA with [facility] US 2   [facility] ([address])    [address]  [place] [address]   [phone]           Some exams require specific prep instructions that would have been given to you at time of scheduling. If you have any additional questions about the prep instructions, please call Imaging Scheduling at [phone] and press #[phone].              Problem List              ICD-10-CM Priority Class Noted - Resolved    Sjogren's syndrome (CMS-HCC) (Chronic) M35.00   4/26/1990 - Present    DDD (degenerative disc disease), cervical (Chronic) M50.30   4/26/2017 - Present    Osteoarthritis of multiple joints (Chronic) M15.9   4/26/2017 - Present    Lupus arthritis (CMS-HCC) (Chronic) M32.9   4/26/2017 - Present    Chronic pain syndrome (Chronic) G89.4   4/26/2017 - Present    Gastroesophageal reflux disease without esophagitis (Chronic) K21.9   4/26/2017 - Present    Lactose intolerance (Chronic) E73.9   4/26/2017 - Present    Raynaud's disease without gangrene (Chronic) I73.00   4/26/2017 - Present    Osteopenia (Chronic) M85.80   4/26/2017 - Present    Functional diarrhea (Chronic) K59.1   4/26/2017 - Present    Current chronic use of systemic steroids (Chronic) Z79.52   4/26/2017 - Present    Incisional breast wound S21.009A   5/15/2017 - Present    Nonhealing surgical wound T81.89XA   6/9/2017 - Present    Open wound of right breast S21.001A   6/26/2017 - Present      Health Maintenance        Date Due Completion Dates    IMM DTaP/Tdap/Td Vaccine (1 - Tdap) 4/1/2019 (Originally 3/28/1974) ---    IMM ZOSTER VACCINE 4/1/2019 (Originally 3/28/2015) ---    IMM INFLUENZA (1) 9/1/2017 9/27/2016    MAMMOGRAM 9/23/2017 9/23/2016    PAP SMEAR 1/2/2020 1/2/2017 (Done)    Override on 1/2/2017: Done     COLONOSCOPY 8/21/2022 8/21/2012            Current Immunizations     Influenza Vaccine Quad Inj (Preserved) 9/27/2016      Below and/or attached are the medications your provider expects you to take. Review all of your home medications and newly ordered medications with your provider and/or pharmacist. Follow medication instructions as directed by your provider and/or pharmacist. Please keep your medication list with you and share with your provider. Update the information when medications are discontinued, doses are changed, or new medications (including over-the-counter products) are added; and carry medication information at all times in the event of emergency situations     Allergies:  CONTRAST MEDIA WITH IODINE - (reactions not documented)     SHELLFISH ALLERGY - Vomiting     PENICILLINS - Vomiting               Medications  Valid as of: July 03, 2017 - 11:36 AM    Generic Name Brand Name Tablet Size Instructions for use    ALPRAZolam (Tab) XANAX 0.5 MG Take 0.5 Tabs by mouth at bedtime as needed for Sleep.        Ascorbic Acid (Tab) ascorbic acid 500 MG Take 500 mg by mouth every day.        CycloSPORINE   1 Drop by Ophthalmic route 2 Times a Day. Both eyes        Metaxalone (Tab) SKELAXIN 800 MG Take 800 mg by mouth 3 times a day.        Multiple Vitamins-Minerals (Tab) THERAGRAN-M  Take 1 Tab by mouth every day.        Non Formulary Request Non Formulary Request  Biotin        Non Formulary Request Non Formulary Request  Flaxseed oil OTC        non-formulary med non-formulary med  Cranberry extract OTC        PredniSONE (Tab) DELTASONE 1 MG Take 7 mg by mouth every day. 7 pills per day        Sildenafil Citrate (Tab) VIAGRA 25 MG Take 20 mg by mouth every day. Indications: Raynaud's Phenomenon of Unknown Cause        Vitamin E (Cap) VITAMIN E 400 UNIT Take 400 Units by mouth every day.        Zolpidem Tartrate (Tab) AMBIEN 5 MG Take  by mouth at bedtime as needed.        Zonisamide (Cap) ZONEGRAN 100 MG Take  300 mg by mouth every bedtime. Indications: neuropathy        .                 Medicines prescribed today were sent to:     [id number] - [place], [place] - [address]    [address] [place] [address]    Phone: [phone] Fax: [phone]    Open 24 Hours?: No      Medication refill instructions:       If your prescription bottle indicates you have medication refills left, it is not necessary to call your provider’s office. Please contact your pharmacy and they will refill your medication.    If your prescription bottle indicates you do not have any refills left, you may request refills at any time through one of the following ways: The online [employer] system (except Urgent Care), by calling your provider’s office, or by asking your pharmacy to contact your provider’s office with a refill request. Medication refills are processed only during regular business hours and may not be available until the next business day. Your provider may request additional information or to have a follow-up visit with you prior to refilling your medication.   *Please Note: Medication refills are assigned a new Rx number when refilled electronically. Your pharmacy may indicate that no refills were authorized even though a new prescription for the same medication is available at the pharmacy. Please request the medicine by name with the pharmacy before contacting your provider for a refill.        Instructions    After hyperbaric oxygen therapy treatment, it is normal to experience some congestion to the ears, muffling of sounds, or abnormal sounds to one or both ears.    If you experience pain or discomfort to one or both ears that does not resolve spontaneously, please contact the hyperbaric department at [phone].         Other Notes About Your Plan     Sjogren's specialist in [place]  Rheum: Dr. Inessa Cruz [place]  Pain: Dr. Shankar (not actively seeing)  GYN: Dr. Sierra  GI: Dr. Clemente  PT: [facility]           [email or web address] Access Code: Activation code not generated  Current mediaBunker Status: Active

## 2017-07-05 ENCOUNTER — OFFICE VISIT (OUTPATIENT)
Dept: WOUND CARE | Facility: MEDICAL CENTER | Age: 62
End: 2017-07-05
Attending: FAMILY MEDICINE
Payer: MEDICARE

## 2017-07-05 DIAGNOSIS — Z79.52 CURRENT CHRONIC USE OF SYSTEMIC STEROIDS: Chronic | ICD-10-CM

## 2017-07-05 DIAGNOSIS — I73.00 RAYNAUD'S DISEASE WITHOUT GANGRENE: Chronic | ICD-10-CM

## 2017-07-05 DIAGNOSIS — M32.9 LUPUS ARTHRITIS (HCC): Chronic | ICD-10-CM

## 2017-07-05 DIAGNOSIS — M35.00 SJOGREN'S SYNDROME, WITH UNSPECIFIED ORGAN INVOLVEMENT (HCC): Chronic | ICD-10-CM

## 2017-07-05 DIAGNOSIS — T81.89XD NONHEALING SURGICAL WOUND, SUBSEQUENT ENCOUNTER: ICD-10-CM

## 2017-07-05 DIAGNOSIS — S21.001S OPEN WOUND OF RIGHT BREAST, SEQUELA: ICD-10-CM

## 2017-07-05 DIAGNOSIS — S21.009A INCISIONAL BREAST WOUND: ICD-10-CM

## 2017-07-05 PROCEDURE — G0277 HBOT, FULL BODY CHAMBER, 30M: HCPCS | Performed by: FAMILY MEDICINE

## 2017-07-05 PROCEDURE — 99183 HYPERBARIC OXYGEN THERAPY: CPT | Performed by: FAMILY MEDICINE

## 2017-07-05 ASSESSMENT — ENCOUNTER SYMPTOMS
FALLS: 0
DIZZINESS: 0
CHILLS: 0
FEVER: 0

## 2017-07-05 NOTE — PATIENT INSTRUCTIONS
After hyperbaric oxygen therapy treatment, it is normal to experience some congestion to the ears, muffling of sounds, or abnormal sounds to one or both ears.    If you experience pain or discomfort to one or both ears that does not resolve spontaneously, please contact the hyperbaric department at [phone].

## 2017-07-05 NOTE — MR AVS SNAPSHOT
Lupe SAWYER Webb   2017 10:00 AM   Office Visit   MRN: [id number]    Department:  [facility]   Dept Phone:  [phone]    Description:  Female : 1955   Provider:  Moni Ridley M.D.           Allergies as of 2017     Allergen Noted Reactions    Contrast Media With Iodine [Iodine] 2014       Rash, same with topical iodine      Shellfish Allergy 2014   Vomiting    Rash, hives,     Penicillins 2014   Vomiting      You were diagnosed with     Nonhealing surgical wound, subsequent encounter   [978587]       Incisional breast wound   [174088]       Open wound of right breast, sequela   [165835]       Sjogren's syndrome, with unspecified organ involvement (CMS-[facility])   [3633819]       Current chronic use of systemic steroids   [9441236]       Raynaud's disease without gangrene   [0017157]       Lupus arthritis (CMS-[facility])   [930603]         Vital Signs     Smoking Status                   Never Smoker            Basic Information     Date Of Birth Sex Race Ethnicity Preferred Language    1955 Female White Non- English      Your appointments     2017  9:30 AM   HBOT Supervision with HYPERBARIC CHAMBER 2   [facility] ([address]    [address]  [place] [address]   [phone]            2017  9:30 AM   HBOT Supervision with HYPERBARIC CHAMBER 2   [facility] ([address]    [address]  [place] [address]   [phone]            Jul 10, 2017  9:30 AM   HBOT Supervision with HYPERBARIC CHAMBER 2   [facility] ([address]    [address]  [place] [address]   [phone]            2017  9:30 AM   HBOT Supervision with HYPERBARIC CHAMBER 2   [facility] ([address])    [address]  [place] [address]   [phone]            2017  9:30 AM   HBOT Supervision with  HYPERBARIC CHAMBER 2   [facility] ([address])    [address]  [place] [address]   [phone]            Jul 13, 2017  9:30 AM   HBOT Supervision with HYPERBARIC CHAMBER 2   [facility] ([address])    [address]  [place] [address]   [phone]            Jul 14, 2017  9:30 AM   HBOT Supervision with HYPERBARIC CHAMBER 2   [facility] ([address])    [address]  [place] [address]   [phone]            Jul 17, 2017  9:30 AM   HBOT Supervision with HYPERBARIC CHAMBER 2   [facility] ([address])    [address]  [place] [address]   [phone]            Jul 18, 2017  9:30 AM   HBOT Supervision with HYPERBARIC CHAMBER 2   [facility] ([address])    [address]  [place] [address]   [phone]            Jul 19, 2017  9:30 AM   HBOT Supervision with HYPERBARIC CHAMBER 2   [facility] ([address])    [address]  [place] [address]   [phone]            Jul 20, 2017  9:30 AM   HBOT Supervision with HYPERBARIC CHAMBER 2   [facility] ([address])    [address]  [place] [address]   [phone]            Jul 21, 2017  9:30 AM   HBOT Supervision with HYPERBARIC CHAMBER 2   [facility] ([address])    [address]  [place] [address]   [phone]            Jul 24, 2017  9:30 AM   HBOT Supervision with HYPERBARIC CHAMBER 2   [facility] ([address])    [address]  [place] [address]   [phone]            Jul 25, 2017  9:30 AM   HBOT Supervision with HYPERBARIC CHAMBER 2   [facility] ([address])    [address]  [place] [address]   [phone]            Jul 26, 2017  9:30 AM   HBOT Supervision with  HYPERBARIC CHAMBER 2   [facility] ([address])    [address]  [place] [address]   [phone]            Jul 27, 2017  9:30 AM   HBOT Supervision with HYPERBARIC CHAMBER 2   [facility] ([address])    [address]  [place] [address]   [phone]            Jul 28, 2017  9:30 AM   HBOT Supervision with HYPERBARIC CHAMBER 2   [facility] ([address])    [address]  [place] [address]   [phone]            Jul 31, 2017  9:30 AM   HBOT Supervision with HYPERBARIC CHAMBER 2   [facility] ([address])    [address]  [place] [address]   [phone]            Aug 01, 2017  9:30 AM   HBOT Supervision with HYPERBARIC CHAMBER 2   [facility] ([address])    [address]  [place] [address]   [phone]            Aug 02, 2017 10:00 AM   HBOT Supervision with HYPERBARIC CHAMBER 2   [facility] ([address])    [address]  [place] [address]   [phone]            Aug 03, 2017 10:00 AM   HBOT Supervision with HYPERBARIC CHAMBER 2   [facility] ([address])    [address]  [place] [address]   [phone]            Aug 04, 2017 10:00 AM   HBOT Supervision with HYPERBARIC CHAMBER 2   [facility] ([address])    [address]  [place] [address]   [phone]            Aug 07, 2017 10:00 AM   HBOT Supervision with HYPERBARIC CHAMBER 2   [facility] ([address])    [address]  [place] [address]   [phone]            Aug 08, 2017 10:00 AM   HBOT Supervision with HYPERBARIC CHAMBER 2   [facility] ([address])    [address]  [place] [address]   [phone]            Sep 08, 2017  2:30 PM   US BUITRAGO with [facility]  [address] [facility] [address]    [address]  [place] [address] [phone]           Some exams require specific prep instructions that would have been given to you at time of scheduling. If you have any additional questions about the prep instructions, please call Imaging Scheduling at [phone] and press #[phone].              Problem List              ICD-10-CM Priority Class Noted - Resolved    Sjogren's syndrome (CMS-HCC) (Chronic) M35.00   4/26/1990 - Present    DDD (degenerative disc disease), cervical (Chronic) M50.30   4/26/2017 - Present    Osteoarthritis of multiple joints (Chronic) M15.9   4/26/2017 - Present    Lupus arthritis (CMS-HCC) (Chronic) M32.9   4/26/2017 - Present    Chronic pain syndrome (Chronic) G89.4   4/26/2017 - Present    Gastroesophageal reflux disease without esophagitis (Chronic) K21.9   4/26/2017 - Present    Lactose intolerance (Chronic) E73.9   4/26/2017 - Present    Raynaud's disease without gangrene (Chronic) I73.00   4/26/2017 - Present    Osteopenia (Chronic) M85.80   4/26/2017 - Present    Functional diarrhea (Chronic) K59.1   4/26/2017 - Present    Current chronic use of systemic steroids (Chronic) Z79.52   4/26/2017 - Present    Incisional breast wound S21.009A   5/15/2017 - Present    Nonhealing surgical wound T81.89XA   6/9/2017 - Present    Open wound of right breast S21.001A   6/26/2017 - Present      Health Maintenance        Date Due Completion Dates    IMM DTaP/Tdap/Td Vaccine (1 - Tdap) 4/1/2019 (Originally 3/28/1974) ---    IMM ZOSTER VACCINE 4/1/2019 (Originally 3/28/2015) ---    IMM INFLUENZA (1) 9/1/2017 9/27/2016    MAMMOGRAM 9/23/2017 9/23/2016    PAP SMEAR 1/2/2020 1/2/2017 (Done)    Override on 1/2/2017: Done    COLONOSCOPY 8/21/2022 8/21/2012            Current Immunizations     Influenza Vaccine Quad Inj (Preserved) 9/27/2016      Below and/or attached are the medications your provider expects you to take. Review all  of your home medications and newly ordered medications with your provider and/or pharmacist. Follow medication instructions as directed by your provider and/or pharmacist. Please keep your medication list with you and share with your provider. Update the information when medications are discontinued, doses are changed, or new medications (including over-the-counter products) are added; and carry medication information at all times in the event of emergency situations     Allergies:  CONTRAST MEDIA WITH IODINE - (reactions not documented)     SHELLFISH ALLERGY - Vomiting     PENICILLINS - Vomiting               Medications  Valid as of: July 05, 2017 - 11:50 AM    Generic Name Brand Name Tablet Size Instructions for use    ALPRAZolam (Tab) XANAX 0.5 MG Take 0.5 Tabs by mouth at bedtime as needed for Sleep.        Ascorbic Acid (Tab) ascorbic acid 500 MG Take 500 mg by mouth every day.        CycloSPORINE   1 Drop by Ophthalmic route 2 Times a Day. Both eyes        Metaxalone (Tab) SKELAXIN 800 MG Take 800 mg by mouth 3 times a day.        Multiple Vitamins-Minerals (Tab) THERAGRAN-M  Take 1 Tab by mouth every day.        Non Formulary Request Non Formulary Request  Biotin        Non Formulary Request Non Formulary Request  Flaxseed oil OTC        non-formulary med non-formulary med  Cranberry extract OTC        PredniSONE (Tab) DELTASONE 1 MG Take 7 mg by mouth every day. 7 pills per day        Sildenafil Citrate (Tab) VIAGRA 25 MG Take 20 mg by mouth every day. Indications: Raynaud's Phenomenon of Unknown Cause        Vitamin E (Cap) VITAMIN E 400 UNIT Take 400 Units by mouth every day.        Zolpidem Tartrate (Tab) AMBIEN 5 MG Take  by mouth at bedtime as needed.        Zonisamide (Cap) ZONEGRAN 100 MG Take 300 mg by mouth every bedtime. Indications: neuropathy        .                 Medicines prescribed today were sent to:     [id number] - [place], [place] - [address]    [address] [place] [address]      Phone: [phone] Fax: [phone]    Open 24 Hours?: No      Medication refill instructions:       If your prescription bottle indicates you have medication refills left, it is not necessary to call your provider’s office. Please contact your pharmacy and they will refill your medication.    If your prescription bottle indicates you do not have any refills left, you may request refills at any time through one of the following ways: The online [employer] system (except Urgent Care), by calling your provider’s office, or by asking your pharmacy to contact your provider’s office with a refill request. Medication refills are processed only during regular business hours and may not be available until the next business day. Your provider may request additional information or to have a follow-up visit with you prior to refilling your medication.   *Please Note: Medication refills are assigned a new Rx number when refilled electronically. Your pharmacy may indicate that no refills were authorized even though a new prescription for the same medication is available at the pharmacy. Please request the medicine by name with the pharmacy before contacting your provider for a refill.        Instructions    After hyperbaric oxygen therapy treatment, it is normal to experience some congestion to the ears, muffling of sounds, or abnormal sounds to one or both ears.    If you experience pain or discomfort to one or both ears that does not resolve spontaneously, please contact the hyperbaric department at [phone].         Other Notes About Your Plan     Sjogren's specialist in [place]  Rheum: Dr. Inessa Cruz [place]  Pain: Dr. Shankar (not actively seeing)  GYN: Dr. Sierra  GI: Dr. Clemente  PT: [facility]           MyChart Access Code: Activation code not generated  Current [email or web address] Status: Active

## 2017-07-05 NOTE — PROGRESS NOTES
Subjective:   Lupe Webb is a 62 y.o. female who presents with a nonhealing breast wound.    HPI  Lupe presents for HBOT today.  She is still sore s/p surgery but tolerated treatment well.  She continues to be off of Methotrexate and is taking Prednisone 5mg.     She denies any ear pain or pressure.    Review of Systems   Constitutional: Positive for malaise/fatigue. Negative for fever and chills.   Musculoskeletal: Negative for falls.   Neurological: Negative for dizziness.          Objective:     There were no vitals taken for this visit.     Physical Exam   Constitutional: She is oriented to person, place, and time. She appears well-developed and well-nourished.   HENT:   Head: Normocephalic and atraumatic.   Right Ear: Tympanic membrane, external ear and ear canal normal.   Left Ear: Tympanic membrane, external ear and ear canal normal.   Pulmonary/Chest: Effort normal.   Neurological: She is alert and oriented to person, place, and time.   Psychiatric: She has a normal mood and affect.   Vitals reviewed.      Assessment/Plan:     Nonhealing surgical wound, initial encounter   Appropriate candidate for HBOT - likely that the patient's wound healing is impeded by a multitude of factors,   including vasculitis from Sjogren's / Raynaud's / Lupus, as well as her chronic use of Methotrexate and Prednisone,   in addition to her age, postmenopausal status, and prior surgery & scar to the wound site. HBOT should be   beneficial for her wound healing in terms of it's angiogenic and anti-vasculitic properties. Have recommended   to the patient that she d/c MTX and continue to decrease prednisone, per instructions from Dr. Cruz.   Patient completed her 16th HBOT treatment at a pressure of 2.4 BRISEIDA x 90 minutes at a descent/ascent of 1.5/3.0 PSI/minute   Events   6/28- no treatment today as heavily bandaged s/p surgery on 6/26.   2. Incisional breast wound   Per Dr. Garcia, s/p Bilateral Saline-->Silicone breast  implant Re-do 11/2/16 with recurrent non-healing surgical wound   3. Sjogren's syndrome, with unspecified organ involvement (CMS-HCC)   On Methotrexate and Prednisone per Dr. Cruz   4. Raynaud's disease without gangrene   On Methotrexate and Prednisone per Dr. Cruz   5. Lupus arthritis (CMS-HCC)   On Methotrexate and Prednisone per Dr. Cruz   6. Current chronic use of systemic steroids   On Methotrexate and Prednisone per Dr. Cruz   7. Dry mouth   8. DDD (degenerative disc disease), cervical   9. Gastroesophageal reflux disease without esophagitis

## 2017-07-06 ENCOUNTER — OFFICE VISIT (OUTPATIENT)
Dept: WOUND CARE | Facility: MEDICAL CENTER | Age: 62
End: 2017-07-06
Attending: FAMILY MEDICINE
Payer: MEDICARE

## 2017-07-06 VITALS
SYSTOLIC BLOOD PRESSURE: 116 MMHG | OXYGEN SATURATION: 100 % | HEART RATE: 50 BPM | TEMPERATURE: 97.9 F | DIASTOLIC BLOOD PRESSURE: 70 MMHG

## 2017-07-06 DIAGNOSIS — I73.00 RAYNAUD'S DISEASE WITHOUT GANGRENE: Chronic | ICD-10-CM

## 2017-07-06 DIAGNOSIS — M35.00 SJOGREN'S SYNDROME, WITH UNSPECIFIED ORGAN INVOLVEMENT (HCC): Chronic | ICD-10-CM

## 2017-07-06 DIAGNOSIS — T81.89XD NONHEALING SURGICAL WOUND, SUBSEQUENT ENCOUNTER: ICD-10-CM

## 2017-07-06 DIAGNOSIS — M32.9 LUPUS ARTHRITIS (HCC): Chronic | ICD-10-CM

## 2017-07-06 DIAGNOSIS — Z79.52 CURRENT CHRONIC USE OF SYSTEMIC STEROIDS: Chronic | ICD-10-CM

## 2017-07-06 DIAGNOSIS — S21.001S OPEN WOUND OF RIGHT BREAST, SEQUELA: ICD-10-CM

## 2017-07-06 PROCEDURE — 99183 HYPERBARIC OXYGEN THERAPY: CPT | Performed by: FAMILY MEDICINE

## 2017-07-06 PROCEDURE — G0277 HBOT, FULL BODY CHAMBER, 30M: HCPCS | Performed by: FAMILY MEDICINE

## 2017-07-06 ASSESSMENT — PAIN SCALES - GENERAL: PAINLEVEL_OUTOF13: 6

## 2017-07-06 NOTE — PROGRESS NOTES
Subjective:   Lupe Webb is a 62 y.o. female who presents with a nonhealing breast wound.    HPI  Lupe returns to HBOT.  No ear pain, no medication changes.  She's tolerating treatments well, but notes some L nostril irritation - she believes this is due to dryness from HBOT, which is a reasonable assumption  She remains off MTX for >2 weeks, but still on prednisone 5mg QD.  Follows up with Dr Garcia on Tuesday    Review of Systems   Constitutional: Positive for malaise/fatigue. Negative for fever and chills.   Musculoskeletal: Negative for falls.   Neurological: Negative for dizziness.          Objective:     Blood pressure 116/70, pulse 50, temperature 36.6 °C (97.9 °F), SpO2 100 %.     Physical Exam   Constitutional: She is oriented to person, place, and time. She appears well-developed and well-nourished.   HENT:   Head: Normocephalic and atraumatic.   Right Ear: Tympanic membrane, external ear and ear canal normal.   Left Ear: Tympanic membrane, external ear and ear canal normal.   Pulmonary/Chest: Effort normal.   Neurological: She is alert and oriented to person, place, and time.   Psychiatric: She has a normal mood and affect.   Vitals reviewed.      Assessment/Plan:     Nonhealing surgical wound, initial encounter   Appropriate candidate for HBOT - likely that the patient's wound healing is impeded by a multitude of factors,   including vasculitis from Sjogren's / Raynaud's / Lupus, as well as her chronic use of Methotrexate and Prednisone,   in addition to her age, postmenopausal status, and prior surgery & scar to the wound site. HBOT should be   beneficial for her wound healing in terms of it's angiogenic and anti-vasculitic properties. Have recommended   to the patient that she d/c MTX and continue to decrease prednisone, per instructions from Dr. Cruz.   Patient completed her 17th HBOT treatment at a pressure of 2.4 BRISEIDA x 90 minutes at a descent/ascent of 1.5/3.0 PSI/minute   Events   6/26 - Redo  excision of non-healing wound and advancement of flap by Dr. Garcia  6/28- no treatment today as heavily bandaged s/p surgery on 6/26.   2. Incisional breast wound   Per Dr. Garcia, s/p Bilateral Saline-->Silicone breast implant Re-do 11/2/16 with recurrent non-healing surgical wound   3. Sjogren's syndrome, with unspecified organ involvement (CMS-HCC)   On Methotrexate and Prednisone per Dr. Cruz   4. Raynaud's disease without gangrene   On Methotrexate and Prednisone per Dr. Cruz   5. Lupus arthritis (CMS-HCC)   On Methotrexate and Prednisone per Dr. Cruz   6. Current chronic use of systemic steroids   On Methotrexate and Prednisone per Dr. Cruz   7. Dry mouth   8. DDD (degenerative disc disease), cervical   9. Gastroesophageal reflux disease without esophagitis

## 2017-07-06 NOTE — MR AVS SNAPSHOT
Lupe Webb   2017 9:30 AM   Non-Provider Visit   MRN: [id number]    Department:  [facility]   Dept Phone:  [phone]    Description:  Female : 1955   Provider:  [facility] 2           Allergies as of 2017     Allergen Noted Reactions    Contrast Media With Iodine [Iodine] 2014       Rash, same with topical iodine      Shellfish Allergy 2014   Vomiting    Rash, hives,     Penicillins 2014   Vomiting      You were diagnosed with     Nonhealing surgical wound, subsequent encounter   [241770]       Open wound of right breast, sequela   [227533]       Sjogren's syndrome, with unspecified organ involvement (CMS-HCC)   [9126624]       Current chronic use of systemic steroids   [4186455]       Raynaud's disease without gangrene   [1090152]       Lupus arthritis (CMS-HCC)   [981539]         Vital Signs     Blood Pressure Pulse Temperature Oxygen Saturation Smoking Status       116/70 mmHg 50 36.6 °C (97.9 °F) 100% Never Smoker        Basic Information     Date Of Birth Sex Race Ethnicity Preferred Language    1955 Female White Non- English      Your appointments     2017  9:30 AM   HBOT Supervision with [facility] 2   [facility] ([address]    [address]  [place] [address]   [phone]            Jul 10, 2017  9:30 AM   HBOT Supervision with HYPERBARIC CHAMBER 2   [facility] ([address]    [address]  [place] [address]   [phone]            2017  9:30 AM   HBOT Supervision with HYPERBARIC CHAMBER 2   [facility] ([address])    [address]  [place] [address]   [phone]            2017  9:30 AM   HBOT Supervision with HYPERBARIC CHAMBER 2   [facility] ([address])    [address]  [place] [address]   [phone]            2017   9:30 AM   HBOT Supervision with HYPERBARIC CHAMBER 2   [facility] ([address])    [address]  [place] [address]   [phone]            Jul 14, 2017  9:30 AM   HBOT Supervision with HYPERBARIC CHAMBER 2   [facility] ([address])    [address]  [place] [address]   [phone]            Jul 17, 2017  9:30 AM   HBOT Supervision with HYPERBARIC CHAMBER 2   [facility] ([address])    [address]  [place] [address]   [phone]            Jul 18, 2017  9:30 AM   HBOT Supervision with HYPERBARIC CHAMBER 2   [facility] ([address])    [address]  [place] [address]   [phone]            Jul 19, 2017  9:30 AM   HBOT Supervision with HYPERBARIC CHAMBER 2   [facility] ([address])    [address]  [place] [address]   [phone]            Jul 20, 2017  9:30 AM   HBOT Supervision with HYPERBARIC CHAMBER 2   [facility] ([address])    [address]  [place] [address]   [phone]            Jul 21, 2017  9:30 AM   HBOT Supervision with HYPERBARIC CHAMBER 2   [facility] ([address])    [address]  [place] [address]   [phone]            Jul 24, 2017  9:30 AM   HBOT Supervision with HYPERBARIC CHAMBER 2   [facility] ([address])    [address]  [place] [address]   [phone]            Jul 25, 2017  9:30 AM   HBOT Supervision with HYPERBARIC CHAMBER 2   [facility] ([address])    [address]  [place] [address]   [phone]            Jul 26, 2017  9:30 AM   HBOT Supervision with HYPERBARIC CHAMBER 2   [facility] ([address])    [address]  [place] [address]   [phone]            Jul 27, 2017   9:30 AM   HBOT Supervision with HYPERBARIC CHAMBER 2   [facility] ([address])    [address]  [place] [address]   [phone]            Jul 28, 2017  9:30 AM   HBOT Supervision with HYPERBARIC CHAMBER 2   [facility] ([facility])    [address]  [place] [address]   [phone]            Jul 31, 2017  9:30 AM   HBOT Supervision with HYPERBARIC CHAMBER 2   [facility] ([facility] [address])    [address]  [place] [address]   [phone]            Aug 01, 2017  9:30 AM   HBOT Supervision with HYPERBARIC CHAMBER 2   [facility] ([facility] [address])    [address]  [place] [address]   [phone]            Aug 02, 2017 10:00 AM   HBOT Supervision with HYPERBARIC CHAMBER 2   [facility] ([facility] [address])    [address]  [place] [address]   [phone]            Aug 03, 2017 10:00 AM   HBOT Supervision with HYPERBARIC CHAMBER 2   [facility] ([facility])    [address]  [place] [address]   [phone]            Aug 04, 2017 10:00 AM   HBOT Supervision with HYPERBARIC CHAMBER 2   [facility] ([facility] [address])    [address]  [place] [address]   [phone]            Aug 07, 2017 10:00 AM   HBOT Supervision with HYPERBARIC CHAMBER 2   [facility] ([facility])    [address]  [place] [address]   [phone]            Aug 08, 2017 10:00 AM   HBOT Supervision with HYPERBARIC CHAMBER 2   [facility] ([facility])    [address]  [place] [address]   [phone]            Sep 08, 2017  2:30 PM   US SONOCINE with RB US 2   [facility] ([place] [address] [facility] [address])    [address]  [place] [address]   [phone]           Some exams require specific  prep instructions that would have been given to you at time of scheduling. If you have any additional questions about the prep instructions, please call Imaging Scheduling at [phone] and press #[phone].              Problem List              ICD-10-CM Priority Class Noted - Resolved    Sjogren's syndrome (CMS-HCC) (Chronic) M35.00   4/26/1990 - Present    DDD (degenerative disc disease), cervical (Chronic) M50.30   4/26/2017 - Present    Osteoarthritis of multiple joints (Chronic) M15.9   4/26/2017 - Present    Lupus arthritis (CMS-[facility]) (Chronic) M32.9   4/26/2017 - Present    Chronic pain syndrome (Chronic) G89.4   4/26/2017 - Present    Gastroesophageal reflux disease without esophagitis (Chronic) K21.9   4/26/2017 - Present    Lactose intolerance (Chronic) E73.9   4/26/2017 - Present    Raynaud's disease without gangrene (Chronic) I73.00   4/26/2017 - Present    Osteopenia (Chronic) M85.80   4/26/2017 - Present    Functional diarrhea (Chronic) K59.1   4/26/2017 - Present    Current chronic use of systemic steroids (Chronic) Z79.52   4/26/2017 - Present    Incisional breast wound S21.009A   5/15/2017 - Present    Nonhealing surgical wound T81.89XA   6/9/2017 - Present    Open wound of right breast S21.001A   6/26/2017 - Present      Health Maintenance        Date Due Completion Dates    IMM DTaP/Tdap/Td Vaccine (1 - Tdap) 4/1/2019 (Originally 3/28/1974) ---    IMM ZOSTER VACCINE 4/1/2019 (Originally 3/28/2015) ---    IMM INFLUENZA (1) 9/1/2017 9/27/2016    MAMMOGRAM 9/23/2017 9/23/2016    PAP SMEAR 1/2/2020 1/2/2017 (Done)    Override on 1/2/2017: Done    COLONOSCOPY 8/21/2022 8/21/2012            Current Immunizations     Influenza Vaccine Quad Inj (Preserved) 9/27/2016      Below and/or attached are the medications your provider expects you to take. Review all of your home medications and newly ordered medications with your provider and/or pharmacist. Follow medication instructions as directed by your provider and/or  pharmacist. Please keep your medication list with you and share with your provider. Update the information when medications are discontinued, doses are changed, or new medications (including over-the-counter products) are added; and carry medication information at all times in the event of emergency situations     Allergies:  CONTRAST MEDIA WITH IODINE - (reactions not documented)     SHELLFISH ALLERGY - Vomiting     PENICILLINS - Vomiting               Medications  Valid as of: July 06, 2017 - 11:31 AM    Generic Name Brand Name Tablet Size Instructions for use    ALPRAZolam (Tab) XANAX 0.5 MG Take 0.5 Tabs by mouth at bedtime as needed for Sleep.        Ascorbic Acid (Tab) ascorbic acid 500 MG Take 500 mg by mouth every day.        CycloSPORINE   1 Drop by Ophthalmic route 2 Times a Day. Both eyes        Metaxalone (Tab) SKELAXIN 800 MG Take 800 mg by mouth 3 times a day.        Multiple Vitamins-Minerals (Tab) THERAGRAN-M  Take 1 Tab by mouth every day.        Non Formulary Request Non Formulary Request  Biotin        Non Formulary Request Non Formulary Request  Flaxseed oil OTC        non-formulary med non-formulary med  Cranberry extract OTC        PredniSONE (Tab) DELTASONE 1 MG Take 7 mg by mouth every day. 7 pills per day        Sildenafil Citrate (Tab) VIAGRA 25 MG Take 20 mg by mouth every day. Indications: Raynaud's Phenomenon of Unknown Cause        Vitamin E (Cap) VITAMIN E 400 UNIT Take 400 Units by mouth every day.        Zolpidem Tartrate (Tab) AMBIEN 5 MG Take  by mouth at bedtime as needed.        Zonisamide (Cap) ZONEGRAN 100 MG Take 300 mg by mouth every bedtime. Indications: neuropathy        .                 Medicines prescribed today were sent to:     [id number] - [place], [place] - [address]    [address] [place] [address]    Phone: [phone] Fax: [phone]    Open 24 Hours?: No      Medication refill instructions:       If your prescription bottle indicates you have  medication refills left, it is not necessary to call your provider’s office. Please contact your pharmacy and they will refill your medication.    If your prescription bottle indicates you do not have any refills left, you may request refills at any time through one of the following ways: The online [employer] system (except Urgent Care), by calling your provider’s office, or by asking your pharmacy to contact your provider’s office with a refill request. Medication refills are processed only during regular business hours and may not be available until the next business day. Your provider may request additional information or to have a follow-up visit with you prior to refilling your medication.   *Please Note: Medication refills are assigned a new Rx number when refilled electronically. Your pharmacy may indicate that no refills were authorized even though a new prescription for the same medication is available at the pharmacy. Please request the medicine by name with the pharmacy before contacting your provider for a refill.        Instructions    After hyperbaric oxygen therapy treatment, it is normal to experience some congestion to the ears, muffling of sounds, or abnormal sounds to one or both ears.    If you experience pain or discomfort to one or both ears that does not resolve spontaneously, please contact the hyperbaric department at [phone].           Other Notes About Your Plan     Sjogren's specialist in [place]  Rheum: Dr. Inessa Cruz Brant  Pain: Dr. Shankar (not actively seeing)  GYN: Dr. Sierra  GI: Dr. Clemente  PT: [facility]           MyChart Access Code: Activation code not generated  Current Who is Undercover [employer] Status: Active

## 2017-07-06 NOTE — PATIENT INSTRUCTIONS
After hyperbaric oxygen therapy treatment, it is normal to experience some congestion to the ears, muffling of sounds, or abnormal sounds to one or both ears.    If you experience pain or discomfort to one or both ears that does not resolve spontaneously, please contact the hyperbaric department at [phone].

## 2017-07-07 ENCOUNTER — OFFICE VISIT (OUTPATIENT)
Dept: WOUND CARE | Facility: MEDICAL CENTER | Age: 62
End: 2017-07-07
Attending: FAMILY MEDICINE
Payer: MEDICARE

## 2017-07-07 VITALS
OXYGEN SATURATION: 99 % | SYSTOLIC BLOOD PRESSURE: 113 MMHG | TEMPERATURE: 99.3 F | HEART RATE: 53 BPM | DIASTOLIC BLOOD PRESSURE: 65 MMHG

## 2017-07-07 DIAGNOSIS — I73.00 RAYNAUD'S DISEASE WITHOUT GANGRENE: Chronic | ICD-10-CM

## 2017-07-07 DIAGNOSIS — M35.00 SJOGREN'S SYNDROME, WITH UNSPECIFIED ORGAN INVOLVEMENT (HCC): Chronic | ICD-10-CM

## 2017-07-07 DIAGNOSIS — T81.89XD NONHEALING SURGICAL WOUND, SUBSEQUENT ENCOUNTER: Primary | ICD-10-CM

## 2017-07-07 DIAGNOSIS — S21.001S OPEN WOUND OF RIGHT BREAST, SEQUELA: ICD-10-CM

## 2017-07-07 DIAGNOSIS — M32.9 LUPUS ARTHRITIS (HCC): Chronic | ICD-10-CM

## 2017-07-07 PROCEDURE — 99183 HYPERBARIC OXYGEN THERAPY: CPT | Performed by: FAMILY MEDICINE

## 2017-07-07 PROCEDURE — G0277 HBOT, FULL BODY CHAMBER, 30M: HCPCS | Performed by: FAMILY MEDICINE

## 2017-07-07 ASSESSMENT — PAIN SCALES - GENERAL: PAINLEVEL_OUTOF13: 5

## 2017-07-07 NOTE — PATIENT INSTRUCTIONS
After hyperbaric oxygen therapy treatment, it is normal to experience some congestion to the ears, muffling of sounds, or abnormal sounds to one or both ears.    If you experience pain or discomfort to one or both ears that does not resolve spontaneously, please contact the hyperbaric department at [phone].

## 2017-07-07 NOTE — PROGRESS NOTES
Subjective:   Lupe Webb is a 62 y.o. female who presents with a nonhealing breast wound.    HPI  Lupe returns to HBOT.  She has no specific complaints today.  Tolerating chamber treatments well.  No ear pain, no medication changes.  Follows up with Dr. Garcia on Tuesday    Review of Systems   Constitutional: Positive for malaise/fatigue. Negative for fever and chills.   Musculoskeletal: Negative for falls.   Neurological: Negative for dizziness.          Objective:     Blood pressure 113/65, pulse 53, temperature 37.4 °C (99.3 °F), SpO2 99 %.     Physical Exam   Constitutional: She is oriented to person, place, and time. She appears well-developed and well-nourished.   HENT:   Head: Normocephalic and atraumatic.   Right Ear: Tympanic membrane, external ear and ear canal normal.   Left Ear: Tympanic membrane, external ear and ear canal normal.   Pulmonary/Chest: Effort normal.   Neurological: She is alert and oriented to person, place, and time.   Psychiatric: She has a normal mood and affect.   Vitals reviewed.      Assessment/Plan:     Nonhealing surgical wound, initial encounter   Appropriate candidate for HBOT - likely that the patient's wound healing is impeded by a multitude of factors,   including vasculitis from Sjogren's / Raynaud's / Lupus, as well as her chronic use of Methotrexate and Prednisone,   in addition to her age, postmenopausal status, and prior surgery & scar to the wound site. HBOT should be   beneficial for her wound healing in terms of it's angiogenic and anti-vasculitic properties. Have recommended   to the patient that she d/c MTX and continue to decrease prednisone, per instructions from Dr. Cruz.   Patient completed her 18th HBOT treatment at a pressure of 2.4 BRISEIDA x 90 minutes at a descent/ascent of 1.5/3.0 PSI/minute   Events   6/26 - Redo excision of non-healing wound and advancement of flap by Dr. Garcia  6/28- no treatment today as heavily bandaged s/p surgery on 6/26.   7/7 -  Off methotrexate  2. Incisional breast wound   Per Dr. Garcia, s/p Bilateral Saline-->Silicone breast implant Re-do 11/2/16 with recurrent non-healing surgical wound   3. Sjogren's syndrome, with unspecified organ involvement (CMS-HCC)   Off Methotrexate and On Prednisone per Dr. Cruz   4. Raynaud's disease without gangrene   Off Methotrexate and  On  Prednisone per Dr. Cruz   5. Lupus arthritis (CMS-HCC)   Off Methotrexate and On Prednisone per Dr. Cruz   6. Current chronic use of systemic steroids   On Methotrexate and Prednisone per Dr. Cruz   7. Dry mouth   8. DDD (degenerative disc disease), cervical   9. Gastroesophageal reflux disease without esophagitis

## 2017-07-07 NOTE — MR AVS SNAPSHOT
Lupe Webb   2017 9:30 AM   Office Visit   MRN: [id number]    Department:  [facility]   Dept Phone:  [phone]    Description:  Female : 1955   Provider:  Sean Espinoza M.D.           Allergies as of 2017     Allergen Noted Reactions    Contrast Media With Iodine [Iodine] 2014       Rash, same with topical iodine      Shellfish Allergy 2014   Vomiting    Rash, hives,     Penicillins 2014   Vomiting      You were diagnosed with     Nonhealing surgical wound, subsequent encounter   [961914]  -  Primary     Open wound of right breast, sequela   [[id number]]       Lupus arthritis (CMS-HCC)   [395811]       Raynaud's disease without gangrene   [0521226]       Sjogren's syndrome, with unspecified organ involvement (CMS-HCC)   [2706257]         Vital Signs     Blood Pressure Pulse Temperature Oxygen Saturation Smoking Status       113/65 mmHg 53 37.4 °C (99.3 °F) 99% Never Smoker        Basic Information     Date Of Birth Sex Race Ethnicity Preferred Language    1955 Female White Non- English      Your appointments     Jul 10, 2017  9:30 AM   HBOT Supervision with HYPERBARIC CHAMBER 2   [facility] ([address])    [address]  [place] [address]   [phone]            2017  9:30 AM   HBOT Supervision with HYPERBARIC CHAMBER 2   [facility] ([address])    [address]  [place] [address]   [phone]            2017  9:30 AM   HBOT Supervision with HYPERBARIC CHAMBER 2   [facility] ([address])    [address]  [place] [address]   [phone]            2017  9:30 AM   HBOT Supervision with HYPERBARIC CHAMBER 2   [facility] ([address])    [address]  [place] [address]   [phone]            2017  9:30 AM   HBOT Supervision with HYPERBARIC CHAMBER 2      [facility] ([address])    [address]  [place] [address]   [phone]            Jul 17, 2017  9:30 AM   HBOT Supervision with HYPERBARIC CHAMBER 2   [facility] ([address])    [address]  [place] [address]   [phone]            Jul 18, 2017  9:30 AM   HBOT Supervision with HYPERBARIC CHAMBER 2   [facility] ([address])    [address]  [place] [address]   [phone]            Jul 19, 2017  9:30 AM   HBOT Supervision with HYPERBARIC CHAMBER 2   [facility] ([address])    [address]  [place] [address]   [phone]            Jul 20, 2017  9:30 AM   HBOT Supervision with HYPERBARIC CHAMBER 2   [facility] ([address])    [address]  [place] [address]   [phone]            Jul 21, 2017  9:30 AM   HBOT Supervision with HYPERBARIC CHAMBER 2   [facility] ([address])    [address]  [place] [address]   [phone]            Jul 24, 2017  9:30 AM   HBOT Supervision with HYPERBARIC CHAMBER 2   [facility] ([address])    [address]  [place] [address]   [phone]            Jul 25, 2017  9:30 AM   HBOT Supervision with HYPERBARIC CHAMBER 2   [facility] ([address])    [address]  [place] [address]   [phone]            Jul 26, 2017  9:30 AM   HBOT Supervision with HYPERBARIC CHAMBER 2   [facility] ([address])    [address]  [place] [address]   [phone]            Jul 27, 2017  9:30 AM   HBOT Supervision with HYPERBARIC CHAMBER 2   [facility] ([address])    [address]  [place] [address]   [phone]            Jul 28, 2017  9:30 AM   HBOT Supervision with HYPERBARIC CHAMBER 2    [facility] ([facility] [address])    [address]  [place] [address]   [phone]            Jul 31, 2017  9:30 AM   HBOT Supervision with HYPERBARIC CHAMBER 2   [facility] ([facility] [address])    [address]  [place] [address]   [phone]            Aug 01, 2017  9:30 AM   HBOT Supervision with HYPERBARIC CHAMBER 2   [facility] ([facility] [address])    [address]  [place] [address]   [phone]            Aug 02, 2017 10:00 AM   HBOT Supervision with HYPERBARIC CHAMBER 2   [facility] ([facility] [address])    [address]  [place] [address]   [phone]            Aug 03, 2017 10:00 AM   HBOT Supervision with HYPERBARIC CHAMBER 2   [facility] ([facility] [address])    [address]  [place] [address]   [phone]            Aug 04, 2017 10:00 AM   HBOT Supervision with HYPERBARIC CHAMBER 2   [facility] ([facility] [address])    [address]  [place] [address]   [phone]            Aug 07, 2017 10:00 AM   HBOT Supervision with HYPERBARIC CHAMBER 2   [facility] ([facility] [address])    [address]  [place] [address]   [phone]            Aug 08, 2017 10:00 AM   HBOT Supervision with HYPERBARIC CHAMBER 2   [facility] ([facility] [address])    [address]  [place] [address]   [phone]            Sep 08, 2017  2:30 PM   US SONOCINE with [facility]  2   [facility] ([address])    [address]  [place] [address]   [phone]           Some exams require specific prep instructions that would have been given to you at time of scheduling. If you have any additional questions about the prep instructions, please call Imaging Scheduling at [phone] and press #[phone].              Problem List              ICD-10-CM  Priority Class Noted - Resolved    Sjogren's syndrome (CMS-HCC) (Chronic) M35.00   4/26/1990 - Present    DDD (degenerative disc disease), cervical (Chronic) M50.30   4/26/2017 - Present    Osteoarthritis of multiple joints (Chronic) M15.9   4/26/2017 - Present    Lupus arthritis (CMS-HCC) (Chronic) M32.9   4/26/2017 - Present    Chronic pain syndrome (Chronic) G89.4   4/26/2017 - Present    Gastroesophageal reflux disease without esophagitis (Chronic) K21.9   4/26/2017 - Present    Lactose intolerance (Chronic) E73.9   4/26/2017 - Present    Raynaud's disease without gangrene (Chronic) I73.00   4/26/2017 - Present    Osteopenia (Chronic) M85.80   4/26/2017 - Present    Functional diarrhea (Chronic) K59.1   4/26/2017 - Present    Current chronic use of systemic steroids (Chronic) Z79.52   4/26/2017 - Present    Incisional breast wound S21.009A   5/15/2017 - Present    Nonhealing surgical wound T81.89XA   6/9/2017 - Present    Open wound of right breast S21.001A   6/26/2017 - Present      Health Maintenance        Date Due Completion Dates    IMM DTaP/Tdap/Td Vaccine (1 - Tdap) 4/1/2019 (Originally 3[birth year]28/1974) ---    IMM ZOSTER VACCINE 4/1/2019 (Originally 3/28/2015) ---    IMM INFLUENZA (1) 9/1/2017 9/27/2016    MAMMOGRAM 9/23/2017 9/23/2016    PAP SMEAR 1/2/2020 1/2/2017 (Done)    Override on 1/2/2017: Done    COLONOSCOPY 8/21/2022 8/21/2012            Current Immunizations     Influenza Vaccine Quad Inj (Preserved) 9/27/2016      Below and/or attached are the medications your provider expects you to take. Review all of your home medications and newly ordered medications with your provider and/or pharmacist. Follow medication instructions as directed by your provider and/or pharmacist. Please keep your medication list with you and share with your provider. Update the information when medications are discontinued, doses are changed, or new medications (including over-the-counter products) are added; and carry  medication information at all times in the event of emergency situations     Allergies:  CONTRAST MEDIA WITH IODINE - (reactions not documented)     SHELLFISH ALLERGY - Vomiting     PENICILLINS - Vomiting               Medications  Valid as of: July 07, 2017 - 11:26 AM    Generic Name Brand Name Tablet Size Instructions for use    ALPRAZolam (Tab) XANAX 0.5 MG Take 0.5 Tabs by mouth at bedtime as needed for Sleep.        Ascorbic Acid (Tab) ascorbic acid 500 MG Take 500 mg by mouth every day.        CycloSPORINE   1 Drop by Ophthalmic route 2 Times a Day. Both eyes        Metaxalone (Tab) SKELAXIN 800 MG Take 800 mg by mouth 3 times a day.        Multiple Vitamins-Minerals (Tab) THERAGRAN-M  Take 1 Tab by mouth every day.        Non Formulary Request Non Formulary Request  Biotin        Non Formulary Request Non Formulary Request  Flaxseed oil OTC        non-formulary med non-formulary med  Cranberry extract OTC        PredniSONE (Tab) DELTASONE 1 MG Take 7 mg by mouth every day. 7 pills per day        Sildenafil Citrate (Tab) VIAGRA 25 MG Take 20 mg by mouth every day. Indications: Raynaud's Phenomenon of Unknown Cause        Vitamin E (Cap) VITAMIN E 400 UNIT Take 400 Units by mouth every day.        Zolpidem Tartrate (Tab) AMBIEN 5 MG Take  by mouth at bedtime as needed.        Zonisamide (Cap) ZONEGRAN 100 MG Take 300 mg by mouth every bedtime. Indications: neuropathy        .                 Medicines prescribed today were sent to:     [facility] [place] - [address] [address]    Phone: [phone] Fax: [phone]    Open 24 Hours?: No      Medication refill instructions:       If your prescription bottle indicates you have medication refills left, it is not necessary to call your provider’s office. Please contact your pharmacy and they will refill your medication.    If your prescription bottle indicates you do not have any refills left, you may request refills at  any time through one of the following ways: The online my[employer] system (except Urgent Care), by calling your provider’s office, or by asking your pharmacy to contact your provider’s office with a refill request. Medication refills are processed only during regular business hours and may not be available until the next business day. Your provider may request additional information or to have a follow-up visit with you prior to refilling your medication.   *Please Note: Medication refills are assigned a new Rx number when refilled electronically. Your pharmacy may indicate that no refills were authorized even though a new prescription for the same medication is available at the pharmacy. Please request the medicine by name with the pharmacy before contacting your provider for a refill.        Instructions    After hyperbaric oxygen therapy treatment, it is normal to experience some congestion to the ears, muffling of sounds, or abnormal sounds to one or both ears.    If you experience pain or discomfort to one or both ears that does not resolve spontaneously, please contact the hyperbaric department at [phone].           Other Notes About Your Plan     Sjogren's specialist in [place]  Rheum: Dr. Inessa Cruz [place]  Pain: Dr. Shankar (not actively seeing)  GYN: Dr. Sierra  GI: Dr. Clemente  PT: [facility]           MyChart Access Code: Activation code not generated  Current [employer] Status: Active

## 2017-07-10 ENCOUNTER — NON-PROVIDER VISIT (OUTPATIENT)
Dept: WOUND CARE | Facility: MEDICAL CENTER | Age: 62
End: 2017-07-10
Attending: FAMILY MEDICINE
Payer: MEDICARE

## 2017-07-10 VITALS
OXYGEN SATURATION: 95 % | HEART RATE: 59 BPM | DIASTOLIC BLOOD PRESSURE: 69 MMHG | SYSTOLIC BLOOD PRESSURE: 124 MMHG | TEMPERATURE: 98.4 F

## 2017-07-10 DIAGNOSIS — Z79.52 CURRENT CHRONIC USE OF SYSTEMIC STEROIDS: Chronic | ICD-10-CM

## 2017-07-10 DIAGNOSIS — M35.00 SJOGREN'S SYNDROME, WITH UNSPECIFIED ORGAN INVOLVEMENT (HCC): Chronic | ICD-10-CM

## 2017-07-10 DIAGNOSIS — S21.009A INCISIONAL BREAST WOUND: ICD-10-CM

## 2017-07-10 DIAGNOSIS — K21.9 GASTROESOPHAGEAL REFLUX DISEASE WITHOUT ESOPHAGITIS: Chronic | ICD-10-CM

## 2017-07-10 DIAGNOSIS — T81.89XD NONHEALING SURGICAL WOUND, SUBSEQUENT ENCOUNTER: Primary | ICD-10-CM

## 2017-07-10 DIAGNOSIS — G89.4 CHRONIC PAIN SYNDROME: Chronic | ICD-10-CM

## 2017-07-10 DIAGNOSIS — I73.00 RAYNAUD'S DISEASE WITHOUT GANGRENE: Chronic | ICD-10-CM

## 2017-07-10 DIAGNOSIS — M50.30 DDD (DEGENERATIVE DISC DISEASE), CERVICAL: Chronic | ICD-10-CM

## 2017-07-10 DIAGNOSIS — M32.9 LUPUS ARTHRITIS (HCC): Chronic | ICD-10-CM

## 2017-07-10 PROCEDURE — G0277 HBOT, FULL BODY CHAMBER, 30M: HCPCS | Performed by: NURSE PRACTITIONER

## 2017-07-10 PROCEDURE — 99183 HYPERBARIC OXYGEN THERAPY: CPT | Performed by: NURSE PRACTITIONER

## 2017-07-10 ASSESSMENT — ENCOUNTER SYMPTOMS
FEVER: 0
CHILLS: 0
DIZZINESS: 0
FALLS: 0

## 2017-07-10 ASSESSMENT — PAIN SCALES - GENERAL: PAINLEVEL_OUTOF13: 6

## 2017-07-10 NOTE — PATIENT INSTRUCTIONS
After hyperbaric oxygen therapy treatment, it is normal to experience some congestion to the ears, muffling of sounds, or abnormal sounds to one or both ears.    If you experience pain or discomfort to one or both ears that does not resolve spontaneously, please contact the hyperbaric department at [phone].

## 2017-07-10 NOTE — PROGRESS NOTES
Subjective:   Lupe Webb is a 62 y.o. female who presents with a nonhealing breast wound.    HPI    Lupe continues to tolerate HBOT.  She offers no complaints  No ear pain, no medication changes.  Follows up with Dr. Garcia on tomorrow    Review of Systems   Constitutional: Positive for malaise/fatigue. Negative for fever and chills.   Musculoskeletal: Negative for falls.   Neurological: Negative for dizziness.          Objective:     Blood pressure 124/69, pulse 59, temperature 36.9 °C (98.4 °F), SpO2 95 %.     Physical Exam   Constitutional: She is oriented to person, place, and time. She appears well-developed and well-nourished.   HENT:   Head: Normocephalic and atraumatic.   Right Ear: Tympanic membrane, external ear and ear canal normal.   Left Ear: Tympanic membrane, external ear and ear canal normal.   Pulmonary/Chest: Effort normal.   Neurological: She is alert and oriented to person, place, and time.   Psychiatric: She has a normal mood and affect.   Vitals reviewed.      Assessment/Plan:     Nonhealing surgical wound, initial encounter   Appropriate candidate for HBOT - likely that the patient's wound healing is impeded by a multitude of factors,   including vasculitis from Sjogren's / Raynaud's / Lupus, as well as her chronic use of Methotrexate and Prednisone,   in addition to her age, postmenopausal status, and prior surgery & scar to the wound site. HBOT should be   beneficial for her wound healing in terms of it's angiogenic and anti-vasculitic properties. Have recommended   to the patient that she d/c MTX and continue to decrease prednisone, per instructions from Dr. Cruz.   Patient completed her 19th HBOT treatment at a pressure of 2.4 BRISEIDA x 90 minutes at a descent/ascent of 1.5/3.0 PSI/minute   Events   6/26 - Redo excision of non-healing wound and advancement of flap by Dr. Garcia  6/28- no treatment today as heavily bandaged s/p surgery on 6/26.   7/7 - Off methotrexate  2. Incisional  breast wound   Per Dr. Garcia, s/p Bilateral Saline-->Silicone breast implant Re-do 11/2/16 with recurrent non-healing surgical wound   3. Sjogren's syndrome, with unspecified organ involvement (CMS-HCC)   Off Methotrexate and On Prednisone per Dr. Cruz   4. Raynaud's disease without gangrene   Off Methotrexate and  On  Prednisone per Dr. Cruz   5. Lupus arthritis (CMS-[facility])   Off Methotrexate and On Prednisone per Dr. Cruz   6. Current chronic use of systemic steroids   On Methotrexate and Prednisone per Dr. Cruz   7. Dry mouth   8. DDD (degenerative disc disease), cervical   9. Gastroesophageal reflux disease without esophagitis

## 2017-07-11 ENCOUNTER — NON-PROVIDER VISIT (OUTPATIENT)
Dept: WOUND CARE | Facility: MEDICAL CENTER | Age: 62
End: 2017-07-11
Attending: FAMILY MEDICINE
Payer: MEDICARE

## 2017-07-11 VITALS
HEART RATE: 50 BPM | DIASTOLIC BLOOD PRESSURE: 66 MMHG | TEMPERATURE: 98.2 F | OXYGEN SATURATION: 99 % | SYSTOLIC BLOOD PRESSURE: 135 MMHG

## 2017-07-11 DIAGNOSIS — M35.00 SJOGREN'S SYNDROME, WITH UNSPECIFIED ORGAN INVOLVEMENT (HCC): Chronic | ICD-10-CM

## 2017-07-11 DIAGNOSIS — M32.9 LUPUS ARTHRITIS (HCC): Chronic | ICD-10-CM

## 2017-07-11 DIAGNOSIS — I73.00 RAYNAUD'S DISEASE WITHOUT GANGRENE: Chronic | ICD-10-CM

## 2017-07-11 DIAGNOSIS — S21.009A INCISIONAL BREAST WOUND: ICD-10-CM

## 2017-07-11 DIAGNOSIS — Z79.52 CURRENT CHRONIC USE OF SYSTEMIC STEROIDS: Chronic | ICD-10-CM

## 2017-07-11 DIAGNOSIS — T81.89XD NONHEALING SURGICAL WOUND, SUBSEQUENT ENCOUNTER: Primary | ICD-10-CM

## 2017-07-11 PROCEDURE — 99183 HYPERBARIC OXYGEN THERAPY: CPT | Performed by: NURSE PRACTITIONER

## 2017-07-11 PROCEDURE — G0277 HBOT, FULL BODY CHAMBER, 30M: HCPCS | Performed by: NURSE PRACTITIONER

## 2017-07-11 ASSESSMENT — ENCOUNTER SYMPTOMS
DIZZINESS: 0
FALLS: 0
CHILLS: 0
FEVER: 0

## 2017-07-11 ASSESSMENT — PAIN SCALES - GENERAL: PAINLEVEL_OUTOF13: 5

## 2017-07-11 NOTE — PROGRESS NOTES
Subjective:   Lupe Webb is a 62 y.o. female who presents with a nonhealing breast wound.    HPI  Lupe continues to tolerate HBOT without any difficulty.    She has had no ear pain or discomfort.  No medication changes  Follow up with Dr. Garcia later today    Review of Systems   Constitutional: Positive for malaise/fatigue. Negative for fever and chills.   Musculoskeletal: Negative for falls.   Neurological: Negative for dizziness.          Objective:     Blood pressure 135/66, pulse 50, temperature 36.8 °C (98.2 °F), SpO2 99 %.     Physical Exam   Constitutional: She is oriented to person, place, and time. She appears well-developed and well-nourished.   HENT:   Head: Normocephalic and atraumatic.   Right Ear: Tympanic membrane, external ear and ear canal normal.   Left Ear: Tympanic membrane, external ear and ear canal normal.   Pulmonary/Chest: Effort normal.   Neurological: She is alert and oriented to person, place, and time.   Psychiatric: She has a normal mood and affect.   Vitals reviewed.      Assessment/Plan:     Nonhealing surgical wound, initial encounter   Appropriate candidate for HBOT - likely that the patient's wound healing is impeded by a multitude of factors,   including vasculitis from Sjogren's / Raynaud's / Lupus, as well as her chronic use of Methotrexate and Prednisone,   in addition to her age, postmenopausal status, and prior surgery & scar to the wound site. HBOT should be   beneficial for her wound healing in terms of it's angiogenic and anti-vasculitic properties. Have recommended   to the patient that she d/c MTX and continue to decrease prednisone, per instructions from Dr. Cruz.   Patient completed her 20th HBOT treatment at a pressure of 2.4 BRISEIDA x 90 minutes at a descent/ascent of 1.5/3.0 PSI/minute   Events   6/26 - Redo excision of non-healing wound and advancement of flap by Dr. Garcia  6/28- no treatment today as heavily bandaged s/p surgery on 6/26.   7/7 - Off  methotrexate  2. Incisional breast wound   Per Dr. Garcia, s/p Bilateral Saline-->Silicone breast implant Re-do 11/2/16 with recurrent non-healing surgical wound   3. Sjogren's syndrome, with unspecified organ involvement (CMS-HCC)   Off Methotrexate and On Prednisone per Dr. Cruz   4. Raynaud's disease without gangrene   Off Methotrexate and  On  Prednisone per Dr. Cruz   5. Lupus arthritis (CMS-HCC)   Off Methotrexate and On Prednisone per Dr. Cruz   6. Current chronic use of systemic steroids   On Methotrexate and Prednisone per Dr. Cruz   7. Dry mouth   8. DDD (degenerative disc disease), cervical   9. Gastroesophageal reflux disease without esophagitis

## 2017-07-11 NOTE — PATIENT INSTRUCTIONS
After hyperbaric oxygen therapy treatment, it is normal to experience some congestion to the ears, muffling of sounds, or abnormal sounds to one or both ears.    If you experience pain or discomfort to one or both ears that does not resolve spontaneously, please contact the hyperbaric department at [phone].

## 2017-07-12 ENCOUNTER — OFFICE VISIT (OUTPATIENT)
Dept: WOUND CARE | Facility: MEDICAL CENTER | Age: 62
End: 2017-07-12
Attending: FAMILY MEDICINE
Payer: MEDICARE

## 2017-07-12 VITALS
OXYGEN SATURATION: 100 % | TEMPERATURE: 98.6 F | DIASTOLIC BLOOD PRESSURE: 67 MMHG | HEART RATE: 57 BPM | SYSTOLIC BLOOD PRESSURE: 105 MMHG

## 2017-07-12 DIAGNOSIS — G89.4 CHRONIC PAIN SYNDROME: Chronic | ICD-10-CM

## 2017-07-12 DIAGNOSIS — S21.001S OPEN WOUND OF RIGHT BREAST, SEQUELA: ICD-10-CM

## 2017-07-12 DIAGNOSIS — M35.00 SJOGREN'S SYNDROME, WITH UNSPECIFIED ORGAN INVOLVEMENT (HCC): Chronic | ICD-10-CM

## 2017-07-12 DIAGNOSIS — M32.9 LUPUS ARTHRITIS (HCC): Chronic | ICD-10-CM

## 2017-07-12 DIAGNOSIS — S21.009A INCISIONAL BREAST WOUND: ICD-10-CM

## 2017-07-12 DIAGNOSIS — T81.89XD NONHEALING SURGICAL WOUND, SUBSEQUENT ENCOUNTER: ICD-10-CM

## 2017-07-12 DIAGNOSIS — I73.00 RAYNAUD'S DISEASE WITHOUT GANGRENE: Chronic | ICD-10-CM

## 2017-07-12 PROCEDURE — 99183 HYPERBARIC OXYGEN THERAPY: CPT | Performed by: FAMILY MEDICINE

## 2017-07-12 PROCEDURE — G0277 HBOT, FULL BODY CHAMBER, 30M: HCPCS | Performed by: FAMILY MEDICINE

## 2017-07-12 ASSESSMENT — ENCOUNTER SYMPTOMS
PALPITATIONS: 0
FEVER: 0
SHORTNESS OF BREATH: 0
CHILLS: 0
DIZZINESS: 0
COUGH: 0
HEADACHES: 0
DOUBLE VISION: 0
BLURRED VISION: 0

## 2017-07-12 ASSESSMENT — PAIN SCALES - GENERAL: PAINLEVEL_OUTOF13: 5

## 2017-07-12 NOTE — MR AVS SNAPSHOT
Lupe SAWYER Webb   2017 9:30 AM   Office Visit   MRN: [id number]    Department:  [facility]   Dept Phone:  [phone]    Description:  Female : 1955   Provider:  Moni Ridley M.D.           Allergies as of 2017     Allergen Noted Reactions    Contrast Media With Iodine [Iodine] 2014       Rash, same with topical iodine      Shellfish Allergy 2014   Vomiting    Rash, hives,     Penicillins 2014   Vomiting      You were diagnosed with     Nonhealing surgical wound, subsequent encounter   [602500]       Open wound of right breast, sequela   [114832]       Incisional breast wound   [043867]       Sjogren's syndrome, with unspecified organ involvement (CMS-HCC)   [6087493]       Raynaud's disease without gangrene   [6393429]       Chronic pain syndrome   [338.4.ICD-9-CM]       Lupus arthritis (CMS-[facility])   [833627]         Vital Signs     Blood Pressure Pulse Temperature Oxygen Saturation Smoking Status       105/67 mmHg 57 37 °C (98.6 °F) 100% Never Smoker        Basic Information     Date Of Birth Sex Race Ethnicity Preferred Language    1955 Female White Non- English      Your appointments     2017  9:30 AM   HBOT Supervision with HYPERBARIC CHAMBER 2   [facility] ([facility] [address])    [address]  [place] [address]   [phone]            2017  9:30 AM   HBOT Supervision with HYPERBARIC CHAMBER 2   [facility] ([facility] [address])    [address]  [facility] [address]   [phone]            2017  9:30 AM   HBOT Supervision with HYPERBARIC CHAMBER 2   [facility] ([facility] [address])    [address]  [place] [address]   [phone]            2017  9:30 AM   HBOT Supervision with HYPERBARIC CHAMBER 2   [facility] ([address] [place] [address])    [address]  [facility] [address]   [phone]            Jul 19, 2017  9:30 AM   HBOT Supervision with HYPERBARIC CHAMBER 2   [facility] ([address])    [address]  [place] [address]   [phone]            Jul 20, 2017  9:30 AM   HBOT Supervision with HYPERBARIC CHAMBER 2   [facility] ([address])    [address]  [place] [address]   [phone]            Jul 21, 2017  9:30 AM   HBOT Supervision with HYPERBARIC CHAMBER 2   [facility] ([address])    [address]  [place] [address]   [phone]            Jul 24, 2017  9:30 AM   HBOT Supervision with HYPERBARIC CHAMBER 2   [facility] ([address])    [address]  [place] [address]   [phone]            Jul 25, 2017  9:30 AM   HBOT Supervision with HYPERBARIC CHAMBER 2   [facility] ([address])    [address]  [place] [address]   [phone]            Jul 26, 2017  9:30 AM   HBOT Supervision with HYPERBARIC CHAMBER 2   [facility] ([address])    [address]  [place] [address]   [phone]            Jul 27, 2017  9:30 AM   HBOT Supervision with HYPERBARIC CHAMBER 2   [facility] ([address])    [address]  [place] [address]   [phone]            Jul 28, 2017  9:30 AM   HBOT Supervision with HYPERBARIC CHAMBER 2   [facility] ([address])    [address]  [place] [address]   [phone]            Jul 31, 2017  9:30 AM   HBOT Supervision with HYPERBARIC CHAMBER 2   [facility] ([address])    [address]  [place] [address]   [phone]            Aug 01, 2017  9:30 AM   HBOT Supervision with HYPERBARIC CHAMBER 2   [facility] ([address])    [address]  [place] [address]   [phone]             Aug 02, 2017 10:00 AM   HBOT Supervision with HYPERBARIC CHAMBER 2   [facility] ([facility] [address])    [address]  [place] [address]   [phone]            Aug 03, 2017 10:00 AM   HBOT Supervision with HYPERBARIC CHAMBER 2   [facility] ([facility] [address])    [address]  [place] [address]   [phone]            Aug 04, 2017 10:00 AM   HBOT Supervision with HYPERBARIC CHAMBER 2   [facility] ([facility] [address])    [address]  [place] [address]   [phone]            Aug 07, 2017 10:00 AM   HBOT Supervision with HYPERBARIC CHAMBER 2   [facility] ([facility] [address])    [address]  [place] [address]   [phone]            Aug 08, 2017 10:00 AM   HBOT Supervision with HYPERBARIC CHAMBER 2   [facility] ([facility] [address])    [address]  [place] [address]   [phone]            Sep 08, 2017  2:30 PM   US SONOCINE with [facility]  2   [facility] BREAST [facility] ([address] [place] [address])    [address]  [place] [address]   [phone]           Some exams require specific prep instructions that would have been given to you at time of scheduling. If you have any additional questions about the prep instructions, please call Imaging Scheduling at [phone] and press #2.              Problem List              ICD-10-CM Priority Class Noted - Resolved    Sjogren's syndrome (CMS-HCC) (Chronic) M35.00   4/26/1990 - Present    DDD (degenerative disc disease), cervical (Chronic) M50.30   4/26/2017 - Present    Osteoarthritis of multiple joints (Chronic) M15.9   4/26/2017 - Present    Lupus arthritis (CMS-HCC) (Chronic) M32.9   4/26/2017 - Present    Chronic pain syndrome (Chronic) G89.4   4/26/2017 - Present    Gastroesophageal reflux disease without esophagitis (Chronic) K21.9   4/26/2017 - Present    Lactose intolerance  (Chronic) E73.9   4/26/2017 - Present    Raynaud's disease without gangrene (Chronic) I73.00   4/26/2017 - Present    Osteopenia (Chronic) M85.80   4/26/2017 - Present    Functional diarrhea (Chronic) K59.1   4/26/2017 - Present    Current chronic use of systemic steroids (Chronic) Z79.52   4/26/2017 - Present    Incisional breast wound S21.009A   5/15/2017 - Present    Nonhealing surgical wound T81.89XA   6/9/2017 - Present    Open wound of right breast S21.001A   6/26/2017 - Present      Health Maintenance        Date Due Completion Dates    IMM DTaP/Tdap/Td Vaccine (1 - Tdap) 4/1/2019 (Originally 3/28/1974) ---    IMM ZOSTER VACCINE 4/1/2019 (Originally 3/28/2015) ---    IMM INFLUENZA (1) 9/1/2017 9/27/2016    MAMMOGRAM 9/23/2017 9/23/2016    PAP SMEAR 1/2/2020 1/2/2017 (Done)    Override on 1/2/2017: Done    COLONOSCOPY 8/21/2022 8/21/2012            Current Immunizations     Influenza Vaccine Quad Inj (Preserved) 9/27/2016      Below and/or attached are the medications your provider expects you to take. Review all of your home medications and newly ordered medications with your provider and/or pharmacist. Follow medication instructions as directed by your provider and/or pharmacist. Please keep your medication list with you and share with your provider. Update the information when medications are discontinued, doses are changed, or new medications (including over-the-counter products) are added; and carry medication information at all times in the event of emergency situations     Allergies:  CONTRAST MEDIA WITH IODINE - (reactions not documented)     SHELLFISH ALLERGY - Vomiting     PENICILLINS - Vomiting               Medications  Valid as of: July 12, 2017 - 11:20 AM    Generic Name Brand Name Tablet Size Instructions for use    ALPRAZolam (Tab) XANAX 0.5 MG Take 0.5 Tabs by mouth at bedtime as needed for Sleep.        Ascorbic Acid (Tab) ascorbic acid 500 MG Take 500 mg by mouth every day.         CycloSPORINE   1 Drop by Ophthalmic route 2 Times a Day. Both eyes        Metaxalone (Tab) SKELAXIN 800 MG Take 800 mg by mouth 3 times a day.        Multiple Vitamins-Minerals (Tab) THERAGRAN-M  Take 1 Tab by mouth every day.        Non Formulary Request Non Formulary Request  Biotin        Non Formulary Request Non Formulary Request  Flaxseed oil OTC        non-formulary med non-formulary med  Cranberry extract OTC        PredniSONE (Tab) DELTASONE 1 MG Take 7 mg by mouth every day. 7 pills per day        Sildenafil Citrate (Tab) VIAGRA 25 MG Take 20 mg by mouth every day. Indications: Raynaud's Phenomenon of Unknown Cause        Vitamin E (Cap) VITAMIN E 400 UNIT Take 400 Units by mouth every day.        Zolpidem Tartrate (Tab) AMBIEN 5 MG Take  by mouth at bedtime as needed.        Zonisamide (Cap) ZONEGRAN 100 MG Take 300 mg by mouth every bedtime. Indications: neuropathy        .                 Medicines prescribed today were sent to:     [facility]  [address] [place], [place] [address] [address]    Phone: [phone] Fax: [phone]    Open 24 Hours?: No      Medication refill instructions:       If your prescription bottle indicates you have medication refills left, it is not necessary to call your provider’s office. Please contact your pharmacy and they will refill your medication.    If your prescription bottle indicates you do not have any refills left, you may request refills at any time through one of the following ways: The online [employer] system (except Urgent Care), by calling your provider’s office, or by asking your pharmacy to contact your provider’s office with a refill request. Medication refills are processed only during regular business hours and may not be available until the next business day. Your provider may request additional information or to have a follow-up visit with you prior to refilling your medication.   *Please Note: Medication refills are assigned a  new Rx number when refilled electronically. Your pharmacy may indicate that no refills were authorized even though a new prescription for the same medication is available at the pharmacy. Please request the medicine by name with the pharmacy before contacting your provider for a refill.        Instructions    After hyperbaric oxygen therapy treatment, it is normal to experience some congestion to the ears, muffling of sounds, or abnormal sounds to one or both ears.    If you experience pain or discomfort to one or both ears that does not resolve spontaneously, please contact the hyperbaric department at [phone].         Other Notes About Your Plan     Sjogren's specialist in [place]  Rheum: Dr. Inessa Cruz [place]  Pain: Dr. Shankar (not actively seeing)  GYN: Dr. Sierra  GI: Dr. Clemente  PT: [facility]           MyChart Access Code: Activation code not generated  Current MyChart Status: Active

## 2017-07-12 NOTE — PATIENT INSTRUCTIONS
After hyperbaric oxygen therapy treatment, it is normal to experience some congestion to the ears, muffling of sounds, or abnormal sounds to one or both ears.    If you experience pain or discomfort to one or both ears that does not resolve spontaneously, please contact the hyperbaric department at [phone].

## 2017-07-13 ENCOUNTER — OFFICE VISIT (OUTPATIENT)
Dept: WOUND CARE | Facility: MEDICAL CENTER | Age: 62
End: 2017-07-13
Attending: FAMILY MEDICINE
Payer: MEDICARE

## 2017-07-13 VITALS
TEMPERATURE: 98.5 F | DIASTOLIC BLOOD PRESSURE: 70 MMHG | OXYGEN SATURATION: 98 % | SYSTOLIC BLOOD PRESSURE: 124 MMHG | HEART RATE: 52 BPM

## 2017-07-13 DIAGNOSIS — I73.00 RAYNAUD'S DISEASE WITHOUT GANGRENE: Chronic | ICD-10-CM

## 2017-07-13 DIAGNOSIS — M32.9 LUPUS ARTHRITIS (HCC): Chronic | ICD-10-CM

## 2017-07-13 DIAGNOSIS — Z79.52 CURRENT CHRONIC USE OF SYSTEMIC STEROIDS: Chronic | ICD-10-CM

## 2017-07-13 DIAGNOSIS — G89.4 CHRONIC PAIN SYNDROME: Chronic | ICD-10-CM

## 2017-07-13 DIAGNOSIS — T81.89XD NONHEALING SURGICAL WOUND, SUBSEQUENT ENCOUNTER: Primary | ICD-10-CM

## 2017-07-13 DIAGNOSIS — M35.00 SJOGREN'S SYNDROME, WITH UNSPECIFIED ORGAN INVOLVEMENT (HCC): Chronic | ICD-10-CM

## 2017-07-13 PROCEDURE — G0277 HBOT, FULL BODY CHAMBER, 30M: HCPCS | Performed by: FAMILY MEDICINE

## 2017-07-13 PROCEDURE — 99183 HYPERBARIC OXYGEN THERAPY: CPT | Performed by: FAMILY MEDICINE

## 2017-07-13 ASSESSMENT — PAIN SCALES - GENERAL: PAINLEVEL_OUTOF13: 8

## 2017-07-13 NOTE — MR AVS SNAPSHOT
Lupe SAWYER Webb   2017 9:30 AM   Office Visit   MRN: [id number]    Department:  [facility]   Dept Phone:  [phone]    Description:  Female : 1955   Provider:  Sean Espinoza M.D.           Allergies as of 2017     Allergen Noted Reactions    Contrast Media With Iodine [Iodine] 2014       Rash, same with topical iodine      Shellfish Allergy 2014   Vomiting    Rash, hives,     Penicillins 2014   Vomiting      You were diagnosed with     Nonhealing surgical wound, subsequent encounter   [069694]  -  Primary     Lupus arthritis (CMS-[facility])   [148224]       Chronic pain syndrome   [338.4.ICD-9-CM]       Raynaud's disease without gangrene   [6894377]       Current chronic use of systemic steroids   [3805163]       Sjogren's syndrome, with unspecified organ involvement (CMS-HCC)   [6514467]         Vital Signs     Blood Pressure Pulse Temperature Oxygen Saturation Smoking Status       124/70 mmHg 52 36.9 °C (98.5 °F) 98% Never Smoker        Basic Information     Date Of Birth Sex Race Ethnicity Preferred Language    1955 Female White Non- English      Your appointments     2017  9:30 AM   HBOT Supervision with Sean Espinoza M.D.   [facility] ([facility] [address])    [address]  [place] [address]   [phone]            2017  9:30 AM   HBOT Supervision with HYPERBARIC CHAMBER 2   [facility] ([address])    [address]  [place] [address]   [phone]            2017  9:30 AM   HBOT Supervision with HYPERBARIC CHAMBER 2   [facility] ([address])    [address]  [place] [address]   [phone]            2017  9:30 AM   HBOT Supervision with HYPERBARIC CHAMBER 2   [facility] ([address])    [address]  [place] [address]   [phone]            2017  9:30  AM   HBOT Supervision with HYPERBARIC CHAMBER 2   [facility] ([address])    [address]  [place] [address]   [phone]            Jul 21, 2017  9:30 AM   HBOT Supervision with HYPERBARIC CHAMBER 2   [facility] ([address])    [address]  [place] [address]   [phone]            Jul 24, 2017  9:30 AM   HBOT Supervision with HYPERBARIC CHAMBER 2   [facility] ([address])    [address]  [place] [address]   [phone]            Jul 25, 2017  9:30 AM   HBOT Supervision with HYPERBARIC CHAMBER 2   [facility] ([address])    [address]  [place] [address]   [phone]            Jul 26, 2017  9:30 AM   HBOT Supervision with HYPERBARIC CHAMBER 2   [facility] ([address])    [address]  [place] [address]   [phone]            Jul 27, 2017  9:30 AM   HBOT Supervision with HYPERBARIC CHAMBER 2   [facility] ([address])    [address]  [place] [address]   [phone]            Jul 28, 2017  9:30 AM   HBOT Supervision with HYPERBARIC CHAMBER 2   [facility] ([address])    [address]  [place] [address]   [phone]            Jul 31, 2017  9:30 AM   HBOT Supervision with HYPERBARIC CHAMBER 2   [facility] ([address])    [address]  [place] [address]   [phone]            Aug 01, 2017  9:30 AM   HBOT Supervision with HYPERBARIC CHAMBER 2   [facility] ([address])    [address]  [place] [address]   [phone]            Aug 02, 2017 10:00 AM   HBOT Supervision with HYPERBARIC CHAMBER 2   [facility] ([address])    [address]  [place] [address]   [phone]            Aug 03, 2017 10:00  AM   HBOT Supervision with HYPERBARIC CHAMBER 2   [facility] ([facility])    [address]  [place] [address]   [phone]            Aug 04, 2017 10:00 AM   HBOT Supervision with HYPERBARIC CHAMBER 2   [facility] ([facility] [address])    [address]  [place] [address]   [phone]            Aug 07, 2017 10:00 AM   HBOT Supervision with HYPERBARIC CHAMBER 2   [facility] ([facility])    [address]  [place] [address]   [phone]            Aug 08, 2017 10:00 AM   HBOT Supervision with HYPERBARIC CHAMBER 2   [facility] ([facility] [address])    [address]  [place] [address]   [phone]            Sep 08, 2017  2:30 PM   US SONOCINE with RB US 2   [facility] BREAST [facility] ([address] [place] [address])    [address]  [place] [address]   [phone]           Some exams require specific prep instructions that would have been given to you at time of scheduling. If you have any additional questions about the prep instructions, please call Imaging Scheduling at [phone] and press #2.              Problem List              ICD-10-CM Priority Class Noted - Resolved    Sjogren's syndrome (CMS-HCC) (Chronic) M35.00   4/26/1990 - Present    DDD (degenerative disc disease), cervical (Chronic) M50.30   4/26/2017 - Present    Osteoarthritis of multiple joints (Chronic) M15.9   4/26/2017 - Present    Lupus arthritis (CMS-HCC) (Chronic) M32.9   4/26/2017 - Present    Chronic pain syndrome (Chronic) G89.4   4/26/2017 - Present    Gastroesophageal reflux disease without esophagitis (Chronic) K21.9   4/26/2017 - Present    Lactose intolerance (Chronic) E73.9   4/26/2017 - Present    Raynaud's disease without gangrene (Chronic) I73.00   4/26/2017 - Present    Osteopenia (Chronic) M85.80   4/26/2017 - Present    Functional diarrhea (Chronic) K59.1   4/26/2017 -  Present    Current chronic use of systemic steroids (Chronic) Z79.52   4/26/2017 - Present    Incisional breast wound S21.009A   5/15/2017 - Present    Nonhealing surgical wound T81.89XA   6/9/2017 - Present    Open wound of right breast S21.001A   6/26/2017 - Present      Health Maintenance        Date Due Completion Dates    IMM DTaP/Tdap/Td Vaccine (1 - Tdap) 4/1/2019 (Originally 3/28/1974) ---    IMM ZOSTER VACCINE 4/1/2019 (Originally 3/28/2015) ---    IMM INFLUENZA (1) 9/1/2017 9/27/2016    MAMMOGRAM 9/23/2017 9/23/2016    PAP SMEAR 1/2/2020 1/2/2017 (Done)    Override on 1/2/2017: Done    COLONOSCOPY 8/21/2022 8/21/2012            Current Immunizations     Influenza Vaccine Quad Inj (Preserved) 9/27/2016      Below and/or attached are the medications your provider expects you to take. Review all of your home medications and newly ordered medications with your provider and/or pharmacist. Follow medication instructions as directed by your provider and/or pharmacist. Please keep your medication list with you and share with your provider. Update the information when medications are discontinued, doses are changed, or new medications (including over-the-counter products) are added; and carry medication information at all times in the event of emergency situations     Allergies:  CONTRAST MEDIA WITH IODINE - (reactions not documented)     SHELLFISH ALLERGY - Vomiting     PENICILLINS - Vomiting               Medications  Valid as of: July 13, 2017 -  9:49 AM    Generic Name Brand Name Tablet Size Instructions for use    ALPRAZolam (Tab) XANAX 0.5 MG Take 0.5 Tabs by mouth at bedtime as needed for Sleep.        Ascorbic Acid (Tab) ascorbic acid 500 MG Take 500 mg by mouth every day.        CycloSPORINE   1 Drop by Ophthalmic route 2 Times a Day. Both eyes        Metaxalone (Tab) SKELAXIN 800 MG Take 800 mg by mouth 3 times a day.        Multiple Vitamins-Minerals (Tab) THERAGRAN-M  Take 1 Tab by mouth every day.          Non Formulary Request Non Formulary Request  Biotin        Non Formulary Request Non Formulary Request  Flaxseed oil OTC        non-formulary med non-formulary med  Cranberry extract OTC        PredniSONE (Tab) DELTASONE 1 MG Take 7 mg by mouth every day. 7 pills per day        Sildenafil Citrate (Tab) VIAGRA 25 MG Take 20 mg by mouth every day. Indications: Raynaud's Phenomenon of Unknown Cause        Vitamin E (Cap) VITAMIN E 400 UNIT Take 400 Units by mouth every day.        Zolpidem Tartrate (Tab) AMBIEN 5 MG Take  by mouth at bedtime as needed.        Zonisamide (Cap) ZONEGRAN 100 MG Take 300 mg by mouth every bedtime. Indications: neuropathy        .                 Medicines prescribed today were sent to:     [id number] - [place], [place] - [address] [facility] [address] [facility]    [address] [facility] [place] [address]    Phone: [phone] Fax: [phone]    Open 24 Hours?: No      Medication refill instructions:       If your prescription bottle indicates you have medication refills left, it is not necessary to call your provider’s office. Please contact your pharmacy and they will refill your medication.    If your prescription bottle indicates you do not have any refills left, you may request refills at any time through one of the following ways: The online [employer] system (except Urgent Care), by calling your provider’s office, or by asking your pharmacy to contact your provider’s office with a refill request. Medication refills are processed only during regular business hours and may not be available until the next business day. Your provider may request additional information or to have a follow-up visit with you prior to refilling your medication.   *Please Note: Medication refills are assigned a new Rx number when refilled electronically. Your pharmacy may indicate that no refills were authorized even though a new prescription for the same medication is available at the pharmacy. Please request the medicine by name with  the pharmacy before contacting your provider for a refill.        Instructions    After hyperbaric oxygen therapy treatment, it is normal to experience some congestion to the ears, muffling of sounds, or abnormal sounds to one or both ears.    If you experience pain or discomfort to one or both ears that does not resolve spontaneously, please contact the hyperbaric department at [phone].           Other Notes About Your Plan     Sjogren's specialist in [place]  Rheum: Dr. Inessa Cruz [place]  Pain: Dr. Shankar (not actively seeing)  GYN: Dr. Sierra  GI: Dr. Clemente  PT: [facility]           MyChart Access Code: Activation code not generated  Current MyChart Status: Active

## 2017-07-13 NOTE — PROGRESS NOTES
Subjective:      Lupe Webb is a 62 y.o. female who presents with a nonhealing breast wound.    HPI  Lupe returns to HBOT.  No ear pain no medication changes.  She feels well today and notes that Dr. Garcia cleared her to return to using MTX - she notes quite a bit of joint pain without this.       Review of Systems   Constitutional: Positive for malaise/fatigue. Negative for fever and chills.   HENT: Negative for ear discharge, ear pain and hearing loss.    Eyes: Negative for blurred vision and double vision.   Respiratory: Negative for cough and shortness of breath.    Cardiovascular: Negative for chest pain and palpitations.   Neurological: Negative for dizziness and headaches.        Objective:     /70 mmHg  Pulse 52  Temp(Src) 36.9 °C (98.5 °F)  SpO2 98%     Physical Exam   Constitutional: She is oriented to person, place, and time. She appears well-developed and well-nourished.   HENT:   Head: Normocephalic and atraumatic.   Right Ear: Tympanic membrane, external ear and ear canal normal.   Left Ear: Tympanic membrane, external ear and ear canal normal.   Nose: Sinus tenderness present.   Pulmonary/Chest: Effort normal.   Neurological: She is alert and oriented to person, place, and time.   Psychiatric: She has a normal mood and affect.   Vitals reviewed.       Assessment/Plan:     Nonhealing surgical wound, initial encounter    Appropriate candidate for HBOT - likely that the patient's wound healing is impeded by a multitude of factors,    including vasculitis from Sjogren's / Raynaud's / Lupus, as well as her chronic use of Methotrexate and Prednisone,    in addition to her age, postmenopausal status, and prior surgery & scar to the wound site. HBOT should be    beneficial for her wound healing in terms of it's angiogenic and anti-vasculitic properties. Have recommended    to the patient that she d/c MTX and continue to decrease prednisone, per instructions from Dr. Cruz.    Patient completed  her 22nd HBOT treatment at a pressure of 2.4 BRISEIDA x 90 minutes at a descent/ascent of 1.5/3.0 PSI/minute    Events    6/26 - Redo excision of non-healing wound and advancement of flap by Dr. Garcia  6/28- no treatment today as heavily bandaged s/p surgery on 6/26.    7/7 - Off methotrexate  2. Incisional breast wound    Per Dr. Garcia, s/p Bilateral Saline-->Silicone breast implant Re-do 11/2/16 with recurrent non-healing surgical wound    3. Sjogren's syndrome, with unspecified organ involvement (CMS-HCC)    Off Methotrexate and On Prednisone per Dr. Cruz    4. Raynaud's disease without gangrene    Off Methotrexate and  On  Prednisone per Dr. Cruz    5. Lupus arthritis (CMS-HCC)    Off Methotrexate and On Prednisone per Dr. Cruz    6. Current chronic use of systemic steroids    On Methotrexate and Prednisone per Dr. Cruz    7. Dry mouth    8. DDD (degenerative disc disease), cervical    9. Gastroesophageal reflux disease without esophagitis

## 2017-07-13 NOTE — PATIENT INSTRUCTIONS
After hyperbaric oxygen therapy treatment, it is normal to experience some congestion to the ears, muffling of sounds, or abnormal sounds to one or both ears.    If you experience pain or discomfort to one or both ears that does not resolve spontaneously, please contact the hyperbaric department at [phone].

## 2017-07-14 ENCOUNTER — OFFICE VISIT (OUTPATIENT)
Dept: WOUND CARE | Facility: MEDICAL CENTER | Age: 62
End: 2017-07-14
Attending: FAMILY MEDICINE
Payer: MEDICARE

## 2017-07-14 VITALS
OXYGEN SATURATION: 100 % | DIASTOLIC BLOOD PRESSURE: 55 MMHG | HEART RATE: 55 BPM | SYSTOLIC BLOOD PRESSURE: 116 MMHG | TEMPERATURE: 98.5 F

## 2017-07-14 DIAGNOSIS — Z79.52 CURRENT CHRONIC USE OF SYSTEMIC STEROIDS: Chronic | ICD-10-CM

## 2017-07-14 DIAGNOSIS — M32.9 LUPUS ARTHRITIS (HCC): Chronic | ICD-10-CM

## 2017-07-14 DIAGNOSIS — I73.00 RAYNAUD'S DISEASE WITHOUT GANGRENE: Chronic | ICD-10-CM

## 2017-07-14 DIAGNOSIS — S21.001S OPEN WOUND OF RIGHT BREAST, SEQUELA: ICD-10-CM

## 2017-07-14 DIAGNOSIS — T81.89XD NONHEALING SURGICAL WOUND, SUBSEQUENT ENCOUNTER: Primary | ICD-10-CM

## 2017-07-14 PROCEDURE — 99183 HYPERBARIC OXYGEN THERAPY: CPT | Performed by: FAMILY MEDICINE

## 2017-07-14 PROCEDURE — G0277 HBOT, FULL BODY CHAMBER, 30M: HCPCS | Performed by: FAMILY MEDICINE

## 2017-07-14 ASSESSMENT — PAIN SCALES - GENERAL: PAINLEVEL_OUTOF13: 8

## 2017-07-14 NOTE — PROGRESS NOTES
Subjective:      Lupe Webb is a 62 y.o. female who presents with a nonhealing breast wound.    HPI  Lupe has no ear pain or medication changes today.  Follows with Dr. Garcia in a few weeks unless wound opens.  No other complaints.    Review of Systems   Constitutional: Positive for malaise/fatigue. Negative for fever and chills.   HENT: Negative for ear discharge, ear pain and hearing loss.    Eyes: Negative for blurred vision and double vision.   Respiratory: Negative for cough and shortness of breath.    Cardiovascular: Negative for chest pain and palpitations.   Neurological: Negative for dizziness and headaches.        Objective:     /55 mmHg  Pulse 55  Temp(Src) 36.9 °C (98.5 °F)  SpO2 100%     Physical Exam   Constitutional: She is oriented to person, place, and time. She appears well-developed and well-nourished.   HENT:   Head: Normocephalic and atraumatic.   Right Ear: Tympanic membrane, external ear and ear canal normal.   Left Ear: Tympanic membrane, external ear and ear canal normal.   Nose: Sinus tenderness present.   Pulmonary/Chest: Effort normal.   Neurological: She is alert and oriented to person, place, and time.   Psychiatric: She has a normal mood and affect.   Vitals reviewed.       Assessment/Plan:     Nonhealing surgical wound, subsequent encounter    Appropriate candidate for HBOT - likely that the patient's wound healing is impeded by a multitude of factors,    including vasculitis from Sjogren's / Raynaud's / Lupus, as well as her chronic use of Methotrexate and Prednisone,    in addition to her age, postmenopausal status, and prior surgery & scar to the wound site. HBOT should be    beneficial for her wound healing in terms of it's angiogenic and anti-vasculitic properties. Have recommended    to the patient that she d/c MTX and continue to decrease prednisone, per instructions from Dr. Cruz.    Patient completed her 23rd HBOT treatment at a pressure of 2.4 BRISEIDA x 90  minutes at a descent/ascent of 1.5/3.0 PSI/minute    Events    6/26 - Redo excision of non-healing wound and advancement of flap by Dr. Garcia  6/28- no treatment today as heavily bandaged s/p surgery on 6/26.    7/7 - Off methotrexate  2. Incisional breast wound    Per Dr. Garcia, s/p Bilateral Saline-->Silicone breast implant Re-do 11/2/16 with recurrent non-healing surgical wound    3. Sjogren's syndrome, with unspecified organ involvement (CMS-HCC)    Off Methotrexate and On Prednisone per Dr. Cruz    4. Raynaud's disease without gangrene    Off Methotrexate and  On  Prednisone per Dr. Cruz    5. Lupus arthritis (CMS-HCC)    Off Methotrexate and On Prednisone per Dr. Cruz    6. Current chronic use of systemic steroids    On Methotrexate and Prednisone per Dr. Cruz    7. Dry mouth    8. DDD (degenerative disc disease), cervical    9. Gastroesophageal reflux disease without esophagitis

## 2017-07-14 NOTE — PATIENT INSTRUCTIONS
The side effects of Acupuncture needle insertion include: minor bruising, bleeding, or pain at the site of needle insertion.  If more worrisome symptoms, such as continued bleeding, severe bruising, or continue pain or altered sensation persist, please contact [facility] office @ [phone]

## 2017-07-17 ENCOUNTER — OFFICE VISIT (OUTPATIENT)
Dept: WOUND CARE | Facility: MEDICAL CENTER | Age: 62
End: 2017-07-17
Attending: FAMILY MEDICINE
Payer: MEDICARE

## 2017-07-17 VITALS
DIASTOLIC BLOOD PRESSURE: 60 MMHG | OXYGEN SATURATION: 100 % | SYSTOLIC BLOOD PRESSURE: 114 MMHG | HEART RATE: 50 BPM | TEMPERATURE: 98.6 F

## 2017-07-17 DIAGNOSIS — S21.001S OPEN WOUND OF RIGHT BREAST, SEQUELA: ICD-10-CM

## 2017-07-17 DIAGNOSIS — I73.00 RAYNAUD'S DISEASE WITHOUT GANGRENE: Chronic | ICD-10-CM

## 2017-07-17 DIAGNOSIS — S21.009A INCISIONAL BREAST WOUND: ICD-10-CM

## 2017-07-17 DIAGNOSIS — G89.4 CHRONIC PAIN SYNDROME: Chronic | ICD-10-CM

## 2017-07-17 DIAGNOSIS — M32.9 LUPUS ARTHRITIS (HCC): Chronic | ICD-10-CM

## 2017-07-17 DIAGNOSIS — M35.00 SJOGREN'S SYNDROME, WITH UNSPECIFIED ORGAN INVOLVEMENT (HCC): Chronic | ICD-10-CM

## 2017-07-17 DIAGNOSIS — T81.89XD NONHEALING SURGICAL WOUND, SUBSEQUENT ENCOUNTER: ICD-10-CM

## 2017-07-17 PROCEDURE — 99183 HYPERBARIC OXYGEN THERAPY: CPT | Performed by: FAMILY MEDICINE

## 2017-07-17 PROCEDURE — G0277 HBOT, FULL BODY CHAMBER, 30M: HCPCS | Performed by: FAMILY MEDICINE

## 2017-07-17 ASSESSMENT — ENCOUNTER SYMPTOMS
DIZZINESS: 0
FEVER: 0
SHORTNESS OF BREATH: 0
COUGH: 0
BLURRED VISION: 0
FALLS: 0
HEADACHES: 0
PALPITATIONS: 0
DOUBLE VISION: 0
CHILLS: 0

## 2017-07-17 ASSESSMENT — PAIN SCALES - GENERAL: PAINLEVEL_OUTOF13: 6

## 2017-07-17 NOTE — PROGRESS NOTES
Subjective:   Lupe Webb is a 62 y.o. female who presents with a nonhealing breast wound.    HPI  Lupe presents for HBOT today. Her wound continues to be closed.    She denies any ear pain or pressure.     Review of Systems   Constitutional: Positive for malaise/fatigue. Negative for fever and chills.   HENT: Negative for ear discharge, ear pain and hearing loss.    Eyes: Negative for blurred vision and double vision.   Respiratory: Negative for cough and shortness of breath.    Cardiovascular: Negative for chest pain and palpitations.   Musculoskeletal: Positive for joint pain. Negative for falls.   Neurological: Negative for dizziness and headaches.          Objective:     /60 mmHg  Pulse 50  Temp(Src) 37 °C (98.6 °F)  SpO2 100%     Physical Exam   Constitutional: She is oriented to person, place, and time. She appears well-developed and well-nourished.   HENT:   Head: Normocephalic and atraumatic.   Right Ear: Tympanic membrane, external ear and ear canal normal.   Left Ear: Tympanic membrane, external ear and ear canal normal.   Pulmonary/Chest: Effort normal.   Neurological: She is alert and oriented to person, place, and time.   Psychiatric: She has a normal mood and affect.   Vitals reviewed.      Assessment/Plan:     Nonhealing surgical wound, subsequent encounter    Appropriate candidate for HBOT - likely that the patient's wound healing is impeded by a multitude of factors,    including vasculitis from Sjogren's / Raynaud's / Lupus, as well as her chronic use of Methotrexate and Prednisone,    in addition to her age, postmenopausal status, and prior surgery & scar to the wound site. HBOT should be    beneficial for her wound healing in terms of it's angiogenic and anti-vasculitic properties. Have recommended    to the patient that she d/c MTX and continue to decrease prednisone, per instructions from Dr. Cruz.    Patient completed her 24th HBOT treatment at a pressure of 2.4 BRISEIDA x 90  minutes at a descent/ascent of 1.5/3.0 PSI/minute    Events    6/26 - Redo excision of non-healing wound and advancement of flap by Dr. Garcia  6/28- no treatment today as heavily bandaged s/p surgery on 6/26.    7/7 - Off methotrexate  2. Incisional breast wound    Per Dr. Garcia, s/p Bilateral Saline-->Silicone breast implant Re-do 11/2/16 with recurrent non-healing surgical wound    3. Sjogren's syndrome, with unspecified organ involvement (CMS-HCC)    Off Methotrexate and On Prednisone per Dr. Cruz    4. Raynaud's disease without gangrene    Off Methotrexate and  On  Prednisone per Dr. Cruz    5. Lupus arthritis (CMS-HCC)    Off Methotrexate and On Prednisone per Dr. Cruz    6. Current chronic use of systemic steroids    On Methotrexate and Prednisone per Dr. Cruz    7. Dry mouth    8. DDD (degenerative disc disease), cervical    9. Gastroesophageal reflux disease without esophagitis

## 2017-07-17 NOTE — MR AVS SNAPSHOT
Lupe SAWYER Webb   2017 9:30 AM   Office Visit   MRN: [id number]    Department:  [facility]   Dept Phone:  [phone]    Description:  Female : 1955   Provider:  Moni Ridley M.D.           Allergies as of 2017     Allergen Noted Reactions    Contrast Media With Iodine [Iodine] 2014       Rash, same with topical iodine      Shellfish Allergy 2014   Vomiting    Rash, hives,     Penicillins 2014   Vomiting      You were diagnosed with     Nonhealing surgical wound, subsequent encounter   [815112]       Open wound of right breast, sequela   [973298]       Incisional breast wound   [307214]       Sjogren's syndrome, with unspecified organ involvement (CMS-[facility])   [8561978]       Raynaud's disease without gangrene   [5022017]       Lupus arthritis (CMS-[facility])   [177872]       Chronic pain syndrome   [338.4.ICD-9-CM]         Vital Signs     Blood Pressure Pulse Temperature Oxygen Saturation Smoking Status       114/60 mmHg 50 37 °C (98.6 °F) 100% Never Smoker        Basic Information     Date Of Birth Sex Race Ethnicity Preferred Language    1955 Female White Non- English      Your appointments     2017  9:30 AM   HBOT Supervision with HYPERBARIC CHAMBER 2   [facility] ([address])    [address]  [place] [address]   [phone]            2017  9:30 AM   HBOT Supervision with HYPERBARIC CHAMBER 2   [facility] ([address])    [address]  [place] [address]   [phone]            2017  9:30 AM   HBOT Supervision with HYPERBARIC CHAMBER 2   [facility] ([address])    [address]  [place] [address]   [phone]            2017  9:30 AM   HBOT Supervision with HYPERBARIC CHAMBER 2   [facility] ([address] [place] [address])    [address]  [place] [address]   [phone]            Jul 24, 2017  9:30 AM   HBOT Supervision with HYPERBARIC CHAMBER 2   [facility] ([address])    [address]  [place] [address]   [phone]            Jul 25, 2017  9:30 AM   HBOT Supervision with HYPERBARIC CHAMBER 2   [facility] ([address])    [address]  [place] [address]   [phone]            Jul 26, 2017  9:30 AM   HBOT Supervision with HYPERBARIC CHAMBER 2   [facility] ([address])    [address]  [place] [address]   [phone]            Jul 27, 2017  9:30 AM   HBOT Supervision with HYPERBARIC CHAMBER 2   [facility] ([address])    [address]  [place] [address]   [phone]            Jul 28, 2017  9:30 AM   HBOT Supervision with HYPERBARIC CHAMBER 2   [facility] ([address])    [address]  [place] [address]   [phone]            Jul 31, 2017  9:30 AM   HBOT Supervision with HYPERBARIC CHAMBER 2   [facility] ([address])    [address]  [place] [address]   [phone]            Aug 01, 2017  9:30 AM   HBOT Supervision with HYPERBARIC CHAMBER 2   [facility] ([address])    [address]  [place] [address]   [phone]            Aug 02, 2017 10:00 AM   HBOT Supervision with HYPERBARIC CHAMBER 2   [facility] ([address])    [address]  [place] [address]   [phone]            Aug 03, 2017 10:00 AM   HBOT Supervision with HYPERBARIC CHAMBER 2   [facility] ([address])    [address]  [place] [address]   [phone]            Aug 04, 2017 10:00 AM   HBOT Supervision with HYPERBARIC CHAMBER 2   [facility] ([address])    [address]  [place] [address]   [phone]             Aug 07, 2017 10:00 AM   HBOT Supervision with HYPERBARIC CHAMBER 2   [facility] ([address])    [address]  [place] [address]   [phone]            Aug 08, 2017 10:00 AM   HBOT Supervision with HYPERBARIC CHAMBER 2   [facility] ([address])    [address]  [place] [address]   [phone]            Sep 08, 2017  2:30 PM   US SONOCINE with [facility] US 2   [facility] ([address])    [address]  [place] [address]   [phone]           Some exams require specific prep instructions that would have been given to you at time of scheduling. If you have any additional questions about the prep instructions, please call Imaging Scheduling at [phone] and press #[phone].              Problem List              ICD-10-CM Priority Class Noted - Resolved    Sjogren's syndrome (CMS-HCC) (Chronic) M35.00   4/26/1990 - Present    DDD (degenerative disc disease), cervical (Chronic) M50.30   4/26/2017 - Present    Osteoarthritis of multiple joints (Chronic) M15.9   4/26/2017 - Present    Lupus arthritis (CMS-HCC) (Chronic) M32.9   4/26/2017 - Present    Chronic pain syndrome (Chronic) G89.4   4/26/2017 - Present    Gastroesophageal reflux disease without esophagitis (Chronic) K21.9   4/26/2017 - Present    Lactose intolerance (Chronic) E73.9   4/26/2017 - Present    Raynaud's disease without gangrene (Chronic) I73.00   4/26/2017 - Present    Osteopenia (Chronic) M85.80   4/26/2017 - Present    Functional diarrhea (Chronic) K59.1   4/26/2017 - Present    Current chronic use of systemic steroids (Chronic) Z79.52   4/26/2017 - Present    Incisional breast wound S21.009A   5/15/2017 - Present    Nonhealing surgical wound T81.89XA   6/9/2017 - Present    Open wound of right breast S21.001A   6/26/2017 - Present      Health Maintenance        Date Due Completion Dates    IMM DTaP/Tdap/Td Vaccine (1 - Tdap)  4/1/2019 (Originally 3/28/1974) ---    IMM ZOSTER VACCINE 4/1/2019 (Originally 3/28/2015) ---    IMM INFLUENZA (1) 9/1/2017 9/27/2016    MAMMOGRAM 9/23/2017 9/23/2016    PAP SMEAR 1/2/2020 1/2/2017 (Done)    Override on 1/2/2017: Done    COLONOSCOPY 8/21/2022 8/21/2012            Current Immunizations     Influenza Vaccine Quad Inj (Preserved) 9/27/2016      Below and/or attached are the medications your provider expects you to take. Review all of your home medications and newly ordered medications with your provider and/or pharmacist. Follow medication instructions as directed by your provider and/or pharmacist. Please keep your medication list with you and share with your provider. Update the information when medications are discontinued, doses are changed, or new medications (including over-the-counter products) are added; and carry medication information at all times in the event of emergency situations     Allergies:  CONTRAST MEDIA WITH IODINE - (reactions not documented)     SHELLFISH ALLERGY - Vomiting     PENICILLINS - Vomiting               Medications  Valid as of: July 17, 2017 - 10:34 AM    Generic Name Brand Name Tablet Size Instructions for use    ALPRAZolam (Tab) XANAX 0.5 MG Take 0.5 Tabs by mouth at bedtime as needed for Sleep.        Ascorbic Acid (Tab) ascorbic acid 500 MG Take 500 mg by mouth every day.        CycloSPORINE   1 Drop by Ophthalmic route 2 Times a Day. Both eyes        Metaxalone (Tab) SKELAXIN 800 MG Take 800 mg by mouth 3 times a day.        Multiple Vitamins-Minerals (Tab) THERAGRAN-M  Take 1 Tab by mouth every day.        Non Formulary Request Non Formulary Request  Biotin        Non Formulary Request Non Formulary Request  Flaxseed oil OTC        non-formulary med non-formulary med  Cranberry extract OTC        PredniSONE (Tab) DELTASONE 1 MG Take 7 mg by mouth every day. 7 pills per day        Sildenafil Citrate (Tab) VIAGRA 25 MG Take 20 mg by mouth every day. Indications:  Raynaud's Phenomenon of Unknown Cause        Vitamin E (Cap) VITAMIN E 400 UNIT Take 400 Units by mouth every day.        Zolpidem Tartrate (Tab) AMBIEN 5 MG Take  by mouth at bedtime as needed.        Zonisamide (Cap) ZONEGRAN 100 MG Take 300 mg by mouth every bedtime. Indications: neuropathy        .                 Medicines prescribed today were sent to:     [id number] - [place], [place] - [address]    [address] [place] [address]    Phone: [phone] Fax: [phone]    Open 24 Hours?: No      Medication refill instructions:       If your prescription bottle indicates you have medication refills left, it is not necessary to call your provider’s office. Please contact your pharmacy and they will refill your medication.    If your prescription bottle indicates you do not have any refills left, you may request refills at any time through one of the following ways: The online [employer] system (except Urgent Care), by calling your provider’s office, or by asking your pharmacy to contact your provider’s office with a refill request. Medication refills are processed only during regular business hours and may not be available until the next business day. Your provider may request additional information or to have a follow-up visit with you prior to refilling your medication.   *Please Note: Medication refills are assigned a new Rx number when refilled electronically. Your pharmacy may indicate that no refills were authorized even though a new prescription for the same medication is available at the pharmacy. Please request the medicine by name with the pharmacy before contacting your provider for a refill.        Instructions    After hyperbaric oxygen therapy treatment, it is normal to experience some congestion to the ears, muffling of sounds, or abnormal sounds to one or both ears.    If you experience pain or discomfort to one or both ears that does not resolve spontaneously, please contact the  [facility] at [phone].         Other Notes About Your Plan     Sjogren's specialist in [place]  Rheum: Dr. Inessa Cruz Clements  Pain: Dr. Shankar (not actively seeing)  GYN: Dr. Sierra  GI: Dr. Clemente  PT: [facility]           MyChart Access Code: Activation code not generated  Current MyChart Status: Active

## 2017-07-17 NOTE — PATIENT INSTRUCTIONS
After hyperbaric oxygen therapy treatment, it is normal to experience some congestion to the ears, muffling of sounds, or abnormal sounds to one or both ears.    If you experience pain or discomfort to one or both ears that does not resolve spontaneously, please contact the hyperbaric department at [phone].

## 2017-07-18 ENCOUNTER — OFFICE VISIT (OUTPATIENT)
Dept: WOUND CARE | Facility: MEDICAL CENTER | Age: 62
End: 2017-07-18
Attending: FAMILY MEDICINE
Payer: MEDICARE

## 2017-07-18 VITALS
SYSTOLIC BLOOD PRESSURE: 114 MMHG | DIASTOLIC BLOOD PRESSURE: 65 MMHG | HEART RATE: 52 BPM | OXYGEN SATURATION: 97 % | TEMPERATURE: 98.3 F

## 2017-07-18 DIAGNOSIS — S21.001S OPEN WOUND OF RIGHT BREAST, SEQUELA: ICD-10-CM

## 2017-07-18 DIAGNOSIS — M35.00 SJOGREN'S SYNDROME, WITH UNSPECIFIED ORGAN INVOLVEMENT (HCC): Chronic | ICD-10-CM

## 2017-07-18 DIAGNOSIS — S21.009A INCISIONAL BREAST WOUND: ICD-10-CM

## 2017-07-18 DIAGNOSIS — T81.89XD NONHEALING SURGICAL WOUND, SUBSEQUENT ENCOUNTER: ICD-10-CM

## 2017-07-18 PROCEDURE — 99183 HYPERBARIC OXYGEN THERAPY: CPT | Performed by: FAMILY MEDICINE

## 2017-07-18 PROCEDURE — G0277 HBOT, FULL BODY CHAMBER, 30M: HCPCS | Performed by: FAMILY MEDICINE

## 2017-07-18 ASSESSMENT — ENCOUNTER SYMPTOMS
HEADACHES: 0
COUGH: 0
DOUBLE VISION: 0
CHILLS: 0
SHORTNESS OF BREATH: 0
FEVER: 0
BLURRED VISION: 0
DIZZINESS: 0
PALPITATIONS: 0

## 2017-07-18 ASSESSMENT — PAIN SCALES - GENERAL: PAINLEVEL_OUTOF13: 6

## 2017-07-18 NOTE — PATIENT INSTRUCTIONS
After hyperbaric oxygen therapy treatment, it is normal to experience some congestion to the ears, muffling of sounds, or abnormal sounds to one or both ears.    If you experience pain or discomfort to one or both ears that does not resolve spontaneously, please contact the hyperbaric department at [phone].

## 2017-07-18 NOTE — PROGRESS NOTES
Subjective:   Lupe Webb is a 62 y.o. female who presents with a nonhealing breast wound.    HPI  Lupe presents for HBOT today.   Her wound continues to be closed.     She denies any ear pain or pressure.     Review of Systems   Constitutional: Negative for fever and chills.   HENT: Negative for ear discharge, ear pain and hearing loss.    Eyes: Negative for blurred vision and double vision.   Respiratory: Negative for cough and shortness of breath.    Cardiovascular: Negative for chest pain and palpitations.   Neurological: Negative for dizziness and headaches.          Objective:     /65 mmHg  Pulse 52  Temp(Src) 36.8 °C (98.3 °F)  SpO2 97%     Physical Exam   Constitutional: She is oriented to person, place, and time. She appears well-developed and well-nourished.   HENT:   Head: Normocephalic and atraumatic.   Right Ear: Tympanic membrane, external ear and ear canal normal.   Left Ear: Tympanic membrane, external ear and ear canal normal.   Pulmonary/Chest: Effort normal.   Neurological: She is alert and oriented to person, place, and time.   Psychiatric: She has a normal mood and affect.   Vitals reviewed.      Assessment/Plan:     Nonhealing surgical wound, subsequent encounter   Appropriate candidate for HBOT - likely that the patient's wound healing is impeded by a multitude of factors,   including vasculitis from Sjogren's / Raynaud's / Lupus, as well as her chronic use of Methotrexate and Prednisone,   in addition to her age, postmenopausal status, and prior surgery & scar to the wound site. HBOT should be   beneficial for her wound healing in terms of it's angiogenic and anti-vasculitic properties. Have recommended   to the patient that she d/c MTX and continue to decrease prednisone, per instructions from Dr. Cruz.   Patient completed her 25th HBOT treatment at a pressure of 2.4 BRISEIDA x 90 minutes at a descent/ascent of 1.5/3.0 PSI/minute   Events   6/26 - Redo excision of non-healing wound  and advancement of flap by Dr. Garcia   6/28- no treatment today as heavily bandaged s/p surgery on 6/26.   7/7 - Off methotrexate   2. Incisional breast wound   Per Dr. Garcia, s/p Bilateral Saline-->Silicone breast implant Re-do 11/2/16 with recurrent non-healing surgical wound   3. Sjogren's syndrome, with unspecified organ involvement (CMS-HCC)   Off Methotrexate and On Prednisone per Dr. Cruz   4. Raynaud's disease without gangrene   Off Methotrexate and On Prednisone per Dr. Cruz   5. Lupus arthritis (CMS-[facility])   Off Methotrexate and On Prednisone per Dr. Cruz   6. Current chronic use of systemic steroids   On Methotrexate and Prednisone per Dr. Cruz   7. Dry mouth   8. DDD (degenerative disc disease), cervical   9. Gastroesophageal reflux disease without esophagitis

## 2017-07-18 NOTE — MR AVS SNAPSHOT
Lupe SAWYER Webb   2017 9:30 AM   Office Visit   MRN: [id number]    Department:  [facility]   Dept Phone:  [phone]    Description:  Female : 1955   Provider:  Moni Ridley M.D.           Allergies as of 2017     Allergen Noted Reactions    Contrast Media With Iodine [Iodine] 2014       Rash, same with topical iodine      Shellfish Allergy 2014   Vomiting    Rash, hives,     Penicillins 2014   Vomiting      You were diagnosed with     Nonhealing surgical wound, subsequent encounter   [924067]       Incisional breast wound   [973194]       Open wound of right breast, sequela   [765812]       Sjogren's syndrome, with unspecified organ involvement (CMS-HCC)   [9491715]         Vital Signs     Blood Pressure Pulse Temperature Oxygen Saturation Smoking Status       114/65 mmHg 52 36.8 °C (98.3 °F) 97% Never Smoker        Basic Information     Date Of Birth Sex Race Ethnicity Preferred Language    1955 Female White Non- English      Your appointments     2017  9:30 AM   HBOT Supervision with HYPERBARIC CHAMBER 2   [facility] ([address] [facility] [address])    [address]  [place] [address]   [phone]            2017  9:30 AM   HBOT Supervision with HYPERBARIC CHAMBER 2   [facility] ([facility] [address])    [address]  [place] [address]   [phone]            2017  9:30 AM   HBOT Supervision with HYPERBARIC CHAMBER 2   [facility] ([facility] [address])    [address]  [place] [address]   [phone]            2017  9:30 AM   HBOT Supervision with HYPERBARIC CHAMBER 2   [facility] ([facility] [address])    [address]  [place] [address]   [phone]            2017  9:30 AM   HBOT Supervision with HYPERBARIC CHAMBER 2   [facility] ([facility] [address])    [address]  [place] [address]   [phone]            Jul 26, 2017  9:30 AM   HBOT Supervision with HYPERBARIC CHAMBER 2   [facility] ([address])    [address]  [place] [address]   [phone]            Jul 27, 2017  9:30 AM   HBOT Supervision with HYPERBARIC CHAMBER 2   [facility] ([address])    [address]  [place] [address]   [phone]            Jul 28, 2017  9:30 AM   HBOT Supervision with HYPERBARIC CHAMBER 2   [facility] ([address])    [address]  [place] [address]   [phone]            Jul 31, 2017  9:30 AM   HBOT Supervision with HYPERBARIC CHAMBER 2   [facility] ([address])    [address]  [place] [address]   [phone]            Aug 01, 2017  9:30 AM   HBOT Supervision with HYPERBARIC CHAMBER 2   [facility] ([address])    [address]  [place] [address]   [phone]            Aug 02, 2017 10:00 AM   HBOT Supervision with HYPERBARIC CHAMBER 2   [facility] ([address])    [address]  [place] [address]   [phone]            Aug 03, 2017 10:00 AM   HBOT Supervision with HYPERBARIC CHAMBER 2   [facility] ([address])    [address]  [place] [address]   [phone]            Aug 04, 2017 10:00 AM   HBOT Supervision with HYPERBARIC CHAMBER 2   [facility] ([address])    [address]  [place] [address]   [phone]            Aug 07, 2017 10:00 AM   HBOT Supervision with HYPERBARIC CHAMBER 2   [facility] ([address])    [address]  [place] [address]   [phone]            Aug 08, 2017 10:00 AM   HBOT Supervision with HYPERBARIC CHAMBER 2   [facility] ([address])    [address]  [place] [address]   [phone]            Sep 08, 2017  2:30 PM   US MINNA with RBHC  2   [facility] ([address])    [address]  [place] [address]   [phone]           Some exams require specific prep instructions that would have been given to you at time of scheduling. If you have any additional questions about the prep instructions, please call Imaging Scheduling at [phone] and press #2.              Problem List              ICD-10-CM Priority Class Noted - Resolved    Sjogren's syndrome (CMS-HCC) (Chronic) M35.00   4/26/1990 - Present    DDD (degenerative disc disease), cervical (Chronic) M50.30   4/26/2017 - Present    Osteoarthritis of multiple joints (Chronic) M15.9   4/26/2017 - Present    Lupus arthritis (CMS-HCC) (Chronic) M32.9   4/26/2017 - Present    Chronic pain syndrome (Chronic) G89.4   4/26/2017 - Present    Gastroesophageal reflux disease without esophagitis (Chronic) K21.9   4/26/2017 - Present    Lactose intolerance (Chronic) E73.9   4/26/2017 - Present    Raynaud's disease without gangrene (Chronic) I73.00   4/26/2017 - Present    Osteopenia (Chronic) M85.80   4/26/2017 - Present    Functional diarrhea (Chronic) K59.1   4/26/2017 - Present    Current chronic use of systemic steroids (Chronic) Z79.52   4/26/2017 - Present    Incisional breast wound S21.009A   5/15/2017 - Present    Nonhealing surgical wound T81.89XA   6/9/2017 - Present    Open wound of right breast S21.001A   6/26/2017 - Present      Health Maintenance        Date Due Completion Dates    IMM DTaP/Tdap/Td Vaccine (1 - Tdap) 4/1/2019 (Originally 3/28/1974) ---    IMM ZOSTER VACCINE 4/1/2019 (Originally 3/28/2015) ---    IMM INFLUENZA (1) 9/1/2017 9/27/2016    MAMMOGRAM 9/23/2017 9/23/2016    PAP SMEAR 1/2/2020 1/2/2017 (Done)    Override on 1/2/2017: Done    COLONOSCOPY 8/21/2022 8/21/2012            Current Immunizations     Influenza Vaccine Quad Inj  (Preserved) 9/27/2016      Below and/or attached are the medications your provider expects you to take. Review all of your home medications and newly ordered medications with your provider and/or pharmacist. Follow medication instructions as directed by your provider and/or pharmacist. Please keep your medication list with you and share with your provider. Update the information when medications are discontinued, doses are changed, or new medications (including over-the-counter products) are added; and carry medication information at all times in the event of emergency situations     Allergies:  CONTRAST MEDIA WITH IODINE - (reactions not documented)     SHELLFISH ALLERGY - Vomiting     PENICILLINS - Vomiting               Medications  Valid as of: July 18, 2017 -  2:11 PM    Generic Name Brand Name Tablet Size Instructions for use    ALPRAZolam (Tab) XANAX 0.5 MG Take 0.5 Tabs by mouth at bedtime as needed for Sleep.        Ascorbic Acid (Tab) ascorbic acid 500 MG Take 500 mg by mouth every day.        CycloSPORINE   1 Drop by Ophthalmic route 2 Times a Day. Both eyes        Metaxalone (Tab) SKELAXIN 800 MG Take 800 mg by mouth 3 times a day.        Multiple Vitamins-Minerals (Tab) THERAGRAN-M  Take 1 Tab by mouth every day.        Non Formulary Request Non Formulary Request  Biotin        Non Formulary Request Non Formulary Request  Flaxseed oil OTC        non-formulary med non-formulary med  Cranberry extract OTC        PredniSONE (Tab) DELTASONE 1 MG Take 7 mg by mouth every day. 7 pills per day        Sildenafil Citrate (Tab) VIAGRA 25 MG Take 20 mg by mouth every day. Indications: Raynaud's Phenomenon of Unknown Cause        Vitamin E (Cap) VITAMIN E 400 UNIT Take 400 Units by mouth every day.        Zolpidem Tartrate (Tab) AMBIEN 5 MG Take  by mouth at bedtime as needed.        Zonisamide (Cap) ZONEGRAN 100 MG Take 300 mg by mouth every bedtime. Indications: neuropathy        .                 Medicines  prescribed today were sent to:     "[facility] # [id number] - [place], [place] - [address]    [address] [place] [address]    Phone: [phone] Fax: [phone]    Open 24 Hours?: No      Medication refill instructions:       If your prescription bottle indicates you have medication refills left, it is not necessary to call your provider’s office. Please contact your pharmacy and they will refill your medication.    If your prescription bottle indicates you do not have any refills left, you may request refills at any time through one of the following ways: The online [employer] system (except Urgent Care), by calling your provider’s office, or by asking your pharmacy to contact your provider’s office with a refill request. Medication refills are processed only during regular business hours and may not be available until the next business day. Your provider may request additional information or to have a follow-up visit with you prior to refilling your medication.   *Please Note: Medication refills are assigned a new Rx number when refilled electronically. Your pharmacy may indicate that no refills were authorized even though a new prescription for the same medication is available at the pharmacy. Please request the medicine by name with the pharmacy before contacting your provider for a refill.        Instructions    After hyperbaric oxygen therapy treatment, it is normal to experience some congestion to the ears, muffling of sounds, or abnormal sounds to one or both ears.    If you experience pain or discomfort to one or both ears that does not resolve spontaneously, please contact the hyperbaric department at [phone].         Other Notes About Your Plan     Sjogren's specialist in [place]  Rheum: Dr. Inessa Cruz [place]  Pain: Dr. Shankar (not actively seeing)  GYN: Dr. Sierra  GI: Dr. Clemente  PT: [facility]           Soundtracker Access Code: Activation code not generated  Current Soundtracker  Status: Active

## 2017-07-19 ENCOUNTER — NON-PROVIDER VISIT (OUTPATIENT)
Dept: WOUND CARE | Facility: MEDICAL CENTER | Age: 62
End: 2017-07-19
Attending: FAMILY MEDICINE
Payer: MEDICARE

## 2017-07-19 VITALS
TEMPERATURE: 98.6 F | OXYGEN SATURATION: 98 % | DIASTOLIC BLOOD PRESSURE: 64 MMHG | HEART RATE: 53 BPM | SYSTOLIC BLOOD PRESSURE: 102 MMHG

## 2017-07-19 DIAGNOSIS — Z79.52 CURRENT CHRONIC USE OF SYSTEMIC STEROIDS: Chronic | ICD-10-CM

## 2017-07-19 DIAGNOSIS — M35.00 SJOGREN'S SYNDROME, WITH UNSPECIFIED ORGAN INVOLVEMENT (HCC): Chronic | ICD-10-CM

## 2017-07-19 DIAGNOSIS — S21.001S OPEN WOUND OF RIGHT BREAST, SEQUELA: ICD-10-CM

## 2017-07-19 DIAGNOSIS — T81.89XD NONHEALING SURGICAL WOUND, SUBSEQUENT ENCOUNTER: ICD-10-CM

## 2017-07-19 DIAGNOSIS — M32.9 LUPUS ARTHRITIS (HCC): Chronic | ICD-10-CM

## 2017-07-19 DIAGNOSIS — S21.009A INCISIONAL BREAST WOUND: ICD-10-CM

## 2017-07-19 DIAGNOSIS — I73.00 RAYNAUD'S DISEASE WITHOUT GANGRENE: Chronic | ICD-10-CM

## 2017-07-19 DIAGNOSIS — G89.4 CHRONIC PAIN SYNDROME: Chronic | ICD-10-CM

## 2017-07-19 PROCEDURE — 99183 HYPERBARIC OXYGEN THERAPY: CPT | Performed by: FAMILY MEDICINE

## 2017-07-19 PROCEDURE — G0277 HBOT, FULL BODY CHAMBER, 30M: HCPCS | Performed by: FAMILY MEDICINE

## 2017-07-19 ASSESSMENT — PAIN SCALES - GENERAL: PAINLEVEL_OUTOF13: 5

## 2017-07-19 ASSESSMENT — ENCOUNTER SYMPTOMS
HEADACHES: 0
SHORTNESS OF BREATH: 0
DOUBLE VISION: 0
DIZZINESS: 0
FEVER: 0
COUGH: 0
FALLS: 0
PALPITATIONS: 0
BLURRED VISION: 0
CHILLS: 0

## 2017-07-19 NOTE — PATIENT INSTRUCTIONS
After hyperbaric oxygen therapy treatment, it is normal to experience some congestion to the ears, muffling of sounds, or abnormal sounds to one or both ears.    If you experience pain or discomfort to one or both ears that does not resolve spontaneously, please contact the hyperbaric department at [phone].

## 2017-07-19 NOTE — PROGRESS NOTES
Subjective:   Lupe Webb is a 62 y.o. female who presents with a nonhealing breast wound.    HPI  Lupe presents for HBOT today.   Her wound continues to be closed.  She has been back on MTX for a week and is doing well.    She denies any ear pain or pressure.     Review of Systems   Constitutional: Positive for malaise/fatigue. Negative for fever and chills.   HENT: Negative for ear discharge, ear pain, hearing loss and tinnitus.    Eyes: Negative for blurred vision and double vision.   Respiratory: Negative for cough and shortness of breath.    Cardiovascular: Negative for chest pain and palpitations.   Musculoskeletal: Positive for joint pain. Negative for falls.   Neurological: Negative for dizziness and headaches.          Objective:     /64 mmHg  Pulse 53  Temp(Src) 37 °C (98.6 °F)  SpO2 98%     Physical Exam   Constitutional: She is oriented to person, place, and time. She appears well-developed and well-nourished.   HENT:   Head: Normocephalic and atraumatic.   Right Ear: Tympanic membrane, external ear and ear canal normal.   Left Ear: Tympanic membrane, external ear and ear canal normal.   Pulmonary/Chest: Effort normal.   Neurological: She is alert and oriented to person, place, and time.   Psychiatric: She has a normal mood and affect.   Vitals reviewed.      Assessment/Plan:     Nonhealing surgical wound, subsequent encounter   Appropriate candidate for HBOT - likely that the patient's wound healing is impeded by a multitude of factors,   including vasculitis from Sjogren's / Raynaud's / Lupus, as well as her chronic use of Methotrexate and Prednisone,   in addition to her age, postmenopausal status, and prior surgery & scar to the wound site. HBOT should be   beneficial for her wound healing in terms of it's angiogenic and anti-vasculitic properties. Have recommended   to the patient that she d/c MTX and continue to decrease prednisone, per instructions from Dr. Cruz.   Patient completed  her 26th HBOT treatment at a pressure of 2.4 BRISEIDA x 90 minutes at a descent/ascent of 1.5/3.0 PSI/minute   Events   6/26 - Redo excision of non-healing wound and advancement of flap by Dr. Garcia   6/28- no treatment today as heavily bandaged s/p surgery on 6/26.   7/7 - Off methotrexate   2. Incisional breast wound   Per Dr. Garcia, s/p Bilateral Saline-->Silicone breast implant Re-do 11/2/16 with recurrent non-healing surgical wound   3. Sjogren's syndrome, with unspecified organ involvement (CMS-HCC)   Off Methotrexate and On Prednisone per Dr. Cruz   4. Raynaud's disease without gangrene   Off Methotrexate and On Prednisone per Dr. Cruz   5. Lupus arthritis (CMS-HCC)   Off Methotrexate and On Prednisone per Dr. Cruz   6. Current chronic use of systemic steroids   On Methotrexate and Prednisone per Dr. Cruz   7. Dry mouth   8. DDD (degenerative disc disease), cervical   9. Gastroesophageal reflux disease without esophagitis

## 2017-07-19 NOTE — MR AVS SNAPSHOT
Lupe Webb   2017 9:30 AM   Non-Provider Visit   MRN: [id number]    Department:  [facility]   Dept Phone:  [phone]    Description:  Female : 1955   Provider:  HYPERBARIC CHAMBER 2           Allergies as of 2017     Allergen Noted Reactions    Contrast Media With Iodine [Iodine] 2014       Rash, same with topical iodine      Shellfish Allergy 2014   Vomiting    Rash, hives,     Penicillins 2014   Vomiting      You were diagnosed with     Nonhealing surgical wound, subsequent encounter   [844229]       Incisional breast wound   [289358]       Open wound of right breast, sequela   [233365]       Sjogren's syndrome, with unspecified organ involvement (CMS-[facility])   [0795654]       Raynaud's disease without gangrene   [0666782]       Lupus arthritis (CMS-[facility])   [664013]       Current chronic use of systemic steroids   [0858386]       Chronic pain syndrome   [338.4.ICD-9-CM]         Vital Signs     Blood Pressure Pulse Temperature Oxygen Saturation Smoking Status       102/64 mmHg 53 37 °C (98.6 °F) 98% Never Smoker        Basic Information     Date Of Birth Sex Race Ethnicity Preferred Language    1955 Female White Non- English      Your appointments     2017  9:30 AM   HBOT Supervision with HYPERBARIC CHAMBER 2   [facility] ([address] [place] [address])    [address]  [place] [address]   [phone]            2017  9:30 AM   HBOT Supervision with HYPERBARIC CHAMBER 2   [facility] ([address] [place] [address])    [address]  [facility] [address]   [phone]            2017  9:30 AM   HBOT Supervision with HYPERBARIC CHAMBER 2   [facility] ([facility] [address])    [address]  [facility] [address]   [phone]            2017  9:30 AM   HBOT Supervision with HYPERBARIC CHAMBER 2   [facility] ([address] [place] [address])    [address]  [place] [address]   [phone]            Jul 25, 2017  9:30 AM   HBOT Supervision with HYPERBARIC CHAMBER 2   [facility] ([address])    [address]  [place] [address]   [phone]            Jul 26, 2017  9:30 AM   HBOT Supervision with HYPERBARIC CHAMBER 2   [facility] ([address])    [address]  [place] [address]   [phone]            Jul 27, 2017  9:30 AM   HBOT Supervision with HYPERBARIC CHAMBER 2   [facility] ([address])    [address]  [place] [address]   [phone]            Jul 28, 2017  9:30 AM   HBOT Supervision with HYPERBARIC CHAMBER 2   [facility] ([address])    [address]  [place] [address]   [phone]            Jul 31, 2017  9:30 AM   HBOT Supervision with HYPERBARIC CHAMBER 2   [facility] ([address])    [address]  [place] [address]   [phone]            Aug 01, 2017  9:30 AM   HBOT Supervision with HYPERBARIC CHAMBER 2   [facility] ([address])    [address]  [place] [address]   [phone]            Aug 02, 2017 10:00 AM   HBOT Supervision with HYPERBARIC CHAMBER 2   [facility] ([address])    [address]  [place] [address]   [phone]            Aug 03, 2017 10:00 AM   HBOT Supervision with HYPERBARIC CHAMBER 2   [facility] ([address])    [address]  [place] [address]   [phone]            Aug 04, 2017 10:00 AM   HBOT Supervision with HYPERBARIC CHAMBER 2   [facility] ([address])    [address]  [place] [address]   [phone]            Aug 07, 2017 10:00 AM   HBOT Supervision with HYPERBARIC CHAMBER 2   [facility] ([address])    [address]  [place] [address]   [phone]            Aug 08, 2017 10:00 AM   HBOT Supervision with HYPERBARIC CHAMBER 2   [facility] ([facility] [address])    [address]  [place] [address]   [phone]            Sep 08, 2017  2:30 PM   US SONOCINE with RBHC US 2   [facility] ([address])    [address]  [place] [address]   [phone]           Some exams require specific prep instructions that would have been given to you at time of scheduling. If you have any additional questions about the prep instructions, please call Imaging Scheduling at [phone] and press #2.              Problem List              ICD-10-CM Priority Class Noted - Resolved    Sjogren's syndrome (CMS-HCC) (Chronic) M35.00   4/26/1990 - Present    DDD (degenerative disc disease), cervical (Chronic) M50.30   4/26/2017 - Present    Osteoarthritis of multiple joints (Chronic) M15.9   4/26/2017 - Present    Lupus arthritis (CMS-HCC) (Chronic) M32.9   4/26/2017 - Present    Chronic pain syndrome (Chronic) G89.4   4/26/2017 - Present    Gastroesophageal reflux disease without esophagitis (Chronic) K21.9   4/26/2017 - Present    Lactose intolerance (Chronic) E73.9   4/26/2017 - Present    Raynaud's disease without gangrene (Chronic) I73.00   4/26/2017 - Present    Osteopenia (Chronic) M85.80   4/26/2017 - Present    Functional diarrhea (Chronic) K59.1   4/26/2017 - Present    Current chronic use of systemic steroids (Chronic) Z79.52   4/26/2017 - Present    Incisional breast wound S21.009A   5/15/2017 - Present    Nonhealing surgical wound T81.89XA   6/9/2017 - Present    Open wound of right breast S21.001A   6/26/2017 - Present      Health Maintenance        Date Due Completion Dates    IMM DTaP/Tdap/Td Vaccine (1 - Tdap) 4/1/2019 (Originally 3/28/1974) ---    IMM ZOSTER VACCINE 4/1/2019 (Originally 3/28/2015) ---    IMM INFLUENZA (1) 9/1/2017 9/27/2016     MAMMOGRAM 9/23/2017 9/23/2016    PAP SMEAR 1/2/2020 1/2/2017 (Done)    Override on 1/2/2017: Done    COLONOSCOPY 8/21/2022 8/21/2012            Current Immunizations     Influenza Vaccine Quad Inj (Preserved) 9/27/2016      Below and/or attached are the medications your provider expects you to take. Review all of your home medications and newly ordered medications with your provider and/or pharmacist. Follow medication instructions as directed by your provider and/or pharmacist. Please keep your medication list with you and share with your provider. Update the information when medications are discontinued, doses are changed, or new medications (including over-the-counter products) are added; and carry medication information at all times in the event of emergency situations     Allergies:  CONTRAST MEDIA WITH IODINE - (reactions not documented)     SHELLFISH ALLERGY - Vomiting     PENICILLINS - Vomiting               Medications  Valid as of: July 19, 2017 -  8:30 AM    Generic Name Brand Name Tablet Size Instructions for use    ALPRAZolam (Tab) XANAX 0.5 MG Take 0.5 Tabs by mouth at bedtime as needed for Sleep.        Ascorbic Acid (Tab) ascorbic acid 500 MG Take 500 mg by mouth every day.        CycloSPORINE   1 Drop by Ophthalmic route 2 Times a Day. Both eyes        Metaxalone (Tab) SKELAXIN 800 MG Take 800 mg by mouth 3 times a day.        Multiple Vitamins-Minerals (Tab) THERAGRAN-M  Take 1 Tab by mouth every day.        Non Formulary Request Non Formulary Request  Biotin        Non Formulary Request Non Formulary Request  Flaxseed oil OTC        non-formulary med non-formulary med  Cranberry extract OTC        PredniSONE (Tab) DELTASONE 1 MG Take 7 mg by mouth every day. 7 pills per day        Sildenafil Citrate (Tab) VIAGRA 25 MG Take 20 mg by mouth every day. Indications: Raynaud's Phenomenon of Unknown Cause        Vitamin E (Cap) VITAMIN E 400 UNIT Take 400 Units by mouth every day.        Zolpidem  Tartrate (Tab) AMBIEN 5 MG Take  by mouth at bedtime as needed.        Zonisamide (Cap) ZONEGRAN 100 MG Take 300 mg by mouth every bedtime. Indications: neuropathy        .                 Medicines prescribed today were sent to:     [id number] - [place], [place] - [address]    [address] [place] [address]    Phone: [phone] Fax: [phone]    Open 24 Hours?: No      Medication refill instructions:       If your prescription bottle indicates you have medication refills left, it is not necessary to call your provider’s office. Please contact your pharmacy and they will refill your medication.    If your prescription bottle indicates you do not have any refills left, you may request refills at any time through one of the following ways: The online [employer] system (except Urgent Care), by calling your provider’s office, or by asking your pharmacy to contact your provider’s office with a refill request. Medication refills are processed only during regular business hours and may not be available until the next business day. Your provider may request additional information or to have a follow-up visit with you prior to refilling your medication.   *Please Note: Medication refills are assigned a new Rx number when refilled electronically. Your pharmacy may indicate that no refills were authorized even though a new prescription for the same medication is available at the pharmacy. Please request the medicine by name with the pharmacy before contacting your provider for a refill.        Instructions    After hyperbaric oxygen therapy treatment, it is normal to experience some congestion to the ears, muffling of sounds, or abnormal sounds to one or both ears.    If you experience pain or discomfort to one or both ears that does not resolve spontaneously, please contact the hyperbaric department at [phone].         Other Notes About Your Plan     Sjogren's specialist in [place]  Rheum: Dr. Inessa Cruz  [place]  Pain: Dr. Shankar (not actively seeing)  GYN: Dr. Sierra  GI: Dr. Clemente  PT: [facility]           MyChart Access Code: Activation code not generated  Current MyChart Status: Active

## 2017-07-20 ENCOUNTER — NON-PROVIDER VISIT (OUTPATIENT)
Dept: WOUND CARE | Facility: MEDICAL CENTER | Age: 62
End: 2017-07-20
Attending: FAMILY MEDICINE
Payer: MEDICARE

## 2017-07-20 VITALS
DIASTOLIC BLOOD PRESSURE: 74 MMHG | OXYGEN SATURATION: 100 % | HEART RATE: 47 BPM | SYSTOLIC BLOOD PRESSURE: 140 MMHG | TEMPERATURE: 98.3 F

## 2017-07-20 DIAGNOSIS — S21.009A INCISIONAL BREAST WOUND: ICD-10-CM

## 2017-07-20 DIAGNOSIS — T81.89XD NONHEALING SURGICAL WOUND, SUBSEQUENT ENCOUNTER: Primary | ICD-10-CM

## 2017-07-20 DIAGNOSIS — I73.00 RAYNAUD'S DISEASE WITHOUT GANGRENE: Chronic | ICD-10-CM

## 2017-07-20 DIAGNOSIS — Z79.52 CURRENT CHRONIC USE OF SYSTEMIC STEROIDS: Chronic | ICD-10-CM

## 2017-07-20 DIAGNOSIS — M32.9 LUPUS ARTHRITIS (HCC): Chronic | ICD-10-CM

## 2017-07-20 DIAGNOSIS — M50.30 DDD (DEGENERATIVE DISC DISEASE), CERVICAL: Chronic | ICD-10-CM

## 2017-07-20 DIAGNOSIS — M35.00 SJOGREN'S SYNDROME, WITH UNSPECIFIED ORGAN INVOLVEMENT (HCC): Chronic | ICD-10-CM

## 2017-07-20 PROCEDURE — 99183 HYPERBARIC OXYGEN THERAPY: CPT | Performed by: NURSE PRACTITIONER

## 2017-07-20 PROCEDURE — G0277 HBOT, FULL BODY CHAMBER, 30M: HCPCS | Performed by: NURSE PRACTITIONER

## 2017-07-20 ASSESSMENT — ENCOUNTER SYMPTOMS
COUGH: 0
DIZZINESS: 0
FALLS: 0
PALPITATIONS: 0
FEVER: 0
BLURRED VISION: 0
HEADACHES: 0
SHORTNESS OF BREATH: 0
DOUBLE VISION: 0
CHILLS: 0

## 2017-07-20 ASSESSMENT — PAIN SCALES - GENERAL: PAINLEVEL_OUTOF13: 8

## 2017-07-20 NOTE — PATIENT INSTRUCTIONS
After hyperbaric oxygen therapy treatment, it is normal to experience some congestion to the ears, muffling of sounds, or abnormal sounds to one or both ears.    If you experience pain or discomfort to one or both ears that does not resolve spontaneously, please contact the hyperbaric department at [phone].

## 2017-07-20 NOTE — PROGRESS NOTES
Subjective:   Lupe Webb is a 62 y.o. female who presents with a nonhealing breast wound.    HPI  Lupe continues to tolerate her treatments without difficulty,  She denies any ear pain or pressure.   She has had no medication changes    Review of Systems   Constitutional: Positive for malaise/fatigue. Negative for fever and chills.   HENT: Negative for ear discharge, ear pain, hearing loss and tinnitus.    Eyes: Negative for blurred vision and double vision.   Respiratory: Negative for cough and shortness of breath.    Cardiovascular: Negative for chest pain and palpitations.   Musculoskeletal: Positive for joint pain. Negative for falls.   Neurological: Negative for dizziness and headaches.          Objective:     /74 mmHg  Pulse 47  Temp(Src) 36.8 °C (98.3 °F)  SpO2 100%     Physical Exam   Constitutional: She is oriented to person, place, and time. She appears well-developed and well-nourished.   HENT:   Head: Normocephalic and atraumatic.   Right Ear: Tympanic membrane, external ear and ear canal normal.   Left Ear: Tympanic membrane, external ear and ear canal normal.   Pulmonary/Chest: Effort normal.   Neurological: She is alert and oriented to person, place, and time.   Psychiatric: She has a normal mood and affect.   Vitals reviewed.      Assessment/Plan:     Nonhealing surgical wound, subsequent encounter   Appropriate candidate for HBOT - likely that the patient's wound healing is impeded by a multitude of factors,   including vasculitis from Sjogren's / Raynaud's / Lupus, as well as her chronic use of Methotrexate and Prednisone,   in addition to her age, postmenopausal status, and prior surgery & scar to the wound site. HBOT should be   beneficial for her wound healing in terms of it's angiogenic and anti-vasculitic properties. Have recommended   to the patient that she d/c MTX and continue to decrease prednisone, per instructions from Dr. Cruz.   Patient completed her 27th HBOT treatment  at a pressure of 2.4 BRISEIDA x 90 minutes at a descent/ascent of 1.5/3.0 PSI/minute   Events   6/26 - Redo excision of non-healing wound and advancement of flap by Dr. Garcia   6/28- no treatment today as heavily bandaged s/p surgery on 6/26.   7/7 - Off methotrexate   2. Incisional breast wound   Per Dr. Garcia, s/p Bilateral Saline-->Silicone breast implant Re-do 11/2/16 with recurrent non-healing surgical wound   3. Sjogren's syndrome, with unspecified organ involvement (CMS-HCC)   Off Methotrexate and On Prednisone per Dr. Cruz   4. Raynaud's disease without gangrene   Off Methotrexate and On Prednisone per Dr. Cruz   5. Lupus arthritis (CMS-HCC)   Off Methotrexate and On Prednisone per Dr. Cruz   6. Current chronic use of systemic steroids   On Methotrexate and Prednisone per Dr. Cruz   7. Dry mouth   8. DDD (degenerative disc disease), cervical   9. Gastroesophageal reflux disease without esophagitis

## 2017-07-21 ENCOUNTER — OFFICE VISIT (OUTPATIENT)
Dept: WOUND CARE | Facility: MEDICAL CENTER | Age: 62
End: 2017-07-21
Attending: FAMILY MEDICINE
Payer: MEDICARE

## 2017-07-21 VITALS
SYSTOLIC BLOOD PRESSURE: 145 MMHG | HEART RATE: 48 BPM | TEMPERATURE: 98 F | OXYGEN SATURATION: 100 % | DIASTOLIC BLOOD PRESSURE: 80 MMHG

## 2017-07-21 DIAGNOSIS — M50.30 DDD (DEGENERATIVE DISC DISEASE), CERVICAL: Chronic | ICD-10-CM

## 2017-07-21 DIAGNOSIS — T81.89XD NONHEALING SURGICAL WOUND, SUBSEQUENT ENCOUNTER: ICD-10-CM

## 2017-07-21 DIAGNOSIS — M32.9 LUPUS ARTHRITIS (HCC): Chronic | ICD-10-CM

## 2017-07-21 DIAGNOSIS — Z79.52 CURRENT CHRONIC USE OF SYSTEMIC STEROIDS: Chronic | ICD-10-CM

## 2017-07-21 DIAGNOSIS — K21.9 GASTROESOPHAGEAL REFLUX DISEASE WITHOUT ESOPHAGITIS: Chronic | ICD-10-CM

## 2017-07-21 DIAGNOSIS — S21.009A INCISIONAL BREAST WOUND: ICD-10-CM

## 2017-07-21 DIAGNOSIS — I73.00 RAYNAUD'S DISEASE WITHOUT GANGRENE: Chronic | ICD-10-CM

## 2017-07-21 DIAGNOSIS — M35.00 SJOGREN'S SYNDROME, WITH UNSPECIFIED ORGAN INVOLVEMENT (HCC): Chronic | ICD-10-CM

## 2017-07-21 PROCEDURE — 99183 HYPERBARIC OXYGEN THERAPY: CPT | Performed by: NURSE PRACTITIONER

## 2017-07-21 PROCEDURE — G0277 HBOT, FULL BODY CHAMBER, 30M: HCPCS | Performed by: NURSE PRACTITIONER

## 2017-07-21 ASSESSMENT — ENCOUNTER SYMPTOMS
HEADACHES: 0
CHILLS: 0
PALPITATIONS: 0
DOUBLE VISION: 0
FEVER: 0
BLURRED VISION: 0
SHORTNESS OF BREATH: 0
DIZZINESS: 0
FALLS: 0
COUGH: 0

## 2017-07-21 ASSESSMENT — PAIN SCALES - GENERAL: PAINLEVEL_OUTOF13: 8

## 2017-07-21 NOTE — PROGRESS NOTES
Subjective:   Lupe Webb is a 62 y.o. female who presents with a nonhealing breast wound.    HPI  Lupe continues to tolerate her treatments without difficulty,  She denies any ear pain or pressure.   She has had no medication changes    Review of Systems   Constitutional: Positive for malaise/fatigue. Negative for fever and chills.   HENT: Negative for ear discharge, ear pain, hearing loss and tinnitus.    Eyes: Negative for blurred vision and double vision.   Respiratory: Negative for cough and shortness of breath.    Cardiovascular: Negative for chest pain and palpitations.   Musculoskeletal: Positive for joint pain. Negative for falls.   Neurological: Negative for dizziness and headaches.          Objective:     There were no vitals taken for this visit.     Physical Exam   Constitutional: She is oriented to person, place, and time. She appears well-developed and well-nourished.   HENT:   Head: Normocephalic and atraumatic.   Right Ear: Tympanic membrane, external ear and ear canal normal.   Left Ear: Tympanic membrane, external ear and ear canal normal.   Pulmonary/Chest: Effort normal.   Neurological: She is alert and oriented to person, place, and time.   Psychiatric: She has a normal mood and affect.   Vitals reviewed.      Assessment/Plan:     Nonhealing surgical wound, subsequent encounter   Appropriate candidate for HBOT - likely that the patient's wound healing is impeded by a multitude of factors,   including vasculitis from Sjogren's / Raynaud's / Lupus, as well as her chronic use of Methotrexate and Prednisone,   in addition to her age, postmenopausal status, and prior surgery & scar to the wound site. HBOT should be   beneficial for her wound healing in terms of it's angiogenic and anti-vasculitic properties. Have recommended   to the patient that she d/c MTX and continue to decrease prednisone, per instructions from Dr. Cruz.   Patient completed her 28th HBOT treatment at a pressure of 2.4 BRISEIDA  x 90 minutes at a descent/ascent of 1.5/3.0 PSI/minute   Events   6/26 - Redo excision of non-healing wound and advancement of flap by Dr. Garcia   6/28- no treatment today as heavily bandaged s/p surgery on 6/26.   7/7 - Off methotrexate   2. Incisional breast wound   Per Dr. Garcia, s/p Bilateral Saline-->Silicone breast implant Re-do 11/2/16 with recurrent non-healing surgical wound   3. Sjogren's syndrome, with unspecified organ involvement (CMS-HCC)   Off Methotrexate and On Prednisone per Dr. Cruz   4. Raynaud's disease without gangrene   Off Methotrexate and On Prednisone per Dr. Cruz   5. Lupus arthritis (CMS-HCC)   Off Methotrexate and On Prednisone per Dr. Cruz   6. Current chronic use of systemic steroids   On Methotrexate and Prednisone per Dr. Cruz   7. Dry mouth   8. DDD (degenerative disc disease), cervical   9. Gastroesophageal reflux disease without esophagitis

## 2017-07-21 NOTE — PATIENT INSTRUCTIONS
After hyperbaric oxygen therapy treatment, it is normal to experience some congestion to the ears, muffling of sounds, or abnormal sounds to one or both ears.    If you experience pain or discomfort to one or both ears that does not resolve spontaneously, please contact the hyperbaric department at [phone].

## 2017-07-24 ENCOUNTER — OFFICE VISIT (OUTPATIENT)
Dept: WOUND CARE | Facility: MEDICAL CENTER | Age: 62
End: 2017-07-24
Attending: FAMILY MEDICINE
Payer: MEDICARE

## 2017-07-24 VITALS
TEMPERATURE: 98.7 F | HEART RATE: 51 BPM | DIASTOLIC BLOOD PRESSURE: 66 MMHG | SYSTOLIC BLOOD PRESSURE: 114 MMHG | OXYGEN SATURATION: 99 %

## 2017-07-24 DIAGNOSIS — T81.89XD NONHEALING SURGICAL WOUND, SUBSEQUENT ENCOUNTER: ICD-10-CM

## 2017-07-24 DIAGNOSIS — M32.9 LUPUS ARTHRITIS (HCC): Chronic | ICD-10-CM

## 2017-07-24 DIAGNOSIS — S21.001S OPEN WOUND OF RIGHT BREAST, SEQUELA: ICD-10-CM

## 2017-07-24 DIAGNOSIS — S21.009A INCISIONAL BREAST WOUND: ICD-10-CM

## 2017-07-24 DIAGNOSIS — I73.00 RAYNAUD'S DISEASE WITHOUT GANGRENE: Chronic | ICD-10-CM

## 2017-07-24 DIAGNOSIS — Z79.52 CURRENT CHRONIC USE OF SYSTEMIC STEROIDS: Chronic | ICD-10-CM

## 2017-07-24 DIAGNOSIS — M35.00 SJOGREN'S SYNDROME, WITH UNSPECIFIED ORGAN INVOLVEMENT (HCC): Chronic | ICD-10-CM

## 2017-07-24 DIAGNOSIS — M15.9 OSTEOARTHRITIS OF MULTIPLE JOINTS, UNSPECIFIED OSTEOARTHRITIS TYPE: Chronic | ICD-10-CM

## 2017-07-24 PROCEDURE — G0277 HBOT, FULL BODY CHAMBER, 30M: HCPCS | Performed by: FAMILY MEDICINE

## 2017-07-24 PROCEDURE — 99183 HYPERBARIC OXYGEN THERAPY: CPT | Performed by: FAMILY MEDICINE

## 2017-07-24 ASSESSMENT — ENCOUNTER SYMPTOMS
PALPITATIONS: 0
FEVER: 0
BLURRED VISION: 0
CHILLS: 0
COUGH: 0
SHORTNESS OF BREATH: 0
HEADACHES: 0
DOUBLE VISION: 0

## 2017-07-24 ASSESSMENT — PAIN SCALES - GENERAL: PAINLEVEL_OUTOF13: 7

## 2017-07-24 NOTE — MR AVS SNAPSHOT
Lupe SAWYER Webb   2017 9:30 AM   Office Visit   MRN: [id number]    Department:  Hyper[facility] Oxygen Ther   Dept Phone:  [phone]    Description:  Female : 1955   Provider:  Moni Ridley M.D.           Allergies as of 2017     Allergen Noted Reactions    Contrast Media With Iodine [Iodine] 2014       Rash, same with topical iodine      Shellfish Allergy 2014   Vomiting    Rash, hives,     Penicillins 2014   Vomiting      You were diagnosed with     Nonhealing surgical wound, subsequent encounter   [361620]       Incisional breast wound   [687976]       Open wound of right breast, sequela   [266961]       Raynaud's disease without gangrene   [1115526]       Sjogren's syndrome, with unspecified organ involvement (CMS-HCC)   [2027673]       Current chronic use of systemic steroids   [2060944]       Lupus arthritis (CMS-HCC)   [048501]       Osteoarthritis of multiple joints, unspecified osteoarthritis type   [7408737]         Vital Signs     Blood Pressure Pulse Temperature Oxygen Saturation Smoking Status       114/66 mmHg 51 37.1 °C (98.7 °F) 99% Never Smoker        Basic Information     Date Of Birth Sex Race Ethnicity Preferred Language    1955 Female White Non- English      Your appointments     2017  9:00 AM   HBOT Supervision with HYPERBARIC CHAMBER 2   [facility] ([facility] [address] [facility] [address])    [address]  [facility] [address]   [phone]            2017  9:30 AM   HBOT Supervision with HYPERBARIC CHAMBER 2   [facility] ([address] [place] [address])    [address]  [place] [address]   [phone]            2017  9:30 AM   HBOT Supervision with HYPERBARIC CHAMBER 2   [facility] ([facility] [address])    [address]  [facility] [address]   [phone]            2017  9:30 AM   HBOT Supervision with HYPERBARIC CHAMBER 2   [facility] ([facility] [address])    [address]  [place] [address]   [phone]            Jul 31, 2017  9:30 AM   HBOT Supervision with HYPERBARIC CHAMBER 2   [facility] ([facility] [address])    [address]  [place] [address]   [phone]            Aug 01, 2017  9:30 AM   HBOT Supervision with HYPERBARIC CHAMBER 2   [facility] ([address] [place] [address])    [address]  [place] [address]   [phone]            Aug 02, 2017 10:00 AM   HBOT Supervision with HYPERBARIC CHAMBER 2   [facility] ([address] [place] [address])    [address] [facility] [address]  [place] [address]   [phone]            Aug 03, 2017 10:00 AM   HBOT Supervision with HYPERBARIC CHAMBER 2   [facility] ([address] [place] [address])    [address] [facility] [address]  [place] [address]   [phone]            Aug 04, 2017 10:00 AM   HBOT Supervision with HYPERBARIC CHAMBER 2   [facility] ([facility] [address])    [address] [facility]  [place] [address]   [phone]            Aug 07, 2017 10:00 AM   HBOT Supervision with HYPERBARIC CHAMBER 2   [facility] ([facility] [address])    [address]  [place] [address]   [phone]            Aug 08, 2017 10:00 AM   HBOT Supervision with HYPERBARIC CHAMBER 2   [facility] ([address] [place] [address])    [address] [facility]  [place] [address]   [phone]            Sep 08, 2017  2:30 PM   US SONOCINE with RB US 2   [facility] ([address] [place] [address])    [address]  [place] [address]   [phone]           Some exams require specific prep instructions that would have been given to you at time of scheduling. If you have any additional questions about the prep instructions, please call Imaging Scheduling at [phone] and press #[phone].              Problem List              ICD-10-CM Priority Class Noted -  Resolved    Sjogren's syndrome (CMS-HCC) (Chronic) M35.00   4/26/1990 - Present    DDD (degenerative disc disease), cervical (Chronic) M50.30   4/26/2017 - Present    Osteoarthritis of multiple joints (Chronic) M15.9   4/26/2017 - Present    Lupus arthritis (CMS-HCC) (Chronic) M32.9   4/26/2017 - Present    Chronic pain syndrome (Chronic) G89.4   4/26/2017 - Present    Gastroesophageal reflux disease without esophagitis (Chronic) K21.9   4/26/2017 - Present    Lactose intolerance (Chronic) E73.9   4/26/2017 - Present    Raynaud's disease without gangrene (Chronic) I73.00   4/26/2017 - Present    Osteopenia (Chronic) M85.80   4/26/2017 - Present    Functional diarrhea (Chronic) K59.1   4/26/2017 - Present    Current chronic use of systemic steroids (Chronic) Z79.52   4/26/2017 - Present    Incisional breast wound S21.009A   5/15/2017 - Present    Nonhealing surgical wound T81.89XA   6/9/2017 - Present    Open wound of right breast S21.001A   6/26/2017 - Present      Health Maintenance        Date Due Completion Dates    IMM DTaP/Tdap/Td Vaccine (1 - Tdap) 4/1/2019 (Originally 3/28/1974) ---    IMM ZOSTER VACCINE 4/1/2019 (Originally 3/28/2015) ---    IMM INFLUENZA (1) 9/1/2017 9/27/2016    MAMMOGRAM 9/23/2017 9/23/2016    PAP SMEAR 1/2/2020 1/2/2017 (Done)    Override on 1/2/2017: Done    COLONOSCOPY 8/21/2022 8/21/2012            Current Immunizations     Influenza Vaccine Quad Inj (Preserved) 9/27/2016      Below and/or attached are the medications your provider expects you to take. Review all of your home medications and newly ordered medications with your provider and/or pharmacist. Follow medication instructions as directed by your provider and/or pharmacist. Please keep your medication list with you and share with your provider. Update the information when medications are discontinued, doses are changed, or new medications (including over-the-counter products) are added; and carry medication information at all  times in the event of emergency situations     Allergies:  CONTRAST MEDIA WITH IODINE - (reactions not documented)     SHELLFISH ALLERGY - Vomiting     PENICILLINS - Vomiting               Medications  Valid as of: July 24, 2017 -  1:26 PM    Generic Name Brand Name Tablet Size Instructions for use    ALPRAZolam (Tab) XANAX 0.5 MG Take 0.5 Tabs by mouth at bedtime as needed for Sleep.        Ascorbic Acid (Tab) ascorbic acid 500 MG Take 500 mg by mouth every day.        CycloSPORINE   1 Drop by Ophthalmic route 2 Times a Day. Both eyes        Metaxalone (Tab) SKELAXIN 800 MG Take 800 mg by mouth 3 times a day.        Multiple Vitamins-Minerals (Tab) THERAGRAN-M  Take 1 Tab by mouth every day.        Non Formulary Request Non Formulary Request  Biotin        Non Formulary Request Non Formulary Request  Flaxseed oil OTC        non-formulary med non-formulary med  Cranberry extract OTC        PredniSONE (Tab) DELTASONE 1 MG Take 7 mg by mouth every day. 7 pills per day        Sildenafil Citrate (Tab) VIAGRA 25 MG Take 20 mg by mouth every day. Indications: Raynaud's Phenomenon of Unknown Cause        Vitamin E (Cap) VITAMIN E 400 UNIT Take 400 Units by mouth every day.        Zolpidem Tartrate (Tab) AMBIEN 5 MG Take  by mouth at bedtime as needed.        Zonisamide (Cap) ZONEGRAN 100 MG Take 300 mg by mouth every bedtime. Indications: neuropathy        .                 Medicines prescribed today were sent to:     [facility]  [address] [place] [address]    [address] [place] [address]    Phone: [phone] Fax: [phone]    Open 24 Hours?: No      Medication refill instructions:       If your prescription bottle indicates you have medication refills left, it is not necessary to call your provider’s office. Please contact your pharmacy and they will refill your medication.    If your prescription bottle indicates you do not have any refills left, you may request refills at any time through one of the  following ways: The online [employer] system (except Urgent Care), by calling your provider’s office, or by asking your pharmacy to contact your provider’s office with a refill request. Medication refills are processed only during regular business hours and may not be available until the next business day. Your provider may request additional information or to have a follow-up visit with you prior to refilling your medication.   *Please Note: Medication refills are assigned a new Rx number when refilled electronically. Your pharmacy may indicate that no refills were authorized even though a new prescription for the same medication is available at the pharmacy. Please request the medicine by name with the pharmacy before contacting your provider for a refill.        Instructions    After hyperbaric oxygen therapy treatment, it is normal to experience some congestion to the ears, muffling of sounds, or abnormal sounds to one or both ears.    If you experience pain or discomfort to one or both ears that does not resolve spontaneously, please contact the hyperbaric department at [phone].         Other Notes About Your Plan     Sjogrbre's specialist in [place]  Rheum: Dr. Inessa Cruz [place]  Pain: Dr. Shankar (not actively seeing)  GYN: Dr. Sierra  GI: Dr. Clemente  PT: [facility]           [employer] Access Code: Activation code not generated  Current Outline Status: Active

## 2017-07-24 NOTE — PROGRESS NOTES
Subjective:      Lupe Webb is a 62 y.o. female who presents with a nonhealing breast wound.    HPI  Lupe presents for HBOT today.   Her wound continues to be closed.     She denies any ear pain or pressure.     Review of Systems   Constitutional: Positive for malaise/fatigue. Negative for fever and chills.   HENT: Negative for ear discharge, ear pain and hearing loss.    Eyes: Negative for blurred vision and double vision.   Respiratory: Negative for cough and shortness of breath.    Cardiovascular: Negative for chest pain and palpitations.   Musculoskeletal: Positive for joint pain.   Neurological: Negative for headaches.          Objective:     /66 mmHg  Pulse 51  Temp(Src) 37.1 °C (98.7 °F)  SpO2 99%     Physical Exam   Constitutional: She is oriented to person, place, and time. She appears well-developed and well-nourished.   HENT:   Head: Normocephalic and atraumatic.   Right Ear: Tympanic membrane, external ear and ear canal normal.   Left Ear: Tympanic membrane, external ear and ear canal normal.   Pulmonary/Chest: Effort normal.   Neurological: She is alert and oriented to person, place, and time.   Psychiatric: She has a normal mood and affect.   Vitals reviewed.        Assessment/Plan:     Nonhealing surgical wound, subsequent encounter    Appropriate candidate for HBOT - likely that the patient's wound healing is impeded by a multitude of factors,    including vasculitis from Sjogren's / Raynaud's / Lupus, as well as her chronic use of Methotrexate and Prednisone,    in addition to her age, postmenopausal status, and prior surgery & scar to the wound site. HBOT should be    beneficial for her wound healing in terms of it's angiogenic and anti-vasculitic properties. Have recommended    to the patient that she d/c MTX and continue to decrease prednisone, per instructions from Dr. Cruz.    Patient completed her 29th HBOT treatment at a pressure of 2.4 BRISEIDA x 90 minutes at a descent/ascent of  1.5/3.0 PSI/minute    Events    6/26 - Redo excision of non-healing wound and advancement of flap by Dr. Garcia    6/28- no treatment today as heavily bandaged s/p surgery on 6/26.    7/7 - Off methotrexate    2. Incisional breast wound    Per Dr. Garcia, s/p Bilateral Saline-->Silicone breast implant Re-do 11/2/16 with recurrent non-healing surgical wound    3. Sjogren's syndrome, with unspecified organ involvement (CMS-HCC)    Off Methotrexate and On Prednisone per Dr. Cruz    4. Raynaud's disease without gangrene    Off Methotrexate and On Prednisone per Dr. Cruz    5. Lupus arthritis (CMS-HCC)    Off Methotrexate and On Prednisone per Dr. Cruz    6. Current chronic use of systemic steroids    On Methotrexate and Prednisone per Dr. Cruz    7. Dry mouth    8. DDD (degenerative disc disease), cervical    9. Gastroesophageal reflux disease without esophagitis

## 2017-07-24 NOTE — PATIENT INSTRUCTIONS
After hyperbaric oxygen therapy treatment, it is normal to experience some congestion to the ears, muffling of sounds, or abnormal sounds to one or both ears.    If you experience pain or discomfort to one or both ears that does not resolve spontaneously, please contact the hyperbaric department at [phone].

## 2017-07-25 ENCOUNTER — OFFICE VISIT (OUTPATIENT)
Dept: WOUND CARE | Facility: MEDICAL CENTER | Age: 62
End: 2017-07-25
Attending: FAMILY MEDICINE
Payer: MEDICARE

## 2017-07-25 DIAGNOSIS — T81.89XD NONHEALING SURGICAL WOUND, SUBSEQUENT ENCOUNTER: ICD-10-CM

## 2017-07-25 DIAGNOSIS — M35.00 SJOGREN'S SYNDROME, WITH UNSPECIFIED ORGAN INVOLVEMENT (HCC): Chronic | ICD-10-CM

## 2017-07-25 DIAGNOSIS — I73.00 RAYNAUD'S DISEASE WITHOUT GANGRENE: Chronic | ICD-10-CM

## 2017-07-25 DIAGNOSIS — M32.9 LUPUS ARTHRITIS (HCC): Chronic | ICD-10-CM

## 2017-07-25 DIAGNOSIS — S21.009A INCISIONAL BREAST WOUND: ICD-10-CM

## 2017-07-25 DIAGNOSIS — S21.001S OPEN WOUND OF RIGHT BREAST, SEQUELA: ICD-10-CM

## 2017-07-25 DIAGNOSIS — Z79.52 CURRENT CHRONIC USE OF SYSTEMIC STEROIDS: Chronic | ICD-10-CM

## 2017-07-25 PROCEDURE — G0277 HBOT, FULL BODY CHAMBER, 30M: HCPCS | Performed by: FAMILY MEDICINE

## 2017-07-25 PROCEDURE — 99183 HYPERBARIC OXYGEN THERAPY: CPT | Performed by: FAMILY MEDICINE

## 2017-07-25 ASSESSMENT — ENCOUNTER SYMPTOMS
PALPITATIONS: 0
HEADACHES: 0
SHORTNESS OF BREATH: 0
DIZZINESS: 0
FALLS: 0
CHILLS: 0
DOUBLE VISION: 0
BLURRED VISION: 0
COUGH: 0
FEVER: 0

## 2017-07-25 ASSESSMENT — PAIN SCALES - GENERAL: PAINLEVEL_OUTOF13: 7

## 2017-07-25 NOTE — MR AVS SNAPSHOT
Lupe SAWYER Webb   2017 9:00 AM   Office Visit   MRN: [id number]    Department:  [facility]   Dept Phone:  [phone]    Description:  Female : 1955   Provider:  Moni Ridley M.D.           Allergies as of 2017     Allergen Noted Reactions    Contrast Media With Iodine [Iodine] 2014       Rash, same with topical iodine      Shellfish Allergy 2014   Vomiting    Rash, hives,     Penicillins 2014   Vomiting      You were diagnosed with     Nonhealing surgical wound, subsequent encounter   [662594]       Open wound of right breast, sequela   [361815]       Incisional breast wound   [554453]       Sjogren's syndrome, with unspecified organ involvement (CMS-[facility])   [3955528]       Current chronic use of systemic steroids   [2630423]       Raynaud's disease without gangrene   [1452320]       Lupus arthritis (CMS-[facility])   [575183]         Vital Signs     Blood Pressure Pulse Temperature Oxygen Saturation Smoking Status       104/63 mmHg 50 37 °C (98.6 °F) 9% Never Smoker        Basic Information     Date Of Birth Sex Race Ethnicity Preferred Language    1955 Female White Non- English      Your appointments     2017  9:30 AM   HBOT Supervision with HYPERBARIC CHAMBER 2   [facility] ([facility] [address])    [address]  [place] [address]   [phone]            2017  9:30 AM   HBOT Supervision with HYPERBARIC CHAMBER 2   [facility] ([facility])    [address]  [place] [address]   [phone]            2017  9:30 AM   HBOT Supervision with HYPERBARIC CHAMBER 2   [facility] ([facility])    [address]  [place] [address]   [phone]            2017  9:30 AM   HBOT Supervision with HYPERBARIC CHAMBER 2   [facility] ([facility])    [address]  [place] [address]      [phone]            Aug 01, 2017  9:30 AM   HBOT Supervision with HYPERBARIC CHAMBER 2   [facility] ([facility])    [address]  [place] [address]   [phone]            Aug 02, 2017 10:00 AM   HBOT Supervision with HYPERBARIC CHAMBER 2   [facility] ([facility])    [address]  [place] [address]   [phone]            Aug 03, 2017 10:00 AM   HBOT Supervision with HYPERBARIC CHAMBER 2   [facility] ([facility])    [address]  [place] [address]   [phone]            Aug 04, 2017 10:00 AM   HBOT Supervision with HYPERBARIC CHAMBER 2   [facility] ([facility])    [address]  [place] [address]   [phone]            Aug 07, 2017 10:00 AM   HBOT Supervision with HYPERBARIC CHAMBER 2   [facility] ([address] [place] [address])    [address]  [place] [address]   [phone]            Aug 08, 2017 10:00 AM   HBOT Supervision with HYPERBARIC CHAMBER 2   [facility] ([facility])    [address]  [place] [address]   [phone]            Sep 22, 2017  2:30 PM   US SONOCINE with [facility]  2   [facility] ([address] [place] [address])    [address]  [place] [address]   [phone]           Some exams require specific prep instructions that would have been given to you at time of scheduling. If you have any additional questions about the prep instructions, please call Imaging Scheduling at [phone] and press #[phone].              Problem List              ICD-10-CM Priority Class Noted - Resolved    Sjogren's syndrome (CMS-HCC) (Chronic) M35.00   4/26/1990 - Present    DDD (degenerative disc disease), cervical (Chronic) M50.30   4/26/2017 - Present    Osteoarthritis of multiple joints (Chronic) M15.9   4/26/2017 - Present    Lupus arthritis (CMS-HCC) (Chronic)  M32.9   4/26/2017 - Present    Chronic pain syndrome (Chronic) G89.4   4/26/2017 - Present    Gastroesophageal reflux disease without esophagitis (Chronic) K21.9   4/26/2017 - Present    Lactose intolerance (Chronic) E73.9   4/26/2017 - Present    Raynaud's disease without gangrene (Chronic) I73.00   4/26/2017 - Present    Osteopenia (Chronic) M85.80   4/26/2017 - Present    Functional diarrhea (Chronic) K59.1   4/26/2017 - Present    Current chronic use of systemic steroids (Chronic) Z79.52   4/26/2017 - Present    Incisional breast wound S21.009A   5/15/2017 - Present    Nonhealing surgical wound T81.89XA   6/9/2017 - Present    Open wound of right breast S21.001A   6/26/2017 - Present      Health Maintenance        Date Due Completion Dates    IMM DTaP/Tdap/Td Vaccine (1 - Tdap) 4/1/2019 (Originally 3/28/1974) ---    IMM ZOSTER VACCINE 4/1/2019 (Originally 3/28/2015) ---    IMM INFLUENZA (1) 9/1/2017 9/27/2016    MAMMOGRAM 9/23/2017 9/23/2016    PAP SMEAR 1/2/2020 1/2/2017 (Done)    Override on 1/2/2017: Done    COLONOSCOPY 8/21/2022 8/21/2012            Current Immunizations     Influenza Vaccine Quad Inj (Preserved) 9/27/2016      Below and/or attached are the medications your provider expects you to take. Review all of your home medications and newly ordered medications with your provider and/or pharmacist. Follow medication instructions as directed by your provider and/or pharmacist. Please keep your medication list with you and share with your provider. Update the information when medications are discontinued, doses are changed, or new medications (including over-the-counter products) are added; and carry medication information at all times in the event of emergency situations     Allergies:  CONTRAST MEDIA WITH IODINE - (reactions not documented)     SHELLFISH ALLERGY - Vomiting     PENICILLINS - Vomiting               Medications  Valid as of: July 25, 2017 -  1:29 PM    Generic Name Brand Name Tablet Size  Instructions for use    ALPRAZolam (Tab) XANAX 0.5 MG Take 0.5 Tabs by mouth at bedtime as needed for Sleep.        Ascorbic Acid (Tab) ascorbic acid 500 MG Take 500 mg by mouth every day.        CycloSPORINE   1 Drop by Ophthalmic route 2 Times a Day. Both eyes        Metaxalone (Tab) SKELAXIN 800 MG Take 800 mg by mouth 3 times a day.        Multiple Vitamins-Minerals (Tab) THERAGRAN-M  Take 1 Tab by mouth every day.        Non Formulary Request Non Formulary Request  Biotin        Non Formulary Request Non Formulary Request  Flaxseed oil OTC        non-formulary med non-formulary med  Cranberry extract OTC        PredniSONE (Tab) DELTASONE 1 MG Take 7 mg by mouth every day. 7 pills per day        Sildenafil Citrate (Tab) VIAGRA 25 MG Take 20 mg by mouth every day. Indications: Raynaud's Phenomenon of Unknown Cause        Vitamin E (Cap) VITAMIN E 400 UNIT Take 400 Units by mouth every day.        Zolpidem Tartrate (Tab) AMBIEN 5 MG Take  by mouth at bedtime as needed.        Zonisamide (Cap) ZONEGRAN 100 MG Take 300 mg by mouth every bedtime. Indications: neuropathy        .                 Medicines prescribed today were sent to:     [facility] [place] - [address]    [address] [address]    Phone: [phone] Fax: [phone]    Open 24 Hours?: No      Medication refill instructions:       If your prescription bottle indicates you have medication refills left, it is not necessary to call your provider’s office. Please contact your pharmacy and they will refill your medication.    If your prescription bottle indicates you do not have any refills left, you may request refills at any time through one of the following ways: The online [employer] system (except Urgent Care), by calling your provider’s office, or by asking your pharmacy to contact your provider’s office with a refill request. Medication refills are processed only during regular business hours and may not be available until  the next business day. Your provider may request additional information or to have a follow-up visit with you prior to refilling your medication.   *Please Note: Medication refills are assigned a new Rx number when refilled electronically. Your pharmacy may indicate that no refills were authorized even though a new prescription for the same medication is available at the pharmacy. Please request the medicine by name with the pharmacy before contacting your provider for a refill.        Instructions    After hyperbaric oxygen therapy treatment, it is normal to experience some congestion to the ears, muffling of sounds, or abnormal sounds to one or both ears.    If you experience pain or discomfort to one or both ears that does not resolve spontaneously, please contact the hyperbaric department at [phone].         Other Notes About Your Plan     Sjogren's specialist in [place]  Rheum: Dr. Inessa Cruz Parker  Pain: Dr. Shankar (not actively seeing)  GYN: Dr. Sierra  GI: Dr. Clemente  PT: [facility]           MyChart Access Code: Activation code not generated  Current MyChart Status: Active

## 2017-07-25 NOTE — PATIENT INSTRUCTIONS
After hyperbaric oxygen therapy treatment, it is normal to experience some congestion to the ears, muffling of sounds, or abnormal sounds to one or both ears.    If you experience pain or discomfort to one or both ears that does not resolve spontaneously, please contact the hyperbaric department at [phone].

## 2017-07-25 NOTE — PROGRESS NOTES
Subjective:   Lupe Webb is a 62 y.o. female who presents with a nonhealing breast wound.    HPI  Lupe presents for HBOT today.   Her wound continues to be closed.     She denies any ear pain or pressure.     Review of Systems   Constitutional: Positive for malaise/fatigue. Negative for fever and chills.   HENT: Negative for ear discharge, ear pain and hearing loss.    Eyes: Negative for blurred vision and double vision.   Respiratory: Negative for cough and shortness of breath.    Cardiovascular: Negative for chest pain and palpitations.   Musculoskeletal: Negative for falls.   Neurological: Negative for dizziness and headaches.          Objective:   Blood pressure 104/63, pulse 50, temperature 37 °C (98.6 °F), SpO2 9 %.     Physical Exam   Constitutional: She is oriented to person, place, and time. She appears well-developed and well-nourished.   HENT:   Head: Normocephalic and atraumatic.   Right Ear: Tympanic membrane, external ear and ear canal normal.   Left Ear: Tympanic membrane, external ear and ear canal normal.   Pulmonary/Chest: Effort normal.   Neurological: She is alert and oriented to person, place, and time.   Psychiatric: She has a normal mood and affect.   Vitals reviewed.      Assessment/Plan:     Nonhealing surgical wound, subsequent encounter   Appropriate candidate for HBOT - likely that the patient's wound healing is impeded by a multitude of factors,   including vasculitis from Sjogren's / Raynaud's / Lupus, as well as her chronic use of Methotrexate and Prednisone,   in addition to her age, postmenopausal status, and prior surgery & scar to the wound site. HBOT should be   beneficial for her wound healing in terms of it's angiogenic and anti-vasculitic properties. Have recommended   to the patient that she d/c MTX and continue to decrease prednisone, per instructions from Dr. Cruz.   Patient completed her 30th HBOT treatment at a pressure of 2.4 BRISEIDA x 90 minutes at a descent/ascent of  1.5/3.0 PSI/minute   Events   6/26 - Redo excision of non-healing wound and advancement of flap by Dr. Garcia   6/28- no treatment today as heavily bandaged s/p surgery on 6/26.   7/7 - Off methotrexate   2. Incisional breast wound   Per Dr. Garcia, s/p Bilateral Saline-->Silicone breast implant Re-do 11/2/16 with recurrent non-healing surgical wound   3. Sjogren's syndrome, with unspecified organ involvement (CMS-HCC)   Off Methotrexate and On Prednisone per Dr. Cruz   4. Raynaud's disease without gangrene   Off Methotrexate and On Prednisone per Dr. Cruz   5. Lupus arthritis (CMS-HCC)   Off Methotrexate and On Prednisone per Dr. Cruz   6. Current chronic use of systemic steroids   On Methotrexate and Prednisone per Dr. Cruz   7. Dry mouth   8. DDD (degenerative disc disease), cervical   9. Gastroesophageal reflux disease without esophagitis

## 2017-07-26 ENCOUNTER — OFFICE VISIT (OUTPATIENT)
Dept: WOUND CARE | Facility: MEDICAL CENTER | Age: 62
End: 2017-07-26
Attending: FAMILY MEDICINE
Payer: MEDICARE

## 2017-07-26 VITALS
SYSTOLIC BLOOD PRESSURE: 104 MMHG | HEART RATE: 50 BPM | DIASTOLIC BLOOD PRESSURE: 63 MMHG | OXYGEN SATURATION: 99 % | TEMPERATURE: 98.6 F

## 2017-07-26 VITALS
HEART RATE: 51 BPM | TEMPERATURE: 98 F | SYSTOLIC BLOOD PRESSURE: 124 MMHG | DIASTOLIC BLOOD PRESSURE: 65 MMHG | OXYGEN SATURATION: 99 %

## 2017-07-26 DIAGNOSIS — Z79.52 CURRENT CHRONIC USE OF SYSTEMIC STEROIDS: Chronic | ICD-10-CM

## 2017-07-26 DIAGNOSIS — T81.89XD NONHEALING SURGICAL WOUND, SUBSEQUENT ENCOUNTER: ICD-10-CM

## 2017-07-26 DIAGNOSIS — I73.00 RAYNAUD'S DISEASE WITHOUT GANGRENE: Chronic | ICD-10-CM

## 2017-07-26 DIAGNOSIS — S21.009A INCISIONAL BREAST WOUND: ICD-10-CM

## 2017-07-26 DIAGNOSIS — G89.4 CHRONIC PAIN SYNDROME: Chronic | ICD-10-CM

## 2017-07-26 DIAGNOSIS — S21.001S OPEN WOUND OF RIGHT BREAST, SEQUELA: ICD-10-CM

## 2017-07-26 DIAGNOSIS — M35.00 SJOGREN'S SYNDROME, WITH UNSPECIFIED ORGAN INVOLVEMENT (HCC): Chronic | ICD-10-CM

## 2017-07-26 DIAGNOSIS — M15.9 OSTEOARTHRITIS OF MULTIPLE JOINTS, UNSPECIFIED OSTEOARTHRITIS TYPE: Chronic | ICD-10-CM

## 2017-07-26 DIAGNOSIS — K21.9 GASTROESOPHAGEAL REFLUX DISEASE WITHOUT ESOPHAGITIS: Chronic | ICD-10-CM

## 2017-07-26 DIAGNOSIS — M32.9 LUPUS ARTHRITIS (HCC): Chronic | ICD-10-CM

## 2017-07-26 PROCEDURE — 99183 HYPERBARIC OXYGEN THERAPY: CPT | Performed by: FAMILY MEDICINE

## 2017-07-26 PROCEDURE — G0277 HBOT, FULL BODY CHAMBER, 30M: HCPCS | Performed by: FAMILY MEDICINE

## 2017-07-26 ASSESSMENT — ENCOUNTER SYMPTOMS
CHILLS: 0
COUGH: 0
FEVER: 0
FALLS: 0
PALPITATIONS: 0
BLURRED VISION: 0
DEPRESSION: 0
HEADACHES: 0
DOUBLE VISION: 0
SHORTNESS OF BREATH: 0

## 2017-07-26 ASSESSMENT — PAIN SCALES - GENERAL: PAINLEVEL_OUTOF13: 7

## 2017-07-26 NOTE — PATIENT INSTRUCTIONS
After hyperbaric oxygen therapy treatment, it is normal to experience some congestion to the ears, muffling of sounds, or abnormal sounds to one or both ears.    If you experience pain or discomfort to one or both ears that does not resolve spontaneously, please contact the hyperbaric department at [phone].

## 2017-07-26 NOTE — PROGRESS NOTES
Subjective:   Lupe Webb is a 62 y.o. female who presents with a nonhealing breast wound.    HPI  Lupe presents for HBOT today.     Her wound continues to be closed.     She is back on Methotrexate but not feeling   the full effects of the medication yet.    She denies any ear pain or pressure.     Review of Systems   Constitutional: Positive for malaise/fatigue. Negative for fever and chills.   HENT: Negative for ear discharge, ear pain and hearing loss.    Eyes: Negative for blurred vision and double vision.   Respiratory: Negative for cough and shortness of breath.    Cardiovascular: Negative for chest pain and palpitations.   Musculoskeletal: Positive for joint pain. Negative for falls.   Neurological: Negative for headaches.   Psychiatric/Behavioral: Negative for depression.          Objective:     /65 mmHg  Pulse 51  Temp(Src) 36.7 °C (98 °F)  SpO2 99%     Physical Exam   Constitutional: She is oriented to person, place, and time. She appears well-developed and well-nourished.   HENT:   Head: Normocephalic and atraumatic.   Right Ear: Tympanic membrane, external ear and ear canal normal.   Left Ear: Tympanic membrane, external ear and ear canal normal.   Pulmonary/Chest: Effort normal.   Neurological: She is alert and oriented to person, place, and time.   Psychiatric: She has a normal mood and affect.   Vitals reviewed.        Assessment/Plan:   Nonhealing surgical wound, subsequent encounter   Appropriate candidate for HBOT - likely that the patient's wound healing is impeded by a multitude of factors,   including vasculitis from Sjogren's / Raynaud's / Lupus, as well as her chronic use of Methotrexate and Prednisone,   in addition to her age, postmenopausal status, and prior surgery & scar to the wound site. HBOT should be   beneficial for her wound healing in terms of it's angiogenic and anti-vasculitic properties. Have recommended   to the patient that she d/c MTX and continue to decrease  prednisone, per instructions from Dr. Cruz.   Patient completed her 31st HBOT treatment at a pressure of 2.4 BRISEIDA x 90 minutes at a descent/ascent of 1.5/3.0 PSI/minute     Events   6/26 - Redo excision of non-healing wound and advancement of flap by Dr. Garcia   6/28- no treatment today as heavily bandaged s/p surgery on 6/26.   7/7 - Off methotrexate   2. Incisional breast wound   Per Dr. Garcia, s/p Bilateral Saline-->Silicone breast implant Re-do 11/2/16 with recurrent non-healing surgical wound   3. Sjogren's syndrome, with unspecified organ involvement (CMS-HCC)   Off Methotrexate and On Prednisone per Dr. Cruz   4. Raynaud's disease without gangrene   Off Methotrexate and On Prednisone per Dr. Cruz   5. Lupus arthritis (CMS-HCC)   Off Methotrexate and On Prednisone per Dr. Cruz   6. Current chronic use of systemic steroids   On Methotrexate and Prednisone per Dr. Cruz   7. Dry mouth   8. DDD (degenerative disc disease), cervical   9. Gastroesophageal reflux disease without esophagitis

## 2017-07-26 NOTE — MR AVS SNAPSHOT
Lupe SAWYER Webb   2017 9:30 AM   Office Visit   MRN: [id number]    Department:  [facility]   Dept Phone:  [phone]    Description:  Female : 1955   Provider:  Moni Ridley M.D.           Allergies as of 2017     Allergen Noted Reactions    Contrast Media With Iodine [Iodine] 2014       Rash, same with topical iodine      Shellfish Allergy 2014   Vomiting    Rash, hives,     Penicillins 2014   Vomiting      You were diagnosed with     Nonhealing surgical wound, subsequent encounter   [056345]       Open wound of right breast, sequela   [060139]       Incisional breast wound   [852856]       Sjogren's syndrome, with unspecified organ involvement (CMS-HCC)   [1378443]       Current chronic use of systemic steroids   [9729380]       Raynaud's disease without gangrene   [0501241]       Gastroesophageal reflux disease without esophagitis   [759601]       Chronic pain syndrome   [338.4.ICD-9-CM]       Lupus arthritis (CMS-HCC)   [213557]       Osteoarthritis of multiple joints, unspecified osteoarthritis type   [5329932]         Vital Signs     Blood Pressure Pulse Temperature Oxygen Saturation Smoking Status       124/65 mmHg 51 36.7 °C (98 °F) 99% Never Smoker        Basic Information     Date Of Birth Sex Race Ethnicity Preferred Language    1955 Female White Non- English      Your appointments     2017  9:30 AM   HBOT Supervision with HYPERBARIC CHAMBER 2   [facility] ([facility])    [address]  [facility] [address]   [phone]            2017  9:30 AM   HBOT Supervision with HYPERBARIC CHAMBER 2   [facility] ([facility])    [address]  [place] [address]   [phone]            2017  9:30 AM   HBOT Supervision with HYPERBARIC CHAMBER 2   [facility] ([facility])    [address]  [place] [address]     [phone]            Aug 01, 2017  9:30 AM   HBOT Supervision with HYPERBARIC CHAMBER 2   [facility] ([facility])    [address]  [place] [address]   [phone]            Aug 02, 2017 10:00 AM   HBOT Supervision with HYPERBARIC CHAMBER 2   [facility] ([facility])    [address]  [place] [address]   [phone]            Aug 03, 2017 10:00 AM   HBOT Supervision with HYPERBARIC CHAMBER 2   [facility] ([facility])    [address]  [place] [address]   [phone]            Aug 04, 2017 10:00 AM   HBOT Supervision with HYPERBARIC CHAMBER 2   [facility] ([facility])    [address]  [place] [address]   [phone]            Aug 07, 2017 10:00 AM   HBOT Supervision with HYPERBARIC CHAMBER 2   [facility] ([address] [place] [address])    [address]  [place] [address]   [phone]            Aug 08, 2017 10:00 AM   HBOT Supervision with HYPERBARIC CHAMBER 2   [facility] ([facility])    [address]  [place] [address]   [phone]            Sep 22, 2017  2:30 PM   US SONOCINE with [facility]  2   [facility] ([address] [place] [address])    [address]  [place] [address]   [phone]           Some exams require specific prep instructions that would have been given to you at time of scheduling. If you have any additional questions about the prep instructions, please call Imaging Scheduling at [phone] and press #2.              Problem List              ICD-10-CM Priority Class Noted - Resolved    Sjogren's syndrome (CMS-HCC) (Chronic) M35.00   4/26/1990 - Present    DDD (degenerative disc disease), cervical (Chronic) M50.30   4/26/2017 - Present    Osteoarthritis of multiple joints (Chronic) M15.9   4/26/2017 - Present    Lupus arthritis (CMS-HCC) (Chronic)  M32.9   4/26/2017 - Present    Chronic pain syndrome (Chronic) G89.4   4/26/2017 - Present    Gastroesophageal reflux disease without esophagitis (Chronic) K21.9   4/26/2017 - Present    Lactose intolerance (Chronic) E73.9   4/26/2017 - Present    Raynaud's disease without gangrene (Chronic) I73.00   4/26/2017 - Present    Osteopenia (Chronic) M85.80   4/26/2017 - Present    Functional diarrhea (Chronic) K59.1   4/26/2017 - Present    Current chronic use of systemic steroids (Chronic) Z79.52   4/26/2017 - Present    Incisional breast wound S21.009A   5/15/2017 - Present    Nonhealing surgical wound T81.89XA   6/9/2017 - Present    Open wound of right breast S21.001A   6/26/2017 - Present      Health Maintenance        Date Due Completion Dates    IMM DTaP/Tdap/Td Vaccine (1 - Tdap) 4/1/2019 (Originally 3/28/1974) ---    IMM ZOSTER VACCINE 4/1/2019 (Originally 3/28/2015) ---    IMM INFLUENZA (1) 9/1/2017 9/27/2016    MAMMOGRAM 9/23/2017 9/23/2016    PAP SMEAR 1/2/2020 1/2/2017 (Done)    Override on 1/2/2017: Done    COLONOSCOPY 8/21/2022 8/21/2012            Current Immunizations     Influenza Vaccine Quad Inj (Preserved) 9/27/2016      Below and/or attached are the medications your provider expects you to take. Review all of your home medications and newly ordered medications with your provider and/or pharmacist. Follow medication instructions as directed by your provider and/or pharmacist. Please keep your medication list with you and share with your provider. Update the information when medications are discontinued, doses are changed, or new medications (including over-the-counter products) are added; and carry medication information at all times in the event of emergency situations     Allergies:  CONTRAST MEDIA WITH IODINE - (reactions not documented)     SHELLFISH ALLERGY - Vomiting     PENICILLINS - Vomiting               Medications  Valid as of: July 26, 2017 - 11:27 AM    Generic Name Brand Name Tablet Size  Instructions for use    ALPRAZolam (Tab) XANAX 0.5 MG Take 0.5 Tabs by mouth at bedtime as needed for Sleep.        Ascorbic Acid (Tab) ascorbic acid 500 MG Take 500 mg by mouth every day.        CycloSPORINE   1 Drop by Ophthalmic route 2 Times a Day. Both eyes        Metaxalone (Tab) SKELAXIN 800 MG Take 800 mg by mouth 3 times a day.        Multiple Vitamins-Minerals (Tab) THERAGRAN-M  Take 1 Tab by mouth every day.        Non Formulary Request Non Formulary Request  Biotin        Non Formulary Request Non Formulary Request  Flaxseed oil OTC        non-formulary med non-formulary med  Cranberry extract OTC        PredniSONE (Tab) DELTASONE 1 MG Take 7 mg by mouth every day. 7 pills per day        Sildenafil Citrate (Tab) VIAGRA 25 MG Take 20 mg by mouth every day. Indications: Raynaud's Phenomenon of Unknown Cause        Vitamin E (Cap) VITAMIN E 400 UNIT Take 400 Units by mouth every day.        Zolpidem Tartrate (Tab) AMBIEN 5 MG Take  by mouth at bedtime as needed.        Zonisamide (Cap) ZONEGRAN 100 MG Take 300 mg by mouth every bedtime. Indications: neuropathy        .                 Medicines prescribed today were sent to:     [facility] [place] - [address]    [address] [address]    Phone: [phone] Fax: [phone]    Open 24 Hours?: No      Medication refill instructions:       If your prescription bottle indicates you have medication refills left, it is not necessary to call your provider’s office. Please contact your pharmacy and they will refill your medication.    If your prescription bottle indicates you do not have any refills left, you may request refills at any time through one of the following ways: The online [employer] system (except Urgent Care), by calling your provider’s office, or by asking your pharmacy to contact your provider’s office with a refill request. Medication refills are processed only during regular business hours and may not be available until  the next business day. Your provider may request additional information or to have a follow-up visit with you prior to refilling your medication.   *Please Note: Medication refills are assigned a new Rx number when refilled electronically. Your pharmacy may indicate that no refills were authorized even though a new prescription for the same medication is available at the pharmacy. Please request the medicine by name with the pharmacy before contacting your provider for a refill.        Instructions    After hyperbaric oxygen therapy treatment, it is normal to experience some congestion to the ears, muffling of sounds, or abnormal sounds to one or both ears.    If you experience pain or discomfort to one or both ears that does not resolve spontaneously, please contact the hyperbaric department at [phone].         Other Notes About Your Plan     Sjogren's specialist in [place]  Rheum: Dr. Inessa Cruz [place]  Pain: Dr. Shankar (not actively seeing)  GYN: Dr. Sierra  GI: Dr. Clemente  PT: [facility]           MyChart Access Code: Activation code not generated  Current MyChart Status: Active

## 2017-07-27 ENCOUNTER — OFFICE VISIT (OUTPATIENT)
Dept: WOUND CARE | Facility: MEDICAL CENTER | Age: 62
End: 2017-07-27
Attending: FAMILY MEDICINE
Payer: MEDICARE

## 2017-07-27 VITALS
TEMPERATURE: 97.7 F | HEART RATE: 50 BPM | DIASTOLIC BLOOD PRESSURE: 67 MMHG | SYSTOLIC BLOOD PRESSURE: 109 MMHG | OXYGEN SATURATION: 100 %

## 2017-07-27 DIAGNOSIS — S21.009A INCISIONAL BREAST WOUND: ICD-10-CM

## 2017-07-27 DIAGNOSIS — T81.89XD NONHEALING SURGICAL WOUND, SUBSEQUENT ENCOUNTER: ICD-10-CM

## 2017-07-27 DIAGNOSIS — Z79.52 CURRENT CHRONIC USE OF SYSTEMIC STEROIDS: Chronic | ICD-10-CM

## 2017-07-27 DIAGNOSIS — I73.00 RAYNAUD'S DISEASE WITHOUT GANGRENE: Chronic | ICD-10-CM

## 2017-07-27 DIAGNOSIS — G89.4 CHRONIC PAIN SYNDROME: Chronic | ICD-10-CM

## 2017-07-27 DIAGNOSIS — S21.001S OPEN WOUND OF RIGHT BREAST, SEQUELA: ICD-10-CM

## 2017-07-27 DIAGNOSIS — M35.00 SJOGREN'S SYNDROME, WITH UNSPECIFIED ORGAN INVOLVEMENT (HCC): Chronic | ICD-10-CM

## 2017-07-27 DIAGNOSIS — M15.9 OSTEOARTHRITIS OF MULTIPLE JOINTS, UNSPECIFIED OSTEOARTHRITIS TYPE: Chronic | ICD-10-CM

## 2017-07-27 DIAGNOSIS — M32.9 LUPUS ARTHRITIS (HCC): Chronic | ICD-10-CM

## 2017-07-27 PROCEDURE — G0277 HBOT, FULL BODY CHAMBER, 30M: HCPCS | Performed by: FAMILY MEDICINE

## 2017-07-27 PROCEDURE — 99183 HYPERBARIC OXYGEN THERAPY: CPT | Performed by: FAMILY MEDICINE

## 2017-07-27 ASSESSMENT — ENCOUNTER SYMPTOMS
DEPRESSION: 0
DOUBLE VISION: 0
PALPITATIONS: 0
HEADACHES: 0
FEVER: 0
CHILLS: 0
FALLS: 0
SHORTNESS OF BREATH: 0
COUGH: 0

## 2017-07-27 ASSESSMENT — PAIN SCALES - GENERAL: PAINLEVEL_OUTOF13: 7

## 2017-07-27 NOTE — MR AVS SNAPSHOT
Lupe SAWYER Webb   2017 9:00 AM   Office Visit   MRN: [id number]    Department:  [facility] Ther   Dept Phone:  [phone]    Description:  Female : 1955   Provider:  Moni Ridley M.D.           Allergies as of 2017     Allergen Noted Reactions    Contrast Media With Iodine [Iodine] 2014       Rash, same with topical iodine      Shellfish Allergy 2014   Vomiting    Rash, hives,     Penicillins 2014   Vomiting      You were diagnosed with     Nonhealing surgical wound, subsequent encounter   [948960]       Incisional breast wound   [668024]       Open wound of right breast, sequela   [659486]       Sjogren's syndrome, with unspecified organ involvement (CMS-[facility])   [1521483]       Current chronic use of systemic steroids   [2116222]       Raynaud's disease without gangrene   [9639829]       Chronic pain syndrome   [338.4.ICD-9-CM]       Lupus arthritis (CMS-[facility])   [221685]       Osteoarthritis of multiple joints, unspecified osteoarthritis type   [3362574]         Vital Signs     Blood Pressure Pulse Temperature Oxygen Saturation Smoking Status       109/67 mmHg 50 36.5 °C (97.7 °F) 100% Never Smoker        Basic Information     Date Of Birth Sex Race Ethnicity Preferred Language    1955 Female White Non- English      Your appointments     2017  9:00 AM   HBOT Supervision with HYPERBARIC CHAMBER 2   [facility] ([address] [place] [address])    [address]  [place] [address]   [phone]            2017  9:00 AM   HBOT Supervision with HYPERBARIC CHAMBER 2   [facility] ([address] [place] [address])    [address]  [place] [address]   [phone]            Aug 01, 2017  9:00 AM   HBOT Supervision with HYPERBARIC CHAMBER 2   [facility] ([address] [place] [address])    [address]  [place] [address]   [phone]            Aug 02, 2017  9:00 AM   HBOT Supervision  with HYPERBARIC CHAMBER 2   [facility] ([facility])    [address]  [place] [address]   [phone]            Aug 03, 2017  9:00 AM   HBOT Supervision with HYPERBARIC CHAMBER 2   [facility] ([facility])    [address]  [place] [address]   [phone]            Aug 04, 2017  9:00 AM   HBOT Supervision with HYPERBARIC CHAMBER 2   [facility] ([facility])    [address]  [place] [address]   [phone]            Aug 07, 2017  9:00 AM   HBOT Supervision with HYPERBARIC CHAMBER 2   [facility] ([facility])    [address]  [place] [address]   [phone]            Aug 08, 2017  9:00 AM   HBOT Supervision with HYPERBARIC CHAMBER 2   [facility] ([address] [place] [address])    [address]  [place] [address]   [phone]            Sep 22, 2017  2:30 PM   US SONOCINE with [facility] US 2   [facility] ([address] [facility] [address])    [address]  [place] [address]   [phone]           Some exams require specific prep instructions that would have been given to you at time of scheduling. If you have any additional questions about the prep instructions, please call Imaging Scheduling at [phone] and press #2.              Problem List              ICD-10-CM Priority Class Noted - Resolved    Sjogren's syndrome (CMS-HCC) (Chronic) M35.00   4/26/1990 - Present    DDD (degenerative disc disease), cervical (Chronic) M50.30   4/26/2017 - Present    Osteoarthritis of multiple joints (Chronic) M15.9   4/26/2017 - Present    Lupus arthritis (CMS-HCC) (Chronic) M32.9   4/26/2017 - Present    Chronic pain syndrome (Chronic) G89.4   4/26/2017 - Present    Gastroesophageal reflux disease without esophagitis (Chronic) K21.9   4/26/2017 - Present    Lactose intolerance (Chronic) E73.9   4/26/2017 - Present    Raynaud's  disease without gangrene (Chronic) I73.00   4/26/2017 - Present    Osteopenia (Chronic) M85.80   4/26/2017 - Present    Functional diarrhea (Chronic) K59.1   4/26/2017 - Present    Current chronic use of systemic steroids (Chronic) Z79.52   4/26/2017 - Present    Incisional breast wound S21.009A   5/15/2017 - Present    Nonhealing surgical wound T81.89XA   6/9/2017 - Present    Open wound of right breast S21.001A   6/26/2017 - Present      Health Maintenance        Date Due Completion Dates    IMM DTaP/Tdap/Td Vaccine (1 - Tdap) 4/1/2019 (Originally 3/28/1974) ---    IMM ZOSTER VACCINE 4/1/2019 (Originally 3/28/2015) ---    IMM INFLUENZA (1) 9/1/2017 9/27/2016    MAMMOGRAM 9/23/2017 9/23/2016    PAP SMEAR 1/2/2020 1/2/2017 (Done)    Override on 1/2/2017: Done    COLONOSCOPY 8/21/2022 8/21/2012            Current Immunizations     Influenza Vaccine Quad Inj (Preserved) 9/27/2016      Below and/or attached are the medications your provider expects you to take. Review all of your home medications and newly ordered medications with your provider and/or pharmacist. Follow medication instructions as directed by your provider and/or pharmacist. Please keep your medication list with you and share with your provider. Update the information when medications are discontinued, doses are changed, or new medications (including over-the-counter products) are added; and carry medication information at all times in the event of emergency situations     Allergies:  CONTRAST MEDIA WITH IODINE - (reactions not documented)     SHELLFISH ALLERGY - Vomiting     PENICILLINS - Vomiting               Medications  Valid as of: July 27, 2017 - 10:13 AM    Generic Name Brand Name Tablet Size Instructions for use    ALPRAZolam (Tab) XANAX 0.5 MG Take 0.5 Tabs by mouth at bedtime as needed for Sleep.        Ascorbic Acid (Tab) ascorbic acid 500 MG Take 500 mg by mouth every day.        CycloSPORINE   1 Drop by Ophthalmic route 2 Times a Day. Both  eyes        Metaxalone (Tab) SKELAXIN 800 MG Take 800 mg by mouth 3 times a day.        Multiple Vitamins-Minerals (Tab) THERAGRAN-M  Take 1 Tab by mouth every day.        Non Formulary Request Non Formulary Request  Biotin        Non Formulary Request Non Formulary Request  Flaxseed oil OTC        non-formulary med non-formulary med  Cranberry extract OTC        PredniSONE (Tab) DELTASONE 1 MG Take 7 mg by mouth every day. 7 pills per day        Sildenafil Citrate (Tab) VIAGRA 25 MG Take 20 mg by mouth every day. Indications: Raynaud's Phenomenon of Unknown Cause        Vitamin E (Cap) VITAMIN E 400 UNIT Take 400 Units by mouth every day.        Zolpidem Tartrate (Tab) AMBIEN 5 MG Take  by mouth at bedtime as needed.        Zonisamide (Cap) ZONEGRAN 100 MG Take 300 mg by mouth every bedtime. Indications: neuropathy        .                 Medicines prescribed today were sent to:     [facility] - [place], [place] - [address]    [address] [address]    Phone: [phone] Fax: [phone]    Open 24 Hours?: No      Medication refill instructions:       If your prescription bottle indicates you have medication refills left, it is not necessary to call your provider’s office. Please contact your pharmacy and they will refill your medication.    If your prescription bottle indicates you do not have any refills left, you may request refills at any time through one of the following ways: The online [email or web address] system (except Urgent Care), by calling your provider’s office, or by asking your pharmacy to contact your provider’s office with a refill request. Medication refills are processed only during regular business hours and may not be available until the next business day. Your provider may request additional information or to have a follow-up visit with you prior to refilling your medication.   *Please Note: Medication refills are assigned a new Rx number when refilled electronically. Your pharmacy may  indicate that no refills were authorized even though a new prescription for the same medication is available at the pharmacy. Please request the medicine by name with the pharmacy before contacting your provider for a refill.        Instructions    After hyperbaric oxygen therapy treatment, it is normal to experience some congestion to the ears, muffling of sounds, or abnormal sounds to one or both ears.    If you experience pain or discomfort to one or both ears that does not resolve spontaneously, please contact the hyperbaric department at [phone].         Other Notes About Your Plan     Sjogren's specialist in [place]  Rheum: Dr. Inessa Cruz [place]  Pain: Dr. Shankar (not actively seeing)  GYN: Dr. Sierra  GI: Dr. Clemente  PT: [facility]           MyChart Access Code: Activation code not generated  Current MyChart Status: Active

## 2017-07-27 NOTE — PATIENT INSTRUCTIONS
After hyperbaric oxygen therapy treatment, it is normal to experience some congestion to the ears, muffling of sounds, or abnormal sounds to one or both ears.    If you experience pain or discomfort to one or both ears that does not resolve spontaneously, please contact the hyperbaric department at [phone].

## 2017-07-27 NOTE — PROGRESS NOTES
Subjective:   Lupe Webb is a 62 y.o. female who presents with a nonhealing breast wound.    HPI  Lupe presents for HBOT today.     Her wound continues to be closed.     She is back on Methotrexate but not feeling   the full effects of the medication yet.     She denies any ear pain or pressure.     Review of Systems   Constitutional: Positive for malaise/fatigue. Negative for fever and chills.   HENT: Negative for ear discharge, ear pain and hearing loss.    Eyes: Negative for double vision.   Respiratory: Negative for cough and shortness of breath.    Cardiovascular: Negative for chest pain and palpitations.   Musculoskeletal: Positive for joint pain. Negative for falls.   Neurological: Negative for headaches.   Psychiatric/Behavioral: Negative for depression.          Objective:     /67 mmHg  Pulse 50  Temp(Src) 36.5 °C (97.7 °F)  SpO2 100%     Physical Exam   HENT:   Right Ear: Tympanic membrane and ear canal normal.   Left Ear: Tympanic membrane and ear canal normal.   Psychiatric: She has a normal mood and affect.               Assessment/Plan:   Nonhealing surgical wound  Appropriate candidate for HBOT - likely that the patient's wound healing is impeded by a multitude of factors,   including vasculitis from Sjogren's / Raynaud's / Lupus, as well as her chronic use of Methotrexate and Prednisone,   in addition to her age, postmenopausal status, and prior surgery & scar to the wound site. HBOT should be   beneficial for her wound healing in terms of it's angiogenic and anti-vasculitic properties. Have recommended   to the patient that she d/c MTX and continue to decrease prednisone, per instructions from Dr. Cruz.   Patient completed her 32nd HBOT treatment at a pressure of 2.4 BRISEIDA x 90 minutes at a descent/ascent of 1.5/3.0 PSI/minute   Events   6/26 - Redo excision of non-healing wound and advancement of flap by Dr. Garcia   6/28- no treatment today as heavily bandaged s/p surgery on 6/26.    7/7 - Off methotrexate   2. Incisional breast wound   Per Dr. Garcia, s/p Bilateral Saline-->Silicone breast implant Re-do 11/2/16 with recurrent non-healing surgical wound   3. Sjogren's syndrome, with unspecified organ involvement (CMS-HCC)   Off Methotrexate and On Prednisone per Dr. Cruz   4. Raynaud's disease without gangrene   Off Methotrexate and On Prednisone per Dr. Cruz   5. Lupus arthritis (CMS-HCC)   Off Methotrexate and On Prednisone per Dr. Cruz   6. Current chronic use of systemic steroids   On Methotrexate and Prednisone per Dr. Cruz   7. Dry mouth   8. DDD (degenerative disc disease), cervical   9. Gastroesophageal reflux disease without esophagitis

## 2017-07-28 ENCOUNTER — NON-PROVIDER VISIT (OUTPATIENT)
Dept: WOUND CARE | Facility: MEDICAL CENTER | Age: 62
End: 2017-07-28
Attending: FAMILY MEDICINE
Payer: MEDICARE

## 2017-07-28 VITALS
SYSTOLIC BLOOD PRESSURE: 139 MMHG | HEART RATE: 50 BPM | DIASTOLIC BLOOD PRESSURE: 73 MMHG | OXYGEN SATURATION: 99 % | TEMPERATURE: 98 F

## 2017-07-28 DIAGNOSIS — T81.89XD NONHEALING SURGICAL WOUND, SUBSEQUENT ENCOUNTER: ICD-10-CM

## 2017-07-28 PROCEDURE — 99999 PR NO CHARGE: CPT | Performed by: SURGERY

## 2017-07-28 PROCEDURE — G0277 HBOT, FULL BODY CHAMBER, 30M: HCPCS | Performed by: SURGERY

## 2017-07-28 ASSESSMENT — ENCOUNTER SYMPTOMS
DEPRESSION: 0
HEADACHES: 0
CHILLS: 0
FALLS: 0
FEVER: 0
PALPITATIONS: 0
COUGH: 0
DOUBLE VISION: 0
SHORTNESS OF BREATH: 0

## 2017-07-28 ASSESSMENT — PAIN SCALES - GENERAL: PAINLEVEL_OUTOF13: 7

## 2017-07-28 NOTE — PROGRESS NOTES
Subjective:   Lupe Webb is a 62 y.o. female who presents with a nonhealing breast wound.    HPI    Lupe presents again for HBOT. Her wound is closed.     She is back on Methotrexate but not feeling   the full effects of the medication yet.     She denies ear pain or pressure.     Review of Systems   Constitutional: Positive for malaise/fatigue. Negative for fever and chills.   HENT: Negative for ear discharge, ear pain and hearing loss.    Eyes: Negative for double vision.   Respiratory: Negative for cough and shortness of breath.    Cardiovascular: Negative for chest pain and palpitations.   Musculoskeletal: Positive for joint pain. Negative for falls.   Neurological: Negative for headaches.   Psychiatric/Behavioral: Negative for depression.          Objective:     /73 mmHg  Pulse 50  Temp(Src) 36.7 °C (98 °F)  SpO2 99%     Physical Exam   HENT:   Right Ear: Tympanic membrane and ear canal normal.   Left Ear: Tympanic membrane and ear canal normal.   Psychiatric: She has a normal mood and affect.          Assessment/Plan:   Nonhealing surgical wound  Appropriate candidate for HBOT - likely that the patient's wound healing is impeded by a multitude of factors,   including vasculitis from Sjogren's / Raynaud's / Lupus, as well as her chronic use of Methotrexate and Prednisone,   in addition to her age, postmenopausal status, and prior surgery & scar to the wound site. HBOT should be   beneficial for her wound healing in terms of it's angiogenic and anti-vasculitic properties. Have recommended   to the patient that she d/c MTX and continue to decrease prednisone, per instructions from Dr. Cruz.   Patient completed her 33rd HBOT treatment at a pressure of 2.4 BRISEIDA x 90 minutes at a descent/ascent of 1.5/3.0 PSI/minute   Events   6/26 - Redo excision of non-healing wound and advancement of flap by Dr. Garcia   6/28- no treatment today as heavily bandaged s/p surgery on 6/26.   7/7 - Off methotrexate   2.  Incisional breast wound   Per Dr. Garcia, s/p Bilateral Saline-->Silicone breast implant Re-do 11/2/16 with recurrent non-healing surgical wound   3. Sjogren's syndrome, with unspecified organ involvement (CMS-HCC)   Off Methotrexate and On Prednisone per Dr. Cruz   4. Raynaud's disease without gangrene   Off Methotrexate and On Prednisone per Dr. Cruz   5. Lupus arthritis (CMS-HCC)   Off Methotrexate and On Prednisone per Dr. Cruz   6. Current chronic use of systemic steroids   On Methotrexate and Prednisone per Dr. Cruz   7. Dry mouth   8. DDD (degenerative disc disease), cervical   9. Gastroesophageal reflux disease without esophagitis

## 2017-07-31 ENCOUNTER — OFFICE VISIT (OUTPATIENT)
Dept: WOUND CARE | Facility: MEDICAL CENTER | Age: 62
End: 2017-07-31
Attending: FAMILY MEDICINE
Payer: MEDICARE

## 2017-07-31 VITALS
HEART RATE: 53 BPM | SYSTOLIC BLOOD PRESSURE: 118 MMHG | TEMPERATURE: 97.7 F | DIASTOLIC BLOOD PRESSURE: 64 MMHG | OXYGEN SATURATION: 100 %

## 2017-07-31 DIAGNOSIS — M35.00 SJOGREN'S SYNDROME, WITH UNSPECIFIED ORGAN INVOLVEMENT (HCC): Chronic | ICD-10-CM

## 2017-07-31 DIAGNOSIS — S21.001S OPEN WOUND OF RIGHT BREAST, SEQUELA: ICD-10-CM

## 2017-07-31 DIAGNOSIS — T81.89XD NONHEALING SURGICAL WOUND, SUBSEQUENT ENCOUNTER: ICD-10-CM

## 2017-07-31 DIAGNOSIS — M32.9 LUPUS ARTHRITIS (HCC): Chronic | ICD-10-CM

## 2017-07-31 DIAGNOSIS — S21.009A INCISIONAL BREAST WOUND: ICD-10-CM

## 2017-07-31 DIAGNOSIS — Z79.52 CURRENT CHRONIC USE OF SYSTEMIC STEROIDS: Chronic | ICD-10-CM

## 2017-07-31 DIAGNOSIS — G89.4 CHRONIC PAIN SYNDROME: Chronic | ICD-10-CM

## 2017-07-31 DIAGNOSIS — I73.00 RAYNAUD'S DISEASE WITHOUT GANGRENE: Chronic | ICD-10-CM

## 2017-07-31 PROCEDURE — G0277 HBOT, FULL BODY CHAMBER, 30M: HCPCS | Performed by: FAMILY MEDICINE

## 2017-07-31 PROCEDURE — 99183 HYPERBARIC OXYGEN THERAPY: CPT | Performed by: FAMILY MEDICINE

## 2017-07-31 ASSESSMENT — ENCOUNTER SYMPTOMS
DOUBLE VISION: 0
COUGH: 0
WEAKNESS: 1
FEVER: 0
FALLS: 0
CHILLS: 0
DEPRESSION: 0
SHORTNESS OF BREATH: 0
BLURRED VISION: 0
PALPITATIONS: 0
HEADACHES: 0

## 2017-07-31 ASSESSMENT — PAIN SCALES - GENERAL: PAINLEVEL_OUTOF13: 7

## 2017-07-31 NOTE — PROGRESS NOTES
Subjective:   Lupe Webb is a 62 y.o. female who presents with a nonhealing breast wound    HPI  Lupe presents for HBOT today.     Her wound continues to be closed.     She is back on Methotrexate but not feeling   the full effects of the medication yet.     She denies any ear pain or pressure.     Review of Systems   Constitutional: Positive for malaise/fatigue. Negative for fever and chills.   HENT: Negative for ear discharge, ear pain and hearing loss.    Eyes: Negative for blurred vision and double vision.   Respiratory: Negative for cough and shortness of breath.    Cardiovascular: Negative for chest pain and palpitations.   Musculoskeletal: Positive for joint pain. Negative for falls.   Neurological: Positive for weakness. Negative for headaches.   Psychiatric/Behavioral: Negative for depression.          Objective:     /64 mmHg  Pulse 53  Temp(Src) 36.5 °C (97.7 °F)  SpO2 100%     Physical Exam   Constitutional: She is oriented to person, place, and time. She appears well-developed and well-nourished.   HENT:   Head: Normocephalic and atraumatic.   Right Ear: External ear normal.   Left Ear: External ear normal.   Pulmonary/Chest: Effort normal.   Neurological: She is alert and oriented to person, place, and time.   Psychiatric: She has a normal mood and affect.   Vitals reviewed.      Assessment/Plan:     Nonhealing surgical wound  Appropriate candidate for HBOT - likely that the patient's wound healing is impeded by a multitude of factors,    including vasculitis from Sjogren's / Raynaud's / Lupus, as well as her chronic use of Methotrexate and Prednisone,    in addition to her age, postmenopausal status, and prior surgery & scar to the wound site. HBOT should be    beneficial for her wound healing in terms of it's angiogenic and anti-vasculitic properties. Have recommended    to the patient that she d/c MTX and continue to decrease prednisone, per instructions from Dr. Cruz.    Patient  completed her 34th HBOT treatment at a pressure of 2.4 BRISEIDA x 90 minutes at a descent/ascent of 1.5/3.0 PSI/minute    Events    6/26 - Redo excision of non-healing wound and advancement of flap by Dr. Garcia    6/28- no treatment today as heavily bandaged s/p surgery on 6/26.    7/7 - Off methotrexate    2. Incisional breast wound    Per Dr. Garcia, s/p Bilateral Saline-->Silicone breast implant Re-do 11/2/16 with recurrent non-healing surgical wound    3. Sjogren's syndrome, with unspecified organ involvement (CMS-HCC)    Off Methotrexate and On Prednisone per Dr. Cruz    4. Raynaud's disease without gangrene    Off Methotrexate and On Prednisone per Dr. Cruz    5. Lupus arthritis (CMS-HCC)    Off Methotrexate and On Prednisone per Dr. Cruz    6. Current chronic use of systemic steroids    On Methotrexate and Prednisone per Dr. Cruz    7. Dry mouth    8. DDD (degenerative disc disease), cervical    9. Gastroesophageal reflux disease without esophagitis

## 2017-07-31 NOTE — PATIENT INSTRUCTIONS
After hyperbaric oxygen therapy treatment, it is normal to experience some congestion to the ears, muffling of sounds, or abnormal sounds to one or both ears.    If you experience pain or discomfort to one or both ears that does not resolve spontaneously, please contact the hyperbaric department at [phone].

## 2017-07-31 NOTE — MR AVS SNAPSHOT
Lupe Webb   2017 9:00 AM   Non-Provider Visit   MRN: [id number]    Department:  Hyper[facility]ic Oxygen Ther   Dept Phone:  [phone]    Description:  Female : 1955   Provider:  HYPERBARIC CHAMBER 2           Allergies as of 2017     Allergen Noted Reactions    Contrast Media With Iodine [Iodine] 2014       Rash, same with topical iodine      Shellfish Allergy 2014   Vomiting    Rash, hives,     Penicillins 2014   Vomiting      You were diagnosed with     Nonhealing surgical wound, subsequent encounter   [633858]       Open wound of right breast, sequela   [016032]       Incisional breast wound   [242732]       Current chronic use of systemic steroids   [2176266]       Sjogren's syndrome, with unspecified organ involvement (CMS-HCC)   [7985869]       Raynaud's disease without gangrene   [5521314]       Chronic pain syndrome   [338.4.ICD-9-CM]       Lupus arthritis (CMS-[facility])   [278770]         Vital Signs     Blood Pressure Pulse Temperature Oxygen Saturation Smoking Status       118/64 mmHg 53 36.5 °C (97.7 °F) 100% Never Smoker        Basic Information     Date Of Birth Sex Race Ethnicity Preferred Language    1955 Female White Non- English      Your appointments     Aug 01, 2017  9:00 AM   HBOT Supervision with HYPERBARIC CHAMBER 2   [facility] ([address] [place] [address])    [address]  [facility] [address]   [phone]            Aug 02, 2017  9:00 AM   HBOT Supervision with HYPERBARIC CHAMBER 2   [facility] ([address] [place] [address])    [address]  [facility] [address]   [phone]            Aug 03, 2017  9:00 AM   HBOT Supervision with HYPERBARIC CHAMBER 2   [facility] ([address] [place] [address])    [address]  [facility] [address]   [phone]            Aug 04, 2017  9:00 AM   HBOT Supervision with HYPERBARIC CHAMBER 2   [facility] ([address] [place] [address])    [address]  [facility] [address]   [phone]            Aug 07, 2017  9:00 AM   HBOT Supervision with HYPERBARIC CHAMBER 2   [facility] ([address] [place] [address])    [address]  [place] [address]   [phone]            Aug 08, 2017  9:00 AM   HBOT Supervision with HYPERBARIC CHAMBER 2   [facility] ([address] [place] [address])    [address]  [facility] [address]   [phone]            Aug 11, 2017  3:00 PM   Non Provider 1 [facility] (--)    [address]  [facility] [address] [phone]           You will be receiving a confirmation call a few days before your appointment from our automated call confirmation system.            Sep 22, 2017  2:30 PM   US SONNAEEME with [facility] US 2   [facility] ([address] [place] [address])    [address]  [facility] [address]   [phone]           Some exams require specific prep instructions that would have been given to you at time of scheduling. If you have any additional questions about the prep instructions, please call Imaging Scheduling at [phone] and press #2.              Problem List              ICD-10-CM Priority Class Noted - Resolved    Sjogren's syndrome (CMS-HCC) (Chronic) M35.00   4/26/1990 - Present    DDD (degenerative disc disease), cervical (Chronic) M50.30   4/26/2017 - Present    Osteoarthritis of multiple joints (Chronic) M15.9   4/26/2017 - Present    Lupus arthritis (CMS-[facility]) (Chronic) M32.9   4/26/2017 - Present    Chronic pain syndrome (Chronic) G89.4   4/26/2017 - Present    Gastroesophageal reflux disease without esophagitis (Chronic) K21.9   4/26/2017 - Present    Lactose intolerance (Chronic) E73.9   4/26/2017 - Present    Raynaud's disease without gangrene (Chronic) I73.00   4/26/2017 - Present    Osteopenia (Chronic) M85.80   4/26/2017 - Present    Functional diarrhea (Chronic) K59.1    4/26/2017 - Present    Current chronic use of systemic steroids (Chronic) Z79.52   4/26/2017 - Present    Incisional breast wound S21.009A   5/15/2017 - Present    Nonhealing surgical wound T81.89XA   6/9/2017 - Present    Open wound of right breast S21.001A   6/26/2017 - Present      Health Maintenance        Date Due Completion Dates    IMM DTaP/Tdap/Td Vaccine (1 - Tdap) 4/1/2019 (Originally 3/28/1974) ---    IMM ZOSTER VACCINE 4/1/2019 (Originally 3/28/2015) ---    IMM INFLUENZA (1) 9/1/2017 9/27/2016    MAMMOGRAM 9/23/2017 9/23/2016    PAP SMEAR 1/2/2020 1/2/2017 (Done)    Override on 1/2/2017: Done    COLONOSCOPY 8/21/2022 8/21/2012            Current Immunizations     Influenza Vaccine Quad Inj (Preserved) 9/27/2016      Below and/or attached are the medications your provider expects you to take. Review all of your home medications and newly ordered medications with your provider and/or pharmacist. Follow medication instructions as directed by your provider and/or pharmacist. Please keep your medication list with you and share with your provider. Update the information when medications are discontinued, doses are changed, or new medications (including over-the-counter products) are added; and carry medication information at all times in the event of emergency situations     Allergies:  CONTRAST MEDIA WITH IODINE - (reactions not documented)     SHELLFISH ALLERGY - Vomiting     PENICILLINS - Vomiting               Medications  Valid as of: July 31, 2017 - 10:35 AM    Generic Name Brand Name Tablet Size Instructions for use    ALPRAZolam (Tab) XANAX 0.5 MG Take 0.5 Tabs by mouth at bedtime as needed for Sleep.        Ascorbic Acid (Tab) ascorbic acid 500 MG Take 500 mg by mouth every day.        CycloSPORINE   1 Drop by Ophthalmic route 2 Times a Day. Both eyes        Metaxalone (Tab) SKELAXIN 800 MG Take 800 mg by mouth 3 times a day.        Multiple Vitamins-Minerals (Tab) THERAGRAN-M  Take 1 Tab by mouth  every day.        Non Formulary Request Non Formulary Request  Biotin        Non Formulary Request Non Formulary Request  Flaxseed oil OTC        non-formulary med non-formulary med  Cranberry extract OTC        PredniSONE (Tab) DELTASONE 1 MG Take 7 mg by mouth every day. 7 pills per day        Sildenafil Citrate (Tab) VIAGRA 25 MG Take 20 mg by mouth every day. Indications: Raynaud's Phenomenon of Unknown Cause        Vitamin E (Cap) VITAMIN E 400 UNIT Take 400 Units by mouth every day.        Zolpidem Tartrate (Tab) AMBIEN 5 MG Take  by mouth at bedtime as needed.        Zonisamide (Cap) ZONEGRAN 100 MG Take 300 mg by mouth every bedtime. Indications: neuropathy        .                 Medicines prescribed today were sent to:     [facility] [place] - [address]    [address] [place] [address]    Phone: [phone] Fax: [phone]    Open 24 Hours?: No      Medication refill instructions:       If your prescription bottle indicates you have medication refills left, it is not necessary to call your provider’s office. Please contact your pharmacy and they will refill your medication.    If your prescription bottle indicates you do not have any refills left, you may request refills at any time through one of the following ways: The online [employer] system (except Urgent Care), by calling your provider’s office, or by asking your pharmacy to contact your provider’s office with a refill request. Medication refills are processed only during regular business hours and may not be available until the next business day. Your provider may request additional information or to have a follow-up visit with you prior to refilling your medication.   *Please Note: Medication refills are assigned a new Rx number when refilled electronically. Your pharmacy may indicate that no refills were authorized even though a new prescription for the same medication is available at the pharmacy. Please request the medicine  by name with the pharmacy before contacting your provider for a refill.        Instructions    After hyperbaric oxygen therapy treatment, it is normal to experience some congestion to the ears, muffling of sounds, or abnormal sounds to one or both ears.    If you experience pain or discomfort to one or both ears that does not resolve spontaneously, please contact the hyperbaric department at [phone].         Other Notes About Your Plan     Sjogren's specialist in [place]  Rheum: Dr. Inessa Cruz [place]  Pain: Dr. Shankar (not actively seeing)  GYN: Dr. Sierra  GI: Dr. Clemente  PT: [facility]           MyChart Access Code: Activation code not generated  Current MyChart Status: Active

## 2017-08-01 ENCOUNTER — NON-PROVIDER VISIT (OUTPATIENT)
Dept: WOUND CARE | Facility: MEDICAL CENTER | Age: 62
End: 2017-08-01
Attending: FAMILY MEDICINE
Payer: MEDICARE

## 2017-08-01 VITALS
HEART RATE: 56 BPM | TEMPERATURE: 98.6 F | SYSTOLIC BLOOD PRESSURE: 112 MMHG | DIASTOLIC BLOOD PRESSURE: 71 MMHG | OXYGEN SATURATION: 99 %

## 2017-08-01 DIAGNOSIS — Z79.52 CURRENT CHRONIC USE OF SYSTEMIC STEROIDS: Chronic | ICD-10-CM

## 2017-08-01 DIAGNOSIS — M35.00 SJOGREN'S SYNDROME, WITH UNSPECIFIED ORGAN INVOLVEMENT (HCC): Chronic | ICD-10-CM

## 2017-08-01 DIAGNOSIS — G89.4 CHRONIC PAIN SYNDROME: Chronic | ICD-10-CM

## 2017-08-01 DIAGNOSIS — M32.9 LUPUS ARTHRITIS (HCC): Chronic | ICD-10-CM

## 2017-08-01 DIAGNOSIS — T81.89XD NONHEALING SURGICAL WOUND, SUBSEQUENT ENCOUNTER: ICD-10-CM

## 2017-08-01 DIAGNOSIS — M15.9 OSTEOARTHRITIS OF MULTIPLE JOINTS, UNSPECIFIED OSTEOARTHRITIS TYPE: Chronic | ICD-10-CM

## 2017-08-01 DIAGNOSIS — I73.00 RAYNAUD'S DISEASE WITHOUT GANGRENE: Chronic | ICD-10-CM

## 2017-08-01 DIAGNOSIS — S21.009A INCISIONAL BREAST WOUND: ICD-10-CM

## 2017-08-01 DIAGNOSIS — S21.001S OPEN WOUND OF RIGHT BREAST, SEQUELA: ICD-10-CM

## 2017-08-01 PROCEDURE — G0277 HBOT, FULL BODY CHAMBER, 30M: HCPCS | Performed by: FAMILY MEDICINE

## 2017-08-01 PROCEDURE — 99183 HYPERBARIC OXYGEN THERAPY: CPT | Performed by: FAMILY MEDICINE

## 2017-08-01 ASSESSMENT — ENCOUNTER SYMPTOMS
WEAKNESS: 1
FEVER: 0
BLURRED VISION: 0
PALPITATIONS: 0
COUGH: 0
SHORTNESS OF BREATH: 0
FALLS: 0
DOUBLE VISION: 0
HEADACHES: 0
CHILLS: 0

## 2017-08-01 ASSESSMENT — PAIN SCALES - GENERAL: PAINLEVEL_OUTOF13: 7

## 2017-08-01 NOTE — PATIENT INSTRUCTIONS
After hyperbaric oxygen therapy treatment, it is normal to experience some congestion to the ears, muffling of sounds, or abnormal sounds to one or both ears.    If you experience pain or discomfort to one or both ears that does not resolve spontaneously, please contact the hyperbaric department at [phone].

## 2017-08-01 NOTE — MR AVS SNAPSHOT
Lupe Webb   2017 9:00 AM   Non-Provider Visit   MRN: [id number]    Department:  Hyperbaric Oxygen Ther   Dept Phone:  [phone]    Description:  Female : 1955   Provider:  HYPERBARIC CHAMBER 2           Allergies as of 2017     Allergen Noted Reactions    Contrast Media With Iodine [Iodine] 2014       Rash, same with topical iodine      Shellfish Allergy 2014   Vomiting    Rash, hives,     Penicillins 2014   Vomiting      You were diagnosed with     Nonhealing surgical wound, subsequent encounter   [965577]       Open wound of right breast, sequela   [309540]       Incisional breast wound   [991749]       Sjogren's syndrome, with unspecified organ involvement (CMS-[facility])   [2318568]       Raynaud's disease without gangrene   [1260246]       Lupus arthritis (CMS-[facility])   [671679]       Osteoarthritis of multiple joints, unspecified osteoarthritis type   [3654518]       Current chronic use of systemic steroids   [5667424]       Chronic pain syndrome   [338.4.ICD-9-CM]         Vital Signs     Blood Pressure Pulse Temperature Oxygen Saturation Smoking Status       112/71 mmHg 56 37 °C (98.6 °F) 99% Never Smoker        Basic Information     Date Of Birth Sex Race Ethnicity Preferred Language    1955 Female White Non- English      Your appointments     Aug 11, 2017  3:00 PM   Non Provider 1 with Richa S. Sanjeev, R.N.   [facility] (--)    [address] [place] [address].  [place] [id number] [phone]           You will be receiving a confirmation call a few days before your appointment from our automated call confirmation system.            Sep 22, 2017  2:30 PM   US BUITRAGO with [address] [facility])    [address]  [place] [address] [phone]           Some exams require specific prep instructions that would have been given to you at time of scheduling. If you have any additional  questions about the prep instructions, please call Imaging Scheduling at [phone] and press #2.              Problem List              ICD-10-CM Priority Class Noted - Resolved    Sjogren's syndrome (CMS-HCC) (Chronic) M35.00   4/26/1990 - Present    DDD (degenerative disc disease), cervical (Chronic) M50.30   4/26/2017 - Present    Osteoarthritis of multiple joints (Chronic) M15.9   4/26/2017 - Present    Lupus arthritis (CMS-HCC) (Chronic) M32.9   4/26/2017 - Present    Chronic pain syndrome (Chronic) G89.4   4/26/2017 - Present    Gastroesophageal reflux disease without esophagitis (Chronic) K21.9   4/26/2017 - Present    Lactose intolerance (Chronic) E73.9   4/26/2017 - Present    Raynaud's disease without gangrene (Chronic) I73.00   4/26/2017 - Present    Osteopenia (Chronic) M85.80   4/26/2017 - Present    Functional diarrhea (Chronic) K59.1   4/26/2017 - Present    Current chronic use of systemic steroids (Chronic) Z79.52   4/26/2017 - Present    Incisional breast wound S21.009A   5/15/2017 - Present    Nonhealing surgical wound T81.89XA   6/9/2017 - Present    Open wound of right breast S21.001A   6/26/2017 - Present      Health Maintenance        Date Due Completion Dates    IMM DTaP/Tdap/Td Vaccine (1 - Tdap) 4/1/2019 (Originally 3/28/1974) ---    IMM ZOSTER VACCINE 4/1/2019 (Originally 3/28/2015) ---    IMM INFLUENZA (1) 9/1/2017 9/27/2016    MAMMOGRAM 9/23/2017 9/23/2016    PAP SMEAR 1/2/2020 1/2/2017 (Done)    Override on 1/2/2017: Done    COLONOSCOPY 8/21/2022 8/21/2012            Current Immunizations     Influenza Vaccine Quad Inj (Preserved) 9/27/2016      Below and/or attached are the medications your provider expects you to take. Review all of your home medications and newly ordered medications with your provider and/or pharmacist. Follow medication instructions as directed by your provider and/or pharmacist. Please keep your medication list with you and share with your provider. Update the  information when medications are discontinued, doses are changed, or new medications (including over-the-counter products) are added; and carry medication information at all times in the event of emergency situations     Allergies:  CONTRAST MEDIA WITH IODINE - (reactions not documented)     SHELLFISH ALLERGY - Vomiting     PENICILLINS - Vomiting               Medications  Valid as of: August 01, 2017 -  1:47 PM    Generic Name Brand Name Tablet Size Instructions for use    ALPRAZolam (Tab) XANAX 0.5 MG Take 0.5 Tabs by mouth at bedtime as needed for Sleep.        Ascorbic Acid (Tab) ascorbic acid 500 MG Take 500 mg by mouth every day.        CycloSPORINE   1 Drop by Ophthalmic route 2 Times a Day. Both eyes        Metaxalone (Tab) SKELAXIN 800 MG Take 800 mg by mouth 3 times a day.        Multiple Vitamins-Minerals (Tab) THERAGRAN-M  Take 1 Tab by mouth every day.        Non Formulary Request Non Formulary Request  Biotin        Non Formulary Request Non Formulary Request  Flaxseed oil OTC        non-formulary med non-formulary med  Cranberry extract OTC        PredniSONE (Tab) DELTASONE 1 MG Take 7 mg by mouth every day. 7 pills per day        Sildenafil Citrate (Tab) VIAGRA 25 MG Take 20 mg by mouth every day. Indications: Raynaud's Phenomenon of Unknown Cause        Vitamin E (Cap) VITAMIN E 400 UNIT Take 400 Units by mouth every day.        Zolpidem Tartrate (Tab) AMBIEN 5 MG Take  by mouth at bedtime as needed.        Zonisamide (Cap) ZONEGRAN 100 MG Take 300 mg by mouth every bedtime. Indications: neuropathy        .                 Medicines prescribed today were sent to:     [facility] [place], [place] - [address] [facility]    [address] [place] [address]    Phone: [phone] Fax: [phone]    Open 24 Hours?: No      Medication refill instructions:       If your prescription bottle indicates you have medication refills left, it is not necessary to call your provider’s office. Please contact your  pharmacy and they will refill your medication.    If your prescription bottle indicates you do not have any refills left, you may request refills at any time through one of the following ways: The online [employer] system (except Urgent Care), by calling your provider’s office, or by asking your pharmacy to contact your provider’s office with a refill request. Medication refills are processed only during regular business hours and may not be available until the next business day. Your provider may request additional information or to have a follow-up visit with you prior to refilling your medication.   *Please Note: Medication refills are assigned a new Rx number when refilled electronically. Your pharmacy may indicate that no refills were authorized even though a new prescription for the same medication is available at the pharmacy. Please request the medicine by name with the pharmacy before contacting your provider for a refill.        Instructions    After hyperbaric oxygen therapy treatment, it is normal to experience some congestion to the ears, muffling of sounds, or abnormal sounds to one or both ears.    If you experience pain or discomfort to one or both ears that does not resolve spontaneously, please contact the hyperbaric department at [phone].         Other Notes About Your Plan     Sjogren's specialist in [place]  Rheum: Dr. Inessa Cruz [place]  Pain: Dr. Shankar (not actively seeing)  GYN: Dr. Sierra  GI: Dr. Clemente  PT: [facility]           MyChart Access Code: Activation code not generated  Current [facility] Status: Active

## 2017-08-01 NOTE — PROGRESS NOTES
Subjective:   Lupe Webb is a 62 y.o. female who presents with a nonhealing breast wound    HPI  Lupe presents for HBOT today.     Her wound continues to be closed.   She is back on Methotrexate but not feeling   the full effects of the medication yet.     She denies any ear pain or pressure.     Review of Systems   Constitutional: Positive for malaise/fatigue. Negative for fever and chills.   HENT: Negative for ear discharge, ear pain and hearing loss.    Eyes: Negative for blurred vision and double vision.   Respiratory: Negative for cough and shortness of breath.    Cardiovascular: Negative for chest pain and palpitations.   Musculoskeletal: Positive for joint pain. Negative for falls.   Neurological: Positive for weakness. Negative for headaches.          Objective:     /71 mmHg  Pulse 56  Temp(Src) 37 °C (98.6 °F)  SpO2 99%     Physical Exam   Constitutional: She is oriented to person, place, and time. She appears well-developed and well-nourished.   HENT:   Head: Normocephalic and atraumatic.   Right Ear: Tympanic membrane, external ear and ear canal normal.   Left Ear: Tympanic membrane, external ear and ear canal normal.   Pulmonary/Chest: Effort normal.   Neurological: She is alert and oriented to person, place, and time.   Psychiatric: She has a normal mood and affect.   Vitals reviewed.        Assessment/Plan:     Nonhealing surgical wound   Appropriate candidate for HBOT - likely that the patient's wound healing is impeded by a multitude of factors,   including vasculitis from Sjogren's / Raynaud's / Lupus, as well as her chronic use of Methotrexate and Prednisone,   in addition to her age, postmenopausal status, and prior surgery & scar to the wound site. HBOT should be   beneficial for her wound healing in terms of it's angiogenic and anti-vasculitic properties. Have recommended   to the patient that she d/c MTX and continue to decrease prednisone, per instructions from Dr. Cruz.    Patient completed her 35th HBOT treatment at a pressure of 2.4 BRISEIDA x 90 minutes at a descent/ascent of 1.5/3.0 PSI/minute   Events   6/26 - Redo excision of non-healing wound and advancement of flap by Dr. Garcia   6/28- no treatment today as heavily bandaged s/p surgery on 6/26.   7/7 - Off methotrexate   2. Incisional breast wound   Per Dr. Garcia, s/p Bilateral Saline-->Silicone breast implant Re-do 11/2/16 with recurrent non-healing surgical wound   3. Sjogren's syndrome, with unspecified organ involvement (CMS-HCC)   Off Methotrexate and On Prednisone per Dr. Cruz   4. Raynaud's disease without gangrene   Off Methotrexate and On Prednisone per Dr. Cruz   5. Lupus arthritis (CMS-HCC)   Off Methotrexate and On Prednisone per Dr. Cruz   6. Current chronic use of systemic steroids   On Methotrexate and Prednisone per Dr. Cruz   7. Dry mouth   8. DDD (degenerative disc disease), cervical   9. Gastroesophageal reflux disease without esophagitis

## 2017-08-02 ENCOUNTER — APPOINTMENT (OUTPATIENT)
Dept: WOUND CARE | Facility: MEDICAL CENTER | Age: 62
End: 2017-08-02
Payer: MEDICARE

## 2017-08-03 ENCOUNTER — APPOINTMENT (OUTPATIENT)
Dept: WOUND CARE | Facility: MEDICAL CENTER | Age: 62
End: 2017-08-03
Attending: FAMILY MEDICINE
Payer: MEDICARE

## 2017-08-04 ENCOUNTER — APPOINTMENT (OUTPATIENT)
Dept: WOUND CARE | Facility: MEDICAL CENTER | Age: 62
End: 2017-08-04
Payer: MEDICARE

## 2017-08-04 ENCOUNTER — OFFICE VISIT (OUTPATIENT)
Dept: INTERNAL MEDICINE | Facility: IMAGING CENTER | Age: 62
End: 2017-08-04
Payer: MEDICARE

## 2017-08-04 VITALS
WEIGHT: 106 LBS | OXYGEN SATURATION: 99 % | HEART RATE: 56 BPM | RESPIRATION RATE: 14 BRPM | HEIGHT: 62 IN | DIASTOLIC BLOOD PRESSURE: 70 MMHG | TEMPERATURE: 98.2 F | BODY MASS INDEX: 19.51 KG/M2 | SYSTOLIC BLOOD PRESSURE: 130 MMHG

## 2017-08-04 DIAGNOSIS — M15.9 PRIMARY OSTEOARTHRITIS INVOLVING MULTIPLE JOINTS: Chronic | ICD-10-CM

## 2017-08-04 DIAGNOSIS — M35.00 SJOGREN'S SYNDROME, WITH UNSPECIFIED ORGAN INVOLVEMENT (HCC): Chronic | ICD-10-CM

## 2017-08-04 DIAGNOSIS — G89.4 CHRONIC PAIN SYNDROME: Chronic | ICD-10-CM

## 2017-08-04 DIAGNOSIS — S21.009A INCISIONAL BREAST WOUND: ICD-10-CM

## 2017-08-04 DIAGNOSIS — Z79.52 CURRENT CHRONIC USE OF SYSTEMIC STEROIDS: Chronic | ICD-10-CM

## 2017-08-04 DIAGNOSIS — M32.9 LUPUS ARTHRITIS (HCC): Chronic | ICD-10-CM

## 2017-08-04 DIAGNOSIS — M50.30 DDD (DEGENERATIVE DISC DISEASE), CERVICAL: Chronic | ICD-10-CM

## 2017-08-04 PROCEDURE — 99214 OFFICE O/P EST MOD 30 MIN: CPT | Performed by: FAMILY MEDICINE

## 2017-08-04 NOTE — MR AVS SNAPSHOT
"        Lupe Webb   2017 3:30 PM   Office Visit   MRN: [id number]    Department:  [facility]   Dept Phone:  [phone]    Description:  Female : 1955   Provider:  Jina De Santiago M.D.           Reason for Visit     Pain           Allergies as of 2017     Allergen Noted Reactions    Contrast Media With Iodine [Iodine] 2014       Rash, same with topical iodine      Shellfish Allergy 2014   Vomiting    Rash, hives,     Penicillins 2014   Vomiting      Vital Signs     Blood Pressure Pulse Temperature Respirations Height Weight    130/70 mmHg 56 36.8 °C (98.2 °F) 14 1.575 m (5' 2.01\") 48.081 kg (106 lb)    Body Mass Index Oxygen Saturation Smoking Status             19.38 kg/m2 99% Never Smoker          Basic Information     Date Of Birth Sex Race Ethnicity Preferred Language    1955 Female White Non- English      Your appointments     Aug 11, 2017  3:00 PM   Non Provider 1 with Richa Pace R.N.   [facility] (--)    [address] [place] [address].  [facility] [id number] [phone]           You will be receiving a confirmation call a few days before your appointment from our automated call confirmation system.            Sep 22, 2017  2:30 PM   US [facility]RAGO with [address] [facility] ([facility])    [address] [facility] [address]  [facility] [id number] [phone]           Some exams require specific prep instructions that would have been given to you at time of scheduling. If you have any additional questions about the prep instructions, please call Imaging Scheduling at [phone] and press #[phone].              Problem List              ICD-10-CM Priority Class Noted - Resolved    Sjogren's syndrome (CMS-HCC) (Chronic) M35.00   1990 - Present    DDD (degenerative disc disease), cervical (Chronic) M50.30   2017 - Present    Osteoarthritis of multiple joints (Chronic) M15.9   2017 - Present   " Lupus arthritis (CMS-[facility]) (Chronic) M32.9   4/26/2017 - Present    Chronic pain syndrome (Chronic) G89.4   4/26/2017 - Present    Gastroesophageal reflux disease without esophagitis (Chronic) K21.9   4/26/2017 - Present    Lactose intolerance (Chronic) E73.9   4/26/2017 - Present    Raynaud's disease without gangrene (Chronic) I73.00   4/26/2017 - Present    Osteopenia (Chronic) M85.80   4/26/2017 - Present    Functional diarrhea (Chronic) K59.1   4/26/2017 - Present    Current chronic use of systemic steroids (Chronic) Z79.52   4/26/2017 - Present    Incisional breast wound S21.009A   5/15/2017 - Present    Nonhealing surgical wound T81.89XA   6/9/2017 - Present    Open wound of right breast S21.001A   6/26/2017 - Present      Health Maintenance        Date Due Completion Dates    IMM DTaP/Tdap/Td Vaccine (1 - Tdap) 4/1/2019 (Originally 3/28/1974) ---    IMM ZOSTER VACCINE 4/1/2019 (Originally 3/28/2015) ---    IMM INFLUENZA (1) 9/1/2017 9/27/2016    MAMMOGRAM 9/23/2017 9/23/2016    PAP SMEAR 1/2/2020 1/2/2017 (Done)    Override on 1/2/2017: Done    COLONOSCOPY 8/21/2022 8/21/2012            Current Immunizations     Influenza Vaccine Quad Inj (Preserved) 9/27/2016      Below and/or attached are the medications your provider expects you to take. Review all of your home medications and newly ordered medications with your provider and/or pharmacist. Follow medication instructions as directed by your provider and/or pharmacist. Please keep your medication list with you and share with your provider. Update the information when medications are discontinued, doses are changed, or new medications (including over-the-counter products) are added; and carry medication information at all times in the event of emergency situations     Allergies:  CONTRAST MEDIA WITH IODINE - (reactions not documented)     SHELLFISH ALLERGY - Vomiting     PENICILLINS - Vomiting               Medications  Valid as of: August 04, 2017 -  4:31 PM      Generic Name Brand Name Tablet Size Instructions for use    Ascorbic Acid (Tab) ascorbic acid 500 MG Take 500 mg by mouth every day.        CycloSPORINE   1 Drop by Ophthalmic route 2 Times a Day. Both eyes        Metaxalone (Tab) SKELAXIN 800 MG Take 800 mg by mouth 3 times a day.        Multiple Vitamins-Minerals (Tab) THERAGRAN-M  Take 1 Tab by mouth every day.        Non Formulary Request Non Formulary Request  Biotin        Non Formulary Request Non Formulary Request  Flaxseed oil OTC        non-formulary med non-formulary med  Cranberry extract OTC        PredniSONE (Tab) DELTASONE 1 MG Take 5 mg by mouth every day. 7 pills per day        Sildenafil Citrate (Tab) VIAGRA 25 MG Take 20 mg by mouth every day. Indications: Raynaud's Phenomenon of Unknown Cause        Vitamin E (Cap) VITAMIN E 400 UNIT Take 400 Units by mouth every day.        Zolpidem Tartrate (Tab) AMBIEN 5 MG Take  by mouth at bedtime as needed.        Zonisamide (Cap) ZONEGRAN 100 MG Take 300 mg by mouth every bedtime. Indications: neuropathy        .                 Medicines prescribed today were sent to:     [facility] [address], [place] - [address] [address]    Phone: [phone] Fax: [phone]    Open 24 Hours?: No      Medication refill instructions:       If your prescription bottle indicates you have medication refills left, it is not necessary to call your provider’s office. Please contact your pharmacy and they will refill your medication.    If your prescription bottle indicates you do not have any refills left, you may request refills at any time through one of the following ways: The online [facility]ailing system (except Urgent Care), by calling your provider’s office, or by asking your pharmacy to contact your provider’s office with a refill request. Medication refills are processed only during regular business hours and may not be available until the next business day. Your provider may request  additional information or to have a follow-up visit with you prior to refilling your medication.   *Please Note: Medication refills are assigned a new Rx number when refilled electronically. Your pharmacy may indicate that no refills were authorized even though a new prescription for the same medication is available at the pharmacy. Please request the medicine by name with the pharmacy before contacting your provider for a refill.        Other Notes About Your Plan     Sjogren's specialist in [place]  Rheum: Dr. Inessa Cruz [place]  Pain: Dr. Shankar (not actively seeing)  GYN: Dr. Sierra  GI: Dr. Clemente  PT: [facility]           MyChart Access Code: Activation code not generated  Current MyChart Status: Active

## 2017-08-05 NOTE — PROGRESS NOTES
Chief Complaint   Patient presents with   • Pain       HPI:  Patient is a 62 y.o. female established patient who presents today to discuss her current global pain complaints and to update me on her recent breast wound treatment. She had a non healing breast wound that required excision of non healing wound tissue, scar tissue removal, and flap advancement 6/26/17 by Dr. Garcia. She has finished her hyperbaric oxygen treatments (started prior to her surgery) and is now at the six week point. Her wound is healing well and Dr. Garcia is not allowing her to do any major activities for another six weeks to continue to promote wound healing. Currently she is struggling with diffuse chronic pain and chronic neuropathy symptoms: burning pain down L leg/ numb fifth finger/ numb R 4th and 5th toes/ diffuse joint pain especially of hands and knees. She previously used narcotics for pain control and would like to avoid that at all costs. She is hopeful that the methotrexate will start to work (she had to discontinue it for two weeks) and had her fourth shot this past Wednesday. She has tried lyrica and neurontin in the past without success. Her  is present at today's visit and endorses her concerns.     Patient Active Problem List    Diagnosis Date Noted   • Open wound of right breast 06/26/2017   • Nonhealing surgical wound 06/09/2017   • Incisional breast wound 05/15/2017   • DDD (degenerative disc disease), cervical 04/26/2017   • Osteoarthritis of multiple joints 04/26/2017   • Lupus arthritis (CMS-HCC) 04/26/2017   • Chronic pain syndrome 04/26/2017   • Gastroesophageal reflux disease without esophagitis 04/26/2017   • Lactose intolerance 04/26/2017   • Raynaud's disease without gangrene 04/26/2017   • Osteopenia 04/26/2017   • Functional diarrhea 04/26/2017   • Current chronic use of systemic steroids 04/26/2017   • Sjogren's syndrome (CMS-HCC) 04/26/1990     Past medical, surgical, family, and social history was  "reviewed in Epic chart by me today.     Medications and allergies reviewed and updated in Epic chart by me today.     ROS:  Pertinent positives listed above in HPI. All other systems have been reviewed and are negative.    PE:   /70 mmHg  Pulse 56  Temp(Src) 36.8 °C (98.2 °F)  Resp 14  Ht 1.575 m (5' 2.01\")  Wt 48.081 kg (106 lb)  BMI 19.38 kg/m2  SpO2 99%  Vital signs reviewed with patient.     Gen: Well developed; well nourished; no acute distress; age appropriate appearance   CV: Regular rate and rhythm; S1/ S2 present; no murmur, gallop or rub noted  Chest: R breast incision (under R breast) 2.5 inches long is intact and healing well/ no signs of infection or skin breakdown noted  Pulm: No respiratory distress; clear to ascultation b/l; no wheezing or stridor noted b/l  Abd: Adequate bowel sounds noted; soft and nontender; no rebound, rigidity, nor distention   Extremities: No peripheral edema b/l LE extremities/ no gross deformities noted  Skin: Warm and dry; no rashes noted   Neuro: No focal deficits noted   Psych: AAOx4; mood and affect are appropriate    A/P:  1. Chronic pain syndrome  Uncontrolled/ Pt has previously weaned herself off of narcotics and does not want to use them again to control her pain. She also reports failing neurontin and lyrica use in the past and is hoping that her methotrexate dose will decrease her pain issues soon after a two week break due to surgery.     2. Lupus arthritis (CMS-HCC)  Pain is uncontrolled. Recommend that she increase Zonagran dose by adding 100 mg AM dose in addition to her 300 mg qhs dose. She is going to stagger her medication changes in the event of side effects. She is to continue all chronic medications per Dr. Cruz.     3. Current chronic use of systemic steroids  I recommended that she increase her prednisone from 5 mg daily to 6 mg daily to assist with her diffuse pain and joint complaints. She would love to wean down her prednisone as low " as possible but does endorse that she does better on higher doses historically.     4. Sjogren's syndrome, with unspecified organ involvement (CMS-HCC)  Stable/ no change/ pt is to continue current medications per Dr. Cruz    5. Primary osteoarthritis involving multiple joints/ chronic Raynaud's   Pain uncontrolled/ see above/ pt is to continue current medications per Dr. Cruz    6. DDD (degenerative disc disease), cervical  Pt does not want to return to Dr. Lucia for another evaluation and reports that she has failed numerous injections in her low back for pain issues in the past.     7. Incisional breast wound (s/p revision 6/26)  Healing well after revision/ Pt is to continue her limited activity per Dr. Garcia.     Pt will update me on her response to medication changes accordingly. I would like to see her every 3-4 months/ PRN sooner if current condition changes.

## 2017-08-07 ENCOUNTER — APPOINTMENT (OUTPATIENT)
Dept: WOUND CARE | Facility: MEDICAL CENTER | Age: 62
End: 2017-08-07
Payer: MEDICARE

## 2017-08-08 ENCOUNTER — APPOINTMENT (OUTPATIENT)
Dept: WOUND CARE | Facility: MEDICAL CENTER | Age: 62
End: 2017-08-08
Payer: MEDICARE

## 2017-08-11 ENCOUNTER — HOSPITAL ENCOUNTER (OUTPATIENT)
Facility: MEDICAL CENTER | Age: 62
End: 2017-08-11
Attending: INTERNAL MEDICINE
Payer: MEDICARE

## 2017-08-11 ENCOUNTER — NON-PROVIDER VISIT (OUTPATIENT)
Dept: INTERNAL MEDICINE | Facility: IMAGING CENTER | Age: 62
End: 2017-08-11
Payer: MEDICARE

## 2017-08-11 DIAGNOSIS — M32.9 LUPUS ARTHRITIS (HCC): Chronic | ICD-10-CM

## 2017-08-11 LAB
ALBUMIN SERPL BCP-MCNC: 4.6 G/DL (ref 3.2–4.9)
ALBUMIN/GLOB SERPL: 2.2 G/DL
ALP SERPL-CCNC: 43 U/L (ref 30–99)
ALT SERPL-CCNC: 24 U/L (ref 2–50)
ANION GAP SERPL CALC-SCNC: 8 MMOL/L (ref 0–11.9)
AST SERPL-CCNC: 26 U/L (ref 12–45)
BASOPHILS # BLD AUTO: 0.5 % (ref 0–1.8)
BASOPHILS # BLD: 0.03 K/UL (ref 0–0.12)
BILIRUB SERPL-MCNC: 0.6 MG/DL (ref 0.1–1.5)
BUN SERPL-MCNC: 16 MG/DL (ref 8–22)
CALCIUM SERPL-MCNC: 10 MG/DL (ref 8.5–10.5)
CHLORIDE SERPL-SCNC: 107 MMOL/L (ref 96–112)
CO2 SERPL-SCNC: 27 MMOL/L (ref 20–33)
CREAT SERPL-MCNC: 0.89 MG/DL (ref 0.5–1.4)
EOSINOPHIL # BLD AUTO: 0 K/UL (ref 0–0.51)
EOSINOPHIL NFR BLD: 0 % (ref 0–6.9)
ERYTHROCYTE [DISTWIDTH] IN BLOOD BY AUTOMATED COUNT: 49.5 FL (ref 35.9–50)
GFR SERPL CREATININE-BSD FRML MDRD: >60 ML/MIN/1.73 M 2
GLOBULIN SER CALC-MCNC: 2.1 G/DL (ref 1.9–3.5)
GLUCOSE SERPL-MCNC: 87 MG/DL (ref 65–99)
HCT VFR BLD AUTO: 40.9 % (ref 37–47)
HGB BLD-MCNC: 14 G/DL (ref 12–16)
IMM GRANULOCYTES # BLD AUTO: 0.02 K/UL (ref 0–0.11)
IMM GRANULOCYTES NFR BLD AUTO: 0.3 % (ref 0–0.9)
LYMPHOCYTES # BLD AUTO: 0.93 K/UL (ref 1–4.8)
LYMPHOCYTES NFR BLD: 15.4 % (ref 22–41)
MCH RBC QN AUTO: 36 PG (ref 27–33)
MCHC RBC AUTO-ENTMCNC: 34.2 G/DL (ref 33.6–35)
MCV RBC AUTO: 105.1 FL (ref 81.4–97.8)
MONOCYTES # BLD AUTO: 0.2 K/UL (ref 0–0.85)
MONOCYTES NFR BLD AUTO: 3.3 % (ref 0–13.4)
NEUTROPHILS # BLD AUTO: 4.85 K/UL (ref 2–7.15)
NEUTROPHILS NFR BLD: 80.5 % (ref 44–72)
NRBC # BLD AUTO: 0 K/UL
NRBC BLD AUTO-RTO: 0 /100 WBC
PLATELET # BLD AUTO: 319 K/UL (ref 164–446)
PMV BLD AUTO: 10.8 FL (ref 9–12.9)
POTASSIUM SERPL-SCNC: 4 MMOL/L (ref 3.6–5.5)
PROT SERPL-MCNC: 6.7 G/DL (ref 6–8.2)
RBC # BLD AUTO: 3.89 M/UL (ref 4.2–5.4)
SODIUM SERPL-SCNC: 142 MMOL/L (ref 135–145)
WBC # BLD AUTO: 6 K/UL (ref 4.8–10.8)

## 2017-08-11 PROCEDURE — 80053 COMPREHEN METABOLIC PANEL: CPT

## 2017-08-11 PROCEDURE — 36415 COLL VENOUS BLD VENIPUNCTURE: CPT | Performed by: FAMILY MEDICINE

## 2017-08-11 PROCEDURE — 85025 COMPLETE CBC W/AUTO DIFF WBC: CPT

## 2017-09-16 ENCOUNTER — NON-PROVIDER VISIT (OUTPATIENT)
Dept: INTERNAL MEDICINE | Facility: IMAGING CENTER | Age: 62
End: 2017-09-16
Payer: MEDICARE

## 2017-09-16 DIAGNOSIS — Z23 NEED FOR INFLUENZA VACCINATION: ICD-10-CM

## 2017-09-16 PROCEDURE — G0008 ADMIN INFLUENZA VIRUS VAC: HCPCS | Performed by: FAMILY MEDICINE

## 2017-09-16 PROCEDURE — 90686 IIV4 VACC NO PRSV 0.5 ML IM: CPT | Performed by: FAMILY MEDICINE

## 2017-09-21 ENCOUNTER — TELEPHONE (OUTPATIENT)
Dept: RADIOLOGY | Facility: MEDICAL CENTER | Age: 62
End: 2017-09-21

## 2017-09-22 ENCOUNTER — HOSPITAL ENCOUNTER (OUTPATIENT)
Dept: RADIOLOGY | Facility: MEDICAL CENTER | Age: 62
End: 2017-09-22
Attending: SPECIALIST
Payer: MEDICARE

## 2017-09-22 DIAGNOSIS — R92.30 DENSE BREASTS: ICD-10-CM

## 2017-09-22 DIAGNOSIS — R92.2 DENSE BREASTS: ICD-10-CM

## 2017-09-22 PROCEDURE — 76641 ULTRASOUND BREAST COMPLETE: CPT

## 2017-09-25 ENCOUNTER — HOSPITAL ENCOUNTER (OUTPATIENT)
Dept: RADIOLOGY | Facility: MEDICAL CENTER | Age: 62
End: 2017-09-25
Attending: FAMILY MEDICINE
Payer: MEDICARE

## 2017-09-25 ENCOUNTER — OFFICE VISIT (OUTPATIENT)
Dept: INTERNAL MEDICINE | Facility: IMAGING CENTER | Age: 62
End: 2017-09-25
Payer: MEDICARE

## 2017-09-25 VITALS
OXYGEN SATURATION: 98 % | HEIGHT: 62 IN | HEART RATE: 60 BPM | BODY MASS INDEX: 19.51 KG/M2 | DIASTOLIC BLOOD PRESSURE: 80 MMHG | SYSTOLIC BLOOD PRESSURE: 130 MMHG | TEMPERATURE: 98.8 F | WEIGHT: 106 LBS | RESPIRATION RATE: 14 BRPM

## 2017-09-25 DIAGNOSIS — R53.82 CHRONIC FATIGUE: ICD-10-CM

## 2017-09-25 DIAGNOSIS — I73.00 RAYNAUD'S DISEASE WITHOUT GANGRENE: Chronic | ICD-10-CM

## 2017-09-25 DIAGNOSIS — Z79.52 CURRENT CHRONIC USE OF SYSTEMIC STEROIDS: Chronic | ICD-10-CM

## 2017-09-25 DIAGNOSIS — M35.00 SJOGREN'S SYNDROME, WITH UNSPECIFIED ORGAN INVOLVEMENT (HCC): Chronic | ICD-10-CM

## 2017-09-25 DIAGNOSIS — M54.6 ACUTE MIDLINE THORACIC BACK PAIN: ICD-10-CM

## 2017-09-25 DIAGNOSIS — Z23 NEED FOR TDAP VACCINATION: ICD-10-CM

## 2017-09-25 DIAGNOSIS — M85.89 OSTEOPENIA OF MULTIPLE SITES: Chronic | ICD-10-CM

## 2017-09-25 DIAGNOSIS — Z00.00 HEALTH CARE MAINTENANCE: ICD-10-CM

## 2017-09-25 PROBLEM — S21.001A OPEN WOUND OF RIGHT BREAST: Status: RESOLVED | Noted: 2017-06-26 | Resolved: 2017-09-25

## 2017-09-25 PROCEDURE — 90471 IMMUNIZATION ADMIN: CPT | Performed by: FAMILY MEDICINE

## 2017-09-25 PROCEDURE — 72070 X-RAY EXAM THORAC SPINE 2VWS: CPT

## 2017-09-25 PROCEDURE — 90715 TDAP VACCINE 7 YRS/> IM: CPT | Performed by: FAMILY MEDICINE

## 2017-09-25 PROCEDURE — 99213 OFFICE O/P EST LOW 20 MIN: CPT | Mod: 25 | Performed by: FAMILY MEDICINE

## 2017-09-25 NOTE — PROGRESS NOTES
"Chief Complaint   Patient presents with   • Back Pain     mid thoracic x 4 days       HPI:  Patient is a 62 y.o. female established patient who presents today for evaluation of new mid thoracic pain for one week with exacerbation of pain for past four days. She has chronic sjogrens/OA/lupus arthritis/Raynauds with chronic pain and associated issues managed by Dr. Cruz in [place]. Pt reports sudden onset of pain last Monday with no associated trauma/ no history of popping noise noted/ no new exercises done in pilates. She tried OTC supportive care and medication modulation without success and is concerned about underlying bone abnormality given her history. She is due for Tdap today and needs yearly fasting labs this fall.     Patient Active Problem List    Diagnosis Date Noted   • Nonhealing surgical wound 06/09/2017   • Incisional breast wound 05/15/2017   • DDD (degenerative disc disease), cervical 04/26/2017   • Osteoarthritis of multiple joints 04/26/2017   • Lupus arthritis (CMS-[facility]) 04/26/2017   • Chronic pain syndrome 04/26/2017   • Gastroesophageal reflux disease without esophagitis 04/26/2017   • Lactose intolerance 04/26/2017   • Raynaud's disease without gangrene 04/26/2017   • Osteopenia 04/26/2017   • Functional diarrhea 04/26/2017   • Current chronic use of systemic steroids 04/26/2017   • Sjogren's syndrome (CMS-HCC) 04/26/1990     Past medical, surgical, family, and social history was reviewed and updated in Epic chart by me today.     Medications and allergies reviewed and updated in Epic chart by me today.     ROS:  Pertinent positives listed above in HPI. All other systems have been reviewed and are negative.    PE:   /80   Pulse 60   Temp 37.1 °C (98.8 °F)   Resp 14   Ht 1.575 m (5' 2.01\")   Wt 48.1 kg (106 lb)   SpO2 98%   BMI 19.38 kg/m²   Vital signs reviewed with patient.     Gen: Well developed; well nourished; no acute distress; age appropriate appearance   Back: slight " spinal curvature noted/ midline mid thoracic bone tenderness with direct palpation with b/l paraspinal muscle spasm/ no overlying skin changes noted.   Extremities: No peripheral edema b/l LE extremities/ no clubbing nor cyanosis noted  Skin: Warm and dry; no rashes noted   Neuro: No focal deficits noted   Psych: AAOx4; mood and affect are appropriate    A/P:  1. Acute midline thoracic back pain  Care plan discussed with patient - will check Xray to rule out occult source of pain. She has pending complete spine MRI with sedation per Dr. Cruz and I encouraged her to schedule this ASAP. Pt is ok for sedation from my standpoint to obtain imaging.  - DX-THORACIC SPINE-2 VIEWS; Future    2. Need for Tdap vaccination  Tdap vaccine administered at visit today.  - Tdap =>6yo IM     Pt will return in October for fasting labs/ Sonocine results from Friday remain pending/ Pneumovax will be given at October lab draw visit.

## 2017-09-28 DIAGNOSIS — M54.6 THORACIC SPINE PAIN: ICD-10-CM

## 2017-10-18 ENCOUNTER — NON-PROVIDER VISIT (OUTPATIENT)
Dept: INTERNAL MEDICINE | Facility: IMAGING CENTER | Age: 62
End: 2017-10-18
Payer: MEDICARE

## 2017-10-18 ENCOUNTER — HOSPITAL ENCOUNTER (OUTPATIENT)
Facility: MEDICAL CENTER | Age: 62
End: 2017-10-18
Attending: FAMILY MEDICINE
Payer: MEDICARE

## 2017-10-18 ENCOUNTER — HOSPITAL ENCOUNTER (OUTPATIENT)
Facility: MEDICAL CENTER | Age: 62
End: 2017-10-18
Attending: INTERNAL MEDICINE
Payer: MEDICARE

## 2017-10-18 DIAGNOSIS — I73.00 RAYNAUD'S DISEASE WITHOUT GANGRENE: Chronic | ICD-10-CM

## 2017-10-18 DIAGNOSIS — M35.00 SJOGREN'S SYNDROME, WITH UNSPECIFIED ORGAN INVOLVEMENT (HCC): Chronic | ICD-10-CM

## 2017-10-18 DIAGNOSIS — Z79.52 CURRENT CHRONIC USE OF SYSTEMIC STEROIDS: Chronic | ICD-10-CM

## 2017-10-18 DIAGNOSIS — M32.9 LUPUS ARTHRITIS (HCC): Chronic | ICD-10-CM

## 2017-10-18 DIAGNOSIS — Z01.89 ENCOUNTER FOR ROUTINE LABORATORY TESTING: ICD-10-CM

## 2017-10-18 DIAGNOSIS — R53.82 CHRONIC FATIGUE: ICD-10-CM

## 2017-10-18 DIAGNOSIS — Z23 NEED FOR PNEUMOCOCCAL VACCINATION: ICD-10-CM

## 2017-10-18 DIAGNOSIS — M85.89 OSTEOPENIA OF MULTIPLE SITES: Chronic | ICD-10-CM

## 2017-10-18 DIAGNOSIS — Z00.00 HEALTH CARE MAINTENANCE: ICD-10-CM

## 2017-10-18 LAB
25(OH)D3 SERPL-MCNC: 44 NG/ML (ref 30–100)
ALBUMIN SERPL BCP-MCNC: 4.6 G/DL (ref 3.2–4.9)
ALBUMIN SERPL BCP-MCNC: 4.8 G/DL (ref 3.2–4.9)
ALBUMIN/GLOB SERPL: 2.2 G/DL
ALBUMIN/GLOB SERPL: 2.5 G/DL
ALP SERPL-CCNC: 58 U/L (ref 30–99)
ALP SERPL-CCNC: 59 U/L (ref 30–99)
ALT SERPL-CCNC: 87 U/L (ref 2–50)
ALT SERPL-CCNC: 87 U/L (ref 2–50)
ANION GAP SERPL CALC-SCNC: 10 MMOL/L (ref 0–11.9)
ANION GAP SERPL CALC-SCNC: 9 MMOL/L (ref 0–11.9)
AST SERPL-CCNC: 43 U/L (ref 12–45)
AST SERPL-CCNC: 44 U/L (ref 12–45)
BASOPHILS # BLD AUTO: 0.9 % (ref 0–1.8)
BASOPHILS # BLD AUTO: 1.1 % (ref 0–1.8)
BASOPHILS # BLD: 0.05 K/UL (ref 0–0.12)
BASOPHILS # BLD: 0.06 K/UL (ref 0–0.12)
BILIRUB SERPL-MCNC: 1 MG/DL (ref 0.1–1.5)
BILIRUB SERPL-MCNC: 1 MG/DL (ref 0.1–1.5)
BUN SERPL-MCNC: 18 MG/DL (ref 8–22)
BUN SERPL-MCNC: 19 MG/DL (ref 8–22)
CALCIUM SERPL-MCNC: 9.7 MG/DL (ref 8.5–10.5)
CALCIUM SERPL-MCNC: 9.8 MG/DL (ref 8.5–10.5)
CHLORIDE SERPL-SCNC: 106 MMOL/L (ref 96–112)
CHLORIDE SERPL-SCNC: 107 MMOL/L (ref 96–112)
CHOLEST SERPL-MCNC: 191 MG/DL (ref 100–199)
CO2 SERPL-SCNC: 26 MMOL/L (ref 20–33)
CO2 SERPL-SCNC: 27 MMOL/L (ref 20–33)
CREAT SERPL-MCNC: 0.79 MG/DL (ref 0.5–1.4)
CREAT SERPL-MCNC: 0.81 MG/DL (ref 0.5–1.4)
CRP SERPL HS-MCNC: 0.15 MG/DL (ref 0–0.75)
EOSINOPHIL # BLD AUTO: 0.17 K/UL (ref 0–0.51)
EOSINOPHIL # BLD AUTO: 0.21 K/UL (ref 0–0.51)
EOSINOPHIL NFR BLD: 3.1 % (ref 0–6.9)
EOSINOPHIL NFR BLD: 3.6 % (ref 0–6.9)
ERYTHROCYTE [DISTWIDTH] IN BLOOD BY AUTOMATED COUNT: 47.4 FL (ref 35.9–50)
ERYTHROCYTE [DISTWIDTH] IN BLOOD BY AUTOMATED COUNT: 47.5 FL (ref 35.9–50)
ERYTHROCYTE [SEDIMENTATION RATE] IN BLOOD BY WESTERGREN METHOD: 6 MM/HOUR (ref 0–30)
GFR SERPL CREATININE-BSD FRML MDRD: >60 ML/MIN/1.73 M 2
GFR SERPL CREATININE-BSD FRML MDRD: >60 ML/MIN/1.73 M 2
GLOBULIN SER CALC-MCNC: 1.9 G/DL (ref 1.9–3.5)
GLOBULIN SER CALC-MCNC: 2.1 G/DL (ref 1.9–3.5)
GLUCOSE SERPL-MCNC: 83 MG/DL (ref 65–99)
GLUCOSE SERPL-MCNC: 85 MG/DL (ref 65–99)
HCT VFR BLD AUTO: 41.8 % (ref 37–47)
HCT VFR BLD AUTO: 42.3 % (ref 37–47)
HDLC SERPL-MCNC: 91 MG/DL
HGB BLD-MCNC: 14.3 G/DL (ref 12–16)
HGB BLD-MCNC: 14.4 G/DL (ref 12–16)
IMM GRANULOCYTES # BLD AUTO: 0.02 K/UL (ref 0–0.11)
IMM GRANULOCYTES # BLD AUTO: 0.04 K/UL (ref 0–0.11)
IMM GRANULOCYTES NFR BLD AUTO: 0.3 % (ref 0–0.9)
IMM GRANULOCYTES NFR BLD AUTO: 0.7 % (ref 0–0.9)
LDLC SERPL CALC-MCNC: 87 MG/DL
LYMPHOCYTES # BLD AUTO: 2.07 K/UL (ref 1–4.8)
LYMPHOCYTES # BLD AUTO: 2.16 K/UL (ref 1–4.8)
LYMPHOCYTES NFR BLD: 37.3 % (ref 22–41)
LYMPHOCYTES NFR BLD: 37.4 % (ref 22–41)
MCH RBC QN AUTO: 35 PG (ref 27–33)
MCH RBC QN AUTO: 35.3 PG (ref 27–33)
MCHC RBC AUTO-ENTMCNC: 33.8 G/DL (ref 33.6–35)
MCHC RBC AUTO-ENTMCNC: 34.4 G/DL (ref 33.6–35)
MCV RBC AUTO: 102.5 FL (ref 81.4–97.8)
MCV RBC AUTO: 103.7 FL (ref 81.4–97.8)
MONOCYTES # BLD AUTO: 0.65 K/UL (ref 0–0.85)
MONOCYTES # BLD AUTO: 0.67 K/UL (ref 0–0.85)
MONOCYTES NFR BLD AUTO: 11.6 % (ref 0–13.4)
MONOCYTES NFR BLD AUTO: 11.7 % (ref 0–13.4)
NEUTROPHILS # BLD AUTO: 2.56 K/UL (ref 2–7.15)
NEUTROPHILS # BLD AUTO: 2.67 K/UL (ref 2–7.15)
NEUTROPHILS NFR BLD: 46.1 % (ref 44–72)
NEUTROPHILS NFR BLD: 46.2 % (ref 44–72)
NRBC # BLD AUTO: 0 K/UL
NRBC # BLD AUTO: 0 K/UL
NRBC BLD AUTO-RTO: 0 /100 WBC
NRBC BLD AUTO-RTO: 0 /100 WBC
PLATELET # BLD AUTO: 290 K/UL (ref 164–446)
PLATELET # BLD AUTO: 301 K/UL (ref 164–446)
PMV BLD AUTO: 10.7 FL (ref 9–12.9)
PMV BLD AUTO: 10.9 FL (ref 9–12.9)
POTASSIUM SERPL-SCNC: 3.6 MMOL/L (ref 3.6–5.5)
POTASSIUM SERPL-SCNC: 3.6 MMOL/L (ref 3.6–5.5)
PROT SERPL-MCNC: 6.7 G/DL (ref 6–8.2)
PROT SERPL-MCNC: 6.7 G/DL (ref 6–8.2)
RBC # BLD AUTO: 4.08 M/UL (ref 4.2–5.4)
RBC # BLD AUTO: 4.08 M/UL (ref 4.2–5.4)
SODIUM SERPL-SCNC: 142 MMOL/L (ref 135–145)
SODIUM SERPL-SCNC: 143 MMOL/L (ref 135–145)
TRIGL SERPL-MCNC: 63 MG/DL (ref 0–149)
TSH SERPL DL<=0.005 MIU/L-ACNC: 2.16 UIU/ML (ref 0.3–3.7)
VIT B12 SERPL-MCNC: >1500 PG/ML (ref 211–911)
WBC # BLD AUTO: 5.6 K/UL (ref 4.8–10.8)
WBC # BLD AUTO: 5.8 K/UL (ref 4.8–10.8)

## 2017-10-18 PROCEDURE — 84443 ASSAY THYROID STIM HORMONE: CPT

## 2017-10-18 PROCEDURE — G0009 ADMIN PNEUMOCOCCAL VACCINE: HCPCS | Performed by: FAMILY MEDICINE

## 2017-10-18 PROCEDURE — 85652 RBC SED RATE AUTOMATED: CPT

## 2017-10-18 PROCEDURE — 80061 LIPID PANEL: CPT

## 2017-10-18 PROCEDURE — 86140 C-REACTIVE PROTEIN: CPT

## 2017-10-18 PROCEDURE — 82306 VITAMIN D 25 HYDROXY: CPT

## 2017-10-18 PROCEDURE — 85025 COMPLETE CBC W/AUTO DIFF WBC: CPT

## 2017-10-18 PROCEDURE — 80053 COMPREHEN METABOLIC PANEL: CPT | Mod: 91

## 2017-10-18 PROCEDURE — 80053 COMPREHEN METABOLIC PANEL: CPT

## 2017-10-18 PROCEDURE — 90732 PPSV23 VACC 2 YRS+ SUBQ/IM: CPT | Performed by: FAMILY MEDICINE

## 2017-10-18 PROCEDURE — 82607 VITAMIN B-12: CPT

## 2017-10-18 PROCEDURE — 85025 COMPLETE CBC W/AUTO DIFF WBC: CPT | Mod: 91

## 2017-10-18 NOTE — NON-PROVIDER
Spoke with Dr Inessa Cruz regarding safety of administering Pneumovax today.  Dr Cruz indicated no concerns.

## 2017-10-20 ENCOUNTER — HOSPITAL ENCOUNTER (OUTPATIENT)
Dept: RADIOLOGY | Facility: MEDICAL CENTER | Age: 62
End: 2017-10-20
Attending: INTERNAL MEDICINE
Payer: MEDICARE

## 2017-10-20 ENCOUNTER — HOSPITAL ENCOUNTER (OUTPATIENT)
Dept: RADIOLOGY | Facility: MEDICAL CENTER | Age: 62
End: 2017-10-20
Attending: FAMILY MEDICINE
Payer: MEDICARE

## 2017-10-20 VITALS
HEIGHT: 62 IN | RESPIRATION RATE: 15 BRPM | BODY MASS INDEX: 19.32 KG/M2 | TEMPERATURE: 97 F | OXYGEN SATURATION: 98 % | SYSTOLIC BLOOD PRESSURE: 114 MMHG | HEART RATE: 64 BPM | DIASTOLIC BLOOD PRESSURE: 63 MMHG | WEIGHT: 105 LBS

## 2017-10-20 DIAGNOSIS — M54.6 THORACIC SPINE PAIN: ICD-10-CM

## 2017-10-20 DIAGNOSIS — M54.16 LUMBAR RADICULOPATHY: ICD-10-CM

## 2017-10-20 PROCEDURE — 72141 MRI NECK SPINE W/O DYE: CPT

## 2017-10-20 PROCEDURE — 72148 MRI LUMBAR SPINE W/O DYE: CPT

## 2017-10-20 PROCEDURE — 72146 MRI CHEST SPINE W/O DYE: CPT

## 2017-10-20 PROCEDURE — 01922 ANES N-INVAS IMG/RADJ THER: CPT

## 2017-10-20 PROCEDURE — 700111 HCHG RX REV CODE 636 W/ 250 OVERRIDE (IP)

## 2017-10-20 PROCEDURE — 700101 HCHG RX REV CODE 250

## 2017-10-20 ASSESSMENT — PAIN SCALES - GENERAL
PAINLEVEL_OUTOF10: 0

## 2017-10-20 NOTE — DISCHARGE INSTRUCTIONS
MRI ADULT DISCHARGE INSTRUCTIONS    You have been medicated today for your scan. Please follow the instructions below to ensure your safe recovery. If you have any questions or problems, feel free to call us at [phone] or [phone].     1.   Have someone stay with you to assist you as needed.    2.   Do not drive or operate any mechanical devices.    3.   Do not perform any activity that requires concentration. Make no major decisions over the next 24 hours.     4.   Be careful changing positions from laying down to sitting or standing, as you may become dizzy.     5.   Do not drink alcohol for 48 hours.    6.   There are no restrictions for eating your normal meals. Drink fluids.    7.   You may continue your usual medications for pain, tranquilizers, muscle relaxants or sedatives when awake.     8.   Tomorrow, you may continue your normal daily activities.     9.   Pressure dressing on 10 - 15 minutes. If swelling or bleeding occurs when removed, continue placing direct pressure on injection site for another 5 minutes, or until bleeding stops.     I have been informed of and understand the above discharge instructions.

## 2017-10-21 ENCOUNTER — TELEPHONE (OUTPATIENT)
Dept: INTERNAL MEDICINE | Facility: IMAGING CENTER | Age: 62
End: 2017-10-21

## 2017-10-21 DIAGNOSIS — S22.000A CLOSED COMPRESSION FRACTURE OF THORACIC VERTEBRA, INITIAL ENCOUNTER (HCC): ICD-10-CM

## 2017-10-21 NOTE — TELEPHONE ENCOUNTER
----- Message from Lupe Webb sent at 10/20/2017  6:12 PM PDT -----  Regarding: Non-Urgent Medical Question  Contact: [phone]  Dr De Santiago:  [facility].  Thank you for posting Lupe's MRI report.  Needless to say, we are very concerned about the fracture(s), the cause/condition leading to fracture(s), getting on the right regiment to restore bone density and potentially being a candidate for the kyphoplasty while the fractures would benefit by the process. Our neighbor just had this done by Dr Walsh and is very pleased with his outcome. We are anxious to talk to you. Thank you Dr De Santiago!

## 2017-10-21 NOTE — TELEPHONE ENCOUNTER
Spoke with patient regarding her T spine MRI results and treatment options. Discussed kyphoplasty/vertebroplasty evaluation options with IR - she has a neighbor who had similar procedure done with Dr. Schreiber and she would like referral to him first. Also spoke about treatment of osteopenia now with fracture present - encouraged her to speak with Dr. Cruz first about injectable medications and she will update me accordingly.

## 2017-10-23 DIAGNOSIS — M48.54XA NON-TRAUMATIC COMPRESSION FRACTURE OF TENTH THORACIC VERTEBRA, INITIAL ENCOUNTER: ICD-10-CM

## 2017-10-23 DIAGNOSIS — M48.54XA NON-TRAUMATIC COMPRESSION FRACTURE OF NINTH THORACIC VERTEBRA, INITIAL ENCOUNTER: ICD-10-CM

## 2017-10-31 ENCOUNTER — HOSPITAL ENCOUNTER (OUTPATIENT)
Dept: LAB | Facility: MEDICAL CENTER | Age: 62
End: 2017-10-31
Attending: INTERNAL MEDICINE
Payer: MEDICARE

## 2017-10-31 LAB
25(OH)D3 SERPL-MCNC: 46 NG/ML (ref 30–100)
ALBUMIN SERPL BCP-MCNC: 4.6 G/DL (ref 3.2–4.9)
ALP SERPL-CCNC: 58 U/L (ref 30–99)
ALT SERPL-CCNC: 71 U/L (ref 2–50)
AST SERPL-CCNC: 47 U/L (ref 12–45)
BILIRUB CONJ SERPL-MCNC: <0.1 MG/DL (ref 0.1–0.5)
BILIRUB INDIRECT SERPL-MCNC: ABNORMAL MG/DL (ref 0–1)
BILIRUB SERPL-MCNC: 0.5 MG/DL (ref 0.1–1.5)
PROT SERPL-MCNC: 6.9 G/DL (ref 6–8.2)
PTH-INTACT SERPL-MCNC: 44 PG/ML (ref 14–72)

## 2017-10-31 PROCEDURE — 83970 ASSAY OF PARATHORMONE: CPT

## 2017-10-31 PROCEDURE — 82306 VITAMIN D 25 HYDROXY: CPT

## 2017-10-31 PROCEDURE — 36415 COLL VENOUS BLD VENIPUNCTURE: CPT

## 2017-10-31 PROCEDURE — 80076 HEPATIC FUNCTION PANEL: CPT

## 2017-11-29 ENCOUNTER — HOSPITAL ENCOUNTER (OUTPATIENT)
Dept: LAB | Facility: MEDICAL CENTER | Age: 62
End: 2017-11-29
Attending: INTERNAL MEDICINE
Payer: MEDICARE

## 2017-11-29 LAB
ALBUMIN SERPL BCP-MCNC: 4.7 G/DL (ref 3.2–4.9)
ALP SERPL-CCNC: 72 U/L (ref 30–99)
ALT SERPL-CCNC: 58 U/L (ref 2–50)
AST SERPL-CCNC: 49 U/L (ref 12–45)
BILIRUB CONJ SERPL-MCNC: 0.1 MG/DL (ref 0.1–0.5)
BILIRUB INDIRECT SERPL-MCNC: 0.5 MG/DL (ref 0–1)
BILIRUB SERPL-MCNC: 0.6 MG/DL (ref 0.1–1.5)
PROT SERPL-MCNC: 7 G/DL (ref 6–8.2)

## 2017-11-29 PROCEDURE — 36415 COLL VENOUS BLD VENIPUNCTURE: CPT

## 2017-11-29 PROCEDURE — 80076 HEPATIC FUNCTION PANEL: CPT

## 2017-12-21 ENCOUNTER — HOSPITAL ENCOUNTER (OUTPATIENT)
Dept: RADIOLOGY | Facility: MEDICAL CENTER | Age: 62
End: 2017-12-21
Attending: INTERNAL MEDICINE
Payer: MEDICARE

## 2017-12-21 DIAGNOSIS — R74.8 ABNORMAL LIVER ENZYMES: ICD-10-CM

## 2017-12-21 PROCEDURE — 76705 ECHO EXAM OF ABDOMEN: CPT

## 2018-01-22 ENCOUNTER — OFFICE VISIT (OUTPATIENT)
Dept: ENDOCRINOLOGY | Facility: MEDICAL CENTER | Age: 63
End: 2018-01-22
Payer: MEDICARE

## 2018-01-22 VITALS
SYSTOLIC BLOOD PRESSURE: 128 MMHG | DIASTOLIC BLOOD PRESSURE: 86 MMHG | WEIGHT: 109 LBS | HEART RATE: 77 BPM | OXYGEN SATURATION: 98 % | BODY MASS INDEX: 20.06 KG/M2 | HEIGHT: 62 IN

## 2018-01-22 DIAGNOSIS — M80.08XA FRACTURE OF VERTEBRA DUE TO OSTEOPOROSIS, INITIAL ENCOUNTER (HCC): Primary | ICD-10-CM

## 2018-01-22 DIAGNOSIS — M80.00XA OSTEOPOROSIS WITH CURRENT PATHOLOGICAL FRACTURE, UNSPECIFIED OSTEOPOROSIS TYPE, INITIAL ENCOUNTER: ICD-10-CM

## 2018-01-22 PROCEDURE — 99203 OFFICE O/P NEW LOW 30 MIN: CPT | Performed by: INTERNAL MEDICINE

## 2018-01-22 RX ORDER — FOLIC ACID 1 MG/1
1 TABLET ORAL DAILY
COMMUNITY
End: 2019-12-16

## 2018-01-23 NOTE — PROGRESS NOTES
Chief Complaint   Patient presents with   • Osteoporosis          CHIEF COMPLAINT:     Endocrine evaluation requested by Dr. Inessa Cha, rheumatologist, and Jina De Santiago MD, PCP, for this 62 year old lady with osteoporosis.     HISTORY OF PRESENT ILLNESS:     The patient’s history is difficult to track in terms of a time line.  She has chronic Sjogren’s syndrome and associated arthritis.  Also she has MR evidence of degenerative disc disease, both cervical and lumbar spine.  She has been treated for osteopenia in the past with bisphosphonates and Prolia.  She has had increasing chronic pain which has led to a thoracic MR done in October 2017 indicating subacute or acute T9 and T10 vertebral compression fractures.  She has seen Dr. Schreiber in consultation who has suggested that she try conservative management to see if these fractures will heal without kyphoplasty.  In November last year, she began Forteo on a daily basis.  She has had a great deal of difficulty tolerating the Forteo but she is determined to stay with it.  She experiences dizziness, nausea and headaches which she relates directly to the Forteo.  She is symptomatic daily but willing to put up with it for the benefit.  So far there has been no benefit in terms of spinal pain relief.      Neither parent had obvious osteoporosis or hip fracture.  The patient has not smoked cigarettes.  She is not a heavy alcohol user.  She is on prednisone, currently 5 mg a day for her Sjogren’s arthritis.  She is trying to wean slowly off prednisone but so far has not been able to get off completely.  She has had a vitamin D level done in October that was quite adequate at 46.  She does take a calcium supplement of 500 mg and I have recommended 1000 mg.  Her chemistry panel shows mild liver enzyme abnormalities of unknown etiology but these have come and gone from time to time, perhaps related to medication.  She does not have renal disease.  She does not take  thyroid hormone and a recent TSH done in October was 2.1.  Parathyroid hormone also in October was normal at 44.      She has had a lifelong GI intolerance to certain foods and difficulty eating so she is slightly built on the thin side.      I would like to see a protein electrophoresis.  Likely that will be normal.      In terms of cause for osteoporosis, I think her life long difficulty with GI symptoms and eating could be a factor.  Prednisone certainly is a factor.  She is now menopausal and that is a factor on no estrogen.  In general, she just may be the prototype being small framed and fair complected.    If she can tolerate Forteo she is on the best treatment she can have and she is determined to try to tolerate it.  I have recommended that she might try an every other day approach with some relief over a 48 hour period of time.  I think she is determined to use it daily and put up with side effects for the benefit.    She also wondered who else might be a source of information.  I did suggest Dr. Jesús Estevez who has had a special interest in bone metabolism over the years. She would like a consultation.           ROS:   Chronic back pain and joint pain  Raynauds  GI intolerance to various foods  Lactose intolerance  Insomnia    Allergies:   Allergies   Allergen Reactions   • Contrast Media With Iodine [Iodine]      Rash, same with topical iodine     • Shellfish Allergy Vomiting     Rash, hives,    • Penicillins Vomiting       Current medicines including changes today:  Current Outpatient Prescriptions   Medication Sig Dispense Refill   • NS SOLN 50 mL with methotrexate PF 25 MG/ML SOLN 30 mg/m2 30 mg/m2 by Intravenous route Once.     • folic acid (FOLVITE) 1 MG Tab Take 1 mg by mouth every day.     • Teriparatide, Recombinant, (FORTEO) 600 MCG/2.4ML Solution Inject  as instructed.     • BIOTIN PO Take  by mouth.     • Flaxseed Oil Oil by Does not apply route.     • CRANBERRY EXTRACT PO Take  by  "mouth.     • therapeutic multivitamin-minerals (THERAGRAN-M) Tab Take 1 Tab by mouth every day.     • vitamin e (VITAMIN E) 400 UNIT Cap Take 400 Units by mouth every day.     • ascorbic acid (ASCORBIC ACID) 500 MG Tab Take 500 mg by mouth every day.     • metaxalone (SKELAXIN) 800 MG Tab Take 800 mg by mouth 3 times a day.     • sildenafil citrate (VIAGRA) 25 MG Tab Take 20 mg by mouth every day. Indications: Raynaud's Phenomenon of Unknown Cause     • CycloSPORINE (RESTASIS OP) 1 Drop by Ophthalmic route 2 Times a Day. Both eyes     • zonisamide (ZONEGRAN) 100 MG CAPS Take 300 mg by mouth every bedtime. Indications: neuropathy     • predniSONE (DELTASONE) 1 MG TABS Take 5 mg by mouth every day. 7 pills per day     • zolpidem (AMBIEN) 5 MG TABS Take  by mouth at bedtime as needed.       No current facility-administered medications for this visit.         Past Medical History:   Diagnosis Date   • Anesthesia     problem coming out of it, very shaky   • Arthritis     osteo/lupus arthritis, feet, hands, neck, back   • Chronic pain syndrome    • DDD (degenerative disc disease), cervical    • Heart burn    • History of shingles    • Indigestion    • Lupus    • Other specified disorder of intestines     diarrhea, constipation   • Pain 4/7/14    5.5/10, 10/25/16 generalized pain throughout body   • Sjogren's syndrome    • Unspecified cataract     IOLOU      Family history        No family history of significant osteoporosis    Social history       Patient is              Does not smoke cigarettes or drink alcohol and does not use recreational drugs.      PHYSICAL EXAM:    /86   Pulse 77   Ht 1.575 m (5' 2\")   Wt 49.4 kg (109 lb)   SpO2 98%   BMI 19.94 kg/m²      Gen.   Slender but otherwise appears healthy             Posture is good. She does not have a marked dorsal kyphosis             She does not have cushingoid features    Skin   appropriate for sex and age    HEENT  unremarkable    Neck   thyroid " gland is relatively small and difficult to palpate    Heart  regular    Extremities  no edema    Neuro  gait and station normal    Psych  appropriate, calm, pleasant      ASSESSMENT AND RECOMMENDATIONS    1. Fracture of vertebra due to osteoporosis, initial encounter (CMS-HCC)    - COMP METABOLIC PANEL; Future  - SPEP W/REFLEX TO ROXANA HUYNH G, M; Future  - VITAMIN D,25 HYDROXY; Future    2. Osteoporosis with current pathological fracture, unspecified osteoporosis type, initial encounter                 Recent thoracic vertebral insufficiency fractures                 Continue Forteo daily or every other day as tolerated                 Increase calcium supplement to 1000 mg per day                 Continue vitamin D3 2500 units per day      DISPOSITION:  Update lab as above                             The patient would be interested in a consultation with Dr. Jesús Boss M.D.    Copies to: Jina De Santiago M.D. [phone]

## 2018-01-24 DIAGNOSIS — M80.00XA OSTEOPOROSIS WITH CURRENT PATHOLOGICAL FRACTURE, UNSPECIFIED OSTEOPOROSIS TYPE, INITIAL ENCOUNTER: Primary | ICD-10-CM

## 2018-01-26 ENCOUNTER — HOSPITAL ENCOUNTER (OUTPATIENT)
Dept: LAB | Facility: MEDICAL CENTER | Age: 63
End: 2018-01-26
Attending: INTERNAL MEDICINE
Payer: MEDICARE

## 2018-01-26 DIAGNOSIS — M80.08XA FRACTURE OF VERTEBRA DUE TO OSTEOPOROSIS, INITIAL ENCOUNTER (HCC): ICD-10-CM

## 2018-01-26 LAB
25(OH)D3 SERPL-MCNC: 32 NG/ML (ref 30–100)
ALBUMIN SERPL BCP-MCNC: 5.4 G/DL (ref 3.2–4.9)
ALBUMIN/GLOB SERPL: 2.7 G/DL
ALP SERPL-CCNC: 59 U/L (ref 30–99)
ALT SERPL-CCNC: 36 U/L (ref 2–50)
ANION GAP SERPL CALC-SCNC: 8 MMOL/L (ref 0–11.9)
AST SERPL-CCNC: 32 U/L (ref 12–45)
BILIRUB SERPL-MCNC: 0.7 MG/DL (ref 0.1–1.5)
BUN SERPL-MCNC: 24 MG/DL (ref 8–22)
CALCIUM SERPL-MCNC: 10.5 MG/DL (ref 8.5–10.5)
CHLORIDE SERPL-SCNC: 103 MMOL/L (ref 96–112)
CO2 SERPL-SCNC: 30 MMOL/L (ref 20–33)
CREAT SERPL-MCNC: 0.92 MG/DL (ref 0.5–1.4)
GLOBULIN SER CALC-MCNC: 2 G/DL (ref 1.9–3.5)
GLUCOSE SERPL-MCNC: 98 MG/DL (ref 65–99)
POTASSIUM SERPL-SCNC: 3.9 MMOL/L (ref 3.6–5.5)
PROT SERPL-MCNC: 7.4 G/DL (ref 6–8.2)
SODIUM SERPL-SCNC: 141 MMOL/L (ref 135–145)

## 2018-01-26 PROCEDURE — 82306 VITAMIN D 25 HYDROXY: CPT

## 2018-01-26 PROCEDURE — 84160 ASSAY OF PROTEIN ANY SOURCE: CPT

## 2018-01-26 PROCEDURE — 36415 COLL VENOUS BLD VENIPUNCTURE: CPT

## 2018-01-26 PROCEDURE — 80053 COMPREHEN METABOLIC PANEL: CPT

## 2018-01-26 PROCEDURE — 84165 PROTEIN E-PHORESIS SERUM: CPT

## 2018-01-29 LAB
ALBUMIN SERPL-MCNC: 4.88 G/DL (ref 3.75–5.01)
ALPHA1 GLOB SERPL ELPH-MCNC: 0.32 G/DL (ref 0.19–0.46)
ALPHA2 GLOB SERPL ELPH-MCNC: 0.66 G/DL (ref 0.48–1.05)
B-GLOBULIN SERPL ELPH-MCNC: 0.68 G/DL (ref 0.48–1.1)
GAMMA GLOB SERPL ELPH-MCNC: 0.65 G/DL (ref 0.62–1.51)
INTERPRETATION SERPL IFE-IMP: NORMAL
MONOCLON BAND OBS SERPL: NORMAL
PATHOLOGY STUDY: NORMAL
PROT SERPL-MCNC: 7.2 G/DL (ref 6–8.3)

## 2018-02-01 ENCOUNTER — TELEPHONE (OUTPATIENT)
Dept: ENDOCRINOLOGY | Facility: MEDICAL CENTER | Age: 63
End: 2018-02-01

## 2018-02-02 NOTE — TELEPHONE ENCOUNTER
Telephone conversation with patient    Reviewed current lab tests which look very good. We discussed the meaning of her BUN versus creatinine and her albumin which are good. She is now supplementing with 2000 units of vitamin D3 so her level of 32 should come up a bit during the winter and I think that's all reasonable she is to try her best to tolerate Forteo.    Raul Boss M.D.

## 2018-02-20 ENCOUNTER — TELEPHONE (OUTPATIENT)
Dept: ENDOCRINOLOGY | Facility: MEDICAL CENTER | Age: 63
End: 2018-02-20

## 2018-02-21 NOTE — TELEPHONE ENCOUNTER
Telephone conversation with patient    Patient had pain following her first injection of Forteo which seemed to be an electrical kind of pain going from her back into her legs. It has eventually worn off. She waited a day and then took another dose of Forteo and seems to be all right. Now she wants to try and use of Forteo every other day and perhaps work into it that way. I think okay if that's all she can take it is better than not taking it at all.     Raul Boss M.D.

## 2018-03-13 ENCOUNTER — OFFICE VISIT (OUTPATIENT)
Dept: INTERNAL MEDICINE | Facility: IMAGING CENTER | Age: 63
End: 2018-03-13
Payer: MEDICARE

## 2018-03-13 VITALS
BODY MASS INDEX: 19.69 KG/M2 | TEMPERATURE: 99.3 F | DIASTOLIC BLOOD PRESSURE: 78 MMHG | RESPIRATION RATE: 14 BRPM | WEIGHT: 107 LBS | OXYGEN SATURATION: 100 % | HEIGHT: 62 IN | SYSTOLIC BLOOD PRESSURE: 138 MMHG | HEART RATE: 63 BPM

## 2018-03-13 DIAGNOSIS — M35.00 SJOGREN'S SYNDROME, WITH UNSPECIFIED ORGAN INVOLVEMENT (HCC): Chronic | ICD-10-CM

## 2018-03-13 DIAGNOSIS — R21 SKIN RASH: ICD-10-CM

## 2018-03-13 DIAGNOSIS — M15.9 PRIMARY OSTEOARTHRITIS INVOLVING MULTIPLE JOINTS: Chronic | ICD-10-CM

## 2018-03-13 DIAGNOSIS — M32.9 LUPUS ARTHRITIS (HCC): Chronic | ICD-10-CM

## 2018-03-13 DIAGNOSIS — M85.89 OSTEOPENIA OF MULTIPLE SITES: Chronic | ICD-10-CM

## 2018-03-13 DIAGNOSIS — M79.2 RADICULAR PAIN OF RIGHT UPPER EXTREMITY: ICD-10-CM

## 2018-03-13 DIAGNOSIS — I73.00 RAYNAUD'S DISEASE WITHOUT GANGRENE: Chronic | ICD-10-CM

## 2018-03-13 DIAGNOSIS — Z79.52 CURRENT CHRONIC USE OF SYSTEMIC STEROIDS: Chronic | ICD-10-CM

## 2018-03-13 DIAGNOSIS — M50.30 DDD (DEGENERATIVE DISC DISEASE), CERVICAL: Chronic | ICD-10-CM

## 2018-03-13 DIAGNOSIS — G89.4 CHRONIC PAIN SYNDROME: Chronic | ICD-10-CM

## 2018-03-13 DIAGNOSIS — F32.0 MILD SINGLE CURRENT EPISODE OF MAJOR DEPRESSIVE DISORDER (HCC): ICD-10-CM

## 2018-03-13 PROBLEM — S21.009A INCISIONAL BREAST WOUND: Status: RESOLVED | Noted: 2017-05-15 | Resolved: 2018-03-13

## 2018-03-13 PROBLEM — F32.9 MAJOR DEPRESSIVE DISORDER: Status: ACTIVE | Noted: 2018-03-13

## 2018-03-13 PROBLEM — T81.89XA NONHEALING SURGICAL WOUND: Status: RESOLVED | Noted: 2017-06-09 | Resolved: 2018-03-13

## 2018-03-13 PROCEDURE — 99214 OFFICE O/P EST MOD 30 MIN: CPT | Performed by: FAMILY MEDICINE

## 2018-03-13 RX ORDER — MULTIVIT-MIN/IRON/FOLIC ACID/K 18-600-40
2000 CAPSULE ORAL DAILY
COMMUNITY

## 2018-03-13 RX ORDER — CLOTRIMAZOLE 10 MG/1
10 LOZENGE ORAL; TOPICAL
COMMUNITY
End: 2018-08-02

## 2018-03-13 RX ORDER — NALTREXONE 100 %
4.5 POWDER (GRAM) MISCELLANEOUS DAILY
COMMUNITY
End: 2019-07-03

## 2018-03-13 RX ORDER — CALCIUM CARBONATE 500(1250)
500 TABLET ORAL 2 TIMES DAILY WITH MEALS
COMMUNITY
End: 2019-12-16

## 2018-03-13 ASSESSMENT — PATIENT HEALTH QUESTIONNAIRE - PHQ9
5. POOR APPETITE OR OVEREATING: 1 - SEVERAL DAYS
SUM OF ALL RESPONSES TO PHQ QUESTIONS 1-9: 12
CLINICAL INTERPRETATION OF PHQ2 SCORE: 4

## 2018-03-14 ENCOUNTER — HOSPITAL ENCOUNTER (OUTPATIENT)
Dept: LAB | Facility: MEDICAL CENTER | Age: 63
End: 2018-03-14
Attending: INTERNAL MEDICINE
Payer: MEDICARE

## 2018-03-14 LAB
25(OH)D3 SERPL-MCNC: 52 NG/ML (ref 30–100)
ALBUMIN SERPL BCP-MCNC: 4.4 G/DL (ref 3.2–4.9)
ALBUMIN/GLOB SERPL: 1.8 G/DL
ALP SERPL-CCNC: 50 U/L (ref 30–99)
ALT SERPL-CCNC: 38 U/L (ref 2–50)
ANION GAP SERPL CALC-SCNC: 9 MMOL/L (ref 0–11.9)
AST SERPL-CCNC: 32 U/L (ref 12–45)
BASOPHILS # BLD AUTO: 0.5 % (ref 0–1.8)
BASOPHILS # BLD: 0.04 K/UL (ref 0–0.12)
BILIRUB SERPL-MCNC: 0.6 MG/DL (ref 0.1–1.5)
BUN SERPL-MCNC: 26 MG/DL (ref 8–22)
CALCIUM SERPL-MCNC: 10.2 MG/DL (ref 8.5–10.5)
CHLORIDE SERPL-SCNC: 106 MMOL/L (ref 96–112)
CO2 SERPL-SCNC: 27 MMOL/L (ref 20–33)
CREAT SERPL-MCNC: 0.86 MG/DL (ref 0.5–1.4)
CRP SERPL HS-MCNC: 0.18 MG/DL (ref 0–0.75)
EOSINOPHIL # BLD AUTO: 0.05 K/UL (ref 0–0.51)
EOSINOPHIL NFR BLD: 0.7 % (ref 0–6.9)
ERYTHROCYTE [DISTWIDTH] IN BLOOD BY AUTOMATED COUNT: 56.8 FL (ref 35.9–50)
ERYTHROCYTE [SEDIMENTATION RATE] IN BLOOD BY WESTERGREN METHOD: 5 MM/HOUR (ref 0–30)
GLOBULIN SER CALC-MCNC: 2.4 G/DL (ref 1.9–3.5)
GLUCOSE SERPL-MCNC: 84 MG/DL (ref 65–99)
HCT VFR BLD AUTO: 42 % (ref 37–47)
HGB BLD-MCNC: 14.1 G/DL (ref 12–16)
IMM GRANULOCYTES # BLD AUTO: 0.02 K/UL (ref 0–0.11)
IMM GRANULOCYTES NFR BLD AUTO: 0.3 % (ref 0–0.9)
LYMPHOCYTES # BLD AUTO: 1.2 K/UL (ref 1–4.8)
LYMPHOCYTES NFR BLD: 15.8 % (ref 22–41)
MCH RBC QN AUTO: 36.3 PG (ref 27–33)
MCHC RBC AUTO-ENTMCNC: 33.6 G/DL (ref 33.6–35)
MCV RBC AUTO: 108.2 FL (ref 81.4–97.8)
MONOCYTES # BLD AUTO: 0.57 K/UL (ref 0–0.85)
MONOCYTES NFR BLD AUTO: 7.5 % (ref 0–13.4)
NEUTROPHILS # BLD AUTO: 5.71 K/UL (ref 2–7.15)
NEUTROPHILS NFR BLD: 75.2 % (ref 44–72)
NRBC # BLD AUTO: 0 K/UL
NRBC BLD-RTO: 0 /100 WBC
PLATELET # BLD AUTO: 313 K/UL (ref 164–446)
PMV BLD AUTO: 10.3 FL (ref 9–12.9)
POTASSIUM SERPL-SCNC: 4 MMOL/L (ref 3.6–5.5)
PROT SERPL-MCNC: 6.8 G/DL (ref 6–8.2)
RBC # BLD AUTO: 3.88 M/UL (ref 4.2–5.4)
SODIUM SERPL-SCNC: 142 MMOL/L (ref 135–145)
WBC # BLD AUTO: 7.6 K/UL (ref 4.8–10.8)

## 2018-03-14 PROCEDURE — 36415 COLL VENOUS BLD VENIPUNCTURE: CPT

## 2018-03-14 PROCEDURE — 86140 C-REACTIVE PROTEIN: CPT

## 2018-03-14 PROCEDURE — 85652 RBC SED RATE AUTOMATED: CPT

## 2018-03-14 PROCEDURE — 80053 COMPREHEN METABOLIC PANEL: CPT

## 2018-03-14 PROCEDURE — 85025 COMPLETE CBC W/AUTO DIFF WBC: CPT

## 2018-03-14 PROCEDURE — 82306 VITAMIN D 25 HYDROXY: CPT

## 2018-03-14 NOTE — PROGRESS NOTES
Chief Complaint   Patient presents with   • Osteopenia       HPI:  Patient is a 62 y.o. female established patient who presents today with her  to discuss her chronic osteopenia in addition to a myriad of new updates on multiple chronic medical conditions since her last visit in September 2017. She started using Forteo at the end of January 2018 and has been unable to tolerate daily use due to perceived side effects of feeling dizzy and shooting pains. She is now using it every other day and sees Dr. Boss for management of this condition. She now takes daily vitamin D3 supplementation and has increased her daily calcium intake. She continues to work out at the gym and ski when she is feeling well. She continues to see Dr. Cruz for rheum care and feels that methotrexate is not as effective as it has been in the past to control her chronic pain issues. She has weaned down to 1.5 mg of prednisone daily and continues with PT to help managed pain from prior non traumatic compression fractures of T9/T10. She has seen Dr. Olivier Jones for epidural injection in her C spine which was not effective, and Dr. Anderson Monteiro helps her with non surgical management of severe multi level DDD of cervical spine. He feels that her DDD is too severe for effective surgical intervention at this point in time. She has numbness of her R 4th/5th fingers (she is left hand dominant) and has seen Dr. Rose in [place] for evaluation. He recommended steroid injection into joint space/ surgical intervention to help alleviate nerve impingement. Pt has full function of her R hand and has no hand motor strength decrease at this time. She plans to see her Sjogren's specialist in [place] in June for evaluation and saw Dr. Silveira yesterday for evaluation of chronic rash present on torso and back for many years. Pt reports having very sensitive skin in general and cannot be in water long due to burning sensation. The small red rough  "bumps come and go without known aggravating or relieving factors. Dr. Silveira prescribed triamcinolone cream and recommended Cerave SA cream for daily use on affected areas. Most importantly today, the patient is reporting mounting stress and depression related to chronic pain and chronic illness. She is become weary of her condition and notes that she is depressed. She saw Dr. Sy Arias last year for counseling, and she did not feel it was helpful. She reports use of \"many\" antidepressants without effectiveness in the past but is open to referral to new mental health provider for assistance. She denies feelings of self harm but needs improved coping strategies.  She also expresses concern for her , who is her primary care giver, in terms of support groups for him. Paula SIMS and I have researched this option for him, but no appropriate groups exist in [place] at this time for him. Pt has extensive family support and no overt safety risks present.     Patient Active Problem List    Diagnosis Date Noted   • Major depressive disorder 03/13/2018   • Radicular pain of right upper extremity 03/13/2018   • Non-traumatic compression fracture of ninth thoracic vertebra (CMS-HCC) 10/23/2017   • Non-traumatic compression fracture of tenth thoracic vertebra (CMS-HCC) 10/23/2017   • DDD (degenerative disc disease), cervical 04/26/2017   • Osteoarthritis of multiple joints 04/26/2017   • Lupus arthritis (CMS-HCC) 04/26/2017   • Chronic pain syndrome 04/26/2017   • Gastroesophageal reflux disease without esophagitis 04/26/2017   • Lactose intolerance 04/26/2017   • Raynaud's disease without gangrene 04/26/2017   • Osteopenia 04/26/2017   • Functional diarrhea 04/26/2017   • Current chronic use of systemic steroids 04/26/2017   • Sjogren's syndrome (CMS-HCC) 04/26/1990     Past medical, surgical, family, and social history was reviewed in Epic chart by me today.     Medications and allergies reviewed and updated in Epic chart " "by me today.     ROS:  Pertinent positives listed above in HPI. All other systems have been reviewed and are negative.    PE:   /78   Pulse 63   Temp 37.4 °C (99.3 °F)   Resp 14   Ht 1.575 m (5' 2.01\")   Wt 48.5 kg (107 lb)   SpO2 100%   BMI 19.57 kg/m²   Vital signs reviewed with patient.     Gen: Well developed; well nourished; no acute distress; well rested appearance   HEENT: Normocephalic; atraumatic; PEERLA b/l; sclera clear b/l; b/l external auditory canals WNL; b/l TM WNL; nares patent b/l; oropharynx clear; oral mucosa moist; tongue midline; dentition adequate   Neck: No adenopathy; no thyromegaly  CV: Regular rate and rhythm; S1/ S2 present; no murmur, gallop or rub noted  Pulm: No respiratory distress; clear to ascultation b/l; no wheezing or stridor noted b/l  Abd: Adequate bowel sounds noted; soft and nontender; no rebound, rigidity, nor distention  Extremities: No peripheral edema b/l LE extremities/ no clubbing nor cyanosis noted  Skin: Warm and dry; mild red rough rash noted on torso and back  Neuro: No new focal deficits noted   Psych: AAOx4; mood and affect are appropriate    A/P:  1. Chronic pain syndrome  Ongoing issue managed by her specialists/ pt has been on narcotics in the past without effect.     2. Current chronic use of systemic steroids  Improved/ pt is currently taking 1.5 mg of steroids daily     3. Chronic lupus arthritis (CMS-[facility])  Ongoing issue managed by Dr. Cruz    4. Chronic primary osteoarthritis involving multiple joints  Ongoing issue for patient managed by her specialists    5. Chronic sjogren's syndrome, with unspecified organ involvement (CMS-[facility])  Unchanged/ pt intends to see her     6. Chronic Raynaud's disease without gangrene  Stable/ unchanged    7. Chronic osteopenia of multiple sites  Pt has started using Forteo in January 2018 managed by Dr. Boss (can only tolerate every other day dosing). She is also doing weight bearing exercise and takes daily " calcium/vitamin D supplementation.     8. Mild single current episode of major depressive disorder (CMS-HCC)  Researched option regarding insurance compatibility with providers. Recommended Dr. Zuri Knott or Isabelle Landeros for counseling services. Pt will let me know her choice so I can make formal referral.   - Patient has been identified as being depressed and appropriate orders and counseling have been given    9. Skin rash  Dr. Silveira does not have exact diagnosis for patent at this time. Recommend patient uses plain Cerave or Eucerin for general moisturizing and apply steroid cream sparingly to most troubling rash spots.    10. Radicular pain of right upper extremity  She has seen Dr. Rose in [place] and is deciding on what intervention she would like to have done.     11. Chronic DDD (degenerative disc disease), cervical  Pt received epidural injection from Dr. CINTHIA Jones without effect, and condition is not amenable to surgery per her visits with Dr. Anderson Monteiro.      Unfortunately, I have little to offer patient that is not already being managed by her diverse team of specialists. Pt is to contact me with her mental health provider choice so I can make formal referral for her.

## 2018-04-02 ENCOUNTER — HOSPITAL ENCOUNTER (OUTPATIENT)
Dept: LAB | Facility: MEDICAL CENTER | Age: 63
End: 2018-04-02
Attending: INTERNAL MEDICINE
Payer: MEDICARE

## 2018-04-03 ENCOUNTER — HOSPITAL ENCOUNTER (OUTPATIENT)
Dept: LAB | Facility: MEDICAL CENTER | Age: 63
End: 2018-04-03
Attending: INTERNAL MEDICINE
Payer: MEDICARE

## 2018-04-03 LAB — CORTIS SERPL-MCNC: 14.4 UG/DL (ref 0–23)

## 2018-04-03 PROCEDURE — 82533 TOTAL CORTISOL: CPT

## 2018-04-03 PROCEDURE — 36415 COLL VENOUS BLD VENIPUNCTURE: CPT

## 2018-05-07 ENCOUNTER — OFFICE VISIT (OUTPATIENT)
Dept: ENDOCRINOLOGY | Facility: MEDICAL CENTER | Age: 63
End: 2018-05-07
Payer: MEDICARE

## 2018-05-07 VITALS
SYSTOLIC BLOOD PRESSURE: 108 MMHG | BODY MASS INDEX: 19.51 KG/M2 | WEIGHT: 106 LBS | HEIGHT: 62 IN | DIASTOLIC BLOOD PRESSURE: 78 MMHG | OXYGEN SATURATION: 99 % | HEART RATE: 84 BPM

## 2018-05-07 DIAGNOSIS — M48.54XA NON-TRAUMATIC COMPRESSION FRACTURE OF TENTH THORACIC VERTEBRA, INITIAL ENCOUNTER: ICD-10-CM

## 2018-05-07 DIAGNOSIS — M48.54XA NON-TRAUMATIC COMPRESSION FRACTURE OF NINTH THORACIC VERTEBRA, INITIAL ENCOUNTER: ICD-10-CM

## 2018-05-07 PROCEDURE — 99214 OFFICE O/P EST MOD 30 MIN: CPT | Performed by: INTERNAL MEDICINE

## 2018-05-08 NOTE — PROGRESS NOTES
Chief Complaint   Patient presents with   • Osteoporosis     Nontraumatic vertebral insufficiency fractures        HPI:        1. Osteoporosis.    The patient is back to review options.  She has decided that she absolutely cannot tolerate Forteo on any regular basis, even intermittent. The shot causes dizziness, headache and nausea that lasts for more than a day and is very definitely interrupting her life.  She started Forteo in November and has tried off and on to go back to it and simply cannot.      She has used Reclast in the past and she cannot recall how many doses.  Also took Prolia for a few years and again I don’t know the length of time.  I first saw her in January of this year.  She does qualify as having a diagnosis of osteoporosis by virtue of nontraumatic insufficiency fractures of her thoracic vertebrae, 9 and 10.  These were felt to be subacute on an MR done October 2017.  Her bone density as measured at the hip and lumbar spine in January 2017 only revealed a T-score of -1.5 in the lumbar spine and -1.9 in the left hip.  The FRAX calculation is poor I think because of other risk factors including menopausal state and long term chronic steroid use.     We will update her bone density since she is going to be off Forteo now.  We need a new baseline for future comparison.  The only new screening that I did in January was to find a parathyroid hormone level that was normal and a serum protein electrophoresis that was also normal.      We will repeat these so as to not miss an important treatable other diagnosis.     However, personally I would want an opinion from an osteoporosis specialist at a tertiary center such as [facility].      She is in agreement with that.  I will make inquiries and a referral.     In the meantime I will update pertinent lab including a new bone density.    ROS:  Continued ongoing pain in spine and multiple joints  3 pound weight loss since January    All other systems reported  as negative or unchanged since last exam      Allergies:   Allergies   Allergen Reactions   • Contrast Media With Iodine [Iodine]      Rash, same with topical iodine     • Shellfish Allergy Vomiting     Rash, hives,    • Bee Venom Swelling   • Penicillins Vomiting       Current medicines including changes today:  Current Outpatient Prescriptions   Medication Sig Dispense Refill   • Naltrexone Powder Take 2 mg by mouth every day.     • calcium carbonate (OS-LOVE 500) 500 MG Tab Take 500 mg by mouth 2 times a day, with meals.     • Cholecalciferol (VITAMIN D) 2000 units Cap Take  by mouth.     • NS SOLN 50 mL with methotrexate PF 25 MG/ML SOLN 30 mg/m2 30 mg/m2 by Intravenous route every 7 days.     • folic acid (FOLVITE) 1 MG Tab Take 1 mg by mouth every day.     • BIOTIN PO Take  by mouth.     • Flaxseed Oil Oil by Does not apply route.     • CRANBERRY EXTRACT PO Take  by mouth.     • therapeutic multivitamin-minerals (THERAGRAN-M) Tab Take 1 Tab by mouth every day.     • ascorbic acid (ASCORBIC ACID) 500 MG Tab Take 500 mg by mouth every day.     • sildenafil citrate (VIAGRA) 25 MG Tab Take 25 mg by mouth every day.     • CycloSPORINE (RESTASIS OP) 1 Drop by Ophthalmic route 2 Times a Day. Both eyes     • zonisamide (ZONEGRAN) 100 MG CAPS Take 300 mg by mouth every bedtime. Indications: neuropathy     • predniSONE (DELTASONE) 1 MG TABS Take 1.5 mg by mouth every day. 7 pills per day     • zolpidem (AMBIEN) 5 MG TABS Take  by mouth at bedtime as needed.     • clotrimazole (MYCELEX) 10 MG Harshal Take 10 mg by mouth 5 Times a Day.     • Teriparatide, Recombinant, (FORTEO) 600 MCG/2.4ML Solution Inject 1 Dose as instructed every 48 hours.     • vitamin e (VITAMIN E) 400 UNIT Cap Take 400 Units by mouth every day.     • metaxalone (SKELAXIN) 800 MG Tab Take 800 mg by mouth 3 times a day.       No current facility-administered medications for this visit.         Past Medical History:   Diagnosis Date   • Anesthesia      "problem coming out of it, very shaky   • Arthritis     osteo/lupus arthritis, feet, hands, neck, back   • Chronic pain syndrome    • DDD (degenerative disc disease), cervical    • Heart burn    • History of shingles    • Indigestion    • Lupus    • Major depressive disorder 3/13/2018   • Other specified disorder of intestines     diarrhea, constipation   • Pain 4/7/14    5.5/10, 10/25/16 generalized pain throughout body   • Sjogren's syndrome    • Unspecified cataract     IOLOU       PHYSICAL EXAM:    /78   Pulse 84   Ht 1.575 m (5' 2\")   Wt 48.1 kg (106 lb)   SpO2 99%   BMI 19.39 kg/m²      Gen.   Appears slender but not underweight and otherwise healthy in no distress, posture is good    Skin   appropriate for sex and age    HEENT  unremarkable    Neck   no adenopathy, thyroid gland is small and difficult to palpate    Heart  regular    Extremities  no edema    Neuro  gait and station normal    Psych  appropriate      ASSESSMENT AND RECOMMENDATIONS    1. Non-traumatic compression fracture of ninth and 10th thoracic vertebra, initial encounter (HCC)    - PTH RELATED PEPTIDE; Future  - COMP METABOLIC PANEL; Future  - PTH INTACT (PTH ONLY); Future  - VITAMIN D,25 HYDROXY; Future  - SPEP W/REFLEX TO AMINA, A, G, M; Future       Bone density to establish a new baseline off of Forteo        24-hour urine calcium      DISPOSITION:  Arrange appropriate consultation at [place]       Raul Boss M.D.    Copies to: Jina De Santiago M.D. [phone]                  Dr. Inessa Casas;es  "

## 2018-05-14 ENCOUNTER — HOSPITAL ENCOUNTER (OUTPATIENT)
Dept: LAB | Facility: MEDICAL CENTER | Age: 63
End: 2018-05-14
Attending: INTERNAL MEDICINE
Payer: MEDICARE

## 2018-05-14 DIAGNOSIS — M48.54XA NON-TRAUMATIC COMPRESSION FRACTURE OF TENTH THORACIC VERTEBRA, INITIAL ENCOUNTER: ICD-10-CM

## 2018-05-14 DIAGNOSIS — M48.54XA NON-TRAUMATIC COMPRESSION FRACTURE OF NINTH THORACIC VERTEBRA, INITIAL ENCOUNTER: ICD-10-CM

## 2018-05-14 LAB
25(OH)D3 SERPL-MCNC: 47 NG/ML (ref 30–100)
PTH-INTACT SERPL-MCNC: 18.7 PG/ML (ref 14–72)

## 2018-05-14 PROCEDURE — 80053 COMPREHEN METABOLIC PANEL: CPT

## 2018-05-14 PROCEDURE — 83970 ASSAY OF PARATHORMONE: CPT

## 2018-05-14 PROCEDURE — 82306 VITAMIN D 25 HYDROXY: CPT

## 2018-05-14 PROCEDURE — 82784 ASSAY IGA/IGD/IGG/IGM EACH: CPT

## 2018-05-14 PROCEDURE — 36415 COLL VENOUS BLD VENIPUNCTURE: CPT

## 2018-05-14 PROCEDURE — 84160 ASSAY OF PROTEIN ANY SOURCE: CPT

## 2018-05-14 PROCEDURE — 86334 IMMUNOFIX E-PHORESIS SERUM: CPT

## 2018-05-14 PROCEDURE — 82542 COL CHROMOTOGRAPHY QUAL/QUAN: CPT

## 2018-05-14 PROCEDURE — 84165 PROTEIN E-PHORESIS SERUM: CPT

## 2018-05-15 LAB
ALBUMIN SERPL BCP-MCNC: 4.6 G/DL (ref 3.2–4.9)
ALBUMIN/GLOB SERPL: 2.6 G/DL
ALP SERPL-CCNC: 57 U/L (ref 30–99)
ALT SERPL-CCNC: 27 U/L (ref 2–50)
ANION GAP SERPL CALC-SCNC: 7 MMOL/L (ref 0–11.9)
AST SERPL-CCNC: 31 U/L (ref 12–45)
BILIRUB SERPL-MCNC: 0.5 MG/DL (ref 0.1–1.5)
BUN SERPL-MCNC: 22 MG/DL (ref 8–22)
CALCIUM SERPL-MCNC: 9.9 MG/DL (ref 8.5–10.5)
CHLORIDE SERPL-SCNC: 105 MMOL/L (ref 96–112)
CO2 SERPL-SCNC: 31 MMOL/L (ref 20–33)
CREAT SERPL-MCNC: 0.91 MG/DL (ref 0.5–1.4)
GLOBULIN SER CALC-MCNC: 1.8 G/DL (ref 1.9–3.5)
GLUCOSE SERPL-MCNC: 111 MG/DL (ref 65–99)
POTASSIUM SERPL-SCNC: 3.9 MMOL/L (ref 3.6–5.5)
PROT SERPL-MCNC: 6.4 G/DL (ref 6–8.2)
SODIUM SERPL-SCNC: 143 MMOL/L (ref 135–145)

## 2018-05-17 LAB
ALBUMIN SERPL-MCNC: 4.71 G/DL (ref 3.75–5.01)
ALPHA1 GLOB SERPL ELPH-MCNC: 0.3 G/DL (ref 0.19–0.46)
ALPHA2 GLOB SERPL ELPH-MCNC: 0.62 G/DL (ref 0.48–1.05)
B-GLOBULIN SERPL ELPH-MCNC: 0.68 G/DL (ref 0.48–1.1)
GAMMA GLOB SERPL ELPH-MCNC: 0.59 G/DL (ref 0.62–1.51)
IGA SERPL-MCNC: 133 MG/DL (ref 68–408)
IGG SERPL-MCNC: 535 MG/DL (ref 768–1632)
IGM SERPL-MCNC: 81 MG/DL (ref 35–263)
INTERPRETATION SERPL IFE-IMP: ABNORMAL
MONOCLON BAND OBS SERPL: ABNORMAL
PATHOLOGY STUDY: ABNORMAL
PROT SERPL-MCNC: 6.9 G/DL (ref 6–8.3)

## 2018-05-18 ENCOUNTER — HOSPITAL ENCOUNTER (OUTPATIENT)
Dept: RADIOLOGY | Facility: MEDICAL CENTER | Age: 63
End: 2018-05-18
Attending: INTERNAL MEDICINE
Payer: MEDICARE

## 2018-05-18 DIAGNOSIS — M48.54XA NON-TRAUMATIC COMPRESSION FRACTURE OF TENTH THORACIC VERTEBRA, INITIAL ENCOUNTER: ICD-10-CM

## 2018-05-18 DIAGNOSIS — M48.54XA NON-TRAUMATIC COMPRESSION FRACTURE OF NINTH THORACIC VERTEBRA, INITIAL ENCOUNTER: ICD-10-CM

## 2018-05-18 PROCEDURE — 77080 DXA BONE DENSITY AXIAL: CPT

## 2018-05-20 LAB — PTH RELATED PROT SERPL-SCNC: <2 PMOL/L (ref 0–3.4)

## 2018-05-21 ENCOUNTER — OFFICE VISIT (OUTPATIENT)
Dept: INTERNAL MEDICINE | Facility: IMAGING CENTER | Age: 63
End: 2018-05-21
Payer: MEDICARE

## 2018-05-21 ENCOUNTER — TELEPHONE (OUTPATIENT)
Dept: ENDOCRINOLOGY | Facility: MEDICAL CENTER | Age: 63
End: 2018-05-21

## 2018-05-21 VITALS
OXYGEN SATURATION: 100 % | RESPIRATION RATE: 14 BRPM | HEIGHT: 62 IN | SYSTOLIC BLOOD PRESSURE: 102 MMHG | TEMPERATURE: 99.1 F | HEART RATE: 82 BPM | WEIGHT: 106 LBS | BODY MASS INDEX: 19.51 KG/M2 | DIASTOLIC BLOOD PRESSURE: 68 MMHG

## 2018-05-21 DIAGNOSIS — M15.9 PRIMARY OSTEOARTHRITIS INVOLVING MULTIPLE JOINTS: Chronic | ICD-10-CM

## 2018-05-21 DIAGNOSIS — Z00.00 MEDICARE ANNUAL WELLNESS VISIT, SUBSEQUENT: ICD-10-CM

## 2018-05-21 DIAGNOSIS — I73.00 RAYNAUD'S DISEASE WITHOUT GANGRENE: Chronic | ICD-10-CM

## 2018-05-21 DIAGNOSIS — K21.9 GASTROESOPHAGEAL REFLUX DISEASE WITHOUT ESOPHAGITIS: Chronic | ICD-10-CM

## 2018-05-21 DIAGNOSIS — Z79.52 CURRENT CHRONIC USE OF SYSTEMIC STEROIDS: Chronic | ICD-10-CM

## 2018-05-21 DIAGNOSIS — M85.89 OSTEOPENIA OF MULTIPLE SITES: Chronic | ICD-10-CM

## 2018-05-21 DIAGNOSIS — Z92.25 HISTORY OF IMMUNOSUPPRESSION: Chronic | ICD-10-CM

## 2018-05-21 DIAGNOSIS — G89.4 CHRONIC PAIN SYNDROME: Chronic | ICD-10-CM

## 2018-05-21 DIAGNOSIS — M48.54XS: ICD-10-CM

## 2018-05-21 DIAGNOSIS — M32.9 LUPUS ARTHRITIS (HCC): Chronic | ICD-10-CM

## 2018-05-21 DIAGNOSIS — M50.30 DDD (DEGENERATIVE DISC DISEASE), CERVICAL: Chronic | ICD-10-CM

## 2018-05-21 DIAGNOSIS — M35.00 SJOGREN'S SYNDROME, WITH UNSPECIFIED ORGAN INVOLVEMENT (HCC): Chronic | ICD-10-CM

## 2018-05-21 DIAGNOSIS — M79.2 RADICULAR PAIN OF RIGHT UPPER EXTREMITY: ICD-10-CM

## 2018-05-21 DIAGNOSIS — F32.0 CURRENT MILD EPISODE OF MAJOR DEPRESSIVE DISORDER WITHOUT PRIOR EPISODE (HCC): ICD-10-CM

## 2018-05-21 PROCEDURE — G0439 PPPS, SUBSEQ VISIT: HCPCS | Performed by: FAMILY MEDICINE

## 2018-05-21 NOTE — TELEPHONE ENCOUNTER
Patient was wondering about the bone specialist at [place].   Also wondering about the test you had recommended a 24 hour urine calcium test. She was wondering if she still needed to do this?

## 2018-05-22 DIAGNOSIS — M80.00XG OSTEOPOROSIS WITH CURRENT PATHOLOGICAL FRACTURE WITH DELAYED HEALING, UNSPECIFIED OSTEOPOROSIS TYPE, SUBSEQUENT ENCOUNTER: ICD-10-CM

## 2018-05-22 ASSESSMENT — ENCOUNTER SYMPTOMS: GENERAL WELL-BEING: FAIR

## 2018-05-22 ASSESSMENT — ACTIVITIES OF DAILY LIVING (ADL): BATHING_REQUIRES_ASSISTANCE: 0

## 2018-05-22 ASSESSMENT — PATIENT HEALTH QUESTIONNAIRE - PHQ9: CLINICAL INTERPRETATION OF PHQ2 SCORE: 0

## 2018-05-22 NOTE — PROGRESS NOTES
CC:   Medicare Annual Wellness Visit    HPI:  Lupe is a 63 y.o. Female here for Medicare Annual Wellness Visit. She has a complex medical history and see numerous consultants, both locally and out of state, who manage her care plan. She has known compression fractures of T9/T10 (no history of trauma) in 2017 and has seen Dr. Schreiber in consultation. She has known osteopenia and has used Reclast, Prolia, and more recently Forteo (stopped due to ongoing nausea, headache, and dizziness) managed by Dr. Boss with [place] referral pending for another treatment opinion. She has ongoing radicular pain of both forearms and hands and has nerve conduction studies pending by Dr. Rose in [place]. She is pondering wrist surgery but I brought up issue of her history of suture rejection and delayed wound healing as a major consideration before she embarks on any future elective surgeries. She has chronic Sjogren's syndrome and is traveling to [place] in June to visit her tertiary care physician for further salivary gland treatment. She sees Dr. Cruz locally for her rheumatology care (chronic Raynaud's/ Lupus arthritis/ OA) and has been taking low dose naltrexone (currently at 4.5 mg) since March for pain control with next appointment in July. She is not experiencing any known side effects from LDN but also cannot say it is helping her pain. She has pending occipital trigger point and nerve root injections Friday with Dr. Jones in [place] for additional non- narcotic pain control management. She continues to attend physical therapy sessions regularly and perceives benefit from these sessions. She has chronic immune suppression due to chronic steroid dependence (currently 0.5 mg prednisone per day) and ongoing methotrexate use. She also has chronic depression that is ongoing due to her multitude of chronic illness with no current medication use (pt choice) and no safety concerns present. She has a very supportive   who is continually advocating for her health care needs.     Patient Active Problem List    Diagnosis Date Noted   • History of immunosuppression 05/21/2018   • Major depressive disorder 03/13/2018   • Radicular pain of right upper extremity 03/13/2018   • Non-traumatic compression fracture of ninth thoracic vertebra ([facility]) 10/23/2017   • Non-traumatic compression fracture of tenth thoracic vertebra ([facility]) 10/23/2017   • DDD (degenerative disc disease), cervical 04/26/2017   • Osteoarthritis of multiple joints 04/26/2017   • Lupus arthritis ([facility]) 04/26/2017   • Chronic pain syndrome 04/26/2017   • Gastroesophageal reflux disease without esophagitis 04/26/2017   • Lactose intolerance 04/26/2017   • Raynaud's disease without gangrene 04/26/2017   • Osteopenia 04/26/2017   • Functional diarrhea 04/26/2017   • Current chronic use of systemic steroids 04/26/2017   • Sjogren's syndrome ([facility]) 04/26/1990     Current Outpatient Prescriptions   Medication Sig Dispense Refill   • Naltrexone Powder Take 4.5 mg by mouth every day.     • calcium carbonate (OS-LOVE 500) 500 MG Tab Take 500 mg by mouth 2 times a day, with meals.     • Cholecalciferol (VITAMIN D) 2000 units Cap Take  by mouth.     • folic acid (FOLVITE) 1 MG Tab Take 1 mg by mouth every day.     • BIOTIN PO Take  by mouth.     • Flaxseed Oil Oil by Does not apply route.     • CRANBERRY EXTRACT PO Take  by mouth.     • therapeutic multivitamin-minerals (THERAGRAN-M) Tab Take 1 Tab by mouth every day.     • ascorbic acid (ASCORBIC ACID) 500 MG Tab Take 500 mg by mouth every day.     • CycloSPORINE (RESTASIS OP) 1 Drop by Ophthalmic route 2 Times a Day. Both eyes     • zonisamide (ZONEGRAN) 100 MG CAPS Take 300 mg by mouth every bedtime. Indications: neuropathy     • predniSONE (DELTASONE) 1 MG TABS Take 0.5 mg by mouth every day. 7 pills per day     • zolpidem (AMBIEN) 5 MG TABS Take  by mouth at bedtime as needed.     • clotrimazole (MYCELEX) 10 MG Harshal Take  10 mg by mouth 5 Times a Day.     • NS SOLN 50 mL with methotrexate PF 25 MG/ML SOLN 30 mg/m2 30 mg/m2 by Intravenous route every 7 days.     • metaxalone (SKELAXIN) 800 MG Tab Take 800 mg by mouth 3 times a day.       No current facility-administered medications for this visit.       Current supplements: see MAR  Chronic narcotic pain medicines: no  Allergies: Bee venom; Contrast media with iodine [iodine]; Shellfish allergy; and Penicillins  Exercise: limited due to chronic conditions  Current social contact/activities: remains very social    Screening:  Depression Screening    Little interest or pleasure in doing things?  0 - not at all  Feeling down, depressed , or hopeless? 0 - not at all  Patient Health Questionnaire Score: 0     If depressive symptoms identified deferred to follow up visit unless specifically addressed in assessment and plan.    Interpretation of PHQ-9 Total Score   Score Severity   1-4 No Depression   5-9 Mild Depression   10-14 Moderate Depression   15-19 Moderately Severe Depression   20-27 Severe Depression    Screening for Cognitive Impairment    Three Minute Recall (leader, season, table) 2/3    Stone clock face with all 12 numbers and set the hands to show 10 past 11.  Yes    Cognitive concerns identified deferred for follow up unless specifically addressed in assessment and plan.    Fall Risk Assessment    Has the patient had two or more falls in the last year or any fall with injury in the last year?  No    Safety Assessment    Throw rugs on floor.  No  Handrails on all stairs.  Yes  Good lighting in all hallways.  Yes  Difficulty hearing.  No  Patient counseled about all safety risks that were identified.    Functional Assessment ADLs    Are there any barriers preventing you from cooking for yourself or meeting nutritional needs?  No.    Are there any barriers preventing you from driving safely or obtaining transportation?  No.    Are there any barriers preventing you from using a  telephone or calling for help?  No.    Are there any barriers preventing you from shopping?  No.    Are there any barriers preventing you from taking care of your own finances?  No.    Are there any barriers preventing you from managing your medications?  No.    Are there any barriers preventing you from showering, bathing or dressing yourself?  No.    Are you currently engaging in any exercise or physical activity?  Yes.     What is your perception of your health?  Fair.      Health Maintenance Summary                MAMMOGRAM Next Due 9/23/2018      Done 9/23/2016 MA-SCREEN MAMMO W/ IMPLANTS W/CAD-BILAT    PAP SMEAR Next Due 1/2/2020      Done 1/2/2017     COLONOSCOPY Next Due 8/21/2022      Done 8/21/2012 REFERRAL TO GI FOR COLONOSCOPY    IMM DTaP/Tdap/Td Vaccine Next Due 9/25/2027      Done 9/25/2017 Imm Admin: Tdap Vaccine          Patient Care Team:  Jina De Santiago M.D. as PCP - General (Family Medicine)  Raul Boss M.D. as Consulting Physician (Endocrinology)  Richa Pace R.N. as Registered Nurse      Social History   Substance Use Topics   • Smoking status: Never Smoker   • Smokeless tobacco: Never Used   • Alcohol use No     Family History   Problem Relation Age of Onset   • Cancer Mother 78     lymphoma   • Heart Disease Father    • Cancer Sister      breast cancer treated with radiation   • Other Sister      eye melanoma treated     She  has a past medical history of Anesthesia; Arthritis; Chronic pain syndrome; DDD (degenerative disc disease), cervical; Heart burn; History of shingles; Indigestion; Lupus; Major depressive disorder (3/13/2018); Other specified disorder of intestines; Pain (4/7/14); Sjogren's syndrome; and Unspecified cataract. She also has no past medical history of Dental disorder.   Past Surgical History:   Procedure Laterality Date   • FLAP CLOSURE Right 6/26/2017    Procedure: FLAP CLOSURE;  Surgeon: Darryl Garcia M.D.;  Location: SURGERY [facility];   "Service:    • SCAR EXCISION Right 6/26/2017    Procedure: SCAR EXCISION - BREAST WOUND EXCISION;  Surgeon: Darryl Garcia M.D.;  Location: SURGERY [facility];  Service:    • BREAST IMPLANT REVISION Bilateral 11/2/2016    Procedure: BREAST IMPLANT - EXCHANGE OF SALINE TO SILICONE WITH REPOSITIONING OF LATERAL FOLDS;  Surgeon: Darryl Garcia M.D.;  Location: SURGERY [facility];  Service:    • LUMBAR LAMINECTOMY DISKECTOMY Left 8/5/2015    Procedure: POSTERIOR L5-S1 HEMILAMINOTOMY AND MICRODISCECTOMY;  Surgeon: Kaleb Lucia M.D.;  Location: SURGERY [facility];  Service:    • CATARACT PHACO WITH IOL  4/18/2014    Performed by Destiney Mccurdy M.D. at SURGERY SAME DAY [facility]   • CATARACT PHACO WITH IOL  4/9/2014    Performed by Destiney Mccurdy M.D. at SURGERY SAME DAY [facility]   • TUMOR EXCISION WITH BIOPSY  1998    pallet   • PB ENLARGE BREAST WITH IMPLANT         ROS:    All positives noted in HPI. All others reviewed and are negative.    Ostomy or other tubes or amputations: no  Chronic oxygen use: no  Last eye exam: UTD per pt report  : denies incontinence/ does not interfere with ADLs/ sleep  Gait: stable  Hearing: adequate  Dentition: adequate    Exam:   Blood pressure 102/68, pulse 82, temperature 37.3 °C (99.1 °F), resp. rate 14, height 1.575 m (5' 2.01\"), weight 48.1 kg (106 lb), SpO2 100 %. Body mass index is 19.38 kg/m².    Gen: Well developed; well nourished; no acute distress; age appropriate appearance   HEENT: Normocephalic; atraumatic; PEERLA b/l; sclera clear b/l; b/l external auditory canals WNL; b/l TM WNL; nares patent; oropharynx clear; oral mucosa moist; tongue midline; dentition adequate   Neck: No adenopathy; no thyromegaly  CV: Regular rate and rhythm; S1/ S2 present; no murmur, gallop or rub noted  Pulm: No respiratory distress; clear to ascultation b/l; no wheezing or stridor noted b/l  Abd: Adequate bowel sounds noted; soft and nontender; no rebound, rigidity, nor " distention  Extremities: No peripheral edema b/l LE extremities/ no clubbing nor cyanosis noted  Skin: Warm and dry; no rashes noted   Neuro: No focal deficits noted; pt is able to get up out of chair unassisted and walk forward  Psych: AAOx4; mood and affect are appropriate    Assessment and Plan:  1. Medicare annual wellness visit, subsequent  No new needs identified at visit today.     2. Chronic pain syndrome  Uncontrolled/ Ongoing issue managed with pending occipital injections with Dr. Jones and low dose naltrexone use since March managed by Dr. Cruz.    3. Chronic DDD (degenerative disc disease), cervical  Stable/ Pending occipital injections for pain management by Dr. Jones    4. Chronic gastroesophageal reflux disease without esophagitis  Stable/ not a current complaint    5. Chronic lupus arthritis (HCC)  Stable/ managed by Dr. Cruz    6. Chronic current mild episode of major depressive disorder without prior episode (HCC)  Stable/ pt currently not on treatment per her choice/ no safety concerns present and has good support system     7. Non-traumatic compression fracture of ninth thoracic vertebra, sequela  Stable/ managed by Dr. Schreiber    8. Non-traumatic compression fracture of tenth thoracic vertebra, sequela  Stable/ managed by Dr. Schreiber    9. Chronic primary osteoarthritis involving multiple joints  Stable/ managed by Dr. Cruz    10. Chronic osteopenia of multiple sites  Worsening/ pt recently stopped using Forteo due to nausea, headache, and dizziness/ has used Reclast and Prolia in past and has pending referral to [place] for additional care evaluation/ managed by Dr. Boss.     11. Chronic Raynaud's disease without gangrene  Stable/ managed by Dr. Cruz    12. Chronic Sjogren's syndrome, with unspecified organ involvement (HCC)  Stable - ongoing issue for patient. Managed locally by Dr. Cruz and has appointment with her Sjogren's expert in [place] in June for salivary gland  manipulation.     13. Current chronic use of systemic steroids  Stable/ patient is currently taking only 0.5 mg prednisone daily.     14. Radicular pain of right upper extremity  Worsening/ nerve conduction studies pending per Dr. Rose - please refer to HPI.    15. History of immunosuppression  Stable/ pt remains at higher risk for medical decompensation due to chronic steroid use and ongoing methotrexate use.      Services needed: no new services needed at this time  Health Care Screening: recommendations as per orders if indicated.  Referrals offered: none required  Counseling provided today:  · Prevent falls and reduce trip hazards; Secure or remove rugs if present   · Maintain working fire alarm and carbon monoxide detectors   · Engage in regular physical activity daily and social activities weekly as tolerated     Unfortunately, she has progressive chronic medical conditions despite aggressive multi-specialty care. She is UTD on HCM items from my perspective and will be due for breast imaging in September/ annual labs in October 2018.

## 2018-05-23 DIAGNOSIS — M48.54XS: ICD-10-CM

## 2018-05-23 DIAGNOSIS — M80.00XA OSTEOPOROSIS WITH CURRENT PATHOLOGICAL FRACTURE, UNSPECIFIED OSTEOPOROSIS TYPE, INITIAL ENCOUNTER: ICD-10-CM

## 2018-05-26 ENCOUNTER — HOSPITAL ENCOUNTER (OUTPATIENT)
Facility: MEDICAL CENTER | Age: 63
End: 2018-05-26
Attending: INTERNAL MEDICINE
Payer: MEDICARE

## 2018-05-26 DIAGNOSIS — M80.00XG OSTEOPOROSIS WITH CURRENT PATHOLOGICAL FRACTURE WITH DELAYED HEALING, UNSPECIFIED OSTEOPOROSIS TYPE, SUBSEQUENT ENCOUNTER: ICD-10-CM

## 2018-05-26 PROCEDURE — 82340 ASSAY OF CALCIUM IN URINE: CPT

## 2018-05-27 ENCOUNTER — TELEPHONE (OUTPATIENT)
Dept: INTERNAL MEDICINE | Facility: IMAGING CENTER | Age: 63
End: 2018-05-27

## 2018-05-27 RX ORDER — VALACYCLOVIR HYDROCHLORIDE 1 G/1
1000 TABLET, FILM COATED ORAL 3 TIMES DAILY
Qty: 21 TAB | Refills: 0 | Status: SHIPPED | OUTPATIENT
Start: 2018-05-27 | End: 2018-08-02

## 2018-05-28 LAB
CALCIUM 24H UR-MCNC: 12.6 MG/DL
CALCIUM 24H UR-MRATE: 224 MG/D
CALCIUM/CREAT 24H UR: 257 MG/G (ref 20–300)
COLLECT DURATION TIME SPEC: 24 HRS
CREAT 24H UR-MCNC: 49 MG/DL
CREAT 24H UR-MRATE: 870 MG/D (ref 500–1400)
SPECIMEN VOL ?TM UR: 1775 ML

## 2018-05-28 NOTE — TELEPHONE ENCOUNTER
Lupe has been having 2 days of right groin pain. No injury. Has had shingles in past in different area. Feels like that. No rash yet.   She is on methotrexate and prednisone.   No swelling, no hernia.    Will start valtrex and have her see Dr Benji Browning.

## 2018-06-04 ENCOUNTER — TELEPHONE (OUTPATIENT)
Dept: ENDOCRINOLOGY | Facility: MEDICAL CENTER | Age: 63
End: 2018-06-04

## 2018-06-04 NOTE — TELEPHONE ENCOUNTER
Received a call from Lupe requesting an Update on referral. I see referral is pend I suggested to contact the referral dept.. Lupe would also like to know the results of her most recent labs. Lupe would like a call back @ [phone].

## 2018-06-05 ENCOUNTER — TELEPHONE (OUTPATIENT)
Dept: ENDOCRINOLOGY | Facility: MEDICAL CENTER | Age: 63
End: 2018-06-05

## 2018-07-02 ENCOUNTER — HOSPITAL ENCOUNTER (OUTPATIENT)
Dept: LAB | Facility: MEDICAL CENTER | Age: 63
End: 2018-07-02
Attending: INTERNAL MEDICINE
Payer: MEDICARE

## 2018-07-02 LAB
ALBUMIN SERPL BCP-MCNC: 4.5 G/DL (ref 3.2–4.9)
ALBUMIN/GLOB SERPL: 2 G/DL
ALP SERPL-CCNC: 68 U/L (ref 30–99)
ALT SERPL-CCNC: 28 U/L (ref 2–50)
ANION GAP SERPL CALC-SCNC: 6 MMOL/L (ref 0–11.9)
AST SERPL-CCNC: 33 U/L (ref 12–45)
BASOPHILS # BLD AUTO: 0.9 % (ref 0–1.8)
BASOPHILS # BLD: 0.07 K/UL (ref 0–0.12)
BILIRUB SERPL-MCNC: 0.5 MG/DL (ref 0.1–1.5)
BUN SERPL-MCNC: 26 MG/DL (ref 8–22)
CALCIUM SERPL-MCNC: 10.1 MG/DL (ref 8.5–10.5)
CHLORIDE SERPL-SCNC: 104 MMOL/L (ref 96–112)
CO2 SERPL-SCNC: 31 MMOL/L (ref 20–33)
CREAT SERPL-MCNC: 1.04 MG/DL (ref 0.5–1.4)
CRP SERPL HS-MCNC: 0.14 MG/DL (ref 0–0.75)
EOSINOPHIL # BLD AUTO: 0.15 K/UL (ref 0–0.51)
EOSINOPHIL NFR BLD: 1.9 % (ref 0–6.9)
ERYTHROCYTE [DISTWIDTH] IN BLOOD BY AUTOMATED COUNT: 48.3 FL (ref 35.9–50)
ERYTHROCYTE [SEDIMENTATION RATE] IN BLOOD BY WESTERGREN METHOD: 10 MM/HOUR (ref 0–30)
GLOBULIN SER CALC-MCNC: 2.2 G/DL (ref 1.9–3.5)
GLUCOSE SERPL-MCNC: 95 MG/DL (ref 65–99)
HCT VFR BLD AUTO: 38.7 % (ref 37–47)
HGB BLD-MCNC: 13.1 G/DL (ref 12–16)
IMM GRANULOCYTES # BLD AUTO: 0.02 K/UL (ref 0–0.11)
IMM GRANULOCYTES NFR BLD AUTO: 0.2 % (ref 0–0.9)
LYMPHOCYTES # BLD AUTO: 1.82 K/UL (ref 1–4.8)
LYMPHOCYTES NFR BLD: 22.5 % (ref 22–41)
MCH RBC QN AUTO: 35.4 PG (ref 27–33)
MCHC RBC AUTO-ENTMCNC: 33.9 G/DL (ref 33.6–35)
MCV RBC AUTO: 104.6 FL (ref 81.4–97.8)
MONOCYTES # BLD AUTO: 0.51 K/UL (ref 0–0.85)
MONOCYTES NFR BLD AUTO: 6.3 % (ref 0–13.4)
NEUTROPHILS # BLD AUTO: 5.51 K/UL (ref 2–7.15)
NEUTROPHILS NFR BLD: 68.2 % (ref 44–72)
NRBC # BLD AUTO: 0 K/UL
NRBC BLD-RTO: 0 /100 WBC
PLATELET # BLD AUTO: 358 K/UL (ref 164–446)
PMV BLD AUTO: 11.1 FL (ref 9–12.9)
POTASSIUM SERPL-SCNC: 4.6 MMOL/L (ref 3.6–5.5)
PROT SERPL-MCNC: 6.7 G/DL (ref 6–8.2)
RBC # BLD AUTO: 3.7 M/UL (ref 4.2–5.4)
SODIUM SERPL-SCNC: 141 MMOL/L (ref 135–145)
WBC # BLD AUTO: 8.1 K/UL (ref 4.8–10.8)

## 2018-07-02 PROCEDURE — 85652 RBC SED RATE AUTOMATED: CPT

## 2018-07-02 PROCEDURE — 80053 COMPREHEN METABOLIC PANEL: CPT

## 2018-07-02 PROCEDURE — 86140 C-REACTIVE PROTEIN: CPT

## 2018-07-02 PROCEDURE — 85025 COMPLETE CBC W/AUTO DIFF WBC: CPT

## 2018-07-02 PROCEDURE — 36415 COLL VENOUS BLD VENIPUNCTURE: CPT

## 2018-07-12 ENCOUNTER — HOSPITAL ENCOUNTER (OUTPATIENT)
Dept: LAB | Facility: MEDICAL CENTER | Age: 63
End: 2018-07-12
Attending: INTERNAL MEDICINE
Payer: MEDICARE

## 2018-07-12 LAB
ALBUMIN SERPL BCP-MCNC: 4.6 G/DL (ref 3.2–4.9)
ALBUMIN/GLOB SERPL: 2.2 G/DL
ALP SERPL-CCNC: 60 U/L (ref 30–99)
ALT SERPL-CCNC: 28 U/L (ref 2–50)
ANION GAP SERPL CALC-SCNC: 7 MMOL/L (ref 0–11.9)
AST SERPL-CCNC: 29 U/L (ref 12–45)
BILIRUB SERPL-MCNC: 1 MG/DL (ref 0.1–1.5)
BUN SERPL-MCNC: 19 MG/DL (ref 8–22)
CALCIUM SERPL-MCNC: 10 MG/DL (ref 8.5–10.5)
CHLORIDE SERPL-SCNC: 109 MMOL/L (ref 96–112)
CO2 SERPL-SCNC: 27 MMOL/L (ref 20–33)
CREAT SERPL-MCNC: 0.91 MG/DL (ref 0.5–1.4)
GLOBULIN SER CALC-MCNC: 2.1 G/DL (ref 1.9–3.5)
GLUCOSE SERPL-MCNC: 84 MG/DL (ref 65–99)
PHOSPHATE SERPL-MCNC: 3.5 MG/DL (ref 2.5–4.5)
POTASSIUM SERPL-SCNC: 3.8 MMOL/L (ref 3.6–5.5)
PROT SERPL-MCNC: 6.7 G/DL (ref 6–8.2)
SODIUM SERPL-SCNC: 143 MMOL/L (ref 135–145)

## 2018-07-12 PROCEDURE — 82523 COLLAGEN CROSSLINKS: CPT

## 2018-07-12 PROCEDURE — 80053 COMPREHEN METABOLIC PANEL: CPT

## 2018-07-12 PROCEDURE — 84080 ASSAY ALKALINE PHOSPHATASES: CPT

## 2018-07-12 PROCEDURE — 84100 ASSAY OF PHOSPHORUS: CPT

## 2018-07-12 PROCEDURE — 36415 COLL VENOUS BLD VENIPUNCTURE: CPT

## 2018-07-13 LAB — ALP BONE SERPL-MCNC: 14.2 UG/L

## 2018-07-14 LAB — COLLAGEN CTX SERPL-MCNC: 488 PG/ML

## 2018-08-02 ENCOUNTER — OUTPATIENT INFUSION SERVICES (OUTPATIENT)
Dept: ONCOLOGY | Facility: MEDICAL CENTER | Age: 63
End: 2018-08-02
Attending: INTERNAL MEDICINE
Payer: MEDICARE

## 2018-08-02 VITALS
DIASTOLIC BLOOD PRESSURE: 59 MMHG | TEMPERATURE: 99.2 F | HEIGHT: 62 IN | WEIGHT: 103.84 LBS | RESPIRATION RATE: 18 BRPM | OXYGEN SATURATION: 100 % | HEART RATE: 63 BPM | BODY MASS INDEX: 19.11 KG/M2 | SYSTOLIC BLOOD PRESSURE: 129 MMHG

## 2018-08-02 LAB
CA-I BLD ISE-SCNC: 1.16 MMOL/L (ref 1.1–1.3)
CREAT BLD-MCNC: 0.9 MG/DL (ref 0.5–1.4)

## 2018-08-02 PROCEDURE — 700111 HCHG RX REV CODE 636 W/ 250 OVERRIDE (IP): Performed by: INTERNAL MEDICINE

## 2018-08-02 PROCEDURE — 82565 ASSAY OF CREATININE: CPT

## 2018-08-02 PROCEDURE — 82330 ASSAY OF CALCIUM: CPT

## 2018-08-02 PROCEDURE — 96365 THER/PROPH/DIAG IV INF INIT: CPT

## 2018-08-02 PROCEDURE — 36415 COLL VENOUS BLD VENIPUNCTURE: CPT

## 2018-08-02 RX ORDER — ZOLEDRONIC ACID 5 MG/100ML
5 INJECTION, SOLUTION INTRAVENOUS ONCE
Status: COMPLETED | OUTPATIENT
Start: 2018-08-02 | End: 2018-08-02

## 2018-08-02 RX ORDER — LEFLUNOMIDE 10 MG/1
10 TABLET ORAL DAILY
COMMUNITY
End: 2019-07-03

## 2018-08-02 RX ADMIN — ZOLEDRONIC ACID 5 MG: 0.05 INJECTION, SOLUTION INTRAVENOUS at 15:45

## 2018-08-02 ASSESSMENT — PAIN SCALES - GENERAL: PAINLEVEL_OUTOF10: 0

## 2018-08-02 NOTE — PROGRESS NOTES
Pharmacy Note  Cr = 0.90, CrCl ~ 47 ml/min  Ca = 1.16  OK for zoledronic acid (Zometa) 4 mg IV today.

## 2018-08-02 NOTE — PROGRESS NOTES
Mrs Pickens arrives to infusion services for reclast infusion. Lupe oriented to infusion center and unit routine. Lupe had labs drawn today. Creatinine and calcium WNL. Education done on reclast and side effects with Mrs Pickens. Lupe verbalized understanding. Reclast information booklet given to Lupe. Mrs Pickens aware to take recommended daily doses of vitamin D and calcium. Reclast infused as ordered.Lupe tolerated well and without incident. Mrs pickens DC'd home with  in good condition and in NAD. Appointment confirmed for next treatment.

## 2018-08-15 ENCOUNTER — HOSPITAL ENCOUNTER (OUTPATIENT)
Dept: LAB | Facility: MEDICAL CENTER | Age: 63
End: 2018-08-15
Attending: INTERNAL MEDICINE
Payer: MEDICARE

## 2018-08-15 LAB
ALBUMIN SERPL BCP-MCNC: 4.7 G/DL (ref 3.2–4.9)
ALBUMIN/GLOB SERPL: 1.8 G/DL
ALP SERPL-CCNC: 71 U/L (ref 30–99)
ALT SERPL-CCNC: 22 U/L (ref 2–50)
ANION GAP SERPL CALC-SCNC: 9 MMOL/L (ref 0–11.9)
AST SERPL-CCNC: 30 U/L (ref 12–45)
BASOPHILS # BLD AUTO: 1.7 % (ref 0–1.8)
BASOPHILS # BLD: 0.09 K/UL (ref 0–0.12)
BILIRUB SERPL-MCNC: 1.2 MG/DL (ref 0.1–1.5)
BUN SERPL-MCNC: 19 MG/DL (ref 8–22)
CALCIUM SERPL-MCNC: 9.7 MG/DL (ref 8.5–10.5)
CHLORIDE SERPL-SCNC: 104 MMOL/L (ref 96–112)
CO2 SERPL-SCNC: 28 MMOL/L (ref 20–33)
CORTIS SERPL-MCNC: 17.5 UG/DL (ref 0–23)
CREAT SERPL-MCNC: 0.98 MG/DL (ref 0.5–1.4)
CRP SERPL HS-MCNC: 0.2 MG/DL (ref 0–0.75)
EOSINOPHIL # BLD AUTO: 0.33 K/UL (ref 0–0.51)
EOSINOPHIL NFR BLD: 6.2 % (ref 0–6.9)
ERYTHROCYTE [DISTWIDTH] IN BLOOD BY AUTOMATED COUNT: 45.6 FL (ref 35.9–50)
GLOBULIN SER CALC-MCNC: 2.6 G/DL (ref 1.9–3.5)
GLUCOSE SERPL-MCNC: 87 MG/DL (ref 65–99)
HCT VFR BLD AUTO: 44.5 % (ref 37–47)
HGB BLD-MCNC: 14.7 G/DL (ref 12–16)
IMM GRANULOCYTES # BLD AUTO: 0.02 K/UL (ref 0–0.11)
IMM GRANULOCYTES NFR BLD AUTO: 0.4 % (ref 0–0.9)
LYMPHOCYTES # BLD AUTO: 1.96 K/UL (ref 1–4.8)
LYMPHOCYTES NFR BLD: 36.6 % (ref 22–41)
MCH RBC QN AUTO: 34.1 PG (ref 27–33)
MCHC RBC AUTO-ENTMCNC: 33 G/DL (ref 33.6–35)
MCV RBC AUTO: 103.2 FL (ref 81.4–97.8)
MONOCYTES # BLD AUTO: 0.55 K/UL (ref 0–0.85)
MONOCYTES NFR BLD AUTO: 10.3 % (ref 0–13.4)
NEUTROPHILS # BLD AUTO: 2.4 K/UL (ref 2–7.15)
NEUTROPHILS NFR BLD: 44.8 % (ref 44–72)
NRBC # BLD AUTO: 0 K/UL
NRBC BLD-RTO: 0 /100 WBC
PLATELET # BLD AUTO: 330 K/UL (ref 164–446)
PMV BLD AUTO: 10.8 FL (ref 9–12.9)
POTASSIUM SERPL-SCNC: 4.1 MMOL/L (ref 3.6–5.5)
PROT SERPL-MCNC: 7.3 G/DL (ref 6–8.2)
RBC # BLD AUTO: 4.31 M/UL (ref 4.2–5.4)
RHEUMATOID FACT SER IA-ACNC: <10 IU/ML (ref 0–14)
SODIUM SERPL-SCNC: 141 MMOL/L (ref 135–145)
T4 FREE SERPL-MCNC: 0.92 NG/DL (ref 0.53–1.43)
TSH SERPL DL<=0.005 MIU/L-ACNC: 1.58 UIU/ML (ref 0.38–5.33)
WBC # BLD AUTO: 5.4 K/UL (ref 4.8–10.8)

## 2018-08-15 PROCEDURE — 85025 COMPLETE CBC W/AUTO DIFF WBC: CPT

## 2018-08-15 PROCEDURE — 84443 ASSAY THYROID STIM HORMONE: CPT

## 2018-08-15 PROCEDURE — 86431 RHEUMATOID FACTOR QUANT: CPT

## 2018-08-15 PROCEDURE — 80053 COMPREHEN METABOLIC PANEL: CPT

## 2018-08-15 PROCEDURE — 86200 CCP ANTIBODY: CPT

## 2018-08-15 PROCEDURE — 86140 C-REACTIVE PROTEIN: CPT

## 2018-08-15 PROCEDURE — 82533 TOTAL CORTISOL: CPT

## 2018-08-15 PROCEDURE — 84439 ASSAY OF FREE THYROXINE: CPT

## 2018-08-15 PROCEDURE — 36415 COLL VENOUS BLD VENIPUNCTURE: CPT

## 2018-08-15 PROCEDURE — 86225 DNA ANTIBODY NATIVE: CPT

## 2018-08-15 PROCEDURE — 86235 NUCLEAR ANTIGEN ANTIBODY: CPT | Mod: 91

## 2018-08-17 LAB — CCP IGG SERPL-ACNC: 2 UNITS (ref 0–19)

## 2018-08-18 LAB
ENA SM IGG SER-ACNC: 0 AU/ML (ref 0–40)
ENA SS-B IGG SER IA-ACNC: 0 AU/ML (ref 0–40)
SSA52 R0ENA AB IGG Q0420: 7 AU/ML (ref 0–40)
SSA60 R0ENA AB IGG Q0419: 0 AU/ML (ref 0–40)
U1 SNRNP IGG SER QL: 0 AU/ML (ref 0–40)

## 2018-08-22 ENCOUNTER — OFFICE VISIT (OUTPATIENT)
Dept: INTERNAL MEDICINE | Facility: IMAGING CENTER | Age: 63
End: 2018-08-22
Payer: MEDICARE

## 2018-08-22 VITALS
TEMPERATURE: 96.8 F | SYSTOLIC BLOOD PRESSURE: 126 MMHG | WEIGHT: 104 LBS | HEART RATE: 57 BPM | RESPIRATION RATE: 14 BRPM | BODY MASS INDEX: 19.14 KG/M2 | HEIGHT: 62 IN | DIASTOLIC BLOOD PRESSURE: 76 MMHG | OXYGEN SATURATION: 100 %

## 2018-08-22 DIAGNOSIS — M35.00 SJOGREN'S SYNDROME, WITH UNSPECIFIED ORGAN INVOLVEMENT (HCC): Chronic | ICD-10-CM

## 2018-08-22 DIAGNOSIS — F32.0 CURRENT MILD EPISODE OF MAJOR DEPRESSIVE DISORDER WITHOUT PRIOR EPISODE (HCC): ICD-10-CM

## 2018-08-22 DIAGNOSIS — M15.9 PRIMARY OSTEOARTHRITIS INVOLVING MULTIPLE JOINTS: Chronic | ICD-10-CM

## 2018-08-22 DIAGNOSIS — G89.4 CHRONIC PAIN SYNDROME: Chronic | ICD-10-CM

## 2018-08-22 DIAGNOSIS — M32.9 LUPUS ARTHRITIS (HCC): Chronic | ICD-10-CM

## 2018-08-22 PROCEDURE — 99215 OFFICE O/P EST HI 40 MIN: CPT | Performed by: FAMILY MEDICINE

## 2018-08-23 PROBLEM — Z79.52 CURRENT CHRONIC USE OF SYSTEMIC STEROIDS: Chronic | Status: RESOLVED | Noted: 2017-04-26 | Resolved: 2018-08-23

## 2018-08-23 RX ORDER — ZOLEDRONIC ACID 5 MG/100ML
5 INJECTION, SOLUTION INTRAVENOUS
COMMUNITY

## 2018-08-23 NOTE — PROGRESS NOTES
"Chief Complaint   Patient presents with   • Pain     chronic    • Other     rheumatological updates       HPI:  Patient is a 63 y.o. female established patient who presents today with her  for a counseling appointment to update me on the status of her complex chronic pain and rheumatologic issues managed by multiple specialists. She has chronic OA of multiple joints complicated by previous thoracic compression fractures, lupus arthritis, and Sjogren's syndrome. Today she is wearing bilateral thumb splints for pain control measures and continues to tolerate low dose naltrexone daily (remains completely off narcotic medication). She sees Dr. Cruz for her rheumatology care and is currently on Arava 10 mg daily and increasing daily dose to 20 mg later this week. She has been tolerating this medication for one month and no longer is on methotrexate. She continues to have \"crippling pain and symptom flairs\" which she feels are consistent with lupus flairs. Although she has always tested negative for lupus, she carries this diagnosis throughout all of her records. She continues to notice hair thinning despite medication changes and is using the typical hair products to help this condition. I also provided her with Nutrofol supplement information as another option for her to research for hair thinning issues. She had her Reclast infusion 8/2/18 for steroid induced osteoporosis/ osteopenia management per the direction of [facility] in conjunction with Dr. Boss. She also sees Dr. Monteiro and Dr. Jones in [place] for spine/pain management and is completely non operable from their standpoint. She has referrals to [facility] as well as [facility] pending (per Dr. Monteiro and Dr. Jones) for tertiary care evaluation to hopefully provide her additional options for pain management related to rheum conditions as well as C spine DDD. They are frustrated with insurance " denial of home neck traction per Dr. Monteiro and appeals process is in the works. She continues to search for Shingrix vaccination in our community and remains appropriately frustrated/overwhelmed with her ongoing pain and medical decline. I have suggested mental health counseling in the past, and she feels that there is no one that can help her. Her  and I both attempted to redirect her attitude towards counseling, and I offered to find other providers that she could research for counseling. She does not want to take anti  depressant medication, but I strongly advised regular counseling sessions to deal with loss of her health and freedom as well as increasing coping skills.     Patient Active Problem List    Diagnosis Date Noted   • History of immunosuppression 05/21/2018   • Major depressive disorder 03/13/2018   • Radicular pain of right upper extremity 03/13/2018   • Non-traumatic compression fracture of ninth thoracic vertebra (HCC) 10/23/2017   • Non-traumatic compression fracture of tenth thoracic vertebra (HCC) 10/23/2017   • DDD (degenerative disc disease), cervical 04/26/2017   • Osteoarthritis of multiple joints 04/26/2017   • Lupus arthritis ([facility]) 04/26/2017   • Chronic pain syndrome 04/26/2017   • Gastroesophageal reflux disease without esophagitis 04/26/2017   • Lactose intolerance 04/26/2017   • Raynaud's disease without gangrene 04/26/2017   • Osteopenia 04/26/2017   • Functional diarrhea 04/26/2017   • Sjogren's syndrome ([facility]) 04/26/1990       Past medical, surgical, family, and social history was reviewed and updated in Epic chart by me today.     Medications and allergies reviewed and updated in Epic chart by me today.     All 2018 records from [facility] and Dr. Cruz reviewed prior to visit today.    ROS:  Pertinent positives listed above in HPI. All other systems have been reviewed and are negative.    PE:   /76   Pulse (!) 57   Temp 36 °C (96.8 °F)   Resp 14   Ht 1.575 m (5'  "2.01\")   Wt 47.2 kg (104 lb)   SpO2 100%   BMI 19.02 kg/m²   Vital signs reviewed with patient.     Gen: Well developed; well nourished; no acute distress; age appropriate appearance   Skin: Warm and dry; no rashes noted   Neuro: No focal deficits noted   Psych: AAOx4; mood and affect are tearful at times throughout our visit.     A/P:  1. Chronic sjogren's syndrome, with unspecified organ involvement (HCC)  Ongoing issue for patient managed by Dr. Cruz and has specialists at [facility] in [place].     2. Primary osteoarthritis involving multiple joints  Ongoing issue with major flair in bilateral thumb joints managed by Dr. Monteiro and Dr. Jones (refer to #2).     3. Current mild episode of major depressive disorder without prior episode (HCC)  Uncontrolled but patient declines medication use and feels that she has not found appropriate counselor to see. I will research further counselor options for her, and she is hussain to have exceedingly strong family support.     4. Lupus arthritis (HCC)  Pt is currently tolerating Arava 10 mg daily and will be increasing daily dose to 20 mg this week managed by Dr. Cruz.     5. Chronic pain syndrome  Ongoing issue managed by Dr. Monteiro, Dr. Jones, and Dr. Cruz. She is to continue daily low dose naltrexone and has pending referrals to [facility] and [facility]. She is also is going to appeal insurance denial of home neck traction for ongoing pain management.      Face to face time: 60 minutes spent with 100% of time spent with direct patient care and counseling Lupe and Otis about diagnosis, prognosis, risk versus benefits of treatment, and importance of compliance with instructions. All questions answered and support provided during visit today.       "

## 2018-09-06 DIAGNOSIS — M15.9 PRIMARY OSTEOARTHRITIS INVOLVING MULTIPLE JOINTS: Chronic | ICD-10-CM

## 2018-09-06 DIAGNOSIS — G89.4 CHRONIC PAIN SYNDROME: Chronic | ICD-10-CM

## 2018-09-06 DIAGNOSIS — M32.9 LUPUS ARTHRITIS (HCC): Chronic | ICD-10-CM

## 2018-09-06 DIAGNOSIS — M79.2 RADICULAR PAIN OF RIGHT UPPER EXTREMITY: ICD-10-CM

## 2018-09-06 DIAGNOSIS — M35.00 SJOGREN'S SYNDROME, WITH UNSPECIFIED ORGAN INVOLVEMENT (HCC): Chronic | ICD-10-CM

## 2018-09-06 DIAGNOSIS — I73.00 RAYNAUD'S DISEASE WITHOUT GANGRENE: Chronic | ICD-10-CM

## 2018-09-07 DIAGNOSIS — M48.54XS: ICD-10-CM

## 2018-09-07 DIAGNOSIS — G89.4 CHRONIC PAIN SYNDROME: Chronic | ICD-10-CM

## 2018-09-07 DIAGNOSIS — M79.2 RADICULAR PAIN OF RIGHT UPPER EXTREMITY: ICD-10-CM

## 2018-09-07 DIAGNOSIS — M32.9 LUPUS ARTHRITIS (HCC): Chronic | ICD-10-CM

## 2018-09-07 DIAGNOSIS — I73.00 RAYNAUD'S DISEASE WITHOUT GANGRENE: Chronic | ICD-10-CM

## 2018-09-07 DIAGNOSIS — M15.9 PRIMARY OSTEOARTHRITIS INVOLVING MULTIPLE JOINTS: Chronic | ICD-10-CM

## 2018-09-17 ENCOUNTER — NON-PROVIDER VISIT (OUTPATIENT)
Dept: INTERNAL MEDICINE | Facility: IMAGING CENTER | Age: 63
End: 2018-09-17
Payer: MEDICARE

## 2018-09-17 DIAGNOSIS — Z23 NEED FOR INFLUENZA VACCINATION: ICD-10-CM

## 2018-09-17 DIAGNOSIS — Z23 NEED FOR SHINGLES VACCINE: ICD-10-CM

## 2018-09-18 PROCEDURE — 90750 HZV VACC RECOMBINANT IM: CPT | Performed by: FAMILY MEDICINE

## 2018-09-18 PROCEDURE — G0008 ADMIN INFLUENZA VIRUS VAC: HCPCS | Performed by: FAMILY MEDICINE

## 2018-09-18 PROCEDURE — 90472 IMMUNIZATION ADMIN EACH ADD: CPT | Performed by: FAMILY MEDICINE

## 2018-09-18 PROCEDURE — 90686 IIV4 VACC NO PRSV 0.5 ML IM: CPT | Performed by: FAMILY MEDICINE

## 2018-09-20 ENCOUNTER — NON-PROVIDER VISIT (OUTPATIENT)
Dept: INTERNAL MEDICINE | Facility: IMAGING CENTER | Age: 63
End: 2018-09-20
Payer: MEDICARE

## 2018-09-20 ENCOUNTER — HOSPITAL ENCOUNTER (OUTPATIENT)
Dept: LAB | Facility: MEDICAL CENTER | Age: 63
End: 2018-09-20
Attending: INTERNAL MEDICINE
Payer: MEDICARE

## 2018-09-20 PROCEDURE — 82570 ASSAY OF URINE CREATININE: CPT

## 2018-09-20 PROCEDURE — 85025 COMPLETE CBC W/AUTO DIFF WBC: CPT

## 2018-09-20 PROCEDURE — 80053 COMPREHEN METABOLIC PANEL: CPT

## 2018-09-20 PROCEDURE — 81003 URINALYSIS AUTO W/O SCOPE: CPT

## 2018-09-20 PROCEDURE — 84156 ASSAY OF PROTEIN URINE: CPT

## 2018-09-21 DIAGNOSIS — M35.00 SJOGREN'S SYNDROME, WITH UNSPECIFIED ORGAN INVOLVEMENT (HCC): Chronic | ICD-10-CM

## 2018-09-21 LAB
ALBUMIN SERPL BCP-MCNC: 4.4 G/DL (ref 3.2–4.9)
ALBUMIN/GLOB SERPL: 1.6 G/DL
ALP SERPL-CCNC: 54 U/L (ref 30–99)
ALT SERPL-CCNC: 27 U/L (ref 2–50)
ANION GAP SERPL CALC-SCNC: 9 MMOL/L (ref 0–11.9)
APPEARANCE UR: CLEAR
AST SERPL-CCNC: 32 U/L (ref 12–45)
BASOPHILS # BLD AUTO: 1.2 % (ref 0–1.8)
BASOPHILS # BLD: 0.07 K/UL (ref 0–0.12)
BILIRUB SERPL-MCNC: 0.5 MG/DL (ref 0.1–1.5)
BILIRUB UR QL STRIP.AUTO: NEGATIVE
BUN SERPL-MCNC: 24 MG/DL (ref 8–22)
CALCIUM SERPL-MCNC: 9.9 MG/DL (ref 8.5–10.5)
CHLORIDE SERPL-SCNC: 105 MMOL/L (ref 96–112)
CO2 SERPL-SCNC: 27 MMOL/L (ref 20–33)
COLOR UR: YELLOW
CREAT SERPL-MCNC: 0.85 MG/DL (ref 0.5–1.4)
CREAT UR-MCNC: 95.5 MG/DL
EOSINOPHIL # BLD AUTO: 0.25 K/UL (ref 0–0.51)
EOSINOPHIL NFR BLD: 4.4 % (ref 0–6.9)
ERYTHROCYTE [DISTWIDTH] IN BLOOD BY AUTOMATED COUNT: 44.5 FL (ref 35.9–50)
GLOBULIN SER CALC-MCNC: 2.7 G/DL (ref 1.9–3.5)
GLUCOSE SERPL-MCNC: 90 MG/DL (ref 65–99)
GLUCOSE UR STRIP.AUTO-MCNC: NEGATIVE MG/DL
HCT VFR BLD AUTO: 44.2 % (ref 37–47)
HGB BLD-MCNC: 14.3 G/DL (ref 12–16)
IMM GRANULOCYTES # BLD AUTO: 0.01 K/UL (ref 0–0.11)
IMM GRANULOCYTES NFR BLD AUTO: 0.2 % (ref 0–0.9)
KETONES UR STRIP.AUTO-MCNC: NEGATIVE MG/DL
LEUKOCYTE ESTERASE UR QL STRIP.AUTO: NEGATIVE
LYMPHOCYTES # BLD AUTO: 1.87 K/UL (ref 1–4.8)
LYMPHOCYTES NFR BLD: 33 % (ref 22–41)
MCH RBC QN AUTO: 32.4 PG (ref 27–33)
MCHC RBC AUTO-ENTMCNC: 32.4 G/DL (ref 33.6–35)
MCV RBC AUTO: 100.2 FL (ref 81.4–97.8)
MICRO URNS: NORMAL
MONOCYTES # BLD AUTO: 0.68 K/UL (ref 0–0.85)
MONOCYTES NFR BLD AUTO: 12 % (ref 0–13.4)
NEUTROPHILS # BLD AUTO: 2.78 K/UL (ref 2–7.15)
NEUTROPHILS NFR BLD: 49.2 % (ref 44–72)
NITRITE UR QL STRIP.AUTO: NEGATIVE
NRBC # BLD AUTO: 0 K/UL
NRBC BLD-RTO: 0 /100 WBC
PH UR STRIP.AUTO: 6.5 [PH]
PLATELET # BLD AUTO: 278 K/UL (ref 164–446)
PMV BLD AUTO: 10.9 FL (ref 9–12.9)
POTASSIUM SERPL-SCNC: 4 MMOL/L (ref 3.6–5.5)
PROT SERPL-MCNC: 7.1 G/DL (ref 6–8.2)
PROT UR QL STRIP: NEGATIVE MG/DL
PROT UR-MCNC: 8.6 MG/DL (ref 0–15)
PROT/CREAT UR: 90 MG/G (ref 10–107)
RBC # BLD AUTO: 4.41 M/UL (ref 4.2–5.4)
RBC UR QL AUTO: NEGATIVE
SODIUM SERPL-SCNC: 141 MMOL/L (ref 135–145)
SP GR UR STRIP.AUTO: 1.02
UROBILINOGEN UR STRIP.AUTO-MCNC: 0.2 MG/DL
WBC # BLD AUTO: 5.7 K/UL (ref 4.8–10.8)

## 2018-10-17 ENCOUNTER — TELEPHONE (OUTPATIENT)
Dept: INTERNAL MEDICINE | Facility: IMAGING CENTER | Age: 63
End: 2018-10-17

## 2018-10-17 NOTE — TELEPHONE ENCOUNTER
"Otis called on behalf of Lupe this morning to discuss her three day history of escalating right shoulder pain. She had a \"bad night\" last night and complains of \"severe pain where muscles attach to shoulder joint and arm movement limitations\". She is seeking \"immediate\" imaging/evaluation and I asked if she had called her established orthopedic doctor for an appointment (has not called him). He reports that this is not related to significant temperature changes and is a new, non trauma related issue. I directed him to call Dr. Roche or take her to ER but they do not want to wait. I then recommended UC, and he mentioned [facility], which certainly is an option for her this morning. They wanted to come to our office for imaging and I explained that we do not have imaging capabilities at our location (near by but not within our office). All questions answered.   "

## 2018-11-01 ENCOUNTER — APPOINTMENT (RX ONLY)
Dept: URBAN - METROPOLITAN AREA CLINIC 35 | Facility: CLINIC | Age: 63
Setting detail: DERMATOLOGY
End: 2018-11-01

## 2018-11-01 DIAGNOSIS — Z71.89 OTHER SPECIFIED COUNSELING: ICD-10-CM

## 2018-11-01 DIAGNOSIS — Z85.828 PERSONAL HISTORY OF OTHER MALIGNANT NEOPLASM OF SKIN: ICD-10-CM

## 2018-11-01 DIAGNOSIS — L11.1 TRANSIENT ACANTHOLYTIC DERMATOSIS [GROVER]: ICD-10-CM

## 2018-11-01 DIAGNOSIS — L81.4 OTHER MELANIN HYPERPIGMENTATION: ICD-10-CM

## 2018-11-01 DIAGNOSIS — D485 NEOPLASM OF UNCERTAIN BEHAVIOR OF SKIN: ICD-10-CM

## 2018-11-01 DIAGNOSIS — D22 MELANOCYTIC NEVI: ICD-10-CM

## 2018-11-01 DIAGNOSIS — L82.1 OTHER SEBORRHEIC KERATOSIS: ICD-10-CM

## 2018-11-01 DIAGNOSIS — Z87.2 PERSONAL HISTORY OF DISEASES OF THE SKIN AND SUBCUTANEOUS TISSUE: ICD-10-CM

## 2018-11-01 PROBLEM — D22.5 MELANOCYTIC NEVI OF TRUNK: Status: ACTIVE | Noted: 2018-11-01

## 2018-11-01 PROBLEM — D48.5 NEOPLASM OF UNCERTAIN BEHAVIOR OF SKIN: Status: ACTIVE | Noted: 2018-11-01

## 2018-11-01 PROBLEM — M12.9 ARTHROPATHY, UNSPECIFIED: Status: ACTIVE | Noted: 2018-11-01

## 2018-11-01 PROBLEM — L85.3 XEROSIS CUTIS: Status: ACTIVE | Noted: 2018-11-01

## 2018-11-01 PROBLEM — L57.0 ACTINIC KERATOSIS: Status: ACTIVE | Noted: 2018-11-01

## 2018-11-01 PROCEDURE — ? LIQUID NITROGEN (COSMETIC)

## 2018-11-01 PROCEDURE — ? BIOPSY BY SHAVE METHOD

## 2018-11-01 PROCEDURE — 11100: CPT

## 2018-11-01 PROCEDURE — ? COUNSELING

## 2018-11-01 PROCEDURE — 99213 OFFICE O/P EST LOW 20 MIN: CPT | Mod: 25

## 2018-11-01 ASSESSMENT — LOCATION DETAILED DESCRIPTION DERM
LOCATION DETAILED: SUPERIOR LUMBAR SPINE
LOCATION DETAILED: LEFT MID-UPPER BACK
LOCATION DETAILED: LEFT INFERIOR UPPER BACK
LOCATION DETAILED: LEFT SUPERIOR LATERAL FOREHEAD
LOCATION DETAILED: LEFT SUPERIOR FOREHEAD
LOCATION DETAILED: RIGHT RIB CAGE
LOCATION DETAILED: RIGHT LATERAL TEMPLE
LOCATION DETAILED: RIGHT MEDIAL UPPER BACK
LOCATION DETAILED: RIGHT DISTAL PRETIBIAL REGION
LOCATION DETAILED: LEFT SUPERIOR MEDIAL UPPER BACK
LOCATION DETAILED: LEFT LATERAL ABDOMEN
LOCATION DETAILED: RIGHT LATERAL PROXIMAL PRETIBIAL REGION
LOCATION DETAILED: RIGHT SUPERIOR PREAURICULAR CHEEK
LOCATION DETAILED: RIGHT DISTAL CALF
LOCATION DETAILED: INFERIOR THORACIC SPINE

## 2018-11-01 ASSESSMENT — LOCATION SIMPLE DESCRIPTION DERM
LOCATION SIMPLE: RIGHT UPPER BACK
LOCATION SIMPLE: LEFT UPPER BACK
LOCATION SIMPLE: RIGHT CALF
LOCATION SIMPLE: RIGHT CHEEK
LOCATION SIMPLE: UPPER BACK
LOCATION SIMPLE: RIGHT TEMPLE
LOCATION SIMPLE: LEFT FOREHEAD
LOCATION SIMPLE: ABDOMEN
LOCATION SIMPLE: RIGHT PRETIBIAL REGION
LOCATION SIMPLE: LOWER BACK

## 2018-11-01 ASSESSMENT — LOCATION ZONE DERM
LOCATION ZONE: LEG
LOCATION ZONE: FACE
LOCATION ZONE: TRUNK

## 2018-11-01 NOTE — PROCEDURE: COUNSELING
Patient Specific Counseling (Will Not Stick From Patient To Patient): Multiple other SK’s on back, sides, and in axilla treated with LN today
Detail Level: Generalized
Detail Level: Zone

## 2018-11-01 NOTE — PROCEDURE: BIOPSY BY SHAVE METHOD
Cryotherapy Text: The wound bed was treated with cryotherapy after the biopsy was performed.
Post-Care Instructions: I reviewed with the patient in detail post-care instructions. Patient is to keep the biopsy site dry overnight, and then replace the bandage and apply petrolatum daily until healed. Patient may apply hydrogen peroxide soaks to remove any crusting.
Notification Instructions: Patient will be notified of biopsy results. However, patient instructed to call the office if not contacted within 2 weeks.
Electrodesiccation Text: The wound bed was treated with electrodesiccation after the biopsy was performed.
Anesthesia Type: 1% lidocaine with epinephrine and a 1:10 solution of 8.4% sodium bicarbonate
Render Post-Care Instructions In Note?: yes
X Size Of Lesion In Cm: 0
Biopsy Method: double edge Personna blade
Anesthesia Volume In Cc: 1.3
Dressing: bandage
Biopsy Type: H and E
Silver Nitrate Text: The wound bed was treated with silver nitrate after the biopsy was performed.
Depth Of Biopsy: dermis
Lab: [id number]
Lab Facility: 
Destruction After The Procedure: No
Size Of Lesion In Cm: 0.5
Detail Level: Detailed
Type Of Destruction Used: Curettage
Billing Type: Third-Party Bill
Consent: Written consent was obtained and risks were reviewed including but not limited to scarring, infection, bleeding, scabbing, incomplete removal, nerve damage and allergy to anesthesia.
Wound Care: Petrolatum
Hemostasis: Aluminum Chloride
Electrodesiccation And Curettage Text: The wound bed was treated with electrodesiccation and curettage after the biopsy was performed.
Curettage Text: The wound bed was treated with curettage after the biopsy was performed.

## 2018-11-06 ENCOUNTER — HOSPITAL ENCOUNTER (OUTPATIENT)
Dept: RADIOLOGY | Facility: MEDICAL CENTER | Age: 63
End: 2018-11-06
Attending: SPECIALIST
Payer: MEDICARE

## 2018-11-06 DIAGNOSIS — R92.2 DENSE BREASTS: ICD-10-CM

## 2018-11-06 DIAGNOSIS — R92.30 DENSE BREASTS: ICD-10-CM

## 2018-11-06 PROCEDURE — 76641 ULTRASOUND BREAST COMPLETE: CPT

## 2018-11-10 ENCOUNTER — HOSPITAL ENCOUNTER (OUTPATIENT)
Dept: LAB | Facility: MEDICAL CENTER | Age: 63
End: 2018-11-10
Attending: INTERNAL MEDICINE
Payer: MEDICARE

## 2018-11-10 LAB
ALBUMIN SERPL BCP-MCNC: 4.6 G/DL (ref 3.2–4.9)
ALBUMIN/GLOB SERPL: 2.1 G/DL
ALP SERPL-CCNC: 46 U/L (ref 30–99)
ALT SERPL-CCNC: 20 U/L (ref 2–50)
ANION GAP SERPL CALC-SCNC: 7 MMOL/L (ref 0–11.9)
AST SERPL-CCNC: 24 U/L (ref 12–45)
BASOPHILS # BLD AUTO: 0.7 % (ref 0–1.8)
BASOPHILS # BLD: 0.05 K/UL (ref 0–0.12)
BILIRUB SERPL-MCNC: 0.6 MG/DL (ref 0.1–1.5)
BUN SERPL-MCNC: 23 MG/DL (ref 8–22)
CALCIUM SERPL-MCNC: 10.1 MG/DL (ref 8.5–10.5)
CHLORIDE SERPL-SCNC: 104 MMOL/L (ref 96–112)
CO2 SERPL-SCNC: 29 MMOL/L (ref 20–33)
CREAT SERPL-MCNC: 0.88 MG/DL (ref 0.5–1.4)
CRP SERPL HS-MCNC: 0.13 MG/DL (ref 0–0.75)
EOSINOPHIL # BLD AUTO: 0.23 K/UL (ref 0–0.51)
EOSINOPHIL NFR BLD: 3.3 % (ref 0–6.9)
ERYTHROCYTE [DISTWIDTH] IN BLOOD BY AUTOMATED COUNT: 47.3 FL (ref 35.9–50)
ERYTHROCYTE [SEDIMENTATION RATE] IN BLOOD BY WESTERGREN METHOD: 5 MM/HOUR (ref 0–30)
GLOBULIN SER CALC-MCNC: 2.2 G/DL (ref 1.9–3.5)
GLUCOSE SERPL-MCNC: 111 MG/DL (ref 65–99)
HAV IGM SERPL QL IA: NEGATIVE
HBV CORE IGM SER QL: NEGATIVE
HBV SURFACE AG SER QL: NEGATIVE
HCT VFR BLD AUTO: 40 % (ref 37–47)
HCV AB SER QL: NEGATIVE
HGB BLD-MCNC: 13.4 G/DL (ref 12–16)
IMM GRANULOCYTES # BLD AUTO: 0.02 K/UL (ref 0–0.11)
IMM GRANULOCYTES NFR BLD AUTO: 0.3 % (ref 0–0.9)
LYMPHOCYTES # BLD AUTO: 2.18 K/UL (ref 1–4.8)
LYMPHOCYTES NFR BLD: 31.3 % (ref 22–41)
MCH RBC QN AUTO: 33.5 PG (ref 27–33)
MCHC RBC AUTO-ENTMCNC: 33.5 G/DL (ref 33.6–35)
MCV RBC AUTO: 100 FL (ref 81.4–97.8)
MONOCYTES # BLD AUTO: 0.51 K/UL (ref 0–0.85)
MONOCYTES NFR BLD AUTO: 7.3 % (ref 0–13.4)
NEUTROPHILS # BLD AUTO: 3.98 K/UL (ref 2–7.15)
NEUTROPHILS NFR BLD: 57.1 % (ref 44–72)
NRBC # BLD AUTO: 0 K/UL
NRBC BLD-RTO: 0 /100 WBC
PLATELET # BLD AUTO: 285 K/UL (ref 164–446)
PMV BLD AUTO: 10.5 FL (ref 9–12.9)
POTASSIUM SERPL-SCNC: 4.2 MMOL/L (ref 3.6–5.5)
PROT SERPL-MCNC: 6.8 G/DL (ref 6–8.2)
RBC # BLD AUTO: 4 M/UL (ref 4.2–5.4)
SODIUM SERPL-SCNC: 140 MMOL/L (ref 135–145)
WBC # BLD AUTO: 7 K/UL (ref 4.8–10.8)

## 2018-11-10 PROCEDURE — 36415 COLL VENOUS BLD VENIPUNCTURE: CPT

## 2018-11-10 PROCEDURE — 80074 ACUTE HEPATITIS PANEL: CPT

## 2018-11-10 PROCEDURE — 86480 TB TEST CELL IMMUN MEASURE: CPT

## 2018-11-10 PROCEDURE — 80053 COMPREHEN METABOLIC PANEL: CPT

## 2018-11-10 PROCEDURE — 86334 IMMUNOFIX E-PHORESIS SERUM: CPT

## 2018-11-10 PROCEDURE — 82784 ASSAY IGA/IGD/IGG/IGM EACH: CPT

## 2018-11-10 PROCEDURE — 84160 ASSAY OF PROTEIN ANY SOURCE: CPT

## 2018-11-10 PROCEDURE — 85652 RBC SED RATE AUTOMATED: CPT

## 2018-11-10 PROCEDURE — 85025 COMPLETE CBC W/AUTO DIFF WBC: CPT

## 2018-11-10 PROCEDURE — 84165 PROTEIN E-PHORESIS SERUM: CPT

## 2018-11-10 PROCEDURE — 86225 DNA ANTIBODY NATIVE: CPT

## 2018-11-10 PROCEDURE — 86140 C-REACTIVE PROTEIN: CPT

## 2018-11-11 LAB
M TB TUBERC IFN-G BLD QL: NEGATIVE
M TB TUBERC IFN-G/MITOGEN IGNF BLD: 0
M TB TUBERC IGNF/MITOGEN IGNF CONTROL: 48.82 [IU]/ML
MITOGEN IGNF BCKGRD COR BLD-ACNC: 0.02 [IU]/ML

## 2018-11-12 LAB — DSDNA AB TITR SER CLIF: NORMAL {TITER}

## 2018-11-13 LAB
ALBUMIN SERPL-MCNC: 4.69 G/DL (ref 3.75–5.01)
ALPHA1 GLOB SERPL ELPH-MCNC: 0.26 G/DL (ref 0.19–0.46)
ALPHA2 GLOB SERPL ELPH-MCNC: 0.62 G/DL (ref 0.48–1.05)
B-GLOBULIN SERPL ELPH-MCNC: 0.64 G/DL (ref 0.48–1.1)
GAMMA GLOB SERPL ELPH-MCNC: 0.59 G/DL (ref 0.62–1.51)
IGA SERPL-MCNC: 128 MG/DL (ref 68–408)
IGG SERPL-MCNC: 582 MG/DL (ref 768–1632)
IGM SERPL-MCNC: 88 MG/DL (ref 35–263)
INTERPRETATION SERPL IFE-IMP: ABNORMAL
INTERPRETATION SERPL IFE-IMP: ABNORMAL
PATHOLOGY STUDY: ABNORMAL
PROT SERPL-MCNC: 6.8 G/DL (ref 6–8.3)

## 2018-12-05 ENCOUNTER — NON-PROVIDER VISIT (OUTPATIENT)
Dept: INTERNAL MEDICINE | Facility: IMAGING CENTER | Age: 63
End: 2018-12-05
Payer: MEDICARE

## 2018-12-05 DIAGNOSIS — Z23 NEED FOR SHINGLES VACCINE: ICD-10-CM

## 2018-12-05 PROCEDURE — 90750 HZV VACC RECOMBINANT IM: CPT | Performed by: FAMILY MEDICINE

## 2018-12-05 PROCEDURE — 90471 IMMUNIZATION ADMIN: CPT | Performed by: FAMILY MEDICINE

## 2018-12-18 ENCOUNTER — OFFICE VISIT (OUTPATIENT)
Dept: INTERNAL MEDICINE | Facility: IMAGING CENTER | Age: 63
End: 2018-12-18
Payer: MEDICARE

## 2018-12-18 VITALS
HEIGHT: 62 IN | OXYGEN SATURATION: 98 % | DIASTOLIC BLOOD PRESSURE: 72 MMHG | HEART RATE: 72 BPM | TEMPERATURE: 99.2 F | WEIGHT: 104 LBS | BODY MASS INDEX: 19.14 KG/M2 | SYSTOLIC BLOOD PRESSURE: 106 MMHG | RESPIRATION RATE: 12 BRPM

## 2018-12-18 DIAGNOSIS — M35.00 SJOGREN'S SYNDROME, WITH UNSPECIFIED ORGAN INVOLVEMENT (HCC): Chronic | ICD-10-CM

## 2018-12-18 DIAGNOSIS — G89.4 CHRONIC PAIN SYNDROME: Chronic | ICD-10-CM

## 2018-12-18 DIAGNOSIS — S76.911A MUSCLE STRAIN OF RIGHT THIGH, INITIAL ENCOUNTER: ICD-10-CM

## 2018-12-18 PROCEDURE — 99214 OFFICE O/P EST MOD 30 MIN: CPT | Performed by: FAMILY MEDICINE

## 2018-12-19 NOTE — PROGRESS NOTES
Chief Complaint   Patient presents with   • Leg Pain     right upper leg       HPI:  Patient is a 63 y.o. female established patient who presents today with her  for evaluation of new right upper thigh pain that has been present since last night. She reports having a dull ache of her right upper thigh and then pain became very sharp when she woke up this morning. She denies new motor or sensory changes and reports that pain is at maximum when lifting her right up from hip level. She denies associated systemic illness but has skied one week ago and walks on inclined treadmill daily. She suffers from chronic pain syndrome and is slated to go to [facility] in February/March timeframe for a second opinion. She feels that nothing is working to control her pain that stems from OA and lupus arthritis. She is seeing Dr. Moore Sr at the [facility] 12/27 for thumb OA evaluation and [facility] in February for right shoulder evaluation. She also has chronic Sjogren's syndrome and is seeing a physician at [facility] for a second opinion January 10 and [facility] (Dr. Lisandra Elder - salivary gland team/ Dr. Girard - osteoporosis management) January 9. She continues to see her local specialists and is scheduled for Rituxan infusion 12/20 and then an additional infusion two weeks managed by Dr. Cruz. She also tried cymbalta for two days last week and felt significant intolerances so she stopped. She continues to be very burdened by her numerous chronic medical conditions but has a very supportive  who is involved with the national [employer].     Patient Active Problem List    Diagnosis Date Noted   • History of immunosuppression 05/21/2018   • Major depressive disorder 03/13/2018   • Radicular pain of right upper extremity 03/13/2018   • Non-traumatic compression fracture of ninth thoracic vertebra (HCC) 10/23/2017   • Non-traumatic compression fracture of tenth  "thoracic vertebra ([facility]) 10/23/2017   • DDD (degenerative disc disease), cervical 04/26/2017   • Osteoarthritis of multiple joints 04/26/2017   • Lupus arthritis ([facility]) 04/26/2017   • Chronic pain syndrome 04/26/2017   • Gastroesophageal reflux disease without esophagitis 04/26/2017   • Lactose intolerance 04/26/2017   • Raynaud's disease without gangrene 04/26/2017   • Osteopenia 04/26/2017   • Functional diarrhea 04/26/2017   • Sjogren's syndrome ([facility]) 04/26/1990       Past medical, surgical, family, and social history was reviewed and updated in Epic chart by me today.     Medications and allergies reviewed and updated in Epic chart by me today.     ROS:  Pertinent positives listed above in HPI. All other systems have been reviewed and are negative.    PE:   /72 (BP Location: Right arm, Patient Position: Sitting, BP Cuff Size: Adult)   Pulse 72   Temp 37.3 °C (99.2 °F) (Temporal)   Resp 12   Ht 1.575 m (5' 2\")   Wt 47.2 kg (104 lb)   SpO2 98%   BMI 19.02 kg/m²   Vital signs reviewed with patient.     Gen: Well developed; well nourished; no acute distress; age appropriate appearance   HEENT: Normocephalic; atraumatic; PEERLA b/l; sclera clear b/l; b/l external auditory canals WNL; b/l TM WNL; nares patent; oropharynx clear; oral mucosa moist; tongue midline; dentition adequate   Neck: No adenopathy; no thyromegaly  CV: Regular rate and rhythm; S1/ S2 present; no murmur, gallop or rub noted  Pulm: No respiratory distress; clear to ascultation b/l; no wheezing or stridor noted b/l  Abd: Adequate bowel sounds noted; soft and nontender; no rebound, rigidity, nor distention  Lower extremities: No peripheral edema b/l LE extremities/ no clubbing nor cyanosis noted; right proximal psoas muscle is very tight and tender with palpation; no overlying skin changes noted and the remainder of her right upper leg/groin/hip area exam is WNL  Skin: Warm and dry; no rashes noted   Neuro: No focal deficits noted; normal " gait  Psych: AAOx4; mood and affect are appropriate    A/P:  1. Muscle strain of right thigh, initial encounter  Pt's condition is consistent with right proximal psoas muscle strain. Recommend ice/ stretching/ avoiding incline workouts until condition resolves.     2. Chronic pain syndrome  Ongoing issue for patient and she intends to have appointment at [facility] in February or March for second opinion (currently sees Dr. Jones).     3. Sjogren's syndrome, with unspecified organ involvement (HCC)  Ongoing issue for patient - she has an appointment with Dr. Jerrod Mcguire 9.     Pt is to contact our office if current condition does not resolve with supportive care measures.

## 2018-12-20 ENCOUNTER — OUTPATIENT INFUSION SERVICES (OUTPATIENT)
Dept: ONCOLOGY | Facility: MEDICAL CENTER | Age: 63
End: 2018-12-20
Attending: INTERNAL MEDICINE
Payer: MEDICARE

## 2018-12-20 VITALS
WEIGHT: 104.94 LBS | TEMPERATURE: 98.1 F | OXYGEN SATURATION: 98 % | BODY MASS INDEX: 19.31 KG/M2 | RESPIRATION RATE: 18 BRPM | HEIGHT: 62 IN | DIASTOLIC BLOOD PRESSURE: 47 MMHG | HEART RATE: 71 BPM | SYSTOLIC BLOOD PRESSURE: 110 MMHG

## 2018-12-20 DIAGNOSIS — M32.9 LUPUS ARTHRITIS (HCC): Chronic | ICD-10-CM

## 2018-12-20 LAB
ALBUMIN SERPL BCP-MCNC: 4.3 G/DL (ref 3.2–4.9)
ALBUMIN/GLOB SERPL: 2 G/DL
ALP SERPL-CCNC: 53 U/L (ref 30–99)
ALT SERPL-CCNC: 17 U/L (ref 2–50)
ANION GAP SERPL CALC-SCNC: 8 MMOL/L (ref 0–11.9)
AST SERPL-CCNC: 24 U/L (ref 12–45)
BASOPHILS # BLD AUTO: 0.5 % (ref 0–1.8)
BASOPHILS # BLD: 0.03 K/UL (ref 0–0.12)
BILIRUB SERPL-MCNC: 0.7 MG/DL (ref 0.1–1.5)
BUN SERPL-MCNC: 25 MG/DL (ref 8–22)
CALCIUM SERPL-MCNC: 9.2 MG/DL (ref 8.5–10.5)
CHLORIDE SERPL-SCNC: 108 MMOL/L (ref 96–112)
CO2 SERPL-SCNC: 23 MMOL/L (ref 20–33)
CREAT SERPL-MCNC: 0.84 MG/DL (ref 0.5–1.4)
EOSINOPHIL # BLD AUTO: 0.15 K/UL (ref 0–0.51)
EOSINOPHIL NFR BLD: 2.4 % (ref 0–6.9)
ERYTHROCYTE [DISTWIDTH] IN BLOOD BY AUTOMATED COUNT: 47.5 FL (ref 35.9–50)
GLOBULIN SER CALC-MCNC: 2.2 G/DL (ref 1.9–3.5)
GLUCOSE SERPL-MCNC: 94 MG/DL (ref 65–99)
HCT VFR BLD AUTO: 37.3 % (ref 37–47)
HGB BLD-MCNC: 12.8 G/DL (ref 12–16)
IMM GRANULOCYTES # BLD AUTO: 0.01 K/UL (ref 0–0.11)
IMM GRANULOCYTES NFR BLD AUTO: 0.2 % (ref 0–0.9)
LYMPHOCYTES # BLD AUTO: 1.73 K/UL (ref 1–4.8)
LYMPHOCYTES NFR BLD: 27.7 % (ref 22–41)
MCH RBC QN AUTO: 34 PG (ref 27–33)
MCHC RBC AUTO-ENTMCNC: 34.3 G/DL (ref 33.6–35)
MCV RBC AUTO: 99.2 FL (ref 81.4–97.8)
MONOCYTES # BLD AUTO: 0.59 K/UL (ref 0–0.85)
MONOCYTES NFR BLD AUTO: 9.5 % (ref 0–13.4)
NEUTROPHILS # BLD AUTO: 3.73 K/UL (ref 2–7.15)
NEUTROPHILS NFR BLD: 59.7 % (ref 44–72)
NRBC # BLD AUTO: 0 K/UL
NRBC BLD-RTO: 0 /100 WBC
PLATELET # BLD AUTO: 328 K/UL (ref 164–446)
PMV BLD AUTO: 9.9 FL (ref 9–12.9)
POTASSIUM SERPL-SCNC: 3.8 MMOL/L (ref 3.6–5.5)
PROT SERPL-MCNC: 6.5 G/DL (ref 6–8.2)
RBC # BLD AUTO: 3.76 M/UL (ref 4.2–5.4)
SODIUM SERPL-SCNC: 139 MMOL/L (ref 135–145)
WBC # BLD AUTO: 6.2 K/UL (ref 4.8–10.8)

## 2018-12-20 PROCEDURE — 96415 CHEMO IV INFUSION ADDL HR: CPT

## 2018-12-20 PROCEDURE — 700111 HCHG RX REV CODE 636 W/ 250 OVERRIDE (IP): Mod: JG | Performed by: INTERNAL MEDICINE

## 2018-12-20 PROCEDURE — A9270 NON-COVERED ITEM OR SERVICE: HCPCS | Performed by: INTERNAL MEDICINE

## 2018-12-20 PROCEDURE — 700105 HCHG RX REV CODE 258: Performed by: INTERNAL MEDICINE

## 2018-12-20 PROCEDURE — 700102 HCHG RX REV CODE 250 W/ 637 OVERRIDE(OP): Performed by: INTERNAL MEDICINE

## 2018-12-20 PROCEDURE — 96413 CHEMO IV INFUSION 1 HR: CPT

## 2018-12-20 PROCEDURE — 96375 TX/PRO/DX INJ NEW DRUG ADDON: CPT

## 2018-12-20 PROCEDURE — 85025 COMPLETE CBC W/AUTO DIFF WBC: CPT

## 2018-12-20 PROCEDURE — 80053 COMPREHEN METABOLIC PANEL: CPT

## 2018-12-20 RX ORDER — METHYLPREDNISOLONE SODIUM SUCCINATE 125 MG/2ML
100 INJECTION, POWDER, LYOPHILIZED, FOR SOLUTION INTRAMUSCULAR; INTRAVENOUS ONCE
Status: COMPLETED | OUTPATIENT
Start: 2018-12-20 | End: 2018-12-20

## 2018-12-20 RX ORDER — ACETAMINOPHEN 325 MG/1
650 TABLET ORAL ONCE
Status: COMPLETED | OUTPATIENT
Start: 2018-12-20 | End: 2018-12-20

## 2018-12-20 RX ADMIN — ACETAMINOPHEN 650 MG: 325 TABLET ORAL at 12:04

## 2018-12-20 RX ADMIN — RITUXIMAB 1000 MG: 10 INJECTION, SOLUTION INTRAVENOUS at 12:28

## 2018-12-20 RX ADMIN — DIPHENHYDRAMINE HYDROCHLORIDE 25 MG: 50 INJECTION INTRAMUSCULAR; INTRAVENOUS at 12:04

## 2018-12-20 RX ADMIN — METHYLPREDNISOLONE SODIUM SUCCINATE 100 MG: 125 INJECTION, POWDER, FOR SOLUTION INTRAMUSCULAR; INTRAVENOUS at 12:24

## 2018-12-20 ASSESSMENT — PAIN SCALES - GENERAL: PAINLEVEL_OUTOF10: 6

## 2018-12-20 NOTE — PROGRESS NOTES
Chemotherapy Verification - SECONDARY RN       Height = 157.5cm  Weight = 47.6kg  BSA = 1.44m2       Medication: Rituximab  Dose: 1000mg (set dose)  Calculated Dose: 1000mg (set dose)                             (In mg/m2, AUC, mg/kg)       I confirm that this process was performed independently.

## 2018-12-20 NOTE — PROGRESS NOTES
Patient ambulatory to [facility] today for first time dose of Rituxan. Has no complaints. Plan of care reviewed and all questions answered. PIV started in LFA with positive blood return. Labs sent, no need to wait for results per pharmacy. Rituxan infused starting at 50ml/hr, and bumped up by 50ml/hr each 30 mins to max rate of 400. Tolerated well with no reactions. Discharged home at this time with , next appointment confirmed.

## 2018-12-20 NOTE — PROGRESS NOTES
Chemotherapy Verification - PRIMARY RN      Height = 157.5cm  Weight = 47.6  BSA = 1.44m2       Medication: Rituximab  Dose: 1000mg set dose  Calculated Dose: 1000mg                              (In mg/m2, AUC, mg/kg)         I confirm this process was performed independently with the BSA and all final chemotherapy dosing calculations congruent.  Any discrepancies of 5% or greater have been addressed with the chemotherapy pharmacist. The resolution of the discrepancy has been documented in the EPIC progress notes.

## 2019-01-02 DIAGNOSIS — M79.2 RADICULAR PAIN OF RIGHT UPPER EXTREMITY: ICD-10-CM

## 2019-01-02 DIAGNOSIS — M48.54XS: ICD-10-CM

## 2019-01-02 DIAGNOSIS — M15.9 PRIMARY OSTEOARTHRITIS INVOLVING MULTIPLE JOINTS: Chronic | ICD-10-CM

## 2019-01-02 DIAGNOSIS — M35.00 SJOGREN'S SYNDROME, WITH UNSPECIFIED ORGAN INVOLVEMENT (HCC): Chronic | ICD-10-CM

## 2019-01-02 DIAGNOSIS — G89.4 CHRONIC PAIN SYNDROME: Chronic | ICD-10-CM

## 2019-01-02 DIAGNOSIS — M32.9 LUPUS ARTHRITIS (HCC): Chronic | ICD-10-CM

## 2019-01-02 DIAGNOSIS — I73.00 RAYNAUD'S DISEASE WITHOUT GANGRENE: Chronic | ICD-10-CM

## 2019-01-02 DIAGNOSIS — M50.30 DDD (DEGENERATIVE DISC DISEASE), CERVICAL: Chronic | ICD-10-CM

## 2019-01-03 ENCOUNTER — OUTPATIENT INFUSION SERVICES (OUTPATIENT)
Dept: ONCOLOGY | Facility: MEDICAL CENTER | Age: 64
End: 2019-01-03
Attending: INTERNAL MEDICINE
Payer: MEDICARE

## 2019-01-03 VITALS
HEIGHT: 62 IN | WEIGHT: 104.72 LBS | RESPIRATION RATE: 19 BRPM | DIASTOLIC BLOOD PRESSURE: 48 MMHG | BODY MASS INDEX: 19.27 KG/M2 | TEMPERATURE: 98.5 F | SYSTOLIC BLOOD PRESSURE: 95 MMHG | HEART RATE: 69 BPM

## 2019-01-03 PROCEDURE — 96375 TX/PRO/DX INJ NEW DRUG ADDON: CPT

## 2019-01-03 PROCEDURE — 96415 CHEMO IV INFUSION ADDL HR: CPT

## 2019-01-03 PROCEDURE — 700105 HCHG RX REV CODE 258: Performed by: INTERNAL MEDICINE

## 2019-01-03 PROCEDURE — 700102 HCHG RX REV CODE 250 W/ 637 OVERRIDE(OP): Performed by: INTERNAL MEDICINE

## 2019-01-03 PROCEDURE — 96413 CHEMO IV INFUSION 1 HR: CPT

## 2019-01-03 PROCEDURE — A9270 NON-COVERED ITEM OR SERVICE: HCPCS | Performed by: INTERNAL MEDICINE

## 2019-01-03 PROCEDURE — 700111 HCHG RX REV CODE 636 W/ 250 OVERRIDE (IP): Performed by: INTERNAL MEDICINE

## 2019-01-03 RX ORDER — ACETAMINOPHEN 325 MG/1
650 TABLET ORAL ONCE
Status: COMPLETED | OUTPATIENT
Start: 2019-01-03 | End: 2019-01-03

## 2019-01-03 RX ORDER — METHYLPREDNISOLONE SODIUM SUCCINATE 125 MG/2ML
100 INJECTION, POWDER, LYOPHILIZED, FOR SOLUTION INTRAMUSCULAR; INTRAVENOUS ONCE
Status: COMPLETED | OUTPATIENT
Start: 2019-01-03 | End: 2019-01-03

## 2019-01-03 RX ADMIN — METHYLPREDNISOLONE SODIUM SUCCINATE 100 MG: 125 INJECTION, POWDER, FOR SOLUTION INTRAMUSCULAR; INTRAVENOUS at 11:21

## 2019-01-03 RX ADMIN — RITUXIMAB 1000 MG: 10 INJECTION, SOLUTION INTRAVENOUS at 12:10

## 2019-01-03 RX ADMIN — ACETAMINOPHEN 650 MG: 325 TABLET ORAL at 11:21

## 2019-01-03 RX ADMIN — DIPHENHYDRAMINE HYDROCHLORIDE 25 MG: 50 INJECTION INTRAMUSCULAR; INTRAVENOUS at 11:25

## 2019-01-03 ASSESSMENT — PAIN SCALES - GENERAL: PAINLEVEL_OUTOF10: 8

## 2019-01-03 NOTE — PROGRESS NOTES
Chemotherapy Verification - PRIMARY RN      Height = 157.5CM  Weight = 47.5KG  BSA = 1.44M2       Medication: Rituxan  Dose: 1000mg  Calculated Dose: 1000mg fixed dose                              (In mg/m2, AUC, mg/kg)       I confirm this process was performed independently with the BSA and all final chemotherapy dosing calculations congruent.  Any discrepancies of 5% or greater have been addressed with the chemotherapy pharmacist. The resolution of the discrepancy has been documented in the EPIC progress notes.

## 2019-01-03 NOTE — PROGRESS NOTES
Chemotherapy Verification - SECONDARY RN       Height = 62.01 in  Weight = 104 lb  BSA = 1.44 m2       Medication: Rituxan Dose: 1000 mg  Calculated Dose: 1000 mg Fixed Dose for RA                            (In mg/m2, AUC, mg/kg)     I confirm that this process was performed independently.

## 2019-01-11 ENCOUNTER — HOSPITAL ENCOUNTER (OUTPATIENT)
Dept: RADIOLOGY | Facility: MEDICAL CENTER | Age: 64
End: 2019-01-11
Attending: INTERNAL MEDICINE
Payer: MEDICARE

## 2019-01-11 ENCOUNTER — HOSPITAL ENCOUNTER (OUTPATIENT)
Dept: LAB | Facility: MEDICAL CENTER | Age: 64
End: 2019-01-11
Attending: INTERNAL MEDICINE
Payer: MEDICARE

## 2019-01-11 DIAGNOSIS — R06.02 SHORTNESS OF BREATH: ICD-10-CM

## 2019-01-11 LAB
25(OH)D3 SERPL-MCNC: 80 NG/ML (ref 30–100)
THYROPEROXIDASE AB SERPL-ACNC: 1.4 IU/ML (ref 0–9)

## 2019-01-11 PROCEDURE — 82595 ASSAY OF CRYOGLOBULIN: CPT

## 2019-01-11 PROCEDURE — 86803 HEPATITIS C AB TEST: CPT

## 2019-01-11 PROCEDURE — 82652 VIT D 1 25-DIHYDROXY: CPT

## 2019-01-11 PROCEDURE — 82306 VITAMIN D 25 HYDROXY: CPT

## 2019-01-11 PROCEDURE — 71046 X-RAY EXAM CHEST 2 VIEWS: CPT

## 2019-01-11 PROCEDURE — 36415 COLL VENOUS BLD VENIPUNCTURE: CPT

## 2019-01-11 PROCEDURE — 86235 NUCLEAR ANTIGEN ANTIBODY: CPT

## 2019-01-11 PROCEDURE — 82787 IGG 1 2 3 OR 4 EACH: CPT | Mod: 91

## 2019-01-11 PROCEDURE — 86376 MICROSOMAL ANTIBODY EACH: CPT

## 2019-01-12 LAB — HCV AB SER QL: NEGATIVE

## 2019-01-13 LAB
ENA SCL70 IGG SER QL: 1 AU/ML (ref 0–40)
IGG1 SER-MCNC: 367 MG/DL (ref 240–1118)
IGG2 SER-MCNC: 172 MG/DL (ref 124–549)
IGG3 SER-MCNC: 34 MG/DL (ref 21–134)
IGG4 SER-MCNC: 7 MG/DL (ref 1–123)

## 2019-01-14 LAB — 1,25(OH)2D3 SERPL-MCNC: 76 PG/ML (ref 19.9–79.3)

## 2019-01-16 LAB — CRYOGLOB SER QL 3D COLD INC: NORMAL

## 2019-02-01 ENCOUNTER — HOSPITAL ENCOUNTER (OUTPATIENT)
Dept: LAB | Facility: MEDICAL CENTER | Age: 64
End: 2019-02-01
Attending: INTERNAL MEDICINE
Payer: MEDICARE

## 2019-02-01 LAB
ALBUMIN SERPL BCP-MCNC: 4.3 G/DL (ref 3.2–4.9)
ALBUMIN/GLOB SERPL: 2 G/DL
ALP SERPL-CCNC: 43 U/L (ref 30–99)
ALT SERPL-CCNC: 21 U/L (ref 2–50)
ANION GAP SERPL CALC-SCNC: 6 MMOL/L (ref 0–11.9)
AST SERPL-CCNC: 26 U/L (ref 12–45)
BASOPHILS # BLD AUTO: 0.9 % (ref 0–1.8)
BASOPHILS # BLD: 0.06 K/UL (ref 0–0.12)
BILIRUB SERPL-MCNC: 0.5 MG/DL (ref 0.1–1.5)
BUN SERPL-MCNC: 26 MG/DL (ref 8–22)
CALCIUM SERPL-MCNC: 9.6 MG/DL (ref 8.5–10.5)
CHLORIDE SERPL-SCNC: 107 MMOL/L (ref 96–112)
CO2 SERPL-SCNC: 27 MMOL/L (ref 20–33)
CREAT SERPL-MCNC: 0.85 MG/DL (ref 0.5–1.4)
CRP SERPL HS-MCNC: 0.13 MG/DL (ref 0–0.75)
EOSINOPHIL # BLD AUTO: 0.23 K/UL (ref 0–0.51)
EOSINOPHIL NFR BLD: 3.5 % (ref 0–6.9)
ERYTHROCYTE [DISTWIDTH] IN BLOOD BY AUTOMATED COUNT: 50 FL (ref 35.9–50)
ERYTHROCYTE [SEDIMENTATION RATE] IN BLOOD BY WESTERGREN METHOD: 7 MM/HOUR (ref 0–30)
GLOBULIN SER CALC-MCNC: 2.1 G/DL (ref 1.9–3.5)
GLUCOSE SERPL-MCNC: 111 MG/DL (ref 65–99)
HCT VFR BLD AUTO: 39.7 % (ref 37–47)
HGB BLD-MCNC: 13.5 G/DL (ref 12–16)
IMM GRANULOCYTES # BLD AUTO: 0.01 K/UL (ref 0–0.11)
IMM GRANULOCYTES NFR BLD AUTO: 0.2 % (ref 0–0.9)
LYMPHOCYTES # BLD AUTO: 2 K/UL (ref 1–4.8)
LYMPHOCYTES NFR BLD: 30.2 % (ref 22–41)
MCH RBC QN AUTO: 35.5 PG (ref 27–33)
MCHC RBC AUTO-ENTMCNC: 34 G/DL (ref 33.6–35)
MCV RBC AUTO: 104.5 FL (ref 81.4–97.8)
MONOCYTES # BLD AUTO: 0.55 K/UL (ref 0–0.85)
MONOCYTES NFR BLD AUTO: 8.3 % (ref 0–13.4)
NEUTROPHILS # BLD AUTO: 3.78 K/UL (ref 2–7.15)
NEUTROPHILS NFR BLD: 56.9 % (ref 44–72)
NRBC # BLD AUTO: 0 K/UL
NRBC BLD-RTO: 0 /100 WBC
PLATELET # BLD AUTO: 307 K/UL (ref 164–446)
PMV BLD AUTO: 10.9 FL (ref 9–12.9)
POTASSIUM SERPL-SCNC: 4 MMOL/L (ref 3.6–5.5)
PROT SERPL-MCNC: 6.4 G/DL (ref 6–8.2)
RBC # BLD AUTO: 3.8 M/UL (ref 4.2–5.4)
SODIUM SERPL-SCNC: 140 MMOL/L (ref 135–145)
WBC # BLD AUTO: 6.6 K/UL (ref 4.8–10.8)

## 2019-02-01 PROCEDURE — 86140 C-REACTIVE PROTEIN: CPT

## 2019-02-01 PROCEDURE — 85652 RBC SED RATE AUTOMATED: CPT

## 2019-02-01 PROCEDURE — 80053 COMPREHEN METABOLIC PANEL: CPT

## 2019-02-01 PROCEDURE — 85025 COMPLETE CBC W/AUTO DIFF WBC: CPT

## 2019-02-01 PROCEDURE — 36415 COLL VENOUS BLD VENIPUNCTURE: CPT

## 2019-03-29 ENCOUNTER — HOSPITAL ENCOUNTER (OUTPATIENT)
Dept: LAB | Facility: MEDICAL CENTER | Age: 64
End: 2019-03-29
Attending: INTERNAL MEDICINE
Payer: MEDICARE

## 2019-03-29 ENCOUNTER — HOSPITAL ENCOUNTER (OUTPATIENT)
Facility: MEDICAL CENTER | Age: 64
End: 2019-03-29
Attending: INTERNAL MEDICINE
Payer: MEDICARE

## 2019-03-29 LAB
ALBUMIN SERPL BCP-MCNC: 4.4 G/DL (ref 3.2–4.9)
ALBUMIN/GLOB SERPL: 2.3 G/DL
ALP SERPL-CCNC: 43 U/L (ref 30–99)
ALT SERPL-CCNC: 23 U/L (ref 2–50)
ANION GAP SERPL CALC-SCNC: 8 MMOL/L (ref 0–11.9)
AST SERPL-CCNC: 30 U/L (ref 12–45)
BASOPHILS # BLD AUTO: 0.9 % (ref 0–1.8)
BASOPHILS # BLD: 0.06 K/UL (ref 0–0.12)
BILIRUB SERPL-MCNC: 0.5 MG/DL (ref 0.1–1.5)
BUN SERPL-MCNC: 21 MG/DL (ref 8–22)
CALCIUM SERPL-MCNC: 9.7 MG/DL (ref 8.5–10.5)
CHLORIDE SERPL-SCNC: 106 MMOL/L (ref 96–112)
CO2 SERPL-SCNC: 28 MMOL/L (ref 20–33)
CREAT SERPL-MCNC: 0.93 MG/DL (ref 0.5–1.4)
CRP SERPL HS-MCNC: 0.14 MG/DL (ref 0–0.75)
EOSINOPHIL # BLD AUTO: 0.16 K/UL (ref 0–0.51)
EOSINOPHIL NFR BLD: 2.3 % (ref 0–6.9)
ERYTHROCYTE [DISTWIDTH] IN BLOOD BY AUTOMATED COUNT: 50.5 FL (ref 35.9–50)
ERYTHROCYTE [SEDIMENTATION RATE] IN BLOOD BY WESTERGREN METHOD: 3 MM/HOUR (ref 0–30)
FOLATE SERPL-MCNC: >23.6 NG/ML
GLOBULIN SER CALC-MCNC: 1.9 G/DL (ref 1.9–3.5)
GLUCOSE SERPL-MCNC: 104 MG/DL (ref 65–99)
HCT VFR BLD AUTO: 39.6 % (ref 37–47)
HGB BLD-MCNC: 13.5 G/DL (ref 12–16)
IMM GRANULOCYTES # BLD AUTO: 0.02 K/UL (ref 0–0.11)
IMM GRANULOCYTES NFR BLD AUTO: 0.3 % (ref 0–0.9)
LYMPHOCYTES # BLD AUTO: 1.64 K/UL (ref 1–4.8)
LYMPHOCYTES NFR BLD: 23.7 % (ref 22–41)
MCH RBC QN AUTO: 35.6 PG (ref 27–33)
MCHC RBC AUTO-ENTMCNC: 34.1 G/DL (ref 33.6–35)
MCV RBC AUTO: 104.5 FL (ref 81.4–97.8)
MONOCYTES # BLD AUTO: 0.56 K/UL (ref 0–0.85)
MONOCYTES NFR BLD AUTO: 8.1 % (ref 0–13.4)
NEUTROPHILS # BLD AUTO: 4.49 K/UL (ref 2–7.15)
NEUTROPHILS NFR BLD: 64.7 % (ref 44–72)
NRBC # BLD AUTO: 0 K/UL
NRBC BLD-RTO: 0 /100 WBC
PLATELET # BLD AUTO: 328 K/UL (ref 164–446)
PMV BLD AUTO: 10.3 FL (ref 9–12.9)
POTASSIUM SERPL-SCNC: 3.9 MMOL/L (ref 3.6–5.5)
PROT SERPL-MCNC: 6.3 G/DL (ref 6–8.2)
RBC # BLD AUTO: 3.79 M/UL (ref 4.2–5.4)
SODIUM SERPL-SCNC: 142 MMOL/L (ref 135–145)
VIT B12 SERPL-MCNC: 1466 PG/ML (ref 211–911)
WBC # BLD AUTO: 6.9 K/UL (ref 4.8–10.8)

## 2019-03-29 PROCEDURE — 82746 ASSAY OF FOLIC ACID SERUM: CPT

## 2019-03-29 PROCEDURE — 86140 C-REACTIVE PROTEIN: CPT

## 2019-03-29 PROCEDURE — 86140 C-REACTIVE PROTEIN: CPT | Mod: 91

## 2019-03-29 PROCEDURE — 85025 COMPLETE CBC W/AUTO DIFF WBC: CPT

## 2019-03-29 PROCEDURE — 36415 COLL VENOUS BLD VENIPUNCTURE: CPT

## 2019-03-29 PROCEDURE — 82607 VITAMIN B-12: CPT | Mod: 91

## 2019-03-29 PROCEDURE — 82607 VITAMIN B-12: CPT

## 2019-03-29 PROCEDURE — 85652 RBC SED RATE AUTOMATED: CPT

## 2019-03-29 PROCEDURE — 85652 RBC SED RATE AUTOMATED: CPT | Mod: 91

## 2019-03-29 PROCEDURE — 80053 COMPREHEN METABOLIC PANEL: CPT

## 2019-03-29 PROCEDURE — 80053 COMPREHEN METABOLIC PANEL: CPT | Mod: 91

## 2019-03-29 PROCEDURE — 85025 COMPLETE CBC W/AUTO DIFF WBC: CPT | Mod: 91

## 2019-03-29 PROCEDURE — 82746 ASSAY OF FOLIC ACID SERUM: CPT | Mod: 91

## 2019-03-29 PROCEDURE — 83921 ORGANIC ACID SINGLE QUANT: CPT | Mod: 91

## 2019-03-29 PROCEDURE — 83921 ORGANIC ACID SINGLE QUANT: CPT

## 2019-04-03 LAB
ALBUMIN SERPL BCP-MCNC: 4.4 G/DL (ref 3.2–4.9)
ALBUMIN/GLOB SERPL: 2.3 G/DL
ALP SERPL-CCNC: 43 U/L (ref 30–99)
ALT SERPL-CCNC: 23 U/L (ref 2–50)
ANION GAP SERPL CALC-SCNC: 8 MMOL/L (ref 0–11.9)
AST SERPL-CCNC: 30 U/L (ref 12–45)
BASOPHILS # BLD AUTO: 0.9 % (ref 0–1.8)
BASOPHILS # BLD: 0.06 K/UL (ref 0–0.12)
BILIRUB SERPL-MCNC: 0.5 MG/DL (ref 0.1–1.5)
BUN SERPL-MCNC: 21 MG/DL (ref 8–22)
CALCIUM SERPL-MCNC: 9.7 MG/DL (ref 8.5–10.5)
CHLORIDE SERPL-SCNC: 106 MMOL/L (ref 96–112)
CO2 SERPL-SCNC: 28 MMOL/L (ref 20–33)
CREAT SERPL-MCNC: 0.93 MG/DL (ref 0.5–1.4)
CRP SERPL HS-MCNC: 0.14 MG/DL (ref 0–0.75)
EOSINOPHIL # BLD AUTO: 0.16 K/UL (ref 0–0.51)
EOSINOPHIL NFR BLD: 2.3 % (ref 0–6.9)
ERYTHROCYTE [DISTWIDTH] IN BLOOD BY AUTOMATED COUNT: 50.5 FL (ref 35.9–50)
ERYTHROCYTE [SEDIMENTATION RATE] IN BLOOD BY WESTERGREN METHOD: 3 MM/HOUR (ref 0–30)
FOLATE SERPL-MCNC: >23.6 NG/ML
GLOBULIN SER CALC-MCNC: 1.9 G/DL (ref 1.9–3.5)
GLUCOSE SERPL-MCNC: 104 MG/DL (ref 65–99)
HCT VFR BLD AUTO: 39.6 % (ref 37–47)
HGB BLD-MCNC: 13.5 G/DL (ref 12–16)
IMM GRANULOCYTES # BLD AUTO: 0.02 K/UL (ref 0–0.11)
IMM GRANULOCYTES NFR BLD AUTO: 0.3 % (ref 0–0.9)
LYMPHOCYTES # BLD AUTO: 1.64 K/UL (ref 1–4.8)
LYMPHOCYTES NFR BLD: 23.7 % (ref 22–41)
MCH RBC QN AUTO: 35.6 PG (ref 27–33)
MCHC RBC AUTO-ENTMCNC: 34.1 G/DL (ref 33.6–35)
MCV RBC AUTO: 104.5 FL (ref 81.4–97.8)
METHYLMALONATE SERPL-SCNC: 0.33 UMOL/L (ref 0–0.4)
MONOCYTES # BLD AUTO: 0.56 K/UL (ref 0–0.85)
MONOCYTES NFR BLD AUTO: 8.1 % (ref 0–13.4)
NEUTROPHILS # BLD AUTO: 4.49 K/UL (ref 2–7.15)
NEUTROPHILS NFR BLD: 64.7 % (ref 44–72)
NRBC # BLD AUTO: 0 K/UL
NRBC BLD-RTO: 0 /100 WBC
PLATELET # BLD AUTO: 328 K/UL (ref 164–446)
PMV BLD AUTO: 10.3 FL (ref 9–12.9)
POTASSIUM SERPL-SCNC: 3.9 MMOL/L (ref 3.6–5.5)
PROT SERPL-MCNC: 6.3 G/DL (ref 6–8.2)
RBC # BLD AUTO: 3.79 M/UL (ref 4.2–5.4)
SODIUM SERPL-SCNC: 142 MMOL/L (ref 135–145)
VIT B12 SERPL-MCNC: 1466 PG/ML (ref 211–911)
WBC # BLD AUTO: 6.9 K/UL (ref 4.8–10.8)

## 2019-04-05 ENCOUNTER — OFFICE VISIT (OUTPATIENT)
Dept: INTERNAL MEDICINE | Facility: IMAGING CENTER | Age: 64
End: 2019-04-05
Payer: MEDICARE

## 2019-04-05 VITALS
WEIGHT: 108 LBS | OXYGEN SATURATION: 99 % | HEIGHT: 62 IN | BODY MASS INDEX: 19.88 KG/M2 | DIASTOLIC BLOOD PRESSURE: 60 MMHG | HEART RATE: 64 BPM | TEMPERATURE: 99.7 F | RESPIRATION RATE: 16 BRPM | SYSTOLIC BLOOD PRESSURE: 119 MMHG

## 2019-04-05 DIAGNOSIS — R53.83 OTHER FATIGUE: ICD-10-CM

## 2019-04-05 DIAGNOSIS — G89.4 CHRONIC PAIN SYNDROME: Chronic | ICD-10-CM

## 2019-04-05 DIAGNOSIS — Z00.00 HEALTH CARE MAINTENANCE: ICD-10-CM

## 2019-04-05 DIAGNOSIS — M79.2 RADICULAR PAIN OF RIGHT UPPER EXTREMITY: ICD-10-CM

## 2019-04-05 DIAGNOSIS — M35.00 SJOGREN'S SYNDROME, WITH UNSPECIFIED ORGAN INVOLVEMENT (HCC): ICD-10-CM

## 2019-04-05 DIAGNOSIS — F32.0 CURRENT MILD EPISODE OF MAJOR DEPRESSIVE DISORDER WITHOUT PRIOR EPISODE (HCC): ICD-10-CM

## 2019-04-05 DIAGNOSIS — M15.9 PRIMARY OSTEOARTHRITIS INVOLVING MULTIPLE JOINTS: Chronic | ICD-10-CM

## 2019-04-05 PROCEDURE — 99215 OFFICE O/P EST HI 40 MIN: CPT | Performed by: FAMILY MEDICINE

## 2019-04-08 NOTE — PROGRESS NOTES
Chief Complaint   Patient presents with   • Hand Pain     Dr. Bearden has new technique for thumb joint replacement.       HPI:  Patient is a 64 y.o. female established patient who presents today for a counseling appointment to discuss her ongoing health conditions. Since I last saw Lupe, she has obtained another orthopedic opinion regarding her chronic bilateral hand and thumb joint pain from Dr. Mascorro. He spoke with her about a new joint replacement technique for her right thumb, and patient has multiple questions regarding this procedure. Unfortunately, neither she nor her  know the name of the procedure or company that produces the medical device to be used. She has emailed Dr. Mascorro and is awaiting a response - we spoke about asking him about number of cases he has performed, patient success rate, potential recovery time and healing obstacle for Lupe, and name of device used. She is going to see her Sjogren's specialists in [place] in two weeks for ongoing salivary care, and has been seen by a specialist at [place]. She was referred to another specialist at [facility], and I encouraged her to follow up on that referral accordingly. She has R SI injection scheduled for April 29 and full spine MRI at [place] April 30 for ongoing evaluation of chronic arthritis of multiple etiologies. She continues to see Dr. Cruz for her Rheum care and continues to suffer greatly from pain and fatigue over the past year. She endorses 100% medication compliance but remains unsure if any of her medications are truly working for her.     Patient Active Problem List    Diagnosis Date Noted   • History of immunosuppression 05/21/2018   • Major depressive disorder 03/13/2018   • Radicular pain of right upper extremity 03/13/2018   • Non-traumatic compression fracture of ninth thoracic vertebra (HCC) 10/23/2017   • Non-traumatic compression fracture of tenth thoracic vertebra (HCC) 10/23/2017   • DDD (degenerative disc  "disease), cervical 04/26/2017   • Osteoarthritis of multiple joints 04/26/2017   • Lupus arthritis (HCC) 04/26/2017   • Chronic pain syndrome 04/26/2017   • Gastroesophageal reflux disease without esophagitis 04/26/2017   • Lactose intolerance 04/26/2017   • Raynaud's disease without gangrene 04/26/2017   • Osteopenia 04/26/2017   • Functional diarrhea 04/26/2017   • Sjogren's syndrome (HCC) 04/26/1990       Past medical, surgical, family, and social history was reviewed and updated in Epic chart by me today.     Medications and allergies reviewed and updated in Epic chart by me today.     ROS:  Pertinent positives listed above in HPI. All other systems have been reviewed and are negative.    PE:   /60 (BP Location: Left arm, Patient Position: Sitting, BP Cuff Size: Adult)   Pulse 64   Temp 37.6 °C (99.7 °F) (Temporal)   Resp 16   Ht 1.575 m (5' 2\")   Wt 49 kg (108 lb)   SpO2 99%   BMI 19.75 kg/m²   Vital signs reviewed with patient.     Gen: Well developed; thin; no acute distress; age appropriate appearance   Skin: Warm and dry; no rashes noted   Neuro: No focal deficits noted   Psych: AAOx4; mood and affect are at baseline    A/P:  1. Radicular pain of right upper extremity  Patient has seen Dr. Mascorro and new type of joint replacement for right thumb was discussed. I encouraged patient to ask further questions, as detailed above in HPI, to help move forward with her treatment plans.     2. Primary osteoarthritis involving multiple joints  Ongoing issue for patient managed by Dr. Cruz.     3. Chronic pain syndrome  Ongoing issue for patient managed by Dr. Jones     4. Sjogren's syndrome, with unspecified organ involvement (HCC)  Ongoing issue for patient. She has seen specialist at [facility], has pending referral to specialist at [facility], and is traveling to [place] in two weeks to see her Sjogren's specialist for gland manipulation.     5. Current mild episode of major depressive disorder without " prior episode (HCC)  Stable at this time. Pt denies overt depression but endorses sadness when considering all aspects of her life have been negatively impacted by her life. She has a very supportive / family, and no safety issues are present.     6. Health care maintenance  Pt is due for annual fasting lipid panel at next lab draw.   - Lipid Profile; Future    7. Other fatigue  Pt is due for annual TSH at next lab draw.   - TSH WITH REFLEX TO FT4; Future     Pt is to return in May for fasting labs draw and Medicare annual visit with me.   Face to face time: 60 minutes spent with greater than 90% of time spent with direct patient care and counseling about diagnosis, prognosis, risk versus benefits of treatment, and importance of compliance with instructions. All questions answered and support provided during visit today.

## 2019-05-02 ENCOUNTER — APPOINTMENT (RX ONLY)
Dept: URBAN - METROPOLITAN AREA CLINIC 35 | Facility: CLINIC | Age: 64
Setting detail: DERMATOLOGY
End: 2019-05-02

## 2019-05-02 DIAGNOSIS — L82.1 OTHER SEBORRHEIC KERATOSIS: ICD-10-CM

## 2019-05-02 DIAGNOSIS — L20.89 OTHER ATOPIC DERMATITIS: ICD-10-CM

## 2019-05-02 DIAGNOSIS — D22 MELANOCYTIC NEVI: ICD-10-CM

## 2019-05-02 DIAGNOSIS — L81.4 OTHER MELANIN HYPERPIGMENTATION: ICD-10-CM

## 2019-05-02 DIAGNOSIS — Z71.89 OTHER SPECIFIED COUNSELING: ICD-10-CM

## 2019-05-02 DIAGNOSIS — Z87.2 PERSONAL HISTORY OF DISEASES OF THE SKIN AND SUBCUTANEOUS TISSUE: ICD-10-CM

## 2019-05-02 DIAGNOSIS — Z85.828 PERSONAL HISTORY OF OTHER MALIGNANT NEOPLASM OF SKIN: ICD-10-CM

## 2019-05-02 DIAGNOSIS — L11.1 TRANSIENT ACANTHOLYTIC DERMATOSIS [GROVER]: ICD-10-CM

## 2019-05-02 PROBLEM — L20.84 INTRINSIC (ALLERGIC) ECZEMA: Status: ACTIVE | Noted: 2019-05-02

## 2019-05-02 PROBLEM — D22.5 MELANOCYTIC NEVI OF TRUNK: Status: ACTIVE | Noted: 2019-05-02

## 2019-05-02 PROCEDURE — 99213 OFFICE O/P EST LOW 20 MIN: CPT

## 2019-05-02 PROCEDURE — ? COUNSELING

## 2019-05-02 ASSESSMENT — LOCATION DETAILED DESCRIPTION DERM
LOCATION DETAILED: LEFT INFERIOR UPPER BACK
LOCATION DETAILED: LEFT SUPERIOR MEDIAL UPPER BACK
LOCATION DETAILED: INFERIOR THORACIC SPINE
LOCATION DETAILED: LEFT SUPERIOR LATERAL FOREHEAD
LOCATION DETAILED: LEFT THENAR EMINENCE
LOCATION DETAILED: RIGHT DISTAL CALF
LOCATION DETAILED: SUPERIOR LUMBAR SPINE
LOCATION DETAILED: RIGHT LATERAL PROXIMAL PRETIBIAL REGION
LOCATION DETAILED: LEFT SUPERIOR FOREHEAD
LOCATION DETAILED: LEFT MID-UPPER BACK
LOCATION DETAILED: RIGHT LATERAL TEMPLE
LOCATION DETAILED: RIGHT RIB CAGE
LOCATION DETAILED: RIGHT DISTAL PRETIBIAL REGION
LOCATION DETAILED: UPPER STERNUM
LOCATION DETAILED: RIGHT MEDIAL UPPER BACK

## 2019-05-02 ASSESSMENT — LOCATION SIMPLE DESCRIPTION DERM
LOCATION SIMPLE: LEFT UPPER BACK
LOCATION SIMPLE: RIGHT TEMPLE
LOCATION SIMPLE: LEFT HAND
LOCATION SIMPLE: ABDOMEN
LOCATION SIMPLE: LOWER BACK
LOCATION SIMPLE: CHEST
LOCATION SIMPLE: UPPER BACK
LOCATION SIMPLE: LEFT FOREHEAD
LOCATION SIMPLE: RIGHT UPPER BACK
LOCATION SIMPLE: RIGHT PRETIBIAL REGION
LOCATION SIMPLE: RIGHT CALF

## 2019-05-02 ASSESSMENT — LOCATION ZONE DERM
LOCATION ZONE: FACE
LOCATION ZONE: HAND
LOCATION ZONE: LEG
LOCATION ZONE: TRUNK

## 2019-05-24 ENCOUNTER — HOSPITAL ENCOUNTER (OUTPATIENT)
Dept: RADIOLOGY | Facility: MEDICAL CENTER | Age: 64
End: 2019-05-24
Attending: INTERNAL MEDICINE
Payer: MEDICARE

## 2019-05-24 DIAGNOSIS — M81.8 STEROID-INDUCED OSTEOPOROSIS: ICD-10-CM

## 2019-05-24 DIAGNOSIS — T38.0X5A STEROID-INDUCED OSTEOPOROSIS: ICD-10-CM

## 2019-05-24 DIAGNOSIS — Z78.0 POSTMENOPAUSAL: ICD-10-CM

## 2019-05-24 DIAGNOSIS — M81.0 OSTEOPOROSIS, UNSPECIFIED OSTEOPOROSIS TYPE, UNSPECIFIED PATHOLOGICAL FRACTURE PRESENCE: ICD-10-CM

## 2019-05-24 DIAGNOSIS — Z79.83 LONG TERM CURRENT USE OF BISPHOSPHONATES: ICD-10-CM

## 2019-05-24 PROCEDURE — 77080 DXA BONE DENSITY AXIAL: CPT

## 2019-06-17 ENCOUNTER — HOSPITAL ENCOUNTER (OUTPATIENT)
Dept: LAB | Facility: MEDICAL CENTER | Age: 64
End: 2019-06-17
Attending: FAMILY MEDICINE
Payer: MEDICARE

## 2019-06-17 ENCOUNTER — HOSPITAL ENCOUNTER (OUTPATIENT)
Dept: LAB | Facility: MEDICAL CENTER | Age: 64
End: 2019-06-17
Attending: INTERNAL MEDICINE
Payer: MEDICARE

## 2019-06-17 DIAGNOSIS — Z00.00 HEALTH CARE MAINTENANCE: ICD-10-CM

## 2019-06-17 DIAGNOSIS — R53.83 OTHER FATIGUE: ICD-10-CM

## 2019-06-17 LAB
ALBUMIN SERPL BCP-MCNC: 4.5 G/DL (ref 3.2–4.9)
ALBUMIN/GLOB SERPL: 2.3 G/DL
ALP SERPL-CCNC: 41 U/L (ref 30–99)
ALT SERPL-CCNC: 27 U/L (ref 2–50)
ANION GAP SERPL CALC-SCNC: 10 MMOL/L (ref 0–11.9)
AST SERPL-CCNC: 28 U/L (ref 12–45)
BASOPHILS # BLD AUTO: 0.6 % (ref 0–1.8)
BASOPHILS # BLD: 0.03 K/UL (ref 0–0.12)
BILIRUB SERPL-MCNC: 1.1 MG/DL (ref 0.1–1.5)
BUN SERPL-MCNC: 17 MG/DL (ref 8–22)
CALCIUM SERPL-MCNC: 9.6 MG/DL (ref 8.5–10.5)
CHLORIDE SERPL-SCNC: 108 MMOL/L (ref 96–112)
CHOLEST SERPL-MCNC: 204 MG/DL (ref 100–199)
CO2 SERPL-SCNC: 26 MMOL/L (ref 20–33)
CREAT SERPL-MCNC: 1.04 MG/DL (ref 0.5–1.4)
CRP SERPL HS-MCNC: 0.16 MG/DL (ref 0–0.75)
EOSINOPHIL # BLD AUTO: 0.33 K/UL (ref 0–0.51)
EOSINOPHIL NFR BLD: 6.7 % (ref 0–6.9)
ERYTHROCYTE [DISTWIDTH] IN BLOOD BY AUTOMATED COUNT: 50.8 FL (ref 35.9–50)
ERYTHROCYTE [SEDIMENTATION RATE] IN BLOOD BY WESTERGREN METHOD: <1 MM/HOUR (ref 0–30)
FASTING STATUS PATIENT QL REPORTED: NORMAL
FASTING STATUS PATIENT QL REPORTED: NORMAL
GLOBULIN SER CALC-MCNC: 2 G/DL (ref 1.9–3.5)
GLUCOSE SERPL-MCNC: 79 MG/DL (ref 65–99)
HCT VFR BLD AUTO: 41.9 % (ref 37–47)
HDLC SERPL-MCNC: 91 MG/DL
HGB BLD-MCNC: 14.1 G/DL (ref 12–16)
IMM GRANULOCYTES # BLD AUTO: 0.01 K/UL (ref 0–0.11)
IMM GRANULOCYTES NFR BLD AUTO: 0.2 % (ref 0–0.9)
LDLC SERPL CALC-MCNC: 102 MG/DL
LYMPHOCYTES # BLD AUTO: 1.26 K/UL (ref 1–4.8)
LYMPHOCYTES NFR BLD: 25.4 % (ref 22–41)
MCH RBC QN AUTO: 35.4 PG (ref 27–33)
MCHC RBC AUTO-ENTMCNC: 33.7 G/DL (ref 33.6–35)
MCV RBC AUTO: 105.3 FL (ref 81.4–97.8)
MONOCYTES # BLD AUTO: 0.59 K/UL (ref 0–0.85)
MONOCYTES NFR BLD AUTO: 11.9 % (ref 0–13.4)
NEUTROPHILS # BLD AUTO: 2.74 K/UL (ref 2–7.15)
NEUTROPHILS NFR BLD: 55.2 % (ref 44–72)
NRBC # BLD AUTO: 0 K/UL
NRBC BLD-RTO: 0 /100 WBC
PLATELET # BLD AUTO: 299 K/UL (ref 164–446)
PMV BLD AUTO: 11 FL (ref 9–12.9)
POTASSIUM SERPL-SCNC: 4 MMOL/L (ref 3.6–5.5)
PROT SERPL-MCNC: 6.5 G/DL (ref 6–8.2)
RBC # BLD AUTO: 3.98 M/UL (ref 4.2–5.4)
SODIUM SERPL-SCNC: 144 MMOL/L (ref 135–145)
TRIGL SERPL-MCNC: 53 MG/DL (ref 0–149)
TSH SERPL DL<=0.005 MIU/L-ACNC: 1.56 UIU/ML (ref 0.38–5.33)
WBC # BLD AUTO: 5 K/UL (ref 4.8–10.8)

## 2019-06-17 PROCEDURE — 86140 C-REACTIVE PROTEIN: CPT

## 2019-06-17 PROCEDURE — 80053 COMPREHEN METABOLIC PANEL: CPT

## 2019-06-17 PROCEDURE — 80061 LIPID PANEL: CPT

## 2019-06-17 PROCEDURE — 85652 RBC SED RATE AUTOMATED: CPT

## 2019-06-17 PROCEDURE — 84443 ASSAY THYROID STIM HORMONE: CPT

## 2019-06-17 PROCEDURE — 85025 COMPLETE CBC W/AUTO DIFF WBC: CPT

## 2019-06-17 PROCEDURE — 36415 COLL VENOUS BLD VENIPUNCTURE: CPT

## 2019-06-19 ENCOUNTER — OUTPATIENT INFUSION SERVICES (OUTPATIENT)
Dept: ONCOLOGY | Facility: MEDICAL CENTER | Age: 64
End: 2019-06-19
Attending: INTERNAL MEDICINE
Payer: MEDICARE

## 2019-06-19 VITALS
BODY MASS INDEX: 19.23 KG/M2 | TEMPERATURE: 98.6 F | HEIGHT: 62 IN | SYSTOLIC BLOOD PRESSURE: 127 MMHG | HEART RATE: 55 BPM | WEIGHT: 104.5 LBS | DIASTOLIC BLOOD PRESSURE: 58 MMHG | RESPIRATION RATE: 18 BRPM | OXYGEN SATURATION: 100 %

## 2019-06-19 DIAGNOSIS — M06.9 RHEUMATOID ARTHRITIS OF HIP, UNSPECIFIED LATERALITY, UNSPECIFIED RHEUMATOID FACTOR PRESENCE: ICD-10-CM

## 2019-06-19 PROCEDURE — 700111 HCHG RX REV CODE 636 W/ 250 OVERRIDE (IP): Mod: JG | Performed by: INTERNAL MEDICINE

## 2019-06-19 PROCEDURE — 700102 HCHG RX REV CODE 250 W/ 637 OVERRIDE(OP): Performed by: INTERNAL MEDICINE

## 2019-06-19 PROCEDURE — 700105 HCHG RX REV CODE 258: Performed by: INTERNAL MEDICINE

## 2019-06-19 PROCEDURE — 96375 TX/PRO/DX INJ NEW DRUG ADDON: CPT

## 2019-06-19 PROCEDURE — 96415 CHEMO IV INFUSION ADDL HR: CPT

## 2019-06-19 PROCEDURE — A9270 NON-COVERED ITEM OR SERVICE: HCPCS | Performed by: INTERNAL MEDICINE

## 2019-06-19 PROCEDURE — 96413 CHEMO IV INFUSION 1 HR: CPT

## 2019-06-19 RX ORDER — ACETAMINOPHEN 325 MG/1
650 TABLET ORAL ONCE
Status: COMPLETED | OUTPATIENT
Start: 2019-06-19 | End: 2019-06-19

## 2019-06-19 RX ORDER — METHYLPREDNISOLONE SODIUM SUCCINATE 125 MG/2ML
100 INJECTION, POWDER, LYOPHILIZED, FOR SOLUTION INTRAMUSCULAR; INTRAVENOUS ONCE
Status: COMPLETED | OUTPATIENT
Start: 2019-06-19 | End: 2019-06-19

## 2019-06-19 RX ADMIN — DIPHENHYDRAMINE HYDROCHLORIDE 25 MG: 50 INJECTION INTRAMUSCULAR; INTRAVENOUS at 11:42

## 2019-06-19 RX ADMIN — METHYLPREDNISOLONE SODIUM SUCCINATE 100 MG: 125 INJECTION, POWDER, FOR SOLUTION INTRAMUSCULAR; INTRAVENOUS at 11:42

## 2019-06-19 RX ADMIN — RITUXIMAB 1000 MG: 10 INJECTION, SOLUTION INTRAVENOUS at 12:25

## 2019-06-19 RX ADMIN — ACETAMINOPHEN 650 MG: 325 TABLET ORAL at 11:41

## 2019-06-25 ENCOUNTER — OFFICE VISIT (OUTPATIENT)
Dept: INTERNAL MEDICINE | Facility: IMAGING CENTER | Age: 64
End: 2019-06-25
Payer: MEDICARE

## 2019-06-25 ENCOUNTER — HOSPITAL ENCOUNTER (OUTPATIENT)
Facility: MEDICAL CENTER | Age: 64
End: 2019-06-25
Attending: FAMILY MEDICINE
Payer: MEDICARE

## 2019-06-25 VITALS
WEIGHT: 104.8 LBS | OXYGEN SATURATION: 100 % | DIASTOLIC BLOOD PRESSURE: 67 MMHG | TEMPERATURE: 98.7 F | HEART RATE: 60 BPM | HEIGHT: 62 IN | BODY MASS INDEX: 19.29 KG/M2 | SYSTOLIC BLOOD PRESSURE: 119 MMHG | RESPIRATION RATE: 17 BRPM

## 2019-06-25 DIAGNOSIS — R10.9 ABDOMINAL CRAMPING: ICD-10-CM

## 2019-06-25 DIAGNOSIS — K21.9 GASTROESOPHAGEAL REFLUX DISEASE WITHOUT ESOPHAGITIS: Chronic | ICD-10-CM

## 2019-06-25 DIAGNOSIS — I73.00 RAYNAUD'S DISEASE WITHOUT GANGRENE: Chronic | ICD-10-CM

## 2019-06-25 DIAGNOSIS — Z87.81 HISTORY OF COMPRESSION FRACTURE OF SPINE: ICD-10-CM

## 2019-06-25 DIAGNOSIS — M79.2 RADICULAR PAIN OF RIGHT UPPER EXTREMITY: ICD-10-CM

## 2019-06-25 DIAGNOSIS — M32.9 LUPUS ARTHRITIS (HCC): Chronic | ICD-10-CM

## 2019-06-25 DIAGNOSIS — R94.4 DECREASED GFR: ICD-10-CM

## 2019-06-25 DIAGNOSIS — M15.9 PRIMARY OSTEOARTHRITIS INVOLVING MULTIPLE JOINTS: Chronic | ICD-10-CM

## 2019-06-25 DIAGNOSIS — Z00.00 ENCOUNTER FOR MEDICARE ANNUAL WELLNESS EXAM: ICD-10-CM

## 2019-06-25 DIAGNOSIS — M50.30 DDD (DEGENERATIVE DISC DISEASE), CERVICAL: Chronic | ICD-10-CM

## 2019-06-25 DIAGNOSIS — M85.89 OSTEOPENIA OF MULTIPLE SITES: Chronic | ICD-10-CM

## 2019-06-25 DIAGNOSIS — Z92.25 HISTORY OF IMMUNOSUPPRESSION: Chronic | ICD-10-CM

## 2019-06-25 DIAGNOSIS — F32.0 CURRENT MILD EPISODE OF MAJOR DEPRESSIVE DISORDER WITHOUT PRIOR EPISODE (HCC): ICD-10-CM

## 2019-06-25 DIAGNOSIS — M35.00 SJOGREN'S SYNDROME, WITH UNSPECIFIED ORGAN INVOLVEMENT (HCC): Chronic | ICD-10-CM

## 2019-06-25 DIAGNOSIS — G89.4 CHRONIC PAIN SYNDROME: Chronic | ICD-10-CM

## 2019-06-25 DIAGNOSIS — M46.99 INFLAMMATORY SPONDYLOPATHY OF MULTIPLE SITES IN SPINE (HCC): ICD-10-CM

## 2019-06-25 LAB
APPEARANCE UR: CLEAR
BILIRUB UR QL STRIP.AUTO: NEGATIVE
COLOR UR: YELLOW
CREAT UR-MCNC: 71.6 MG/DL
GLUCOSE UR STRIP.AUTO-MCNC: NEGATIVE MG/DL
KETONES UR STRIP.AUTO-MCNC: NEGATIVE MG/DL
LEUKOCYTE ESTERASE UR QL STRIP.AUTO: NEGATIVE
MICRO URNS: NORMAL
MICROALBUMIN UR-MCNC: 0.7 MG/DL
MICROALBUMIN/CREAT UR: 10 MG/G (ref 0–30)
NITRITE UR QL STRIP.AUTO: NEGATIVE
PH UR STRIP.AUTO: 7.5 [PH]
PROT UR QL STRIP: NEGATIVE MG/DL
RBC UR QL AUTO: NEGATIVE
SP GR UR STRIP.AUTO: 1.01
UROBILINOGEN UR STRIP.AUTO-MCNC: 0.2 MG/DL

## 2019-06-25 PROCEDURE — 82570 ASSAY OF URINE CREATININE: CPT

## 2019-06-25 PROCEDURE — 82043 UR ALBUMIN QUANTITATIVE: CPT

## 2019-06-25 PROCEDURE — 81003 URINALYSIS AUTO W/O SCOPE: CPT

## 2019-06-25 PROCEDURE — G0439 PPPS, SUBSEQ VISIT: HCPCS | Performed by: FAMILY MEDICINE

## 2019-06-25 RX ORDER — DICYCLOMINE HYDROCHLORIDE 10 MG/1
10 CAPSULE ORAL 4 TIMES DAILY PRN
Qty: 30 CAP | Refills: 2 | Status: SHIPPED | OUTPATIENT
Start: 2019-06-25 | End: 2019-10-11

## 2019-06-28 PROBLEM — M48.54XA: Status: RESOLVED | Noted: 2017-10-23 | Resolved: 2019-06-28

## 2019-06-28 PROBLEM — F32.9 MAJOR DEPRESSIVE DISORDER: Status: RESOLVED | Noted: 2018-03-13 | Resolved: 2019-06-28

## 2019-06-28 PROBLEM — Z87.81 HISTORY OF COMPRESSION FRACTURE OF SPINE: Status: ACTIVE | Noted: 2019-06-28

## 2019-06-28 ASSESSMENT — PATIENT HEALTH QUESTIONNAIRE - PHQ9
5. POOR APPETITE OR OVEREATING: 0 - NOT AT ALL
SUM OF ALL RESPONSES TO PHQ QUESTIONS 1-9: 2
CLINICAL INTERPRETATION OF PHQ2 SCORE: 1

## 2019-06-28 ASSESSMENT — ACTIVITIES OF DAILY LIVING (ADL): BATHING_REQUIRES_ASSISTANCE: 0

## 2019-06-28 ASSESSMENT — ENCOUNTER SYMPTOMS: GENERAL WELL-BEING: FAIR

## 2019-06-28 NOTE — PROGRESS NOTES
CC:   Medicare Annual Wellness Visit    HPI:    Lupe is a 64 y.o. Female here for her Medicare Annual Wellness Visit. She continues to have a complex medical history and see numerous consultants, both locally and out of state, who manage her care plan. She prior compression fractures of T9/T10 (no history of trauma) in 2017 that have resolved and are asymptomatic. She has known osteopenia (mopst recent dexa scan shows stable bone density) treated in past with Reclast, Prolia, and Forteo (stopped due to ongoing nausea, headache, and dizziness) by Dr. Boss, [place], and in conjunction with Dr. Cruz. She reports that she may be a candidate for Evenity (2 syringes monthly for one year) but needs full physician agreement.  She has ongoing radicular pain of both forearms and hands and has seen multiple hand specialists. She is still considering right thumb Stablyx joint replacement with Dr. Mascorro to help control chronic pain issues. She has chronic Sjogren's syndrome and travels to [place] very 3-4 months to visit her tertiary care physician for ongoing salivary gland treatment. She sees Dr. Cruz locally for her rheumatology care (chronic Raynaud's/ Lupus arthritis/ OA/Sjogrens) and has additionally established care with Dr. Mccann at [place] for associated pain management assistance. She has undergone SI injections, has pending L1-L3 ablation July 9  with some relief and is undergoing neck block injections later this week at [place]. She has chronic immune suppression due to a history of prior steroid dependence and ongoing methotrexate use. She also has chronic depression that is ongoing due to her multitude of chronic illness with no current medication use (pt choice) and no safety concerns present. She continues to see Dr. Sy Arias for counseling sessions, although she is unclear if these sessions are of benefit to her. She also has pending appointment with Dr. Ambrosio in [place] for  dermatology second opinion regarding chronic body rash related to her underlying chronic conditions. She also has long standing abdominal cramping and rapid transit of food that megan her from eating large amounts of food at one sitting. She reports being worked up for this condition in the past, symptoms have increased over the past  and is looking for medication to help decrease her symptomatology. She would like to review labs done 6/17/19 and endorses 100% medication compliance. She continues to remain physically active, social with friends/family, and has a very supportive .     Patient Active Problem List    Diagnosis Date Noted   • History of compression fracture of spine 06/28/2019   • History of immunosuppression 05/21/2018   • Radicular pain of right upper extremity 03/13/2018   • DDD (degenerative disc disease), cervical 04/26/2017   • Osteoarthritis of multiple joints 04/26/2017   • Lupus arthritis (HCC) 04/26/2017   • Chronic pain syndrome 04/26/2017   • Gastroesophageal reflux disease without esophagitis 04/26/2017   • Lactose intolerance 04/26/2017   • Raynaud's disease without gangrene 04/26/2017   • Osteopenia 04/26/2017   • Functional diarrhea 04/26/2017   • Sjogren's syndrome (HCC) 04/26/1990     Current Outpatient Prescriptions   Medication Sig Dispense Refill   • dicyclomine (BENTYL) 10 MG Cap Take 1 Cap by mouth 4 times a day as needed (abdominal spasm). 30 Cap 2   • Methotrexate, PF, (OTREXUP) 12.5 MG/0.4ML Solution Auto-injector Inject  as instructed.     • zoledronic Acid (RECLAST) 5 MG/100ML Solution IVPB premix 5 mg by Intravenous route Once.     • leflunomide (ARAVA) 10 MG Tab Take 10 mg by mouth every day.     • calcium carbonate (OS-LOVE 500) 500 MG Tab Take 500 mg by mouth 2 times a day, with meals.     • Cholecalciferol (VITAMIN D) 2000 units Cap Take  by mouth.     • folic acid (FOLVITE) 1 MG Tab Take 1 mg by mouth every day.     • BIOTIN PO Take  by mouth.     • Flaxseed Oil  Oil by Does not apply route.     • CRANBERRY EXTRACT PO Take  by mouth.     • therapeutic multivitamin-minerals (THERAGRAN-M) Tab Take 1 Tab by mouth every day.     • ascorbic acid (ASCORBIC ACID) 500 MG Tab Take 500 mg by mouth every day.     • CycloSPORINE (RESTASIS OP) 1 Drop by Ophthalmic route 2 Times a Day. Both eyes     • Naltrexone Powder Take 4.5 mg by mouth every day.       No current facility-administered medications for this visit.       Current supplements: see MAR  Chronic narcotic pain medicines: no  Allergies: Bee venom; Contrast media with iodine [iodine]; Shellfish allergy; Lactose; and Penicillins  Exercise: yes  Current social contact/activities: yes  Current mood: good  Advance Directive on file: no    Screening:  Depression Screening    Little interest or pleasure in doing things?  0 - not at all  Feeling down, depressed , or hopeless? 1 - several days  Trouble falling or staying asleep, or sleeping too much?  0 - not at all  Feeling tired or having little energy?  1 - several days  Poor appetite or overeating?  0 - not at all  Feeling bad about yourself - or that you are a failure or have let yourself or your family down? 0 - not at all  Trouble concentrating on things, such as reading the newspaper or watching television? 0 - not at all  Moving or speaking so slowly that other people could have noticed.  Or the opposite - being so fidgety or restless that you have been moving around a lot more than usual?  0 - not at all  Thoughts that you would be better off dead, or of hurting yourself?  0 - not at all  Patient Health Questionnaire Score: 2      Screening for Cognitive Impairment    Three Minute Recall (village, kitchen, baby) 3/3    Stone clock face with all 12 numbers and set the hands to show 10 past 10.  Yes    Cognitive concerns identified deferred for follow up unless specifically addressed in assessment and plan.    Fall Risk Assessment    Has the patient had two or more falls in the  last year or any fall with injury in the last year?  No    Safety Assessment    Throw rugs on floor.  No  Handrails on all stairs.  Yes  Good lighting in all hallways.  Yes  Difficulty hearing.  No  Patient counseled about all safety risks that were identified.    Functional Assessment ADLs    Are there any barriers preventing you from cooking for yourself or meeting nutritional needs?  No.    Are there any barriers preventing you from driving safely or obtaining transportation?  No.    Are there any barriers preventing you from using a telephone or calling for help?  No.    Are there any barriers preventing you from shopping?  No.    Are there any barriers preventing you from taking care of your own finances?  No.    Are there any barriers preventing you from managing your medications?  No.    Are there any barriers preventing you from showering, bathing or dressing yourself? No.    Are you currently engaging in any exercise or physical activity?  Yes.     What is your perception of your health?  Fair.      Health Maintenance Summary                MAMMOGRAM Postponed 9/2/2019 Originally 9/23/2018. Provider advises postponing for medical reasons     Done 9/23/2016 MA-SCREEN MAMMO W/ IMPLANTS W/CAD-BILAT    PAP SMEAR Next Due 1/2/2020      Done 1/2/2017     Annual Wellness Visit Next Due 6/25/2020      Done 6/25/2019 Visit Dx: Encounter for Medicare annual wellness exam     Patient has more history with this topic...    COLONOSCOPY Next Due 8/21/2022      Done 8/21/2012 REFERRAL TO GI FOR COLONOSCOPY    IMM DTaP/Tdap/Td Vaccine Next Due 9/25/2027      Done 9/25/2017 Imm Admin: Tdap Vaccine          Patient Care Team:  Jina De Santiago M.D. as PCP - General (Family Medicine)  Raul Boss M.D. as Consulting Physician (Endocrinology)    Social History   Substance Use Topics   • Smoking status: Never Smoker   • Smokeless tobacco: Never Used   • Alcohol use No     Family History   Problem Relation Age of Onset    • Cancer Mother 78        lymphoma   • Heart Disease Father    • Cancer Sister         breast cancer treated with radiation   • Other Sister         eye melanoma treated     She  has a past medical history of Anesthesia; Arthritis; Chronic pain syndrome; DDD (degenerative disc disease), cervical; Heart burn; History of shingles; Indigestion; Lupus; Major depressive disorder (3/13/2018); Other specified disorder of intestines; Pain (4/7/14); Sjogren's syndrome; and Unspecified cataract. She also has no past medical history of Dental disorder.   Past Surgical History:   Procedure Laterality Date   • EYE SURGERY Right 05/2018    torn retina repair   • FLAP CLOSURE Right 6/26/2017    Procedure: FLAP CLOSURE;  Surgeon: Darryl Garcia M.D.;  Location: [facility];  Service:    • SCAR EXCISION Right 6/26/2017    Procedure: SCAR EXCISION - BREAST WOUND EXCISION;  Surgeon: Darryl Garcia M.D.;  Location: [facility];  Service:    • BREAST IMPLANT REVISION Bilateral 11/2/2016    Procedure: BREAST IMPLANT - EXCHANGE OF SALINE TO SILICONE WITH REPOSITIONING OF LATERAL FOLDS;  Surgeon: Darryl Garcia M.D.;  Location: [facility];  Service:    • LUMBAR LAMINECTOMY DISKECTOMY Left 8/5/2015    Procedure: POSTERIOR L5-S1 HEMILAMINOTOMY AND MICRODISCECTOMY;  Surgeon: Kaleb Lucia M.D.;  Location: [facility];  Service:    • CATARACT PHACO WITH IOL  4/18/2014    Performed by Destiney Mccurdy M.D. at SURGERY SAME DAY [facility]   • CATARACT PHACO WITH IOL  4/9/2014    Performed by Destiney Mccurdy M.D. at SURGERY SAME DAY [facility]   • TUMOR EXCISION WITH BIOPSY  1998    pallet   • PB ENLARGE BREAST WITH IMPLANT         ROS:    All positives noted in HPI. All others reviewed and are negative.    Ostomy or other tubes or amputations: no  Chronic oxygen use: no  Last eye exam: UTD per pt report  : denies urinary incontinence; does not interfere with ADLs/ sleep  Gait: stable  "  Problems with balance/ difficulty walking: no  Hearing: good  Dentition: adequate     Lab results from 6/17/19 reviewed in detail with patient at visit today.     Exam:   /67 (BP Location: Left arm, Patient Position: Sitting, BP Cuff Size: Adult)   Pulse 60   Temp 37.1 °C (98.7 °F) (Temporal)   Resp 17   Ht 1.575 m (5' 2\")   Wt 47.5 kg (104 lb 12.8 oz)   SpO2 100%  Body mass index is 19.17 kg/m².    Gen: Well developed; well nourished; no acute distress; age appropriate appearance   HEENT: Normocephalic; atraumatic; PEERLA b/l; sclera clear b/l; b/l external auditory canals WNL; b/l TM WNL; nares patent; oropharynx clear; oral mucosa moist; tongue midline; dentition adequate   Neck: No adenopathy; no thyromegaly  CV: Regular rate and rhythm; S1/ S2 present; no murmur, gallop or rub noted  Pulm: No respiratory distress; clear to ascultation b/l; no wheezing or stridor noted b/l  Abd: Adequate bowel sounds noted; soft and nontender; no rebound, rigidity, nor distention  Wearing right thumb joint splint   Extremities: No peripheral edema b/l LE extremities/ no clubbing nor cyanosis noted  Skin: Warm and dry; no rashes noted   Neuro: No focal deficits noted; pt is able to get up out of chair unassisted and walk forward  Psych: AAOx4; mood and affect are appropriate    Assessment and Plan:  1. Decreased GFR  GFR 53 currently (previously normal 3/29/19). Will check urine studies today for further work up.   - URINALYSIS,CULTURE IF INDICATED; Future  - MICROALBUMIN CREAT RATIO URINE; Future    2. Abdominal cramping  Chronic ongoing issue for patient likely related to multiple chronic medical conditions (full work up done in past). Will trial PRN Bentyl use to see if condition improves. New RX sent to pharmacy.   - dicyclomine (BENTYL) 10 MG Cap; Take 1 Cap by mouth 4 times a day as needed (abdominal spasm).  Dispense: 30 Cap; Refill: 2    3. Sjogren's syndrome, with unspecified organ involvement " ([facility])  Stable/ managed by local specialist as well as physician in [place]    4. Radicular pain of right upper extremity  Ongoing issue for patient currently being managed by [place] team and Dr. Mascorro with Stablix procedure (right thumb joint) being further considered in the Fall.    5. Current mild episode of major depressive disorder without prior episode ([facility])  Stable/ pt continues to see Dr. Arias for counseling sessions and declines daily medication use.     6. History of immunosuppression  Stable    7. History of compression fracture of spine  Resolved/ patient remains at high risk for further events though.     8. Osteopenia of multiple sites  Ongoing issue for patient being managed by Dr. Cruz and Darron. She is being considered for Evenity use (2 syringes monthly for one year) and has used Prolia, Forteo, and Reclast for treatment.     9. Raynaud's disease without gangrene  Stable    10. Gastroesophageal reflux disease without esophagitis  Stable without daily PPI use at this time.     11. Chronic pain syndrome  Ongoing issue for patient being managed by [place] team with upcoming procedures planned (L1-L3 ablation July 9 and neck block trial later this week).    12. Lupus arthritis ([facility])  Stable/ patient is to continue current medication use with ongoing management by Dr. Cruz    13. Primary osteoarthritis involving multiple joints  Stable    14. DDD (degenerative disc disease), cervical  Stable    15. Inflammatory spondylopathy of multiple sites in spine ([facility])  Stable    16. Encounter for Medicare annual wellness exam  Pt is UTD with HCM items and continues to received excellent care by her specialists.      Services needed: no new services needed at this time  Health Care Screening: recommendations as per orders if indicated.  Referrals offered: none required  Counseling provided today:  · Prevent falls and reduce trip hazards; Secure or remove rugs if present   · Maintain working fire  alarm and carbon monoxide detectors   · Engage in regular physical activity daily and social activities weekly as tolerated

## 2019-07-03 ENCOUNTER — OUTPATIENT INFUSION SERVICES (OUTPATIENT)
Dept: ONCOLOGY | Facility: MEDICAL CENTER | Age: 64
End: 2019-07-03
Attending: INTERNAL MEDICINE
Payer: MEDICARE

## 2019-07-03 VITALS
HEIGHT: 62 IN | HEART RATE: 57 BPM | DIASTOLIC BLOOD PRESSURE: 77 MMHG | OXYGEN SATURATION: 99 % | WEIGHT: 104.28 LBS | SYSTOLIC BLOOD PRESSURE: 131 MMHG | BODY MASS INDEX: 19.19 KG/M2 | TEMPERATURE: 98.6 F | RESPIRATION RATE: 18 BRPM

## 2019-07-03 PROCEDURE — 700111 HCHG RX REV CODE 636 W/ 250 OVERRIDE (IP): Performed by: INTERNAL MEDICINE

## 2019-07-03 PROCEDURE — 96415 CHEMO IV INFUSION ADDL HR: CPT

## 2019-07-03 PROCEDURE — 700105 HCHG RX REV CODE 258: Performed by: INTERNAL MEDICINE

## 2019-07-03 PROCEDURE — 700102 HCHG RX REV CODE 250 W/ 637 OVERRIDE(OP): Performed by: INTERNAL MEDICINE

## 2019-07-03 PROCEDURE — A9270 NON-COVERED ITEM OR SERVICE: HCPCS | Performed by: INTERNAL MEDICINE

## 2019-07-03 PROCEDURE — 96375 TX/PRO/DX INJ NEW DRUG ADDON: CPT

## 2019-07-03 PROCEDURE — 96413 CHEMO IV INFUSION 1 HR: CPT

## 2019-07-03 RX ORDER — SILDENAFIL CITRATE 20 MG/1
20 TABLET ORAL 3 TIMES DAILY
COMMUNITY

## 2019-07-03 RX ORDER — METHYLPREDNISOLONE SODIUM SUCCINATE 125 MG/2ML
100 INJECTION, POWDER, LYOPHILIZED, FOR SOLUTION INTRAMUSCULAR; INTRAVENOUS ONCE
Status: COMPLETED | OUTPATIENT
Start: 2019-07-03 | End: 2019-07-03

## 2019-07-03 RX ORDER — ACETAMINOPHEN 325 MG/1
650 TABLET ORAL ONCE
Status: COMPLETED | OUTPATIENT
Start: 2019-07-03 | End: 2019-07-03

## 2019-07-03 RX ORDER — ZONISAMIDE 50 MG/1
100 CAPSULE ORAL DAILY
COMMUNITY
End: 2019-10-11

## 2019-07-03 RX ADMIN — RITUXIMAB 1000 MG: 10 INJECTION, SOLUTION INTRAVENOUS at 12:12

## 2019-07-03 RX ADMIN — METHYLPREDNISOLONE SODIUM SUCCINATE 100 MG: 125 INJECTION, POWDER, FOR SOLUTION INTRAMUSCULAR; INTRAVENOUS at 11:48

## 2019-07-03 RX ADMIN — ACETAMINOPHEN 650 MG: 325 TABLET ORAL at 11:44

## 2019-07-03 RX ADMIN — DIPHENHYDRAMINE HYDROCHLORIDE 25 MG: 50 INJECTION INTRAMUSCULAR; INTRAVENOUS at 11:50

## 2019-07-03 NOTE — PROGRESS NOTES
Chemotherapy Verification - SECONDARY RN       Height = 62 inches  Weight = 47.3 kg  BSA = 1.44 m2       Medication: Rituxan  Dose: 1000 mg  Calculated Dose: 1000 mg; set dose, no calculation required                             (In mg/m2, AUC, mg/kg)         I confirm that this process was performed independently.

## 2019-07-03 NOTE — PROGRESS NOTES
Chemotherapy Verification - PRIMARY RN      Height = 157.5cm  Weight = 47.3kg  BSA = 1.44m2       Medication: Rituxan  Dose: 1,000 mg (set dose)  Calculated Dose: 1,000 mg                             (In mg/m2, AUC, mg/kg)     I confirm this process was performed independently with the BSA and all final chemotherapy dosing calculations congruent.  Any discrepancies of 10% or greater have been addressed with the chemotherapy pharmacist. The resolution of the discrepancy has been documented in the EPIC progress notes.

## 2019-07-03 NOTE — PROGRESS NOTES
Pt to [facility] for Day 15 Rituxan.  Pt tolerated previous infusion well.  Denies any current infections. Pt quite anxious regarding IV.  PIV placed right forearm. Pt tolerated well, with marked reduction in anxiety.  Pt received pre-meds as ordered.  Rituxan started at 100mg/hr, rate increased by 100mg/hr Q30 minutes to a final rate of 400mg/hr.  Pt tolerated infusion well.  PIV flushed with NS, then d/c'd.  Catheter tip remained intact. Gauze and coban to site. Pt left [facility] in NAD.

## 2019-07-29 ENCOUNTER — HOSPITAL ENCOUNTER (OUTPATIENT)
Dept: LAB | Facility: MEDICAL CENTER | Age: 64
End: 2019-07-29
Attending: INTERNAL MEDICINE
Payer: MEDICARE

## 2019-07-29 LAB
25(OH)D3 SERPL-MCNC: 80 NG/ML (ref 30–100)
ALBUMIN SERPL BCP-MCNC: 4.2 G/DL (ref 3.2–4.9)
BUN SERPL-MCNC: 22 MG/DL (ref 8–22)
CALCIUM SERPL-MCNC: 9.8 MG/DL (ref 8.5–10.5)
CHLORIDE SERPL-SCNC: 106 MMOL/L (ref 96–112)
CO2 SERPL-SCNC: 27 MMOL/L (ref 20–33)
CREAT SERPL-MCNC: 0.97 MG/DL (ref 0.5–1.4)
GLUCOSE SERPL-MCNC: 91 MG/DL (ref 65–99)
PHOSPHATE SERPL-MCNC: 3.1 MG/DL (ref 2.5–4.5)
POTASSIUM SERPL-SCNC: 3.7 MMOL/L (ref 3.6–5.5)
SODIUM SERPL-SCNC: 144 MMOL/L (ref 135–145)

## 2019-07-29 PROCEDURE — 36415 COLL VENOUS BLD VENIPUNCTURE: CPT

## 2019-07-29 PROCEDURE — 80069 RENAL FUNCTION PANEL: CPT

## 2019-07-29 PROCEDURE — 82306 VITAMIN D 25 HYDROXY: CPT

## 2019-08-07 ENCOUNTER — OUTPATIENT INFUSION SERVICES (OUTPATIENT)
Dept: ONCOLOGY | Facility: MEDICAL CENTER | Age: 64
End: 2019-08-07
Attending: INTERNAL MEDICINE
Payer: MEDICARE

## 2019-08-07 VITALS
TEMPERATURE: 99 F | WEIGHT: 103.62 LBS | DIASTOLIC BLOOD PRESSURE: 70 MMHG | RESPIRATION RATE: 18 BRPM | OXYGEN SATURATION: 100 % | SYSTOLIC BLOOD PRESSURE: 131 MMHG | HEIGHT: 62 IN | BODY MASS INDEX: 19.07 KG/M2 | HEART RATE: 60 BPM

## 2019-08-07 PROCEDURE — 700111 HCHG RX REV CODE 636 W/ 250 OVERRIDE (IP): Performed by: INTERNAL MEDICINE

## 2019-08-07 PROCEDURE — 96365 THER/PROPH/DIAG IV INF INIT: CPT

## 2019-08-07 RX ORDER — ZOLEDRONIC ACID 5 MG/100ML
5 INJECTION, SOLUTION INTRAVENOUS ONCE
Status: COMPLETED | OUTPATIENT
Start: 2019-08-07 | End: 2019-08-07

## 2019-08-07 RX ADMIN — ZOLEDRONIC ACID 5 MG: 5 INJECTION, SOLUTION INTRAVENOUS at 16:08

## 2019-08-07 NOTE — PROGRESS NOTES
Pharmacy Note:    7/29/19-  Cr = 0.97   CrCl ~ 43ml/min  Serum Ca = 9.8    OK for zoledronic acid (reclast) 5 mg IV riki.    XAVIER Montoya Pharm.D.

## 2019-08-08 NOTE — PROGRESS NOTES
Patient here for Reclast. Denies any infections, dental work, or other complaints. PIV placed by Shania SIMS with brisk blood return. Labs done 9 days ago and are WNL. Reclast infused with no reactions. PIV flushed and removed with tip intact. Site covered with pressure dressing. Discharged home to self care in no distress at this time. Email to schedulers to schedule another appointment in one year.

## 2019-09-03 ENCOUNTER — HOSPITAL ENCOUNTER (OUTPATIENT)
Dept: RADIOLOGY | Facility: MEDICAL CENTER | Age: 64
End: 2019-09-03
Attending: FAMILY MEDICINE
Payer: MEDICARE

## 2019-09-03 PROCEDURE — 76536 US EXAM OF HEAD AND NECK: CPT

## 2019-09-08 DIAGNOSIS — R10.9 ABDOMINAL CRAMPING: ICD-10-CM

## 2019-09-10 ENCOUNTER — HOSPITAL ENCOUNTER (OUTPATIENT)
Dept: LAB | Facility: MEDICAL CENTER | Age: 64
End: 2019-09-10
Attending: INTERNAL MEDICINE
Payer: MEDICARE

## 2019-09-10 LAB
ALBUMIN SERPL BCP-MCNC: 4.5 G/DL (ref 3.2–4.9)
ALBUMIN/GLOB SERPL: 2.1 G/DL
ALP SERPL-CCNC: 50 U/L (ref 30–99)
ALT SERPL-CCNC: 29 U/L (ref 2–50)
AMYLASE SERPL-CCNC: 106 U/L (ref 20–103)
ANION GAP SERPL CALC-SCNC: 10 MMOL/L (ref 0–11.9)
AST SERPL-CCNC: 32 U/L (ref 12–45)
BASOPHILS # BLD AUTO: 1.2 % (ref 0–1.8)
BASOPHILS # BLD: 0.07 K/UL (ref 0–0.12)
BILIRUB SERPL-MCNC: 0.6 MG/DL (ref 0.1–1.5)
BUN SERPL-MCNC: 24 MG/DL (ref 8–22)
CALCIUM SERPL-MCNC: 10.4 MG/DL (ref 8.5–10.5)
CHLORIDE SERPL-SCNC: 103 MMOL/L (ref 96–112)
CO2 SERPL-SCNC: 30 MMOL/L (ref 20–33)
CREAT SERPL-MCNC: 1.01 MG/DL (ref 0.5–1.4)
EOSINOPHIL # BLD AUTO: 0.25 K/UL (ref 0–0.51)
EOSINOPHIL NFR BLD: 4.3 % (ref 0–6.9)
ERYTHROCYTE [DISTWIDTH] IN BLOOD BY AUTOMATED COUNT: 50.1 FL (ref 35.9–50)
GLOBULIN SER CALC-MCNC: 2.1 G/DL (ref 1.9–3.5)
GLUCOSE SERPL-MCNC: 99 MG/DL (ref 65–99)
HCT VFR BLD AUTO: 38.2 % (ref 37–47)
HGB BLD-MCNC: 12.7 G/DL (ref 12–16)
IMM GRANULOCYTES # BLD AUTO: 0.02 K/UL (ref 0–0.11)
IMM GRANULOCYTES NFR BLD AUTO: 0.3 % (ref 0–0.9)
LIPASE SERPL-CCNC: 65 U/L (ref 11–82)
LYMPHOCYTES # BLD AUTO: 1.35 K/UL (ref 1–4.8)
LYMPHOCYTES NFR BLD: 23.5 % (ref 22–41)
MCH RBC QN AUTO: 35.3 PG (ref 27–33)
MCHC RBC AUTO-ENTMCNC: 33.2 G/DL (ref 33.6–35)
MCV RBC AUTO: 106.1 FL (ref 81.4–97.8)
MONOCYTES # BLD AUTO: 0.53 K/UL (ref 0–0.85)
MONOCYTES NFR BLD AUTO: 9.2 % (ref 0–13.4)
NEUTROPHILS # BLD AUTO: 3.53 K/UL (ref 2–7.15)
NEUTROPHILS NFR BLD: 61.5 % (ref 44–72)
NRBC # BLD AUTO: 0 K/UL
NRBC BLD-RTO: 0 /100 WBC
PLATELET # BLD AUTO: 328 K/UL (ref 164–446)
PMV BLD AUTO: 10.6 FL (ref 9–12.9)
POTASSIUM SERPL-SCNC: 4 MMOL/L (ref 3.6–5.5)
PROT SERPL-MCNC: 6.6 G/DL (ref 6–8.2)
RBC # BLD AUTO: 3.6 M/UL (ref 4.2–5.4)
SODIUM SERPL-SCNC: 143 MMOL/L (ref 135–145)
WBC # BLD AUTO: 5.8 K/UL (ref 4.8–10.8)

## 2019-09-10 PROCEDURE — 82784 ASSAY IGA/IGD/IGG/IGM EACH: CPT

## 2019-09-10 PROCEDURE — 85025 COMPLETE CBC W/AUTO DIFF WBC: CPT

## 2019-09-10 PROCEDURE — 36415 COLL VENOUS BLD VENIPUNCTURE: CPT

## 2019-09-10 PROCEDURE — 83516 IMMUNOASSAY NONANTIBODY: CPT

## 2019-09-10 PROCEDURE — 80053 COMPREHEN METABOLIC PANEL: CPT

## 2019-09-10 PROCEDURE — 82150 ASSAY OF AMYLASE: CPT

## 2019-09-10 PROCEDURE — 83690 ASSAY OF LIPASE: CPT

## 2019-09-12 LAB — IGA SERPL-MCNC: 121 MG/DL (ref 68–408)

## 2019-09-13 LAB — TTG IGA SER IA-ACNC: 0 U/ML (ref 0–3)

## 2019-09-24 DIAGNOSIS — M15.9 PRIMARY OSTEOARTHRITIS INVOLVING MULTIPLE JOINTS: ICD-10-CM

## 2019-09-24 DIAGNOSIS — M46.99 INFLAMMATORY SPONDYLOPATHY OF MULTIPLE SITES IN SPINE (HCC): ICD-10-CM

## 2019-09-24 DIAGNOSIS — M35.00 SJOGREN'S SYNDROME, WITH UNSPECIFIED ORGAN INVOLVEMENT (HCC): ICD-10-CM

## 2019-09-24 DIAGNOSIS — M32.9 LUPUS ARTHRITIS (HCC): ICD-10-CM

## 2019-09-24 DIAGNOSIS — I73.00 RAYNAUD'S DISEASE WITHOUT GANGRENE: ICD-10-CM

## 2019-09-24 DIAGNOSIS — Z92.25 HISTORY OF IMMUNOSUPPRESSION: ICD-10-CM

## 2019-09-28 ENCOUNTER — HOSPITAL ENCOUNTER (OUTPATIENT)
Dept: RADIOLOGY | Facility: MEDICAL CENTER | Age: 64
End: 2019-09-28
Attending: INTERNAL MEDICINE
Payer: MEDICARE

## 2019-09-28 DIAGNOSIS — K59.00 CONSTIPATION, UNSPECIFIED CONSTIPATION TYPE: ICD-10-CM

## 2019-09-28 PROCEDURE — 74019 RADEX ABDOMEN 2 VIEWS: CPT

## 2019-10-02 DIAGNOSIS — G89.4 CHRONIC PAIN SYNDROME: ICD-10-CM

## 2019-10-02 DIAGNOSIS — K59.09 OTHER CONSTIPATION: ICD-10-CM

## 2019-10-02 DIAGNOSIS — E73.9 LACTOSE INTOLERANCE: Chronic | ICD-10-CM

## 2019-10-02 DIAGNOSIS — M35.00 SJOGREN'S SYNDROME, WITH UNSPECIFIED ORGAN INVOLVEMENT (HCC): Chronic | ICD-10-CM

## 2019-10-11 DIAGNOSIS — Z01.812 PRE-OPERATIVE LABORATORY EXAMINATION: ICD-10-CM

## 2019-10-11 LAB
ALBUMIN SERPL BCP-MCNC: 4.4 G/DL (ref 3.2–4.9)
ALBUMIN/GLOB SERPL: 1.9 G/DL
ALP SERPL-CCNC: 44 U/L (ref 30–99)
ALT SERPL-CCNC: 31 U/L (ref 2–50)
ANION GAP SERPL CALC-SCNC: 7 MMOL/L (ref 0–11.9)
AST SERPL-CCNC: 31 U/L (ref 12–45)
BASOPHILS # BLD AUTO: 1.6 % (ref 0–1.8)
BASOPHILS # BLD: 0.11 K/UL (ref 0–0.12)
BILIRUB SERPL-MCNC: 0.5 MG/DL (ref 0.1–1.5)
BUN SERPL-MCNC: 23 MG/DL (ref 8–22)
CALCIUM SERPL-MCNC: 9.1 MG/DL (ref 8.5–10.5)
CHLORIDE SERPL-SCNC: 109 MMOL/L (ref 96–112)
CO2 SERPL-SCNC: 25 MMOL/L (ref 20–33)
CREAT SERPL-MCNC: 0.82 MG/DL (ref 0.5–1.4)
EOSINOPHIL # BLD AUTO: 0.36 K/UL (ref 0–0.51)
EOSINOPHIL NFR BLD: 5.2 % (ref 0–6.9)
ERYTHROCYTE [DISTWIDTH] IN BLOOD BY AUTOMATED COUNT: 52.3 FL (ref 35.9–50)
GLOBULIN SER CALC-MCNC: 2.3 G/DL (ref 1.9–3.5)
GLUCOSE SERPL-MCNC: 82 MG/DL (ref 65–99)
HCT VFR BLD AUTO: 38.9 % (ref 37–47)
HGB BLD-MCNC: 13 G/DL (ref 12–16)
IMM GRANULOCYTES # BLD AUTO: 0.03 K/UL (ref 0–0.11)
IMM GRANULOCYTES NFR BLD AUTO: 0.4 % (ref 0–0.9)
LYMPHOCYTES # BLD AUTO: 1.73 K/UL (ref 1–4.8)
LYMPHOCYTES NFR BLD: 24.9 % (ref 22–41)
MCH RBC QN AUTO: 35.4 PG (ref 27–33)
MCHC RBC AUTO-ENTMCNC: 33.4 G/DL (ref 33.6–35)
MCV RBC AUTO: 106 FL (ref 81.4–97.8)
MONOCYTES # BLD AUTO: 1.32 K/UL (ref 0–0.85)
MONOCYTES NFR BLD AUTO: 19 % (ref 0–13.4)
NEUTROPHILS # BLD AUTO: 3.39 K/UL (ref 2–7.15)
NEUTROPHILS NFR BLD: 48.9 % (ref 44–72)
NRBC # BLD AUTO: 0 K/UL
NRBC BLD-RTO: 0 /100 WBC
PLATELET # BLD AUTO: 324 K/UL (ref 164–446)
PMV BLD AUTO: 10.7 FL (ref 9–12.9)
POTASSIUM SERPL-SCNC: 4 MMOL/L (ref 3.6–5.5)
PROT SERPL-MCNC: 6.7 G/DL (ref 6–8.2)
RBC # BLD AUTO: 3.67 M/UL (ref 4.2–5.4)
SODIUM SERPL-SCNC: 141 MMOL/L (ref 135–145)
WBC # BLD AUTO: 6.9 K/UL (ref 4.8–10.8)

## 2019-10-11 PROCEDURE — 85025 COMPLETE CBC W/AUTO DIFF WBC: CPT

## 2019-10-11 PROCEDURE — 80053 COMPREHEN METABOLIC PANEL: CPT

## 2019-10-11 PROCEDURE — 36415 COLL VENOUS BLD VENIPUNCTURE: CPT

## 2019-10-11 RX ORDER — ZOLPIDEM TARTRATE 10 MG/1
5 TABLET ORAL NIGHTLY PRN
COMMUNITY
Start: 2019-08-05

## 2019-10-11 RX ORDER — MULTIVIT WITH MINERALS/LUTEIN
1000 TABLET ORAL DAILY
COMMUNITY

## 2019-10-11 RX ORDER — ESTRADIOL 10 UG/1
10 INSERT VAGINAL DAILY
COMMUNITY
End: 2020-02-11 | Stop reason: SDUPTHER

## 2019-10-11 RX ORDER — DIMENHYDRINATE 50 MG
1 TABLET ORAL DAILY
COMMUNITY
End: 2019-12-16

## 2019-10-11 RX ORDER — ANTIARTHRITIC COMBINATION NO.2 900 MG
1 TABLET ORAL DAILY
COMMUNITY

## 2019-10-11 RX ORDER — MAGNESIUM OXIDE 400 MG/1
400 TABLET ORAL DAILY
COMMUNITY
End: 2019-12-16

## 2019-10-11 RX ORDER — ZONISAMIDE 100 MG/1
300 CAPSULE ORAL DAILY
COMMUNITY

## 2019-10-11 RX ORDER — METHOTREXATE 15 MG/.4ML
15 INJECTION, SOLUTION SUBCUTANEOUS
COMMUNITY
Start: 2019-08-05 | End: 2019-10-11

## 2019-10-11 NOTE — OR NURSING
"Pre-admit appointment completed. \"Preparing for your procedure\" sheet given to pt along with verbal and written instructions. Pt instructed to continue regularly prescribed medications through the day before surgery.     Pt states her rheumatologist said she it was OK for surgery because last rituxan was over 3 months ago and she instructed pt to hold methotrexate and her folic acid 2 weeks before and 2 weeks after surgery.     Pt states she just had more skin sites biopsied on her body and she has stitches to abd sites and on her back. No drainage to sites. Pt instructed to inform Dr Mascorro of such and she voices understanding.     Pt will hold her sildenafil 48 hours prior to procedure.     Dr Beltran notified via email of procedure due to autoimmume disease and other co-morbidities.   "

## 2019-10-14 NOTE — OR NURSING
Please make sure that patient has a CBC on file since the stoppage of Rituxan. Otherwise one can be ordered for the day of surgery to monitor for signs of anemia as well as decreased white blood cells. Thank you.    Davion Beltran M.D.  [facility]      On Oct 11, 2019, at 17:17, [email or web address] wrote:  ?   MR [id number]  10/16/19 Dr Mascorro for Carpomeatacarpal thumb arthroplasty

## 2019-10-16 ENCOUNTER — ANESTHESIA EVENT (OUTPATIENT)
Dept: SURGERY | Facility: MEDICAL CENTER | Age: 64
End: 2019-10-16
Payer: MEDICARE

## 2019-10-16 ENCOUNTER — ANESTHESIA (OUTPATIENT)
Dept: SURGERY | Facility: MEDICAL CENTER | Age: 64
End: 2019-10-16
Payer: MEDICARE

## 2019-10-16 ENCOUNTER — APPOINTMENT (OUTPATIENT)
Dept: RADIOLOGY | Facility: MEDICAL CENTER | Age: 64
End: 2019-10-16
Attending: ORTHOPAEDIC SURGERY
Payer: MEDICARE

## 2019-10-16 ENCOUNTER — HOSPITAL ENCOUNTER (OUTPATIENT)
Facility: MEDICAL CENTER | Age: 64
End: 2019-10-16
Attending: ORTHOPAEDIC SURGERY | Admitting: ORTHOPAEDIC SURGERY
Payer: MEDICARE

## 2019-10-16 VITALS
HEIGHT: 62 IN | OXYGEN SATURATION: 97 % | WEIGHT: 102.95 LBS | RESPIRATION RATE: 16 BRPM | DIASTOLIC BLOOD PRESSURE: 47 MMHG | TEMPERATURE: 99 F | BODY MASS INDEX: 18.95 KG/M2 | SYSTOLIC BLOOD PRESSURE: 99 MMHG | HEART RATE: 60 BPM

## 2019-10-16 PROBLEM — M18.11 ARTHRITIS OF CARPOMETACARPAL (CMC) JOINT OF RIGHT THUMB: Status: ACTIVE | Noted: 2019-10-16

## 2019-10-16 PROCEDURE — 160048 HCHG OR STATISTICAL LEVEL 1-5: Performed by: ORTHOPAEDIC SURGERY

## 2019-10-16 PROCEDURE — 160036 HCHG PACU - EA ADDL 30 MINS PHASE I: Performed by: ORTHOPAEDIC SURGERY

## 2019-10-16 PROCEDURE — 160039 HCHG SURGERY MINUTES - EA ADDL 1 MIN LEVEL 3: Performed by: ORTHOPAEDIC SURGERY

## 2019-10-16 PROCEDURE — A9270 NON-COVERED ITEM OR SERVICE: HCPCS | Performed by: ANESTHESIOLOGY

## 2019-10-16 PROCEDURE — 160046 HCHG PACU - 1ST 60 MINS PHASE II: Performed by: ORTHOPAEDIC SURGERY

## 2019-10-16 PROCEDURE — 160025 RECOVERY II MINUTES (STATS): Performed by: ORTHOPAEDIC SURGERY

## 2019-10-16 PROCEDURE — 160035 HCHG PACU - 1ST 60 MINS PHASE I: Performed by: ORTHOPAEDIC SURGERY

## 2019-10-16 PROCEDURE — 502000 HCHG MISC OR IMPLANTS RC 0278: Performed by: ORTHOPAEDIC SURGERY

## 2019-10-16 PROCEDURE — 700111 HCHG RX REV CODE 636 W/ 250 OVERRIDE (IP): Performed by: ANESTHESIOLOGY

## 2019-10-16 PROCEDURE — 500865 HCHG NEEDLE, SPINAL 20G/22G: Performed by: ORTHOPAEDIC SURGERY

## 2019-10-16 PROCEDURE — 160002 HCHG RECOVERY MINUTES (STAT): Performed by: ORTHOPAEDIC SURGERY

## 2019-10-16 PROCEDURE — 160028 HCHG SURGERY MINUTES - 1ST 30 MINS LEVEL 3: Performed by: ORTHOPAEDIC SURGERY

## 2019-10-16 PROCEDURE — 501838 HCHG SUTURE GENERAL: Performed by: ORTHOPAEDIC SURGERY

## 2019-10-16 PROCEDURE — A6222 GAUZE <=16 IN NO W/SAL W/O B: HCPCS | Performed by: ORTHOPAEDIC SURGERY

## 2019-10-16 PROCEDURE — A6454 SELF-ADHER BAND W>=3" <5"/YD: HCPCS | Performed by: ORTHOPAEDIC SURGERY

## 2019-10-16 PROCEDURE — 700105 HCHG RX REV CODE 258: Performed by: ORTHOPAEDIC SURGERY

## 2019-10-16 PROCEDURE — 700101 HCHG RX REV CODE 250: Performed by: ORTHOPAEDIC SURGERY

## 2019-10-16 PROCEDURE — 700102 HCHG RX REV CODE 250 W/ 637 OVERRIDE(OP): Performed by: ANESTHESIOLOGY

## 2019-10-16 PROCEDURE — 502576 HCHG PACK, HAND: Performed by: ORTHOPAEDIC SURGERY

## 2019-10-16 PROCEDURE — 700101 HCHG RX REV CODE 250: Performed by: ANESTHESIOLOGY

## 2019-10-16 PROCEDURE — 160009 HCHG ANES TIME/MIN: Performed by: ORTHOPAEDIC SURGERY

## 2019-10-16 RX ORDER — SODIUM CHLORIDE, SODIUM LACTATE, POTASSIUM CHLORIDE, CALCIUM CHLORIDE 600; 310; 30; 20 MG/100ML; MG/100ML; MG/100ML; MG/100ML
INJECTION, SOLUTION INTRAVENOUS CONTINUOUS
Status: DISCONTINUED | OUTPATIENT
Start: 2019-10-16 | End: 2019-10-16 | Stop reason: HOSPADM

## 2019-10-16 RX ORDER — CELECOXIB 200 MG/1
200 CAPSULE ORAL ONCE
Status: COMPLETED | OUTPATIENT
Start: 2019-10-16 | End: 2019-10-16

## 2019-10-16 RX ORDER — LIDOCAINE HYDROCHLORIDE AND EPINEPHRINE 10; 10 MG/ML; UG/ML
INJECTION, SOLUTION INFILTRATION; PERINEURAL
Status: DISCONTINUED | OUTPATIENT
Start: 2019-10-16 | End: 2019-10-16 | Stop reason: HOSPADM

## 2019-10-16 RX ORDER — HALOPERIDOL 5 MG/ML
1 INJECTION INTRAMUSCULAR
Status: DISCONTINUED | OUTPATIENT
Start: 2019-10-16 | End: 2019-10-16 | Stop reason: HOSPADM

## 2019-10-16 RX ORDER — OXYCODONE HCL 5 MG/5 ML
5 SOLUTION, ORAL ORAL
Status: DISCONTINUED | OUTPATIENT
Start: 2019-10-16 | End: 2019-10-16 | Stop reason: HOSPADM

## 2019-10-16 RX ORDER — BUPIVACAINE HYDROCHLORIDE AND EPINEPHRINE 5; 5 MG/ML; UG/ML
INJECTION, SOLUTION PERINEURAL
Status: DISCONTINUED | OUTPATIENT
Start: 2019-10-16 | End: 2019-10-16 | Stop reason: HOSPADM

## 2019-10-16 RX ORDER — MEPERIDINE HYDROCHLORIDE 25 MG/ML
INJECTION INTRAMUSCULAR; INTRAVENOUS; SUBCUTANEOUS PRN
Status: DISCONTINUED | OUTPATIENT
Start: 2019-10-16 | End: 2019-10-16 | Stop reason: SURG

## 2019-10-16 RX ORDER — DEXAMETHASONE SODIUM PHOSPHATE 4 MG/ML
INJECTION, SOLUTION INTRA-ARTICULAR; INTRALESIONAL; INTRAMUSCULAR; INTRAVENOUS; SOFT TISSUE PRN
Status: DISCONTINUED | OUTPATIENT
Start: 2019-10-16 | End: 2019-10-16 | Stop reason: SURG

## 2019-10-16 RX ORDER — ACETAMINOPHEN 500 MG
1000 TABLET ORAL ONCE
Status: COMPLETED | OUTPATIENT
Start: 2019-10-16 | End: 2019-10-16

## 2019-10-16 RX ORDER — OXYCODONE HCL 5 MG/5 ML
10 SOLUTION, ORAL ORAL
Status: DISCONTINUED | OUTPATIENT
Start: 2019-10-16 | End: 2019-10-16 | Stop reason: HOSPADM

## 2019-10-16 RX ORDER — PROMETHAZINE HYDROCHLORIDE 25 MG/1
25 TABLET ORAL EVERY 6 HOURS PRN
Qty: 30 TAB | Refills: 1 | Status: SHIPPED | OUTPATIENT
Start: 2019-10-16 | End: 2019-12-16

## 2019-10-16 RX ORDER — CEFAZOLIN SODIUM 1 G/3ML
INJECTION, POWDER, FOR SOLUTION INTRAMUSCULAR; INTRAVENOUS PRN
Status: DISCONTINUED | OUTPATIENT
Start: 2019-10-16 | End: 2019-10-16 | Stop reason: SURG

## 2019-10-16 RX ORDER — METOCLOPRAMIDE HYDROCHLORIDE 5 MG/ML
INJECTION INTRAMUSCULAR; INTRAVENOUS PRN
Status: DISCONTINUED | OUTPATIENT
Start: 2019-10-16 | End: 2019-10-16 | Stop reason: SURG

## 2019-10-16 RX ORDER — MEPERIDINE HYDROCHLORIDE 25 MG/ML
12.5 INJECTION INTRAMUSCULAR; INTRAVENOUS; SUBCUTANEOUS
Status: DISCONTINUED | OUTPATIENT
Start: 2019-10-16 | End: 2019-10-16 | Stop reason: HOSPADM

## 2019-10-16 RX ADMIN — ACETAMINOPHEN 1000 MG: 500 TABLET ORAL at 09:38

## 2019-10-16 RX ADMIN — METOCLOPRAMIDE 10 MG: 5 INJECTION, SOLUTION INTRAMUSCULAR; INTRAVENOUS at 10:47

## 2019-10-16 RX ADMIN — FENTANYL CITRATE 25 MCG: 50 INJECTION, SOLUTION INTRAMUSCULAR; INTRAVENOUS at 10:47

## 2019-10-16 RX ADMIN — CEFAZOLIN 2 G: 1 INJECTION, POWDER, FOR SOLUTION INTRAVENOUS at 10:47

## 2019-10-16 RX ADMIN — FENTANYL CITRATE 50 MCG: 50 INJECTION, SOLUTION INTRAMUSCULAR; INTRAVENOUS at 11:11

## 2019-10-16 RX ADMIN — PROPOFOL 100 MG: 10 INJECTION, EMULSION INTRAVENOUS at 10:47

## 2019-10-16 RX ADMIN — EPHEDRINE SULFATE 5 MG: 50 INJECTION INTRAMUSCULAR; INTRAVENOUS; SUBCUTANEOUS at 11:29

## 2019-10-16 RX ADMIN — MEPERIDINE HYDROCHLORIDE 12.5 MG: 25 INJECTION INTRAMUSCULAR; INTRAVENOUS; SUBCUTANEOUS at 11:46

## 2019-10-16 RX ADMIN — SODIUM CHLORIDE, POTASSIUM CHLORIDE, SODIUM LACTATE AND CALCIUM CHLORIDE 1000 ML: 600; 310; 30; 20 INJECTION, SOLUTION INTRAVENOUS at 09:39

## 2019-10-16 RX ADMIN — LIDOCAINE HYDROCHLORIDE 40 MG: 20 INJECTION, SOLUTION INFILTRATION; PERINEURAL at 11:47

## 2019-10-16 RX ADMIN — FENTANYL CITRATE 25 MCG: 50 INJECTION, SOLUTION INTRAMUSCULAR; INTRAVENOUS at 11:03

## 2019-10-16 RX ADMIN — CELECOXIB 200 MG: 200 CAPSULE ORAL at 09:38

## 2019-10-16 RX ADMIN — SODIUM CHLORIDE, POTASSIUM CHLORIDE, SODIUM LACTATE AND CALCIUM CHLORIDE: 600; 310; 30; 20 INJECTION, SOLUTION INTRAVENOUS at 11:48

## 2019-10-16 RX ADMIN — MEPERIDINE HYDROCHLORIDE 12.5 MG: 25 INJECTION INTRAMUSCULAR; INTRAVENOUS; SUBCUTANEOUS at 11:47

## 2019-10-16 RX ADMIN — DEXAMETHASONE SODIUM PHOSPHATE 8 MG: 4 INJECTION, SOLUTION INTRAMUSCULAR; INTRAVENOUS at 10:47

## 2019-10-16 RX ADMIN — EPHEDRINE SULFATE 5 MG: 50 INJECTION INTRAMUSCULAR; INTRAVENOUS; SUBCUTANEOUS at 11:13

## 2019-10-16 RX ADMIN — LIDOCAINE HYDROCHLORIDE 40 MG: 20 INJECTION, SOLUTION INFILTRATION; PERINEURAL at 10:47

## 2019-10-16 RX ADMIN — EPHEDRINE SULFATE 5 MG: 50 INJECTION INTRAMUSCULAR; INTRAVENOUS; SUBCUTANEOUS at 11:23

## 2019-10-16 RX ADMIN — FENTANYL CITRATE 25 MCG: 50 INJECTION, SOLUTION INTRAMUSCULAR; INTRAVENOUS at 11:43

## 2019-10-16 RX ADMIN — EPHEDRINE SULFATE 10 MG: 50 INJECTION INTRAMUSCULAR; INTRAVENOUS; SUBCUTANEOUS at 10:56

## 2019-10-16 RX ADMIN — LIDOCAINE HYDROCHLORIDE 0.5 ML: 10 INJECTION, SOLUTION INFILTRATION; PERINEURAL at 09:39

## 2019-10-16 RX ADMIN — MIDAZOLAM HYDROCHLORIDE 2 MG: 1 INJECTION, SOLUTION INTRAMUSCULAR; INTRAVENOUS at 10:43

## 2019-10-16 ASSESSMENT — PAIN SCALES - GENERAL: PAIN_LEVEL: 0

## 2019-10-16 NOTE — DISCHARGE INSTRUCTIONS
ACTIVITY: Rest and take it easy for the first 24 hours.  A responsible adult is recommended to remain with you during that time.  It is normal to feel sleepy.  We encourage you to not do anything that requires balance, judgment or coordination.    MILD FLU-LIKE SYMPTOMS ARE NORMAL. YOU MAY EXPERIENCE GENERALIZED MUSCLE ACHES, THROAT IRRITATION, HEADACHE AND/OR SOME NAUSEA.    FOR 24 HOURS DO NOT:  Drive, operate machinery or run household appliances.  Drink beer or alcoholic beverages.   Make important decisions or sign legal documents.    SPECIAL INSTRUCTIONS:   No lifting anything heavier than 2 lbs with the operative hand   Keep hand elevated and apply ice as much as possible in the first three days   Do not operate a vehicle or machinery while taking narcotic pain medications   Return to clinic in 10-14 days   Please call the office with any questions [phone]    DIET: To avoid nausea, slowly advance diet as tolerated, avoiding spicy or greasy foods for the first day.  Add more substantial food to your diet according to your physician's instructions.    INCREASE FLUIDS AND FIBER TO AVOID CONSTIPATION.    SURGICAL DRESSING/BATHING: Keep dressings clean, dry and intact.    FOLLOW-UP APPOINTMENT:  A follow-up appointment should be arranged with your doctor in 10-14 days; call to schedule.    You should CALL YOUR PHYSICIAN if you develop:  Fever greater than 101 degrees F.  Pain not relieved by medication, or persistent nausea or vomiting.  Excessive bleeding (blood soaking through dressing) or unexpected drainage from the wound.  Extreme redness or swelling around the incision site, drainage of pus or foul smelling drainage.  Inability to urinate or empty your bladder within 8 hours.    You should call 911 if you develop problems with breathing or chest pain.    If you are unable to contact your doctor or surgical center, you should go to the nearest emergency room or urgent care center.      Physician's  telephone #: Dr. Mascorro [phone]    If any questions arise, call your doctor.  If your doctor is not available, please feel free to call the Surgical Center at [phone].  The Center is open Monday through Friday from 7AM to 7PM.      You can also call the HEALTH HOTLINE open 24 hours/day, 7 days/week and speak to a nurse at [phone], or toll free at [phone].    A registered nurse may call you a few days after your surgery to see how you are doing after your procedure.    MEDICATIONS: Resume taking daily medication.  Take prescribed pain medication with food.  If no medication is prescribed, you may take non-aspirin pain medication if needed.  PAIN MEDICATION CAN BE VERY CONSTIPATING.  Take a stool softener or laxative such as senokot, pericolace, or milk of magnesia if needed.    Prescription given pre-operatively.      Last pain medication given: None.    If your physician has prescribed pain medication that includes Acetaminophen (Tylenol), do not take additional Acetaminophen (Tylenol) while taking the prescribed medication.    Depression / Suicide Risk    As you are discharged from this [facility] facility, it is important to learn how to keep safe from harming yourself.    Recognize the warning signs:  · Abrupt changes in personality, positive or negative- including increase in energy   · Giving away possessions  · Change in eating patterns- significant weight changes-  positive or negative  · Change in sleeping patterns- unable to sleep or sleeping all the time   · Unwillingness or inability to communicate  · Depression  · Unusual sadness, discouragement and loneliness  · Talk of wanting to die  · Neglect of personal appearance   · Rebelliousness- reckless behavior  · Withdrawal from people/activities they love  · Confusion- inability to concentrate     If you or a loved one observes any of these behaviors or has concerns about self-harm, here's what you can do:  · Talk about it-  your feelings and reasons for harming yourself  · Remove any means that you might use to hurt yourself (examples: pills, rope, extension cords, firearm)  · Get professional help from the community (Mental Health, Substance Abuse, psychological counseling)  · Do not be alone:Call your Safe Contact- someone whom you trust who will be there for you.  · Call your local CRISIS HOTLINE 473-6801 or 385-428-3647  · Call your local Children's Mobile Crisis Response Team [place] (460) 410-9153 or www.[employer]  · Call the toll free National Suicide Prevention Hotlines   · National Suicide Prevention Lifeline 739-601-ITRI (6688)  · National Hope Line Network 800-SUICIDE (556-7219)

## 2019-10-16 NOTE — OR NURSING
"0900- Patient allergies and NPO status verified, home medication reconciliation completed and belongings secured. Patient verbalizes understanding of pain scale, expected course of stay and plan of care. Surgical site verified with patient. Patient and family given home care instructions sheet for discharge planning. IV access established. Sequentials placed on legs.  930- Pt recently had biopsies taken from [place] and back.  Tegaderms placed over stitches on back.  Prep washed off of right arm d/t Pt states \"burning\"    "

## 2019-10-16 NOTE — OR NURSING
1300:  Pt brought over to discharge area via gurney, no pain or nausea, awake and alert. Pt requested hsuband help her dress. Explained to pt that we were concerned with her safety getting dressed and into the recliner and that CNA would help. She agreed that it was OK for CNA to dress her.  1346: Called pt's [facility] into [facility] on [address]. Presciption placed in the chart.  1350: Helped pt to restroom, tolerated it well  1359: D/Marcos to care of family post uneventful stay in PACU 2.   :

## 2019-10-16 NOTE — ANESTHESIA PROCEDURE NOTES
Airway  Date/Time: 10/16/2019 10:48 AM  Performed by: Mabel Carrera M.D.  Authorized by: Mabel Carrera M.D.     Location:  OR  Urgency:  Elective  Indications for Airway Management:  Anesthesia  Spontaneous Ventilation: absent    Sedation Level:  Deep  Preoxygenated: Yes    MILS Maintained Throughout: Yes    Mask Difficulty Assessment:  0 - not attempted  Final Airway Type:  Supraglottic airway  Final Supraglottic Airway:  Standard LMA  SGA Size:  3  Number of Attempts at Approach:  1

## 2019-10-16 NOTE — ANESTHESIA TIME REPORT
Anesthesia Start and Stop Event Times     Date Time Event    10/16/2019 1010 Ready for Procedure     1043 Anesthesia Start     1200 Anesthesia Stop        Responsible Staff  10/16/19    Name Role Begin End    Mabel Carrera M.D. Anesth 1043 1200        Preop Diagnosis (Free Text):  Pre-op Diagnosis     OA OF CARPOMETACARPAL JOINT OF THE THUMB        Preop Diagnosis (Codes):    Post op Diagnosis  Osteoarthritis of carpometacarpal (CMC) joint of right thumb      Premium Reason  Non-Premium    Comments:

## 2019-10-16 NOTE — ANESTHESIA POSTPROCEDURE EVALUATION
Patient: Lupe Webb    Procedure Summary     Date:  10/16/19 Room / Location:   OR 02 / SURGERY [facility]    Anesthesia Start:  1043 Anesthesia Stop:  1200    Procedures:       ARTHROPLASTY, FINGER - THUMB CARPOMETACARPAL WITH STABLYX IMPLANT (Right Hand)      INJECTION, JOINT, DIAGNOSTIC - CARPOMETACARPAL INJECTION (Left Hand) Diagnosis:  (OA OF CARPOMETACARPAL JOINT OF THE THUMB)    Surgeon:  Anderson Mascorro M.D. Responsible Provider:  Mabel Carrera M.D.    Anesthesia Type:  general ASA Status:  2          Final Anesthesia Type: general  Last vitals  BP   Blood Pressure: (!) 99/47    Temp   37.2 °C (99 °F)    Pulse   Pulse: 60, Heart Rate (Monitored): 71   Resp   16    SpO2   97 %      Anesthesia Post Evaluation    Patient location during evaluation: PACU  Patient participation: complete - patient participated  Level of consciousness: awake  Pain score: 0    Airway patency: patent  Anesthetic complications: no  Cardiovascular status: adequate  Respiratory status: acceptable  Hydration status: acceptable    PONV: none           Nurse Pain Score: 0 (NPRS)

## 2019-10-16 NOTE — OP REPORT
DATE OF SERVICE:  10/16/2016     PREOPERATIVE DIAGNOSIS:  Right thumb carpometacarpal osteoarthritis.     POSTOPERATIVE DIAGNOSIS:  Right thumb carpometacarpal osteoarthritis.     SURGERY PERFORMED:  Right thumb CMC arthroplasty with implant.     SURGEON:  Anderson Mascorro MD     ANESTHESIA:  General.     ASSISTANT:  None.     IMPLANTS:  [employer] Stablyx size 2.     TOURNIQUET TIME:  51 minutes.     TOURNIQUET PRESSURE:  250 mmHg.     COMPLICATIONS:  None.     INDICATIONS FOR PROCEDURE:  Ms. Webb is a very pleasant patient who   has had persistent right thumb pain that has been recalcitrant to   nonoperative treatments including rest, NSAIDs, activity modification and   steroid injections and splinting.  For these reasons, the decision was made to   take them to the operating room today for the above-mentioned procedure.     DESCRIPTION OF PROCEDURE:  On the day of surgery, the patient was seen in the   preoperative area where informed consent was obtained with all risks and   benefits of the procedure explained and all questions answered.  They wished to   proceed with the surgery.  The proper site was marked.  The patient was subsequently   taken to the operating room and placed in the supine position with all bony   prominences well padded.  General endotracheal anesthesia was induced.  A   tourniquet was placed on the operative extremity and it was then prepped and   draped in the usual sterile fashion.      A time out was performed with all persons in attendance agreeing on the proper patient, proper surgical site and proper surgery performed.     An Esmarch was used to exsanguinate the right upper extremity and the   tourniquet was insufflated to 250 mmHg.  A standard dorsal approach to the CMC   joint was performed.  Sharp and blunt dissection was taken down to the level   of the extensor pollicis brevis tendon.  Care was taken to isolate and retract   the sensory nerve throughout the remainder  of the case.  The tendon sheath   was incised using a [place] blade and the tendon was then retracted and   protected throughout the remainder of the case as well.     A hypodermic needle was placed into the CMC joint and fluoroscopy was used to   verify positioning and placement of this needle.  This was then used to help   localize an ulnar based capsulotomy.  This was performed using Bovie   electrocautery.  Once the joint was visualized, the proximal 6 mm of the thumb   metacarpal base was removed using an oscillating saw.  A [place] blade was   then used to perform a volar capsulotomy to expose the volar aspect of the   trapezium.     Attention was then turned to trapezioplasty and osteophytes were removed using   an osteotome, rasp and contouring tools provided in the Stablyx tray.    Fluoroscopy was then used to verify acceptable osteophyte removal and   contouring.  A trial was then used to verify acceptable sizing and this was   verified using fluoroscopy.     Attention was then turned to the metacarpal.  An awl was placed in the center   of the metacarpal and I then broached up to the appropriate size for the   metacarpal.  I then placed a trial implant, which was then reduced and the   thumb was taken through a full range of motion.  There was excellent motion,   being able to bring the thumb to the base of the ring finger, full abduction   and full extension.  The joint was stable.  Fluoroscopy was used to verify   acceptable positioning and no hinging of the implant either.  The trial   implant was then removed.     The wound was thoroughly irrigated with copious amounts of normal saline.  The   final implant was then placed and the joint was reduced.  The thumb was again   taken through a full range of motion and he had similar motion to the trial   implant.  The capsulotomy was then closed using 3-0 Vicryl.  The subcutaneous   tissues were closed using 3-0 Monocryl and the skin was closed using 4-0  nylon   in a horizontal mattress fashion.     The wound was then dressed with Xeroform, 4x4, sterile cast padding and a   well-padded thumb opponens splint was then placed.  The tourniquet was   deflated.  General endotracheal anesthesia was reversed.  The patient was subsequently taken to the PACU in good and stable condition.     DISPOSITION:  The patient will be discharged to home on a regular diet.    One-pound lifting restriction to the left upper extremity.  The splint should   remain clean, dry and intact until they return to the clinic in 10-14 days.  The paitent   should apply ice and elevate as much as possible.  A prescription was given   for Norco for pain control and they should not drive or operate machinery while   taking this medication.

## 2019-10-16 NOTE — ANESTHESIA QCDR
2019 [facility] Clinical Data Registry (for Quality Improvement)     Postoperative nausea/vomiting risk protocol (Adult = 18 yrs and Pediatric 3-17 yrs)- (430 and 463)  General inhalation anesthetic (NOT TIVA) with PONV risk factors: Yes  Provision of anti-emetic therapy with at least 2 different classes of agents: Yes   Patient DID NOT receive anti-emetic therapy and reason is documented in Medical Record:  N/A    Multimodal Pain Management- (AQI59)  Patient undergoing Elective Surgery (i.e. Outpatient, or ASC, or Prescheduled Surgery prior to Hospital Admission): Yes  Use of Multimodal Pain Management, two or more drugs and/or interventions, NOT including systemic opioids: Yes   Exception: Documented allergy to multiple classes of analgesics:  N/A    PACU assessment of acute postoperative pain prior to Anesthesia Care End- Applies to Patients Age = 18- (ABG7)  Initial PACU pain score is which of the following: < 7/10  Patient unable to report pain score: N/A    Post-anesthetic transfer of care checklist/protocol to PACU/ICU- (426 and 427)  Upon conclusion of case, patient transferred to which of the following locations: PACU/Non-ICU  Use of transfer checklist/protocol: Yes  Exclusion: Service Performed in Patient Hospital Room (and thus did not require transfer): N/A    PACU Reintubation- (AQI31)  General anesthesia requiring endotracheal intubation (ETT) along with subsequent extubation in OR or PACU: No  Required reintubation in the PACU: N/A  Extubation was a planned trial documented in the medical record prior to removal of the original airway device: N/A    Unplanned admission to ICU related to anesthesia service up through end of PACU care- (MD51)  Unplanned admission to ICU (not initially anticipated at anesthesia start time): No

## 2019-10-16 NOTE — ANESTHESIA PREPROCEDURE EVALUATION
65 yo w/OA of CMC joint of right thumb    Relevant Problems   ANESTHESIA (within normal limits)      NEURO   (+) History of compression fracture of spine   (+) History of immunosuppression      CARDIAC   (+) Raynaud's disease without gangrene      GI   (+) Gastroesophageal reflux disease without esophagitis      Other   (+) Chronic pain syndrome   (+) DDD (degenerative disc disease), cervical   (+) Lupus arthritis (HCC)   (+) Osteoarthritis of multiple joints   (+) Sjogren's syndrome (HCC)     (+) RA, shaking on anesthesia emergence    Denies CAD, CP/SOB, CVA, HTN, DM, LUNG/LIVER/KIDNEY DZ, URI    Physical Exam    Airway   Mallampati: III  TM distance: >3 FB  Neck ROM: full       Cardiovascular   Rhythm: regular  Rate: normal  (-) murmur     Dental - normal exam         Pulmonary   Breath sounds clear to auscultation     Abdominal    Neurological - normal exam                 Anesthesia Plan    ASA 2       Plan - general       Airway plan will be LMA        Induction: intravenous    Postoperative Plan: Postoperative administration of opioids is intended.    Pertinent diagnostic labs and testing reviewed    Informed Consent:    Anesthetic plan and risks discussed with patient.

## 2019-10-16 NOTE — OR NURSING
1158: To PACU post ARTHROPLASTY, FINGER - THUMB CARPOMETACARPAL WITH STABLYX IMPLANT - Right. Pt is extubated, breathing is spontaneous and unlabored. Fingers are pink and warm w/ brisk cap refill.  1212: Pt more awake. Denies pain or nausea.  1244: No change. Meets criteria for stage ll.

## 2019-11-14 DIAGNOSIS — M15.9 PRIMARY OSTEOARTHRITIS INVOLVING MULTIPLE JOINTS: ICD-10-CM

## 2019-11-14 DIAGNOSIS — G89.4 CHRONIC PAIN SYNDROME: ICD-10-CM

## 2019-11-14 DIAGNOSIS — M46.99 INFLAMMATORY SPONDYLOPATHY OF MULTIPLE SITES IN SPINE (HCC): ICD-10-CM

## 2019-11-14 DIAGNOSIS — M32.9 LUPUS ARTHRITIS (HCC): ICD-10-CM

## 2019-12-16 ENCOUNTER — OUTPATIENT INFUSION SERVICES (OUTPATIENT)
Dept: ONCOLOGY | Facility: MEDICAL CENTER | Age: 64
End: 2019-12-16
Attending: INTERNAL MEDICINE
Payer: MEDICARE

## 2019-12-16 VITALS
HEART RATE: 78 BPM | SYSTOLIC BLOOD PRESSURE: 133 MMHG | DIASTOLIC BLOOD PRESSURE: 50 MMHG | BODY MASS INDEX: 19.19 KG/M2 | OXYGEN SATURATION: 97 % | HEIGHT: 62 IN | TEMPERATURE: 97.9 F | RESPIRATION RATE: 18 BRPM | WEIGHT: 104.28 LBS

## 2019-12-16 DIAGNOSIS — M06.9 RHEUMATOID ARTHRITIS OF HIP, UNSPECIFIED LATERALITY, UNSPECIFIED RHEUMATOID FACTOR PRESENCE: ICD-10-CM

## 2019-12-16 LAB
25(OH)D3 SERPL-MCNC: 66 NG/ML (ref 30–100)
BASOPHILS # BLD AUTO: 0.8 % (ref 0–1.8)
BASOPHILS # BLD: 0.05 K/UL (ref 0–0.12)
EOSINOPHIL # BLD AUTO: 0.07 K/UL (ref 0–0.51)
EOSINOPHIL NFR BLD: 1.1 % (ref 0–6.9)
ERYTHROCYTE [DISTWIDTH] IN BLOOD BY AUTOMATED COUNT: 42.5 FL (ref 35.9–50)
FERRITIN SERPL-MCNC: 33.6 NG/ML (ref 10–291)
HCT VFR BLD AUTO: 39.9 % (ref 37–47)
HGB BLD-MCNC: 13.8 G/DL (ref 12–16)
IMM GRANULOCYTES # BLD AUTO: 0.01 K/UL (ref 0–0.11)
IMM GRANULOCYTES NFR BLD AUTO: 0.2 % (ref 0–0.9)
IRON SATN MFR SERPL: 48 % (ref 15–55)
IRON SERPL-MCNC: 154 UG/DL (ref 40–170)
LYMPHOCYTES # BLD AUTO: 1.17 K/UL (ref 1–4.8)
LYMPHOCYTES NFR BLD: 19 % (ref 22–41)
MCH RBC QN AUTO: 34.8 PG (ref 27–33)
MCHC RBC AUTO-ENTMCNC: 34.6 G/DL (ref 33.6–35)
MCV RBC AUTO: 100.5 FL (ref 81.4–97.8)
MONOCYTES # BLD AUTO: 0.45 K/UL (ref 0–0.85)
MONOCYTES NFR BLD AUTO: 7.3 % (ref 0–13.4)
NEUTROPHILS # BLD AUTO: 4.41 K/UL (ref 2–7.15)
NEUTROPHILS NFR BLD: 71.6 % (ref 44–72)
NRBC # BLD AUTO: 0 K/UL
NRBC BLD-RTO: 0 /100 WBC
PLATELET # BLD AUTO: 340 K/UL (ref 164–446)
PMV BLD AUTO: 10.6 FL (ref 9–12.9)
RBC # BLD AUTO: 3.97 M/UL (ref 4.2–5.4)
TIBC SERPL-MCNC: 321 UG/DL (ref 250–450)
TSH SERPL DL<=0.005 MIU/L-ACNC: 1.33 UIU/ML (ref 0.38–5.33)
WBC # BLD AUTO: 6.2 K/UL (ref 4.8–10.8)

## 2019-12-16 PROCEDURE — 96375 TX/PRO/DX INJ NEW DRUG ADDON: CPT

## 2019-12-16 PROCEDURE — 85025 COMPLETE CBC W/AUTO DIFF WBC: CPT

## 2019-12-16 PROCEDURE — A9270 NON-COVERED ITEM OR SERVICE: HCPCS | Performed by: INTERNAL MEDICINE

## 2019-12-16 PROCEDURE — 84443 ASSAY THYROID STIM HORMONE: CPT

## 2019-12-16 PROCEDURE — 82784 ASSAY IGA/IGD/IGG/IGM EACH: CPT

## 2019-12-16 PROCEDURE — 83550 IRON BINDING TEST: CPT

## 2019-12-16 PROCEDURE — 700102 HCHG RX REV CODE 250 W/ 637 OVERRIDE(OP): Performed by: INTERNAL MEDICINE

## 2019-12-16 PROCEDURE — 96413 CHEMO IV INFUSION 1 HR: CPT

## 2019-12-16 PROCEDURE — 83540 ASSAY OF IRON: CPT

## 2019-12-16 PROCEDURE — 96415 CHEMO IV INFUSION ADDL HR: CPT

## 2019-12-16 PROCEDURE — 82306 VITAMIN D 25 HYDROXY: CPT

## 2019-12-16 PROCEDURE — 82728 ASSAY OF FERRITIN: CPT

## 2019-12-16 PROCEDURE — 700111 HCHG RX REV CODE 636 W/ 250 OVERRIDE (IP): Performed by: INTERNAL MEDICINE

## 2019-12-16 PROCEDURE — 700105 HCHG RX REV CODE 258: Performed by: INTERNAL MEDICINE

## 2019-12-16 RX ORDER — ACETAMINOPHEN 325 MG/1
650 TABLET ORAL ONCE
Status: COMPLETED | OUTPATIENT
Start: 2019-12-16 | End: 2019-12-16

## 2019-12-16 RX ORDER — METHYLPREDNISOLONE SODIUM SUCCINATE 125 MG/2ML
100 INJECTION, POWDER, LYOPHILIZED, FOR SOLUTION INTRAMUSCULAR; INTRAVENOUS ONCE
Status: COMPLETED | OUTPATIENT
Start: 2019-12-16 | End: 2019-12-16

## 2019-12-16 RX ADMIN — DIPHENHYDRAMINE HYDROCHLORIDE 25 MG: 50 INJECTION INTRAMUSCULAR; INTRAVENOUS at 11:50

## 2019-12-16 RX ADMIN — RITUXIMAB 1000 MG: 10 INJECTION, SOLUTION INTRAVENOUS at 12:15

## 2019-12-16 RX ADMIN — ACETAMINOPHEN 650 MG: 325 TABLET ORAL at 11:41

## 2019-12-16 RX ADMIN — METHYLPREDNISOLONE SODIUM SUCCINATE 100 MG: 125 INJECTION, POWDER, FOR SOLUTION INTRAMUSCULAR; INTRAVENOUS at 11:45

## 2019-12-16 NOTE — PROGRESS NOTES
Chemotherapy Verification - PRIMARY RN      Height = 1.57 m  Weight = 47.3 kg  BSA = 1.44 m2       Medication: Rituximab  Dose: 1000 mg  Calculated Dose: 1000 mg (set dose)                            (In mg/m2, AUC, mg/kg)       I confirm this process was performed independently with the BSA and all final chemotherapy dosing calculations congruent.  Any discrepancies of 10% or greater have been addressed with the chemotherapy pharmacist. The resolution of the discrepancy has been documented in the EPIC progress notes.

## 2019-12-16 NOTE — PROGRESS NOTES
Chemotherapy Verification - SECONDARY RN       Height = 61.81 inches  Weight = 47.3 kg  BSA = 1.43 m2         Medication: Ritxuan  Dose: 1000 mg  Calculated Dose: 1000 mg, set dose, no calculation required.                             (In mg/m2, AUC, mg/kg)        I confirm that this process was performed independently.

## 2019-12-17 NOTE — PROGRESS NOTES
Patient presents to clinic for Rituxan infusion. Pt denies any recent infections, fevers, or chills. PIV established, brisk blood return noted. Labs drawn as ordered. Labs reviewed and verified, pt meets criteria for treatment today. Premeds administered as ordered. Rituxan infused as ordered starting at initial rate of 50 mg/hr and increasing every 30 minutes to a max rate of 400 mg/hr. Pt tolerated infusion well with no s/s of adverse reaction. PIV flushed, catheter removed intact. Pt discharged in stable, ambulatory condition.

## 2019-12-18 LAB — IGA SERPL-MCNC: 130 MG/DL (ref 68–408)

## 2019-12-30 ENCOUNTER — OUTPATIENT INFUSION SERVICES (OUTPATIENT)
Dept: ONCOLOGY | Facility: MEDICAL CENTER | Age: 64
End: 2019-12-30
Attending: INTERNAL MEDICINE
Payer: MEDICARE

## 2019-12-30 VITALS
DIASTOLIC BLOOD PRESSURE: 63 MMHG | OXYGEN SATURATION: 99 % | HEIGHT: 62 IN | BODY MASS INDEX: 19.55 KG/M2 | TEMPERATURE: 97 F | SYSTOLIC BLOOD PRESSURE: 124 MMHG | RESPIRATION RATE: 18 BRPM | HEART RATE: 67 BPM | WEIGHT: 106.26 LBS

## 2019-12-30 PROCEDURE — 96375 TX/PRO/DX INJ NEW DRUG ADDON: CPT

## 2019-12-30 PROCEDURE — 700105 HCHG RX REV CODE 258: Performed by: INTERNAL MEDICINE

## 2019-12-30 PROCEDURE — 700102 HCHG RX REV CODE 250 W/ 637 OVERRIDE(OP): Performed by: INTERNAL MEDICINE

## 2019-12-30 PROCEDURE — 96415 CHEMO IV INFUSION ADDL HR: CPT

## 2019-12-30 PROCEDURE — A9270 NON-COVERED ITEM OR SERVICE: HCPCS | Performed by: INTERNAL MEDICINE

## 2019-12-30 PROCEDURE — 96413 CHEMO IV INFUSION 1 HR: CPT

## 2019-12-30 PROCEDURE — 700111 HCHG RX REV CODE 636 W/ 250 OVERRIDE (IP): Performed by: INTERNAL MEDICINE

## 2019-12-30 RX ORDER — ACETAMINOPHEN 325 MG/1
650 TABLET ORAL ONCE
Status: COMPLETED | OUTPATIENT
Start: 2019-12-30 | End: 2019-12-30

## 2019-12-30 RX ORDER — METHYLPREDNISOLONE SODIUM SUCCINATE 125 MG/2ML
100 INJECTION, POWDER, LYOPHILIZED, FOR SOLUTION INTRAMUSCULAR; INTRAVENOUS ONCE
Status: COMPLETED | OUTPATIENT
Start: 2019-12-30 | End: 2019-12-30

## 2019-12-30 RX ADMIN — DIPHENHYDRAMINE HYDROCHLORIDE 25 MG: 50 INJECTION INTRAMUSCULAR; INTRAVENOUS at 10:50

## 2019-12-30 RX ADMIN — METHYLPREDNISOLONE SODIUM SUCCINATE 62.5 MG: 125 INJECTION, POWDER, FOR SOLUTION INTRAMUSCULAR; INTRAVENOUS at 10:49

## 2019-12-30 RX ADMIN — ACETAMINOPHEN 650 MG: 325 TABLET ORAL at 10:49

## 2019-12-30 RX ADMIN — RITUXIMAB 1000 MG: 10 INJECTION, SOLUTION INTRAVENOUS at 11:17

## 2019-12-30 NOTE — PROGRESS NOTES
Chemotherapy Verification - PRIMARY RN      Height = 158 cm  Weight = 48.2 kg  BSA = 1.45 m2       Medication: Rituxan  Dose: 1000 mg set dose  Calculated Dose: 1000 mg                             (In mg/m2, AUC, mg/kg)     I confirm this process was performed independently with the BSA and all final chemotherapy dosing calculations congruent.  Any discrepancies of 10% or greater have been addressed with the chemotherapy pharmacist. The resolution of the discrepancy has been documented in the EPIC progress notes.

## 2019-12-30 NOTE — PROGRESS NOTES
Chemotherapy Verification - SECONDARY RN       Height = 158 cm  Weight = 48.2 kg  BSA = 1.45 m2       Medication: Rituximab  Dose: 1000mg (flat dose)  Calculated Dose: 1000 mg                             (In mg/m2, AUC, mg/kg)     I confirm that this process was performed independently.

## 2019-12-30 NOTE — PROGRESS NOTES
Pt arrived ambulatory to IS for day 15 Rituxan for RA.  POC discussed, pt denies active infections today.  PIV started to RFA, blood return confirmed.  Premedications given and were tolerated well.  Rituxan infused via subsequent rate protocol.  Infusion completed without adverse reaction.  PIV flushed, removed, and site covered with gauze and coban.  No additional appointments made at this time, pt to follow up with provider.  Pt discharged from IS in NAD with spouse.

## 2020-02-11 DIAGNOSIS — N95.2 ATROPHIC VAGINITIS: ICD-10-CM

## 2020-02-11 RX ORDER — ESTRADIOL 10 UG/1
10 INSERT VAGINAL
Qty: 25 TAB | Refills: 4 | Status: SHIPPED | OUTPATIENT
Start: 2020-02-11

## 2020-03-10 ENCOUNTER — APPOINTMENT (OUTPATIENT)
Dept: RADIOLOGY | Facility: MEDICAL CENTER | Age: 65
End: 2020-03-10

## 2020-03-11 ENCOUNTER — HOSPITAL ENCOUNTER (OUTPATIENT)
Dept: LAB | Facility: MEDICAL CENTER | Age: 65
End: 2020-03-11
Attending: INTERNAL MEDICINE
Payer: MEDICARE

## 2020-03-11 LAB
BASOPHILS # BLD AUTO: 0.9 % (ref 0–1.8)
BASOPHILS # BLD: 0.06 K/UL (ref 0–0.12)
EOSINOPHIL # BLD AUTO: 0.11 K/UL (ref 0–0.51)
EOSINOPHIL NFR BLD: 1.6 % (ref 0–6.9)
ERYTHROCYTE [DISTWIDTH] IN BLOOD BY AUTOMATED COUNT: 46.8 FL (ref 35.9–50)
ERYTHROCYTE [SEDIMENTATION RATE] IN BLOOD BY WESTERGREN METHOD: <1 MM/HOUR (ref 0–30)
HCT VFR BLD AUTO: 42 % (ref 37–47)
HGB BLD-MCNC: 14 G/DL (ref 12–16)
IMM GRANULOCYTES # BLD AUTO: 0.02 K/UL (ref 0–0.11)
IMM GRANULOCYTES NFR BLD AUTO: 0.3 % (ref 0–0.9)
LYMPHOCYTES # BLD AUTO: 1.98 K/UL (ref 1–4.8)
LYMPHOCYTES NFR BLD: 28.8 % (ref 22–41)
MCH RBC QN AUTO: 33.7 PG (ref 27–33)
MCHC RBC AUTO-ENTMCNC: 33.3 G/DL (ref 33.6–35)
MCV RBC AUTO: 101.2 FL (ref 81.4–97.8)
MONOCYTES # BLD AUTO: 0.74 K/UL (ref 0–0.85)
MONOCYTES NFR BLD AUTO: 10.8 % (ref 0–13.4)
NEUTROPHILS # BLD AUTO: 3.97 K/UL (ref 2–7.15)
NEUTROPHILS NFR BLD: 57.6 % (ref 44–72)
NRBC # BLD AUTO: 0 K/UL
NRBC BLD-RTO: 0 /100 WBC
PLATELET # BLD AUTO: 309 K/UL (ref 164–446)
PMV BLD AUTO: 10.8 FL (ref 9–12.9)
RBC # BLD AUTO: 4.15 M/UL (ref 4.2–5.4)
WBC # BLD AUTO: 6.9 K/UL (ref 4.8–10.8)

## 2020-03-11 PROCEDURE — 85652 RBC SED RATE AUTOMATED: CPT

## 2020-03-11 PROCEDURE — 80053 COMPREHEN METABOLIC PANEL: CPT

## 2020-03-11 PROCEDURE — 85025 COMPLETE CBC W/AUTO DIFF WBC: CPT

## 2020-03-11 PROCEDURE — 82607 VITAMIN B-12: CPT

## 2020-03-11 PROCEDURE — 86140 C-REACTIVE PROTEIN: CPT

## 2020-03-11 PROCEDURE — 36415 COLL VENOUS BLD VENIPUNCTURE: CPT

## 2020-03-12 LAB
ALBUMIN SERPL BCP-MCNC: 4.6 G/DL (ref 3.2–4.9)
ALBUMIN/GLOB SERPL: 2.1 G/DL
ALP SERPL-CCNC: 45 U/L (ref 30–99)
ALT SERPL-CCNC: 20 U/L (ref 2–50)
ANION GAP SERPL CALC-SCNC: 8 MMOL/L (ref 7–16)
AST SERPL-CCNC: 25 U/L (ref 12–45)
BILIRUB SERPL-MCNC: 0.5 MG/DL (ref 0.1–1.5)
BUN SERPL-MCNC: 35 MG/DL (ref 8–22)
CALCIUM SERPL-MCNC: 10 MG/DL (ref 8.5–10.5)
CHLORIDE SERPL-SCNC: 105 MMOL/L (ref 96–112)
CO2 SERPL-SCNC: 27 MMOL/L (ref 20–33)
CREAT SERPL-MCNC: 0.98 MG/DL (ref 0.5–1.4)
CRP SERPL HS-MCNC: 0.12 MG/DL (ref 0–0.75)
GLOBULIN SER CALC-MCNC: 2.2 G/DL (ref 1.9–3.5)
GLUCOSE SERPL-MCNC: 100 MG/DL (ref 65–99)
POTASSIUM SERPL-SCNC: 3.8 MMOL/L (ref 3.6–5.5)
PROT SERPL-MCNC: 6.8 G/DL (ref 6–8.2)
SODIUM SERPL-SCNC: 140 MMOL/L (ref 135–145)
VIT B12 SERPL-MCNC: 1048 PG/ML (ref 211–911)

## 2020-05-22 ENCOUNTER — HOSPITAL ENCOUNTER (OUTPATIENT)
Dept: RADIOLOGY | Facility: MEDICAL CENTER | Age: 65
End: 2020-05-22
Attending: INTERNAL MEDICINE
Payer: MEDICARE

## 2020-05-22 DIAGNOSIS — E78.2 MIXED DYSLIPIDEMIA: ICD-10-CM

## 2020-05-22 DIAGNOSIS — E55.9 VITAMIN D DEFICIENCY: ICD-10-CM

## 2020-05-22 DIAGNOSIS — M81.0 OSTEOPOROSIS, UNSPECIFIED OSTEOPOROSIS TYPE, UNSPECIFIED PATHOLOGICAL FRACTURE PRESENCE: ICD-10-CM

## 2020-05-22 DIAGNOSIS — R53.83 OTHER FATIGUE: ICD-10-CM

## 2020-05-22 DIAGNOSIS — Z79.83 LONG TERM (CURRENT) USE OF BISPHOSPHONATES: ICD-10-CM

## 2020-05-22 DIAGNOSIS — R73.9 HYPERGLYCEMIA: ICD-10-CM

## 2020-05-22 PROCEDURE — 77080 DXA BONE DENSITY AXIAL: CPT

## 2020-06-15 ENCOUNTER — TELEPHONE (OUTPATIENT)
Dept: ONCOLOGY | Facility: MEDICAL CENTER | Age: 65
End: 2020-06-15

## 2020-06-16 ENCOUNTER — OUTPATIENT INFUSION SERVICES (OUTPATIENT)
Dept: ONCOLOGY | Facility: MEDICAL CENTER | Age: 65
End: 2020-06-16
Attending: INTERNAL MEDICINE
Payer: MEDICARE

## 2020-06-16 VITALS
HEIGHT: 62 IN | WEIGHT: 104.72 LBS | TEMPERATURE: 97.7 F | RESPIRATION RATE: 18 BRPM | OXYGEN SATURATION: 100 % | BODY MASS INDEX: 19.27 KG/M2 | HEART RATE: 64 BPM | DIASTOLIC BLOOD PRESSURE: 57 MMHG | SYSTOLIC BLOOD PRESSURE: 117 MMHG

## 2020-06-16 DIAGNOSIS — M32.9 LUPUS ARTHRITIS (HCC): Chronic | ICD-10-CM

## 2020-06-16 LAB
ALBUMIN SERPL BCP-MCNC: 4.3 G/DL (ref 3.2–4.9)
ALBUMIN/GLOB SERPL: 2.4 G/DL
ALP SERPL-CCNC: 42 U/L (ref 30–99)
ALT SERPL-CCNC: 31 U/L (ref 2–50)
ANION GAP SERPL CALC-SCNC: 10 MMOL/L (ref 7–16)
AST SERPL-CCNC: 26 U/L (ref 12–45)
BASOPHILS # BLD AUTO: 0.7 % (ref 0–1.8)
BASOPHILS # BLD: 0.06 K/UL (ref 0–0.12)
BILIRUB SERPL-MCNC: 0.4 MG/DL (ref 0.1–1.5)
BUN SERPL-MCNC: 26 MG/DL (ref 8–22)
CALCIUM SERPL-MCNC: 9.2 MG/DL (ref 8.5–10.5)
CHLORIDE SERPL-SCNC: 103 MMOL/L (ref 96–112)
CO2 SERPL-SCNC: 22 MMOL/L (ref 20–33)
CREAT SERPL-MCNC: 0.81 MG/DL (ref 0.5–1.4)
EOSINOPHIL # BLD AUTO: 0.04 K/UL (ref 0–0.51)
EOSINOPHIL NFR BLD: 0.4 % (ref 0–6.9)
ERYTHROCYTE [DISTWIDTH] IN BLOOD BY AUTOMATED COUNT: 46.4 FL (ref 35.9–50)
GLOBULIN SER CALC-MCNC: 1.8 G/DL (ref 1.9–3.5)
GLUCOSE SERPL-MCNC: 110 MG/DL (ref 65–99)
HCT VFR BLD AUTO: 43 % (ref 37–47)
HGB BLD-MCNC: 14.2 G/DL (ref 12–16)
IMM GRANULOCYTES # BLD AUTO: 0.04 K/UL (ref 0–0.11)
IMM GRANULOCYTES NFR BLD AUTO: 0.4 % (ref 0–0.9)
LYMPHOCYTES # BLD AUTO: 0.93 K/UL (ref 1–4.8)
LYMPHOCYTES NFR BLD: 10.1 % (ref 22–41)
MCH RBC QN AUTO: 33 PG (ref 27–33)
MCHC RBC AUTO-ENTMCNC: 33 G/DL (ref 33.6–35)
MCV RBC AUTO: 100 FL (ref 81.4–97.8)
MONOCYTES # BLD AUTO: 0.65 K/UL (ref 0–0.85)
MONOCYTES NFR BLD AUTO: 7.1 % (ref 0–13.4)
NEUTROPHILS # BLD AUTO: 7.45 K/UL (ref 2–7.15)
NEUTROPHILS NFR BLD: 81.3 % (ref 44–72)
NRBC # BLD AUTO: 0 K/UL
NRBC BLD-RTO: 0 /100 WBC
PLATELET # BLD AUTO: 292 K/UL (ref 164–446)
PMV BLD AUTO: 10.1 FL (ref 9–12.9)
POTASSIUM SERPL-SCNC: 3.7 MMOL/L (ref 3.6–5.5)
PROT SERPL-MCNC: 6.1 G/DL (ref 6–8.2)
RBC # BLD AUTO: 4.3 M/UL (ref 4.2–5.4)
SODIUM SERPL-SCNC: 135 MMOL/L (ref 135–145)
WBC # BLD AUTO: 9.2 K/UL (ref 4.8–10.8)

## 2020-06-16 PROCEDURE — 96413 CHEMO IV INFUSION 1 HR: CPT

## 2020-06-16 PROCEDURE — A9270 NON-COVERED ITEM OR SERVICE: HCPCS | Performed by: INTERNAL MEDICINE

## 2020-06-16 PROCEDURE — 700102 HCHG RX REV CODE 250 W/ 637 OVERRIDE(OP): Performed by: INTERNAL MEDICINE

## 2020-06-16 PROCEDURE — 96375 TX/PRO/DX INJ NEW DRUG ADDON: CPT

## 2020-06-16 PROCEDURE — 96415 CHEMO IV INFUSION ADDL HR: CPT

## 2020-06-16 PROCEDURE — 85025 COMPLETE CBC W/AUTO DIFF WBC: CPT

## 2020-06-16 PROCEDURE — 700111 HCHG RX REV CODE 636 W/ 250 OVERRIDE (IP): Performed by: INTERNAL MEDICINE

## 2020-06-16 PROCEDURE — 700105 HCHG RX REV CODE 258: Performed by: INTERNAL MEDICINE

## 2020-06-16 PROCEDURE — 80053 COMPREHEN METABOLIC PANEL: CPT

## 2020-06-16 RX ORDER — PREDNISONE 5 MG/1
5 TABLET ORAL DAILY
COMMUNITY

## 2020-06-16 RX ORDER — ACETAMINOPHEN 325 MG/1
650 TABLET ORAL ONCE
Status: COMPLETED | OUTPATIENT
Start: 2020-06-16 | End: 2020-06-16

## 2020-06-16 RX ORDER — METHYLPREDNISOLONE SODIUM SUCCINATE 125 MG/2ML
100 INJECTION, POWDER, LYOPHILIZED, FOR SOLUTION INTRAMUSCULAR; INTRAVENOUS ONCE
Status: COMPLETED | OUTPATIENT
Start: 2020-06-16 | End: 2020-06-16

## 2020-06-16 RX ADMIN — DIPHENHYDRAMINE HYDROCHLORIDE 25 MG: 50 INJECTION, SOLUTION INTRAMUSCULAR; INTRAVENOUS at 11:36

## 2020-06-16 RX ADMIN — ACETAMINOPHEN 650 MG: 325 TABLET ORAL at 11:35

## 2020-06-16 RX ADMIN — METHYLPREDNISOLONE SODIUM SUCCINATE 100 MG: 125 INJECTION, POWDER, FOR SOLUTION INTRAMUSCULAR; INTRAVENOUS at 11:35

## 2020-06-16 RX ADMIN — RITUXIMAB 1000 MG: 10 INJECTION, SOLUTION INTRAVENOUS at 11:59

## 2020-06-16 ASSESSMENT — FIBROSIS 4 INDEX: FIB4 SCORE: 1.18

## 2020-06-16 NOTE — PROGRESS NOTES
Pt arrived ambulatory to IS for day 1 Rituxan for RA.  POC discussed.  PIV started to LFA, blood return confirmed and labs drawn per pharmacy protocol.  Results reviewed, pt appropriate for treatment today.  Premedications given, tolerated well.  Rituxan titrated per subsequent rate protocol (last infusion within 6 months) without adverse reaction.  PIV flushed, removed, and site covered with gauze and coban.  Next appointment confirmed.  Pt discharged from IS in NAD under self care.

## 2020-06-16 NOTE — PROGRESS NOTES
Chemotherapy Verification - PRIMARY RN      Height = 158 cm  Weight = 47.5 kg  BSA = 1.44 m2       Medication: Rituxan   Dose: 1000 mg set dose   Calculated Dose: 1000 mg                             (In mg/m2, AUC, mg/kg)     I confirm this process was performed independently with the BSA and all final chemotherapy dosing calculations congruent.  Any discrepancies of 10% or greater have been addressed with the chemotherapy pharmacist. The resolution of the discrepancy has been documented in the EPIC progress notes.

## 2020-06-16 NOTE — PROGRESS NOTES
Chemotherapy Verification - SECONDARY RN       Height = 62.21 inches  Weight = 47.5 kg  BSA = 1.44 m2       Medication:  Rituxan    Dose:  1000 mg  Calculated Dose: 1000 mg; set dose, no calculation required                             (In mg/m2, AUC, mg/kg)         I confirm that this process was performed independently.

## 2020-06-17 ENCOUNTER — HOSPITAL ENCOUNTER (OUTPATIENT)
Dept: LAB | Facility: MEDICAL CENTER | Age: 65
End: 2020-06-17
Attending: FAMILY MEDICINE
Payer: MEDICARE

## 2020-06-17 ENCOUNTER — HOSPITAL ENCOUNTER (OUTPATIENT)
Dept: LAB | Facility: MEDICAL CENTER | Age: 65
End: 2020-06-17
Attending: INTERNAL MEDICINE
Payer: MEDICARE

## 2020-06-17 DIAGNOSIS — R73.9 HYPERGLYCEMIA: ICD-10-CM

## 2020-06-17 DIAGNOSIS — R53.83 OTHER FATIGUE: ICD-10-CM

## 2020-06-17 DIAGNOSIS — E78.2 MIXED DYSLIPIDEMIA: ICD-10-CM

## 2020-06-17 DIAGNOSIS — E55.9 VITAMIN D DEFICIENCY: ICD-10-CM

## 2020-06-17 LAB
25(OH)D3 SERPL-MCNC: 89 NG/ML (ref 30–100)
25(OH)D3 SERPL-MCNC: 89 NG/ML (ref 30–100)
APPEARANCE UR: CLEAR
BACTERIA #/AREA URNS HPF: NEGATIVE /HPF
BILIRUB UR QL STRIP.AUTO: NEGATIVE
CHOLEST SERPL-MCNC: 219 MG/DL (ref 100–199)
COLOR UR: YELLOW
CREAT UR-MCNC: 23.43 MG/DL
CRP SERPL HS-MCNC: 0.09 MG/DL (ref 0–0.75)
EPI CELLS #/AREA URNS HPF: NEGATIVE /HPF
ERYTHROCYTE [SEDIMENTATION RATE] IN BLOOD BY WESTERGREN METHOD: 1 MM/HOUR (ref 0–30)
FASTING STATUS PATIENT QL REPORTED: NORMAL
GLUCOSE UR STRIP.AUTO-MCNC: NEGATIVE MG/DL
HDLC SERPL-MCNC: 120 MG/DL
HYALINE CASTS #/AREA URNS LPF: NORMAL /LPF
KETONES UR STRIP.AUTO-MCNC: NEGATIVE MG/DL
LDLC SERPL CALC-MCNC: 83 MG/DL
LEUKOCYTE ESTERASE UR QL STRIP.AUTO: ABNORMAL
MICRO URNS: ABNORMAL
NITRITE UR QL STRIP.AUTO: NEGATIVE
PH UR STRIP.AUTO: 7.5 [PH] (ref 5–8)
PROT UR QL STRIP: NEGATIVE MG/DL
PROT UR-MCNC: <4 MG/DL (ref 0–15)
PROT/CREAT UR: NORMAL MG/G (ref 10–107)
RBC # URNS HPF: NORMAL /HPF
RBC UR QL AUTO: NEGATIVE
SP GR UR STRIP.AUTO: 1.01
TRIGL SERPL-MCNC: 82 MG/DL (ref 0–149)
TSH SERPL DL<=0.005 MIU/L-ACNC: 1.48 UIU/ML (ref 0.38–5.33)
UROBILINOGEN UR STRIP.AUTO-MCNC: 0.2 MG/DL
WBC #/AREA URNS HPF: NORMAL /HPF

## 2020-06-17 PROCEDURE — 81001 URINALYSIS AUTO W/SCOPE: CPT

## 2020-06-17 PROCEDURE — 84443 ASSAY THYROID STIM HORMONE: CPT

## 2020-06-17 PROCEDURE — 82306 VITAMIN D 25 HYDROXY: CPT | Mod: 91

## 2020-06-17 PROCEDURE — 83036 HEMOGLOBIN GLYCOSYLATED A1C: CPT | Mod: GA

## 2020-06-17 PROCEDURE — 36415 COLL VENOUS BLD VENIPUNCTURE: CPT

## 2020-06-17 PROCEDURE — 80061 LIPID PANEL: CPT

## 2020-06-17 PROCEDURE — 86140 C-REACTIVE PROTEIN: CPT

## 2020-06-17 PROCEDURE — 82306 VITAMIN D 25 HYDROXY: CPT

## 2020-06-17 PROCEDURE — 85652 RBC SED RATE AUTOMATED: CPT

## 2020-06-18 LAB
EST. AVERAGE GLUCOSE BLD GHB EST-MCNC: 97 MG/DL
HBA1C MFR BLD: 5 % (ref 0–5.6)

## 2020-06-30 ENCOUNTER — OUTPATIENT INFUSION SERVICES (OUTPATIENT)
Dept: ONCOLOGY | Facility: MEDICAL CENTER | Age: 65
End: 2020-06-30
Attending: INTERNAL MEDICINE
Payer: MEDICARE

## 2020-06-30 VITALS — BODY MASS INDEX: 19.39 KG/M2 | WEIGHT: 105.38 LBS | HEIGHT: 62 IN | TEMPERATURE: 97.8 F | RESPIRATION RATE: 18 BRPM

## 2020-06-30 PROCEDURE — 96375 TX/PRO/DX INJ NEW DRUG ADDON: CPT

## 2020-06-30 PROCEDURE — 700105 HCHG RX REV CODE 258: Performed by: INTERNAL MEDICINE

## 2020-06-30 PROCEDURE — A9270 NON-COVERED ITEM OR SERVICE: HCPCS | Performed by: INTERNAL MEDICINE

## 2020-06-30 PROCEDURE — 96413 CHEMO IV INFUSION 1 HR: CPT

## 2020-06-30 PROCEDURE — 700111 HCHG RX REV CODE 636 W/ 250 OVERRIDE (IP): Performed by: INTERNAL MEDICINE

## 2020-06-30 PROCEDURE — 96415 CHEMO IV INFUSION ADDL HR: CPT

## 2020-06-30 PROCEDURE — 700102 HCHG RX REV CODE 250 W/ 637 OVERRIDE(OP): Performed by: INTERNAL MEDICINE

## 2020-06-30 RX ORDER — ACETAMINOPHEN 325 MG/1
650 TABLET ORAL ONCE
Status: COMPLETED | OUTPATIENT
Start: 2020-06-30 | End: 2020-06-30

## 2020-06-30 RX ORDER — METHYLPREDNISOLONE SODIUM SUCCINATE 125 MG/2ML
100 INJECTION, POWDER, LYOPHILIZED, FOR SOLUTION INTRAMUSCULAR; INTRAVENOUS ONCE
Status: COMPLETED | OUTPATIENT
Start: 2020-06-30 | End: 2020-06-30

## 2020-06-30 RX ADMIN — ACETAMINOPHEN 650 MG: 325 TABLET ORAL at 11:12

## 2020-06-30 RX ADMIN — RITUXIMAB 1000 MG: 10 INJECTION, SOLUTION INTRAVENOUS at 11:50

## 2020-06-30 RX ADMIN — DIPHENHYDRAMINE HYDROCHLORIDE 25 MG: 50 INJECTION, SOLUTION INTRAMUSCULAR; INTRAVENOUS at 11:13

## 2020-06-30 RX ADMIN — METHYLPREDNISOLONE SODIUM SUCCINATE 100 MG: 125 INJECTION, POWDER, FOR SOLUTION INTRAMUSCULAR; INTRAVENOUS at 11:12

## 2020-06-30 ASSESSMENT — FIBROSIS 4 INDEX: FIB4 SCORE: 1.04

## 2020-06-30 NOTE — PROGRESS NOTES
Chemotherapy Verification - SECONDARY RN     Day 15    Height = 158 cm  Weight = 47.8 kg  BSA = 1.45 m2       Medication: Rituxan  Dose: 1000 mg set dose    Calculated Dose: 1000 mg                                I confirm that this process was performed independently.

## 2020-06-30 NOTE — PROGRESS NOTES
Pt arrived ambulatory to IS for day 15 Rituxan for RA.  POC discussed.  PIV started to LFA, blood return confirmed and labs drawn per pharmacy protocol.  Results reviewed, pt appropriate for treatment today.  Premedications given, tolerated well.  Rituxan titrated per subsequent rate protocol  without adverse reaction.  PIV flushed, removed, and site covered with gauze and coban.  No additional appts made at this time, she will follow up with provider.  Pt discharged from IS in NAD under self care.

## 2020-07-06 ENCOUNTER — OFFICE VISIT (OUTPATIENT)
Dept: INTERNAL MEDICINE | Facility: IMAGING CENTER | Age: 65
End: 2020-07-06
Payer: MEDICARE

## 2020-07-06 VITALS
SYSTOLIC BLOOD PRESSURE: 125 MMHG | BODY MASS INDEX: 19.14 KG/M2 | HEART RATE: 60 BPM | DIASTOLIC BLOOD PRESSURE: 75 MMHG | HEIGHT: 62 IN | WEIGHT: 104 LBS | OXYGEN SATURATION: 99 % | TEMPERATURE: 99.3 F | RESPIRATION RATE: 14 BRPM

## 2020-07-06 DIAGNOSIS — Z71.85 VACCINE COUNSELING: ICD-10-CM

## 2020-07-06 DIAGNOSIS — Z12.39 BREAST CANCER SCREENING: ICD-10-CM

## 2020-07-06 DIAGNOSIS — Z92.25 HISTORY OF IMMUNOSUPPRESSION: Chronic | ICD-10-CM

## 2020-07-06 DIAGNOSIS — G89.4 CHRONIC PAIN SYNDROME: Chronic | ICD-10-CM

## 2020-07-06 DIAGNOSIS — K21.9 GASTROESOPHAGEAL REFLUX DISEASE WITHOUT ESOPHAGITIS: Chronic | ICD-10-CM

## 2020-07-06 DIAGNOSIS — E78.2 MIXED DYSLIPIDEMIA: ICD-10-CM

## 2020-07-06 DIAGNOSIS — M79.2 NEUROPATHIC PAIN: ICD-10-CM

## 2020-07-06 DIAGNOSIS — M15.9 PRIMARY OSTEOARTHRITIS INVOLVING MULTIPLE JOINTS: Chronic | ICD-10-CM

## 2020-07-06 DIAGNOSIS — H61.23 BILATERAL IMPACTED CERUMEN: ICD-10-CM

## 2020-07-06 DIAGNOSIS — K31.84 NONDIABETIC GASTROPARESIS: ICD-10-CM

## 2020-07-06 DIAGNOSIS — M81.0 AGE-RELATED OSTEOPOROSIS WITHOUT CURRENT PATHOLOGICAL FRACTURE: ICD-10-CM

## 2020-07-06 DIAGNOSIS — I73.00 RAYNAUD'S DISEASE WITHOUT GANGRENE: Chronic | ICD-10-CM

## 2020-07-06 DIAGNOSIS — Z00.00 ENCOUNTER FOR MEDICARE ANNUAL WELLNESS EXAM: ICD-10-CM

## 2020-07-06 DIAGNOSIS — M35.09 SJOGREN'S SYNDROME WITH OTHER ORGAN INVOLVEMENT (HCC): Chronic | ICD-10-CM

## 2020-07-06 DIAGNOSIS — M32.9 LUPUS ARTHRITIS (HCC): Chronic | ICD-10-CM

## 2020-07-06 DIAGNOSIS — F51.01 PRIMARY INSOMNIA: ICD-10-CM

## 2020-07-06 PROBLEM — M18.11 ARTHRITIS OF CARPOMETACARPAL (CMC) JOINT OF RIGHT THUMB: Status: RESOLVED | Noted: 2019-10-16 | Resolved: 2020-07-06

## 2020-07-06 PROBLEM — M85.80 OSTEOPENIA: Chronic | Status: RESOLVED | Noted: 2017-04-26 | Resolved: 2020-07-06

## 2020-07-06 PROCEDURE — G0439 PPPS, SUBSEQ VISIT: HCPCS | Performed by: FAMILY MEDICINE

## 2020-07-06 ASSESSMENT — ACTIVITIES OF DAILY LIVING (ADL): BATHING_REQUIRES_ASSISTANCE: 0

## 2020-07-06 ASSESSMENT — PATIENT HEALTH QUESTIONNAIRE - PHQ9: CLINICAL INTERPRETATION OF PHQ2 SCORE: 0

## 2020-07-06 ASSESSMENT — FIBROSIS 4 INDEX: FIB4 SCORE: 1.04

## 2020-07-06 ASSESSMENT — ENCOUNTER SYMPTOMS: GENERAL WELL-BEING: GOOD

## 2020-07-06 NOTE — PROGRESS NOTES
CC:   Medicare Annual Wellness Visit    HPI:  Lupe is a 65 y.o. female, here with her , for her Medicare Annual Wellness Visit. She continues to have a complex medical history and see numerous consultants, both locally and out of state, who manage her ongoing medical care plan. She prior compression fractures of T9/T10 (no history of trauma) in 2017 that have resolved and remain asymptomatic. She has known osteoporosis (most recent dexa scan shows stable bone density) treated with Reclast by [place] and Dr. Cruz in [place].  She has ongoing radicular pain of both forearms and hands and has seen multiple hand specialists. She underwent right thumb Stablyx joint replacement with Dr. Mascorro 10/16/19 to help control chronic pain issues but she is still experiencing pain in that joint area (happily has increased mobility though). She has chronic Sjogren's syndrome and travels to [place] regularly (prior to the pandemic) to visit her tertiary care physician for ongoing salivary gland treatment. She sees Dr. Cruz locally for her rheumatology care (chronic Raynaud's/ Lupus arthritis/ OA/Sjogrens/chronic neuropathy) and has additionally established care with Dr. Jones in [place] and Dr. Mccann at [place] for associated pain management assistance. She has undergone SI injections, underwent L1-L3 ablation July 9, 2019 and prior neck block injections at [place] with ongoing care management from her teams. She has chronic immune suppression due to a history of ongoing steroid use and ongoing high risk medication use. She also has chronic dysthymia that is ongoing due to her multitude of chronic illness with no current medication use (pt choice) and no safety concerns present. She continues to see Dr. Ambrosio in [place] for dermatology care, and suffers from non diabetic gastroparesis due to her chronic immune conditions. She has seen specialists at [facility] as well as [facility] for ongoing  management. She is overdue for screening mammogram and is eligible for Prevnar vaccine. She would like to review labs done 6/17/2020 and endorses 100% medication compliance. She continues to remain physically active as able and is in good spirits today.     Patient Active Problem List    Diagnosis Date Noted   • Age-related osteoporosis without current pathological fracture 07/06/2020   • Nondiabetic gastroparesis 07/06/2020   • Mixed dyslipidemia 07/06/2020   • Primary insomnia 07/06/2020   • Neuropathic pain 07/06/2020   • History of compression fracture of spine 06/28/2019   • History of immunosuppression 05/21/2018   • Radicular pain of right upper extremity 03/13/2018   • DDD (degenerative disc disease), cervical 04/26/2017   • Osteoarthritis of multiple joints 04/26/2017   • Lupus arthritis (HCC) 04/26/2017   • Chronic pain syndrome 04/26/2017   • Gastroesophageal reflux disease without esophagitis 04/26/2017   • Lactose intolerance 04/26/2017   • Raynaud's disease without gangrene 04/26/2017   • Functional diarrhea 04/26/2017   • Sjogren's syndrome (HCC) 04/26/1990     Current Outpatient Medications   Medication Sig Dispense Refill   • Sennosides (SENNA-EX PO) Take  by mouth.     • Mycophenolate Mofetil (CELLCEPT PO) Take  by mouth.     • predniSONE (DELTASONE) 5 MG Tab Take 5 mg by mouth every day.     • estradiol (VAGIFEM) 10 MCG Tab Insert 1 Tab in vagina 2X A WEEK. 25 Tab 4   • Ascorbic Acid (VITAMIN C) 1000 MG Tab Take 1,000 mg by mouth every day.     • Biotin 5000 MCG Tab Take 1 Tab by mouth every day.     • Omega-3 Fatty Acids (SALMON OIL-1000 PO) Take 2,000 mg by mouth every day.     • zolpidem (AMBIEN) 10 MG Tab Take 5 mg by mouth at bedtime as needed.     • zonisamide (ZONEGRAN) 100 MG Cap Take 300 mg by mouth every day. For neuropathy     • riTUXimab (RITUXAN IV) by Intravenous route every 6 months.     • Non Formulary Request Take 100 mcg by mouth every day. MK-7 . Vitamin for bones suggested by  doctor at [place].     • zoledronic Acid (RECLAST) 5 MG/100ML Solution IVPB premix 5 mg by Intravenous route Once Every 12 Months. Indications: Decreased Bone Mineral Density     • CRANBERRY EXTRACT PO Take 1,400 mg by mouth every day.     • therapeutic multivitamin-minerals (THERAGRAN-M) Tab Take 1 Tab by mouth every day.     • CycloSPORINE (RESTASIS OP) 1 Drop by Ophthalmic route 2 Times a Day. Both eyes     • sildenafil (REVATIO) 20 MG tablet Take 20 mg by mouth 3 times a day. For Raynauds syndrome     • Cholecalciferol (VITAMIN D) 2000 units Cap Take 2,000 Units by mouth every day.       No current facility-administered medications for this visit.       Current supplements: see MAR  Chronic narcotic pain medicines: no  Allergies: Bee venom; Betadine [povidone iodine]; Contrast media with iodine [iodine]; Other misc; Shellfish allergy; Lactose; and Penicillins  Exercise: yes  Current social contact/activities: yes  Current mood: good  Advance Directive on file: no    Screening:  Depression Screening    Little interest or pleasure in doing things?  0 - not at all  Feeling down, depressed , or hopeless? 0 - not at all  Patient Health Questionnaire Score: 0    Screening for Cognitive Impairment    Three Minute Recall (river, ely, finger) 2/3    Stone clock face with all 12 numbers and set the hands to show 10 past 11.  Yes    Cognitive concerns identified deferred for follow up unless specifically addressed in assessment and plan.    Fall Risk Assessment    Has the patient had two or more falls in the last year or any fall with injury in the last year?  No    Safety Assessment    Throw rugs on floor.  Yes  Handrails on all stairs.  Yes  Good lighting in all hallways.  Yes  Difficulty hearing.  No  Patient counseled about all safety risks that were identified.    Functional Assessment ADLs    Are there any barriers preventing you from cooking for yourself or meeting nutritional needs?  No.    Are there any  barriers preventing you from driving safely or obtaining transportation?  No.    Are there any barriers preventing you from using a telephone or calling for help?  No.    Are there any barriers preventing you from shopping?  No.    Are there any barriers preventing you from taking care of your own finances?  No.    Are there any barriers preventing you from managing your medications?  No.    Are there any barriers preventing you from showering, bathing or dressing yourself?  No.    Are you currently engaging in any exercise or physical activity?  Yes.     What is your perception of your health?  Good.      Health Maintenance Summary                MAMMOGRAM Overdue 9/23/2018      Done 9/23/2016 MA-SCREEN MAMMO W/ IMPLANTS W/CAD-BILAT    IMM PNEUMOCOCCAL VACCINE: 65+ Years Overdue 3/28/2020      Done 10/18/2017 Imm Admin: Pneumococcal polysaccharide vaccine (PPSV-23)    IMM INFLUENZA Next Due 9/1/2020      Done 9/5/2019 Imm Admin: Influenza Vaccine Quad Inj (Pf)     Patient has more history with this topic...    Annual Wellness Visit Next Due 7/7/2021      Done 7/6/2020 Visit Dx: Encounter for Medicare annual wellness exam     Patient has more history with this topic...    COLONOSCOPY Next Due 8/21/2022      Done 8/21/2012 REFERRAL TO GI FOR COLONOSCOPY    BONE DENSITY Next Due 5/22/2025      Done 5/22/2020 DS-BONE DENSITY STUDY (DEXA)     Patient has more history with this topic...    IMM DTaP/Tdap/Td Vaccine Next Due 9/25/2027      Done 9/25/2017 Imm Admin: Tdap Vaccine          Patient Care Team:  Jina De Santiago M.D. as PCP - General (Family Medicine)  Raul Boss M.D. as Consulting Physician (Endocrinology)      Social History     Tobacco Use   • Smoking status: Never Smoker   • Smokeless tobacco: Never Used   Substance Use Topics   • Alcohol use: No   • Drug use: No     Family History   Problem Relation Age of Onset   • Cancer Mother 78        lymphoma   • Heart Disease Father    • Cancer Sister          breast cancer treated with radiation   • Other Sister         eye melanoma treated     She  has a past medical history of Anesthesia, Arthritis, Arthritis, Cancer (HCC) (1984-present), Chronic pain syndrome, DDD (degenerative disc disease), cervical, Decreased glomerular filtration rate (GFR), Elevated liver function tests, Gastroparesis, GERD (gastroesophageal reflux disease), Heart burn, History of anemia as a child, History of shingles, Indigestion, Lupus ([facility]), Major depressive disorder (3/13/2018), Neuropathy ([facility]), Other specified disorder of intestines, Pain (4/7/14), Raynaud's disease, Shingles outbreak (2014, 2016), Sjogren's syndrome, and Unspecified cataract.   Past Surgical History:   Procedure Laterality Date   • FINGER ARTHROPLASTY Right 10/16/2019    Procedure: ARTHROPLASTY, FINGER - THUMB CARPOMETACARPAL WITH STABLYX IMPLANT;  Surgeon: Anderson Mascorro M.D.;  Location: [facility];  Service: Orthopedics   • JOINT INJECTION DIAGNOSTIC Left 10/16/2019    Procedure: INJECTION, JOINT, DIAGNOSTIC - CARPOMETACARPAL INJECTION;  Surgeon: Anderson Mascorro M.D.;  Location: [facility];  Service: Orthopedics   • OTHER  04/2019    Had general sedation for MRI for entire spine.   • EYE SURGERY Right 05/2018    torn retina repair   • FLAP CLOSURE Right 6/26/2017    Procedure: FLAP CLOSURE;  Surgeon: Darryl Garcia M.D.;  Location: [facility];  Service:    • SCAR EXCISION Right 6/26/2017    Procedure: SCAR EXCISION - BREAST WOUND EXCISION;  Surgeon: Darryl Garcia M.D.;  Location: [facility];  Service:    • BREAST IMPLANT REVISION Bilateral 11/2/2016    Procedure: BREAST IMPLANT - EXCHANGE OF SALINE TO SILICONE WITH REPOSITIONING OF LATERAL FOLDS;  Surgeon: Darryl Garcia M.D.;  Location: [facility];  Service:    • LUMBAR LAMINECTOMY DISKECTOMY Left 8/5/2015    Procedure: POSTERIOR L5-S1 HEMILAMINOTOMY AND MICRODISCECTOMY;   "Surgeon: Kaleb Lucia M.D.;  Location: SURGERY [facility];  Service:    • ENDOSCOPY  2015   • CATARACT PHACO WITH IOL  4/18/2014    Performed by Destiney Mccurdy M.D. at SURGERY SAME DAY [facility]   • CATARACT PHACO WITH IOL  4/9/2014    Performed by Destiney Mccurdy M.D. at SURGERY SAME DAY [facility]   • COLONOSCOPY  2014   • MAMMOPLASTY AUGMENTATION Bilateral 2000   • TUMOR EXCISION WITH BIOPSY  1998    pallet   • BLOCK EPIDURAL STEROID INJECTION  2014-present    Hx of several to cervical and lumbar. 10/11/19-now follows at [place].   • DESTRUCT,NEUROLYTIC AGNT,OTHER  2016, 2019    Cervical and lumbar nerve ablation       ROS:    All positives noted in HPI. All others reviewed and are negative.    Ostomy or other tubes or amputations: no  Chronic oxygen use:no  Last eye exam: UTD per pt report  : denies urinary incontinence; does not interfere with ADLs/ sleep  Gait: stable  Problems with balance/ difficulty walking: no  Hearing: good   Dentition: adequate    Lab results 6/17/2020 reviewed with patient at visit today.     Exam:   /75   Pulse 60   Temp 37.4 °C (99.3 °F) (Temporal)   Resp 14   Ht 1.575 m (5' 2\")   Wt 47.2 kg (104 lb)   SpO2 99%  Body mass index is 19.02 kg/m².    Gen: Well developed; well nourished; no acute distress; age appropriate appearance   HEENT: Normocephalic; atraumatic; PEERLA b/l; sclera clear b/l; b/l external auditory canals WNL; b/l cerumen impaction; nares patent; oropharynx clear; mask in place  Neck: No adenopathy; no thyromegaly  CV: Regular rate and rhythm; S1/ S2 present; no murmur, gallop or rub noted  Pulm: No respiratory distress; clear to ascultation b/l; no wheezing or stridor noted b/l  Abd: Adequate bowel sounds noted; soft and nontender; no rebound, rigidity, nor distention  Extremities: No peripheral edema b/l LE extremities/ no clubbing nor cyanosis noted  Skin: Warm and dry; no rashes noted   Neuro: No focal deficits noted; pt is able to get up out " of chair unassisted and walk forward  Psych: AAOx4; mood and affect are appropriate    Assessment and Plan:  1. Sjogren's syndrome with other organ involvement (HCC)  Stable/ managed by Dr. Cruz in [place] as well as physician in [place]    2. Primary osteoarthritis involving multiple joints  Ongoing issue for patient managed by Dr. Cruz.     3. Lupus arthritis (HCC)  Stable/ patient is to continue current medication use with ongoing management by Dr. Cruz and [facility] in [place].     4. Chronic pain syndrome  Ongoing issue for patient managed by Dr. Jones and [facility].     5. Neuropathic pain  Ongoing issue for patient mainly affecting b/l LE and feet - managed by her pain team.     6. Nondiabetic gastroparesis  Stable/ managed by GI team and patient avoids eating raw vegetables.     7. Gastroesophageal reflux disease without esophagitis  Stable without current PPI use.     8. Raynaud's disease without gangrene  Stable/ managed by Dr. Cruz.     9. Age-related osteoporosis without current pathological fracture  Improved on recent dexa scan managed by her specialty teams.     10. History of immunosuppression  Stable     11. Mixed dyslipidemia  Total cholesterol is now 219 (previously 204) but HDL is 120. Recommend ongoing healthy diet choices, regular exercise as able, and will follow.     12. Primary insomnia  Stable/ well controlled with ongoing Ambien use.     13. Breast cancer screening  Patient has mammogram order at home from her GYN team, and I strongly recommend that she schedule this important imaging exam in the near future.     14. Vaccine counseling  Patient provided with information about Prevnar vaccine - she just received Rituxan infusion and continues on low dose oral steroids. Will defer vaccination today but I encouraged patient to obtain vaccine this calendar year.     15. Bilateral impacted cerumen  April SIMS will flush ears at visit today and  patient encouraged to use Debrox weekly at home.     16. Encounter for Medicare annual wellness exam  Patient is stable and care plan well managed by her team of specialists.        Services needed: no new services needed at this time  Health Care Screening: recommendations as per orders if indicated.  Referrals offered: none  Counseling provided today:  · Prevent falls and reduce trip hazards; Secure or remove rugs if present   · Maintain working fire alarm and carbon monoxide detectors   · Engage in regular physical activity daily and social activities weekly as tolerated

## 2020-07-27 DIAGNOSIS — M54.2 NECK PAIN: ICD-10-CM

## 2020-07-27 DIAGNOSIS — M54.9 ACUTE BACK PAIN, UNSPECIFIED BACK LOCATION, UNSPECIFIED BACK PAIN LATERALITY: ICD-10-CM

## 2020-08-13 ENCOUNTER — TELEPHONE (OUTPATIENT)
Dept: ONCOLOGY | Facility: MEDICAL CENTER | Age: 65
End: 2020-08-13

## 2020-08-14 ENCOUNTER — OUTPATIENT INFUSION SERVICES (OUTPATIENT)
Dept: ONCOLOGY | Facility: MEDICAL CENTER | Age: 65
End: 2020-08-14
Attending: INTERNAL MEDICINE
Payer: MEDICARE

## 2020-08-14 VITALS
HEIGHT: 62 IN | TEMPERATURE: 97.4 F | BODY MASS INDEX: 19.47 KG/M2 | OXYGEN SATURATION: 99 % | HEART RATE: 79 BPM | SYSTOLIC BLOOD PRESSURE: 115 MMHG | RESPIRATION RATE: 17 BRPM | WEIGHT: 105.82 LBS | DIASTOLIC BLOOD PRESSURE: 59 MMHG

## 2020-08-14 DIAGNOSIS — M81.0 AGE-RELATED OSTEOPOROSIS WITHOUT CURRENT PATHOLOGICAL FRACTURE: ICD-10-CM

## 2020-08-14 LAB
ALBUMIN SERPL BCP-MCNC: 4.5 G/DL (ref 3.2–4.9)
ALBUMIN/GLOB SERPL: 2.5 G/DL
ALP SERPL-CCNC: 42 U/L (ref 30–99)
ALT SERPL-CCNC: 13 U/L (ref 2–50)
ANION GAP SERPL CALC-SCNC: 17 MMOL/L (ref 7–16)
AST SERPL-CCNC: 22 U/L (ref 12–45)
BASOPHILS # BLD AUTO: 0.5 % (ref 0–1.8)
BASOPHILS # BLD: 0.05 K/UL (ref 0–0.12)
BILIRUB SERPL-MCNC: 0.3 MG/DL (ref 0.1–1.5)
BUN SERPL-MCNC: 30 MG/DL (ref 8–22)
CA-I BLD ISE-SCNC: 1.13 MMOL/L (ref 1.1–1.3)
CALCIUM SERPL-MCNC: 9.6 MG/DL (ref 8.5–10.5)
CHLORIDE SERPL-SCNC: 103 MMOL/L (ref 96–112)
CO2 SERPL-SCNC: 19 MMOL/L (ref 20–33)
CREAT BLD-MCNC: 1 MG/DL (ref 0.5–1.4)
CREAT SERPL-MCNC: 0.93 MG/DL (ref 0.5–1.4)
CRP SERPL HS-MCNC: 0.08 MG/DL (ref 0–0.75)
EOSINOPHIL # BLD AUTO: 0 K/UL (ref 0–0.51)
EOSINOPHIL NFR BLD: 0 % (ref 0–6.9)
ERYTHROCYTE [DISTWIDTH] IN BLOOD BY AUTOMATED COUNT: 48.6 FL (ref 35.9–50)
GLOBULIN SER CALC-MCNC: 1.8 G/DL (ref 1.9–3.5)
GLUCOSE SERPL-MCNC: 118 MG/DL (ref 65–99)
HCT VFR BLD AUTO: 39.2 % (ref 37–47)
HGB BLD-MCNC: 12.7 G/DL (ref 12–16)
IMM GRANULOCYTES # BLD AUTO: 0.06 K/UL (ref 0–0.11)
IMM GRANULOCYTES NFR BLD AUTO: 0.6 % (ref 0–0.9)
LYMPHOCYTES # BLD AUTO: 0.91 K/UL (ref 1–4.8)
LYMPHOCYTES NFR BLD: 9.4 % (ref 22–41)
MCH RBC QN AUTO: 33.9 PG (ref 27–33)
MCHC RBC AUTO-ENTMCNC: 32.4 G/DL (ref 33.6–35)
MCV RBC AUTO: 104.5 FL (ref 81.4–97.8)
MONOCYTES # BLD AUTO: 0.32 K/UL (ref 0–0.85)
MONOCYTES NFR BLD AUTO: 3.3 % (ref 0–13.4)
NEUTROPHILS # BLD AUTO: 8.38 K/UL (ref 2–7.15)
NEUTROPHILS NFR BLD: 86.2 % (ref 44–72)
NRBC # BLD AUTO: 0 K/UL
NRBC BLD-RTO: 0 /100 WBC
PLATELET # BLD AUTO: 283 K/UL (ref 164–446)
PMV BLD AUTO: 10.2 FL (ref 9–12.9)
POTASSIUM SERPL-SCNC: 4.1 MMOL/L (ref 3.6–5.5)
PROT SERPL-MCNC: 6.3 G/DL (ref 6–8.2)
RBC # BLD AUTO: 3.75 M/UL (ref 4.2–5.4)
SODIUM SERPL-SCNC: 139 MMOL/L (ref 135–145)
WBC # BLD AUTO: 9.7 K/UL (ref 4.8–10.8)

## 2020-08-14 PROCEDURE — 86140 C-REACTIVE PROTEIN: CPT

## 2020-08-14 PROCEDURE — 36415 COLL VENOUS BLD VENIPUNCTURE: CPT

## 2020-08-14 PROCEDURE — 82330 ASSAY OF CALCIUM: CPT

## 2020-08-14 PROCEDURE — 80053 COMPREHEN METABOLIC PANEL: CPT

## 2020-08-14 PROCEDURE — 700111 HCHG RX REV CODE 636 W/ 250 OVERRIDE (IP): Performed by: INTERNAL MEDICINE

## 2020-08-14 PROCEDURE — 85025 COMPLETE CBC W/AUTO DIFF WBC: CPT

## 2020-08-14 PROCEDURE — 82565 ASSAY OF CREATININE: CPT | Mod: XU

## 2020-08-14 PROCEDURE — 96365 THER/PROPH/DIAG IV INF INIT: CPT

## 2020-08-14 RX ORDER — ZOLEDRONIC ACID 5 MG/100ML
5 INJECTION, SOLUTION INTRAVENOUS ONCE
Status: COMPLETED | OUTPATIENT
Start: 2020-08-14 | End: 2020-08-14

## 2020-08-14 RX ADMIN — ZOLEDRONIC ACID 5 MG: 5 INJECTION, SOLUTION INTRAVENOUS at 13:59

## 2020-08-14 ASSESSMENT — FIBROSIS 4 INDEX: FIB4 SCORE: 1.04

## 2020-08-14 NOTE — PROGRESS NOTES
Pt arrived ambulatory to  for Reclast.  POC discussed.  Pt denies active infections or recent/upcoming invasive dental procedures.  PIV started to LAC, blood return confirmed and labs drawn per pharmacy protocol.  Additional paper orders brought in by patient, those were drawn as well.  Results reviewed, pt meets parameters for infusion today.  Reclast given as ordered without adverse reaction.  PIV flushed, removed, and site covered with gauze and coban.  No additional appts made at this time.  Pt discharged from [facility] in NAD under self care.

## 2020-10-15 ENCOUNTER — NON-PROVIDER VISIT (OUTPATIENT)
Dept: INTERNAL MEDICINE | Facility: IMAGING CENTER | Age: 65
End: 2020-10-15
Payer: MEDICARE

## 2020-10-15 DIAGNOSIS — Z23 NEED FOR VACCINATION: ICD-10-CM

## 2020-10-15 PROCEDURE — 90662 IIV NO PRSV INCREASED AG IM: CPT | Performed by: FAMILY MEDICINE

## 2020-10-15 PROCEDURE — G0008 ADMIN INFLUENZA VIRUS VAC: HCPCS | Performed by: FAMILY MEDICINE

## 2020-11-05 DIAGNOSIS — M06.00 SERONEGATIVE RHEUMATOID ARTHRITIS (HCC): ICD-10-CM

## 2020-11-05 RX ORDER — METHYLPREDNISOLONE SODIUM SUCCINATE 125 MG/2ML
100 INJECTION, POWDER, LYOPHILIZED, FOR SOLUTION INTRAMUSCULAR; INTRAVENOUS ONCE
Status: CANCELLED | OUTPATIENT
Start: 2020-11-06 | End: 2020-11-06

## 2020-11-05 RX ORDER — ACETAMINOPHEN 325 MG/1
650 TABLET ORAL ONCE
Status: CANCELLED | OUTPATIENT
Start: 2020-11-06

## 2020-11-06 ENCOUNTER — OUTPATIENT INFUSION SERVICES (OUTPATIENT)
Dept: ONCOLOGY | Facility: MEDICAL CENTER | Age: 65
End: 2020-11-06
Attending: INTERNAL MEDICINE
Payer: MEDICARE

## 2020-11-06 VITALS
BODY MASS INDEX: 18.74 KG/M2 | TEMPERATURE: 97.5 F | DIASTOLIC BLOOD PRESSURE: 50 MMHG | RESPIRATION RATE: 18 BRPM | OXYGEN SATURATION: 98 % | HEART RATE: 65 BPM | HEIGHT: 62 IN | WEIGHT: 101.85 LBS | SYSTOLIC BLOOD PRESSURE: 118 MMHG

## 2020-11-06 DIAGNOSIS — M06.00 SERONEGATIVE RHEUMATOID ARTHRITIS (HCC): ICD-10-CM

## 2020-11-06 PROCEDURE — 96415 CHEMO IV INFUSION ADDL HR: CPT

## 2020-11-06 PROCEDURE — 700111 HCHG RX REV CODE 636 W/ 250 OVERRIDE (IP): Mod: JG | Performed by: INTERNAL MEDICINE

## 2020-11-06 PROCEDURE — 700102 HCHG RX REV CODE 250 W/ 637 OVERRIDE(OP): Performed by: INTERNAL MEDICINE

## 2020-11-06 PROCEDURE — A9270 NON-COVERED ITEM OR SERVICE: HCPCS | Performed by: INTERNAL MEDICINE

## 2020-11-06 PROCEDURE — 96375 TX/PRO/DX INJ NEW DRUG ADDON: CPT

## 2020-11-06 PROCEDURE — 96413 CHEMO IV INFUSION 1 HR: CPT

## 2020-11-06 PROCEDURE — 700105 HCHG RX REV CODE 258: Performed by: INTERNAL MEDICINE

## 2020-11-06 RX ORDER — METHYLPREDNISOLONE SODIUM SUCCINATE 125 MG/2ML
100 INJECTION, POWDER, LYOPHILIZED, FOR SOLUTION INTRAMUSCULAR; INTRAVENOUS ONCE
Status: COMPLETED | OUTPATIENT
Start: 2020-11-06 | End: 2020-11-06

## 2020-11-06 RX ORDER — METHYLPREDNISOLONE SODIUM SUCCINATE 125 MG/2ML
100 INJECTION, POWDER, LYOPHILIZED, FOR SOLUTION INTRAMUSCULAR; INTRAVENOUS ONCE
Status: CANCELLED | OUTPATIENT
Start: 2020-11-20 | End: 2020-11-20

## 2020-11-06 RX ORDER — ACETAMINOPHEN 325 MG/1
650 TABLET ORAL ONCE
Status: CANCELLED | OUTPATIENT
Start: 2020-11-20

## 2020-11-06 RX ORDER — ACETAMINOPHEN 325 MG/1
650 TABLET ORAL ONCE
Status: COMPLETED | OUTPATIENT
Start: 2020-11-06 | End: 2020-11-06

## 2020-11-06 RX ADMIN — DIPHENHYDRAMINE HYDROCHLORIDE 25 MG: 50 INJECTION, SOLUTION INTRAMUSCULAR; INTRAVENOUS at 11:00

## 2020-11-06 RX ADMIN — RITUXIMAB 1000 MG: 10 INJECTION, SOLUTION INTRAVENOUS at 11:45

## 2020-11-06 RX ADMIN — ACETAMINOPHEN 650 MG: 325 TABLET ORAL at 10:35

## 2020-11-06 RX ADMIN — METHYLPREDNISOLONE SODIUM SUCCINATE 100 MG: 125 INJECTION, POWDER, FOR SOLUTION INTRAMUSCULAR; INTRAVENOUS at 10:47

## 2020-11-06 ASSESSMENT — FIBROSIS 4 INDEX: FIB4 SCORE: 1.4

## 2020-11-06 ASSESSMENT — PAIN DESCRIPTION - PAIN TYPE: TYPE: CHRONIC PAIN

## 2020-11-06 NOTE — PROGRESS NOTES
Chemotherapy Verification - SECONDARY RN       Height = 157.5 cm  Weight = 46.2 kg  BSA = 1.42 m2       Medication: Rituxan  Dose: 1,000 mg (set dose)  Calculated Dose: 1,000 mg                             (In mg/m2, AUC, mg/kg)       I confirm that this process was performed independently.

## 2020-11-06 NOTE — PROGRESS NOTES
Chemotherapy Verification - PRYMARY RN       Height = 62.01 in  Weight = 101 lb  BSA = 1.42 m2       Medication: Rituxan Dose: 1000 mg  Calculated Dose: 1000 mg Fixed Dose for RA                            (In mg/m2, AUC, mg/kg)     I confirm that this process was performed independently.

## 2020-11-06 NOTE — PROGRESS NOTES
into Infusions Services for Day 1 of Rituxan for Rheumatoid Arthritis. Pt denied having any new or acute complaints today, reports tolerating past treatments well. PIV started, had + blood return flushed briskly.Pt given Rituxan as prescribed, tolerated well, denied having any complaints during or after infusion. PIV discontinued, bleeding controlled with gauze and coban. Discharge home to self care in [facility]. Appointment confirm for next treatment.

## 2020-11-20 ENCOUNTER — OUTPATIENT INFUSION SERVICES (OUTPATIENT)
Dept: ONCOLOGY | Facility: MEDICAL CENTER | Age: 65
End: 2020-11-20
Attending: INTERNAL MEDICINE
Payer: MEDICARE

## 2020-11-20 VITALS
WEIGHT: 100.09 LBS | HEIGHT: 62 IN | OXYGEN SATURATION: 98 % | TEMPERATURE: 97.8 F | SYSTOLIC BLOOD PRESSURE: 118 MMHG | DIASTOLIC BLOOD PRESSURE: 61 MMHG | HEART RATE: 68 BPM | BODY MASS INDEX: 18.42 KG/M2 | RESPIRATION RATE: 18 BRPM

## 2020-11-20 DIAGNOSIS — M06.00 SERONEGATIVE RHEUMATOID ARTHRITIS (HCC): ICD-10-CM

## 2020-11-20 LAB
25(OH)D3 SERPL-MCNC: 90 NG/ML (ref 30–100)
ALBUMIN SERPL BCP-MCNC: 4.7 G/DL (ref 3.2–4.9)
ALBUMIN/GLOB SERPL: 2.2 G/DL
ALP SERPL-CCNC: 44 U/L (ref 30–99)
ALT SERPL-CCNC: 24 U/L (ref 2–50)
ANION GAP SERPL CALC-SCNC: 12 MMOL/L (ref 7–16)
AST SERPL-CCNC: 22 U/L (ref 12–45)
BASOPHILS # BLD AUTO: 0.3 % (ref 0–1.8)
BASOPHILS # BLD: 0.06 K/UL (ref 0–0.12)
BILIRUB SERPL-MCNC: 0.5 MG/DL (ref 0.1–1.5)
BUN SERPL-MCNC: 28 MG/DL (ref 8–22)
CALCIUM SERPL-MCNC: 9.7 MG/DL (ref 8.5–10.5)
CHLORIDE SERPL-SCNC: 105 MMOL/L (ref 96–112)
CO2 SERPL-SCNC: 23 MMOL/L (ref 20–33)
CREAT SERPL-MCNC: 0.77 MG/DL (ref 0.5–1.4)
CRP SERPL HS-MCNC: 0.13 MG/DL (ref 0–0.75)
EOSINOPHIL # BLD AUTO: 0.02 K/UL (ref 0–0.51)
EOSINOPHIL NFR BLD: 0.1 % (ref 0–6.9)
ERYTHROCYTE [DISTWIDTH] IN BLOOD BY AUTOMATED COUNT: 52.1 FL (ref 35.9–50)
ERYTHROCYTE [SEDIMENTATION RATE] IN BLOOD BY WESTERGREN METHOD: 1 MM/HOUR (ref 0–30)
GLOBULIN SER CALC-MCNC: 2.1 G/DL (ref 1.9–3.5)
GLUCOSE SERPL-MCNC: 100 MG/DL (ref 65–99)
HCT VFR BLD AUTO: 46 % (ref 37–47)
HGB BLD-MCNC: 14.8 G/DL (ref 12–16)
IMM GRANULOCYTES # BLD AUTO: 0.13 K/UL (ref 0–0.11)
IMM GRANULOCYTES NFR BLD AUTO: 0.8 % (ref 0–0.9)
LYMPHOCYTES # BLD AUTO: 0.86 K/UL (ref 1–4.8)
LYMPHOCYTES NFR BLD: 5 % (ref 22–41)
MCH RBC QN AUTO: 33.3 PG (ref 27–33)
MCHC RBC AUTO-ENTMCNC: 32.2 G/DL (ref 33.6–35)
MCV RBC AUTO: 103.6 FL (ref 81.4–97.8)
MONOCYTES # BLD AUTO: 1.31 K/UL (ref 0–0.85)
MONOCYTES NFR BLD AUTO: 7.6 % (ref 0–13.4)
NEUTROPHILS # BLD AUTO: 14.8 K/UL (ref 2–7.15)
NEUTROPHILS NFR BLD: 86.2 % (ref 44–72)
NRBC # BLD AUTO: 0 K/UL
NRBC BLD-RTO: 0 /100 WBC
PLATELET # BLD AUTO: 295 K/UL (ref 164–446)
PMV BLD AUTO: 9.9 FL (ref 9–12.9)
POTASSIUM SERPL-SCNC: 3.7 MMOL/L (ref 3.6–5.5)
PROT SERPL-MCNC: 6.8 G/DL (ref 6–8.2)
RBC # BLD AUTO: 4.44 M/UL (ref 4.2–5.4)
SODIUM SERPL-SCNC: 140 MMOL/L (ref 135–145)
WBC # BLD AUTO: 17.2 K/UL (ref 4.8–10.8)

## 2020-11-20 PROCEDURE — 700111 HCHG RX REV CODE 636 W/ 250 OVERRIDE (IP): Performed by: INTERNAL MEDICINE

## 2020-11-20 PROCEDURE — 700102 HCHG RX REV CODE 250 W/ 637 OVERRIDE(OP): Performed by: INTERNAL MEDICINE

## 2020-11-20 PROCEDURE — A9270 NON-COVERED ITEM OR SERVICE: HCPCS | Performed by: INTERNAL MEDICINE

## 2020-11-20 PROCEDURE — 96415 CHEMO IV INFUSION ADDL HR: CPT

## 2020-11-20 PROCEDURE — 85025 COMPLETE CBC W/AUTO DIFF WBC: CPT

## 2020-11-20 PROCEDURE — 86140 C-REACTIVE PROTEIN: CPT

## 2020-11-20 PROCEDURE — 82784 ASSAY IGA/IGD/IGG/IGM EACH: CPT

## 2020-11-20 PROCEDURE — 85652 RBC SED RATE AUTOMATED: CPT

## 2020-11-20 PROCEDURE — 96413 CHEMO IV INFUSION 1 HR: CPT

## 2020-11-20 PROCEDURE — 700105 HCHG RX REV CODE 258: Performed by: INTERNAL MEDICINE

## 2020-11-20 PROCEDURE — 96367 TX/PROPH/DG ADDL SEQ IV INF: CPT

## 2020-11-20 PROCEDURE — 82306 VITAMIN D 25 HYDROXY: CPT

## 2020-11-20 PROCEDURE — 80053 COMPREHEN METABOLIC PANEL: CPT

## 2020-11-20 PROCEDURE — 96375 TX/PRO/DX INJ NEW DRUG ADDON: CPT

## 2020-11-20 RX ORDER — METHYLPREDNISOLONE SODIUM SUCCINATE 125 MG/2ML
100 INJECTION, POWDER, LYOPHILIZED, FOR SOLUTION INTRAMUSCULAR; INTRAVENOUS ONCE
Status: COMPLETED | OUTPATIENT
Start: 2020-11-20 | End: 2020-11-20

## 2020-11-20 RX ORDER — ACETAMINOPHEN 325 MG/1
650 TABLET ORAL ONCE
Status: COMPLETED | OUTPATIENT
Start: 2020-11-20 | End: 2020-11-20

## 2020-11-20 RX ORDER — ACETAMINOPHEN 325 MG/1
650 TABLET ORAL ONCE
Status: CANCELLED | OUTPATIENT
Start: 2020-11-20

## 2020-11-20 RX ORDER — CLOTRIMAZOLE 10 MG/1
10 LOZENGE ORAL; TOPICAL
COMMUNITY
Start: 2020-11-17

## 2020-11-20 RX ORDER — METHYLPREDNISOLONE SODIUM SUCCINATE 125 MG/2ML
100 INJECTION, POWDER, LYOPHILIZED, FOR SOLUTION INTRAMUSCULAR; INTRAVENOUS ONCE
Status: CANCELLED | OUTPATIENT
Start: 2020-11-20 | End: 2020-11-20

## 2020-11-20 RX ADMIN — ACETAMINOPHEN 650 MG: 325 TABLET ORAL at 11:06

## 2020-11-20 RX ADMIN — METHYLPREDNISOLONE SODIUM SUCCINATE 100 MG: 125 INJECTION, POWDER, FOR SOLUTION INTRAMUSCULAR; INTRAVENOUS at 11:07

## 2020-11-20 RX ADMIN — RITUXIMAB 1000 MG: 10 INJECTION, SOLUTION INTRAVENOUS at 11:43

## 2020-11-20 RX ADMIN — DIPHENHYDRAMINE HYDROCHLORIDE 25 MG: 50 INJECTION, SOLUTION INTRAMUSCULAR; INTRAVENOUS at 11:07

## 2020-11-20 ASSESSMENT — FIBROSIS 4 INDEX: FIB4 SCORE: 1.4

## 2020-11-20 NOTE — PROGRESS NOTES
Chemotherapy Verification - SECONDARY RN       Height = 157 cm  Weight = 45.4 kg  BSA = 1.41 m2       Medication: Rituxan  Dose: set dose  Calculated Dose: 1000 mg                             (In mg/m2, AUC, mg/kg)     I confirm that this process was performed independently.

## 2020-11-20 NOTE — PROGRESS NOTES
Lupe arrived ambulatory to [facility] for Rituxan infusion. POC discussed with pt and she agrees with plan. Warm pack applied to pt's right forearm, PIV established, brisk blood return noted. Blood draw completed, labs ordered and specimens sent to lab. Pt given warm blankets and pillows for comfort. Pt with call light in reach. Pt medicated per MAR pt tolerated treatment without s/s adverse reaction. PIV dc'd catheter tip intact, gauze and coban dressing applied. Pt discharged to self care, NAD. Pt with ride home by spouse.

## 2020-11-20 NOTE — PROGRESS NOTES
Chemotherapy Verification - PRIMARY RN      Height = 157cm  Weight = 45.4kg  BSA = 1.41m^2       Medication: Rituxan  Dose: 1000mg set dose  Calculated Dose: 1000mg set dose                                I confirm this process was performed independently with the BSA and all final chemotherapy dosing calculations congruent.  Any discrepancies of 10% or greater have been addressed with the chemotherapy pharmacist. The resolution of the discrepancy has been documented in the EPIC progress notes.

## 2020-11-22 LAB — IGA SERPL-MCNC: 116 MG/DL (ref 68–408)

## 2020-11-30 ENCOUNTER — HOSPITAL ENCOUNTER (OUTPATIENT)
Dept: RADIOLOGY | Facility: MEDICAL CENTER | Age: 65
End: 2020-11-30
Attending: INTERNAL MEDICINE
Payer: MEDICARE

## 2020-11-30 DIAGNOSIS — R63.4 LOSS OF WEIGHT: ICD-10-CM

## 2020-11-30 PROCEDURE — 700117 HCHG RX CONTRAST REV CODE 255

## 2020-11-30 PROCEDURE — 74177 CT ABD & PELVIS W/CONTRAST: CPT

## 2020-11-30 RX ADMIN — IOHEXOL 100 ML: 350 INJECTION, SOLUTION INTRAVENOUS at 08:51

## 2020-12-15 ENCOUNTER — HOSPITAL ENCOUNTER (OUTPATIENT)
Dept: LAB | Facility: MEDICAL CENTER | Age: 65
End: 2020-12-15
Attending: INTERNAL MEDICINE
Payer: MEDICARE

## 2020-12-15 LAB
BASOPHILS # BLD AUTO: 0.5 % (ref 0–1.8)
BASOPHILS # BLD: 0.04 K/UL (ref 0–0.12)
EOSINOPHIL # BLD AUTO: 0.01 K/UL (ref 0–0.51)
EOSINOPHIL NFR BLD: 0.1 % (ref 0–6.9)
ERYTHROCYTE [DISTWIDTH] IN BLOOD BY AUTOMATED COUNT: 49.6 FL (ref 35.9–50)
HCT VFR BLD AUTO: 41.1 % (ref 37–47)
HGB BLD-MCNC: 13.2 G/DL (ref 12–16)
IMM GRANULOCYTES # BLD AUTO: 0.02 K/UL (ref 0–0.11)
IMM GRANULOCYTES NFR BLD AUTO: 0.2 % (ref 0–0.9)
LYMPHOCYTES # BLD AUTO: 1.02 K/UL (ref 1–4.8)
LYMPHOCYTES NFR BLD: 12.7 % (ref 22–41)
MCH RBC QN AUTO: 33.3 PG (ref 27–33)
MCHC RBC AUTO-ENTMCNC: 32.1 G/DL (ref 33.6–35)
MCV RBC AUTO: 103.8 FL (ref 81.4–97.8)
MONOCYTES # BLD AUTO: 0.44 K/UL (ref 0–0.85)
MONOCYTES NFR BLD AUTO: 5.5 % (ref 0–13.4)
NEUTROPHILS # BLD AUTO: 6.5 K/UL (ref 2–7.15)
NEUTROPHILS NFR BLD: 81 % (ref 44–72)
NRBC # BLD AUTO: 0 K/UL
NRBC BLD-RTO: 0 /100 WBC
PLATELET # BLD AUTO: 297 K/UL (ref 164–446)
PMV BLD AUTO: 10.1 FL (ref 9–12.9)
RBC # BLD AUTO: 3.96 M/UL (ref 4.2–5.4)
WBC # BLD AUTO: 8 K/UL (ref 4.8–10.8)

## 2020-12-15 PROCEDURE — 85025 COMPLETE CBC W/AUTO DIFF WBC: CPT

## 2020-12-15 PROCEDURE — 36415 COLL VENOUS BLD VENIPUNCTURE: CPT

## 2020-12-21 DIAGNOSIS — N81.6 RECTOCELE: ICD-10-CM

## 2021-01-04 ENCOUNTER — HOSPITAL ENCOUNTER (OUTPATIENT)
Facility: MEDICAL CENTER | Age: 66
End: 2021-01-04
Attending: FAMILY MEDICINE
Payer: MEDICARE

## 2021-01-04 ENCOUNTER — NON-PROVIDER VISIT (OUTPATIENT)
Dept: INTERNAL MEDICINE | Facility: IMAGING CENTER | Age: 66
End: 2021-01-04
Payer: MEDICARE

## 2021-01-04 DIAGNOSIS — Z20.822 ENCOUNTER FOR LABORATORY TESTING FOR COVID-19 VIRUS: ICD-10-CM

## 2021-01-04 PROCEDURE — U0005 INFEC AGEN DETEC AMPLI PROBE: HCPCS

## 2021-01-04 PROCEDURE — U0003 INFECTIOUS AGENT DETECTION BY NUCLEIC ACID (DNA OR RNA); SEVERE ACUTE RESPIRATORY SYNDROME CORONAVIRUS 2 (SARS-COV-2) (CORONAVIRUS DISEASE [COVID-19]), AMPLIFIED PROBE TECHNIQUE, MAKING USE OF HIGH THROUGHPUT TECHNOLOGIES AS DESCRIBED BY CMS-2020-01-R: HCPCS

## 2021-01-05 LAB
COVID ORDER STATUS COVID19: NORMAL
SARS-COV-2 RNA RESP QL NAA+PROBE: NOTDETECTED
SPECIMEN SOURCE: NORMAL

## 2021-02-19 ENCOUNTER — HOSPITAL ENCOUNTER (OUTPATIENT)
Dept: LAB | Facility: MEDICAL CENTER | Age: 66
End: 2021-02-19
Attending: INTERNAL MEDICINE
Payer: MEDICARE

## 2021-02-19 LAB
ALBUMIN SERPL BCP-MCNC: 4.4 G/DL (ref 3.2–4.9)
ALBUMIN/GLOB SERPL: 2.4 G/DL
ALP SERPL-CCNC: 38 U/L (ref 30–99)
ALT SERPL-CCNC: 23 U/L (ref 2–50)
ANION GAP SERPL CALC-SCNC: 10 MMOL/L (ref 7–16)
AST SERPL-CCNC: 30 U/L (ref 12–45)
BASOPHILS # BLD AUTO: 0.4 % (ref 0–1.8)
BASOPHILS # BLD: 0.03 K/UL (ref 0–0.12)
BILIRUB SERPL-MCNC: 0.3 MG/DL (ref 0.1–1.5)
BUN SERPL-MCNC: 25 MG/DL (ref 8–22)
CALCIUM SERPL-MCNC: 9.7 MG/DL (ref 8.5–10.5)
CHLORIDE SERPL-SCNC: 106 MMOL/L (ref 96–112)
CO2 SERPL-SCNC: 25 MMOL/L (ref 20–33)
CREAT SERPL-MCNC: 0.73 MG/DL (ref 0.5–1.4)
CRP SERPL HS-MCNC: 0.11 MG/DL (ref 0–0.75)
EOSINOPHIL # BLD AUTO: 0 K/UL (ref 0–0.51)
EOSINOPHIL NFR BLD: 0 % (ref 0–6.9)
ERYTHROCYTE [DISTWIDTH] IN BLOOD BY AUTOMATED COUNT: 50.5 FL (ref 35.9–50)
ERYTHROCYTE [SEDIMENTATION RATE] IN BLOOD BY WESTERGREN METHOD: <1 MM/HOUR (ref 0–30)
FOLATE SERPL-MCNC: 29 NG/ML
GLOBULIN SER CALC-MCNC: 1.8 G/DL (ref 1.9–3.5)
GLUCOSE SERPL-MCNC: 106 MG/DL (ref 65–99)
HCT VFR BLD AUTO: 42.3 % (ref 37–47)
HGB BLD-MCNC: 13.2 G/DL (ref 12–16)
IMM GRANULOCYTES # BLD AUTO: 0.04 K/UL (ref 0–0.11)
IMM GRANULOCYTES NFR BLD AUTO: 0.5 % (ref 0–0.9)
LYMPHOCYTES # BLD AUTO: 0.81 K/UL (ref 1–4.8)
LYMPHOCYTES NFR BLD: 10.3 % (ref 22–41)
MCH RBC QN AUTO: 32.9 PG (ref 27–33)
MCHC RBC AUTO-ENTMCNC: 31.2 G/DL (ref 33.6–35)
MCV RBC AUTO: 105.5 FL (ref 81.4–97.8)
MONOCYTES # BLD AUTO: 0.37 K/UL (ref 0–0.85)
MONOCYTES NFR BLD AUTO: 4.7 % (ref 0–13.4)
NEUTROPHILS # BLD AUTO: 6.58 K/UL (ref 2–7.15)
NEUTROPHILS NFR BLD: 84.1 % (ref 44–72)
NRBC # BLD AUTO: 0 K/UL
NRBC BLD-RTO: 0 /100 WBC
PLATELET # BLD AUTO: 304 K/UL (ref 164–446)
PMV BLD AUTO: 10.6 FL (ref 9–12.9)
POTASSIUM SERPL-SCNC: 4.2 MMOL/L (ref 3.6–5.5)
PROT SERPL-MCNC: 6.2 G/DL (ref 6–8.2)
RBC # BLD AUTO: 4.01 M/UL (ref 4.2–5.4)
SODIUM SERPL-SCNC: 141 MMOL/L (ref 135–145)
VIT B12 SERPL-MCNC: 1491 PG/ML (ref 211–911)
WBC # BLD AUTO: 7.8 K/UL (ref 4.8–10.8)

## 2021-02-19 PROCEDURE — 82784 ASSAY IGA/IGD/IGG/IGM EACH: CPT

## 2021-02-19 PROCEDURE — 36415 COLL VENOUS BLD VENIPUNCTURE: CPT

## 2021-02-19 PROCEDURE — 86140 C-REACTIVE PROTEIN: CPT

## 2021-02-19 PROCEDURE — 82607 VITAMIN B-12: CPT

## 2021-02-19 PROCEDURE — 85652 RBC SED RATE AUTOMATED: CPT

## 2021-02-19 PROCEDURE — 82746 ASSAY OF FOLIC ACID SERUM: CPT

## 2021-02-19 PROCEDURE — 80053 COMPREHEN METABOLIC PANEL: CPT

## 2021-02-19 PROCEDURE — 83520 IMMUNOASSAY QUANT NOS NONAB: CPT

## 2021-02-19 PROCEDURE — 84165 PROTEIN E-PHORESIS SERUM: CPT

## 2021-02-19 PROCEDURE — 85025 COMPLETE CBC W/AUTO DIFF WBC: CPT

## 2021-02-19 PROCEDURE — 86334 IMMUNOFIX E-PHORESIS SERUM: CPT

## 2021-02-19 PROCEDURE — 84155 ASSAY OF PROTEIN SERUM: CPT | Mod: XU

## 2021-02-22 LAB
ALBUMIN SERPL ELPH-MCNC: 4.15 G/DL (ref 3.75–5.01)
ALPHA1 GLOB SERPL ELPH-MCNC: 0.24 G/DL (ref 0.19–0.46)
ALPHA2 GLOB SERPL ELPH-MCNC: 0.54 G/DL (ref 0.48–1.05)
B-GLOBULIN SERPL ELPH-MCNC: 0.53 G/DL (ref 0.48–1.1)
EER MONOCLONAL PROTEIN AND FLC, SERUM Q5224: ABNORMAL
GAMMA GLOB SERPL ELPH-MCNC: 0.45 G/DL (ref 0.62–1.51)
IGA SERPL-MCNC: 112 MG/DL (ref 68–408)
IGG SERPL-MCNC: 447 MG/DL (ref 768–1632)
IGM SERPL-MCNC: 65 MG/DL (ref 35–263)
INTERPRETATION SERPL IFE-IMP: ABNORMAL
INTERPRETATION SERPL IFE-IMP: ABNORMAL
KAPPA LC FREE SER-MCNC: 11.47 MG/L (ref 3.3–19.4)
KAPPA LC FREE/LAMBDA FREE SER NEPH: 0.7 {RATIO} (ref 0.26–1.65)
LAMBDA LC FREE SERPL-MCNC: 16.47 MG/L (ref 5.71–26.3)
PROT SERPL-MCNC: 5.9 G/DL (ref 6.3–8.2)

## 2021-03-03 DIAGNOSIS — Z23 NEED FOR VACCINATION: ICD-10-CM

## 2021-09-06 NOTE — PROGRESS NOTES
Chemotherapy Verification - PRIMARY RN      Height = 157.5cm  Weight = 47.4kg  BSA = 1.44m2       Medication: Rituxan  Dose: 1000mg  Calculated Dose: 1000mg set dose for RA                             (In mg/m2, AUC, mg/kg)       I confirm this process was performed independently with the BSA and all final chemotherapy dosing calculations congruent.  Any discrepancies of 10% or greater have been addressed with the chemotherapy pharmacist. The resolution of the discrepancy has been documented in the EPIC progress notes.       
Chemotherapy Verification - SECONDARY RN       Height = 157.5cm  Weight = 47.4kg  BSA = 1.44m2       Medication: Rituxan  Dose: 1000mg set dose  Calculated Dose: 1000mg set dose                             (In mg/m2, AUC, mg/kg)     I confirm that this process was performed independently.   
Pt returns to infusion center for scheduled Rituxan as treatment for RA.  Pt quite anxious about IV placement; encouragement and support provided.  PIV established, brisk blood return noted.  Pt given premeds as ordered.  Pt tolerated infusion without incident with rate increased per slow rate protocol.  PIV flushed with saline per policy, removed, gauze dressing placed.  Pt left infusion center ambulatory and in good condition.  Return appointment scheduled.  
room air

## 2022-02-07 NOTE — PATIENT INSTRUCTIONS
After hyperbaric oxygen therapy treatment, it is normal to experience some congestion to the ears, muffling of sounds, or abnormal sounds to one or both ears.    If you experience pain or discomfort to one or both ears that does not resolve spontaneously, please contact the hyperbaric department at [phone].       No

## (undated) DEVICE — MASK ANESTHESIA ADULT  - (100/CA)

## (undated) DEVICE — NEEDLE SPINAL NON-SAFETY 20 GA X 3 IN (25/BX)

## (undated) DEVICE — SODIUM CHL IRRIGATION 0.9% 1000ML (12EA/CA)

## (undated) DEVICE — WATER IRRIGATION STERILE 1000ML (12EA/CA)

## (undated) DEVICE — SUCTION INSTRUMENT YANKAUER BULBOUS TIP W/O VENT (50EA/CA)

## (undated) DEVICE — BLADE SURGICAL #15 - (50/BX 3BX/CA)

## (undated) DEVICE — PAD PREP 24 X 48 CUFFED - (100/CA)

## (undated) DEVICE — LACTATED RINGERS INJ 1000 ML - (14EA/CA 60CA/PF)

## (undated) DEVICE — KIT ANESTHESIA W/CIRCUIT & 3/LT BAG W/FILTER (20EA/CA)

## (undated) DEVICE — SYRINGE 10 ML CONTROL LL (25EA/BX 4BX/CA)

## (undated) DEVICE — HUMID-VENT HEAT AND MOISTURE EXCHANGE- (50/BX)

## (undated) DEVICE — BOVIE NEEDLE TIP INSULATD NON-SAFETY 2CM (50/PK)

## (undated) DEVICE — CANISTER SUCTION RIGID RED 1500CC (40EA/CA)

## (undated) DEVICE — PROTECTOR ULNA NERVE - (36PR/CA)

## (undated) DEVICE — TUBE CONNECTING SUCTION - CLEAR PLASTIC STERILE 72 IN (50EA/CA)

## (undated) DEVICE — NEEDLE NON SAFETY HYPO 22 GA X 1 1/2 IN (100/BX)

## (undated) DEVICE — DRAPE LARGE 3 QUARTER - (20/CA)

## (undated) DEVICE — ADHESIVE MASTISOL - (48/BX)

## (undated) DEVICE — KIT ROOM DECONTAMINATION

## (undated) DEVICE — SUTURE V-LOCK 90 ABSORBABLE 3.0 CL23 ON A P14 NEEDLE (12EA/CA)

## (undated) DEVICE — GOWN WARMING STANDARD FLEX - (30/CA)

## (undated) DEVICE — SENSOR SPO2 NEO LNCS ADHESIVE (20/BX) SEE USER NOTES

## (undated) DEVICE — GLOVE BIOGEL PI INDICATOR SZ 7.5 SURGICAL PF LF -(50/BX 4BX/CA)

## (undated) DEVICE — SWAB ANAEROBIC SPEC.COLLECTOR - (25/PK 4PK/CA 100EA/CA)

## (undated) DEVICE — HEAD HOLDER JUNIOR/ADULT

## (undated) DEVICE — DRESSING ABDOMINAL PAD STERILE 8 X 10" (360EA/CA)"

## (undated) DEVICE — DERMABOND ADVANCED - (12EA/BX)

## (undated) DEVICE — NEPTUNE 4 PORT MANIFOLD - (20/PK)

## (undated) DEVICE — BAG, SPONGE COUNT 50600

## (undated) DEVICE — ELECTRODE 850 FOAM ADHESIVE - HYDROGEL RADIOTRNSPRNT (50/PK)

## (undated) DEVICE — SPONGE GAUZESTER 4 X 4 4PLY - (128PK/CA)

## (undated) DEVICE — SUTURE GENERAL

## (undated) DEVICE — SPLINT PLASTER 3 IN X 15 IN - (50/BX 12BX/CA)

## (undated) DEVICE — CLOSURE SKIN STRIP 1/2 X 4 IN - (STERI STRIP) (50/BX 4BX/CA)

## (undated) DEVICE — DRAPE FLUOROSCAN C-ARM - 54 X 78 (40EA/CA)

## (undated) DEVICE — GLOVE BIOGEL SZ 8 SURGICAL PF LTX - (50PR/BX 4BX/CA)

## (undated) DEVICE — BLADE BEAVER 6400 MINI EYE ROUND TIP SHARP ON ONE SIDE (20/CA)

## (undated) DEVICE — SUTURE 4-0 MONOCRYL PLUS PS-2 - 27 INCH (36/BX)

## (undated) DEVICE — BANDAID SHEER STRIP 3/4 IN (100EA/BX 12BX/CA)

## (undated) DEVICE — PADDING CAST 3 IN STERILE - 3 X 4 YDS (24EA/CA)

## (undated) DEVICE — ELECTRODE DUAL RETURN W/ CORD - (50/PK)

## (undated) DEVICE — GLOVE, LITE (PAIR)

## (undated) DEVICE — GOWN SURGEONS X-LARGE - DISP. (30/CA)

## (undated) DEVICE — GAUZE FLUFF STERILE 2-PLY 36 X 36 (100EA/CA)

## (undated) DEVICE — DRESSING XEROFORM 1X8 - (50/BX 4BX/CA)

## (undated) DEVICE — GLOVE BIOGEL SZ 7.5 SURGICAL PF LTX - (50PR/BX 4BX/CA)

## (undated) DEVICE — BANDAGE ELASTIC DOUBLE 6X10 - (6EA/BX)"

## (undated) DEVICE — BANDAGE ELASTIC 3 X 5 YDS - STERILE VELCRO (25/CA)LATEX

## (undated) DEVICE — TOWELS CLOTH SURGICAL - (4/PK 20PK/CA)

## (undated) DEVICE — PACK BREAST SM OR - (1EA/CA)

## (undated) DEVICE — TUBE, CULTURE AEROBIC

## (undated) DEVICE — SUTURE 2-0 VICRYL PLUS SH - 27 INCH (36/BX)

## (undated) DEVICE — CHLORAPREP 26 ML APPLICATOR - ORANGE TINT(25/CA)

## (undated) DEVICE — GLOVE BIOGEL PI INDICATOR SZ 8.0 SURGICAL PF LF -(50/BX 4BX/CA)

## (undated) DEVICE — PACK UPPER EXTREMITY SM OR - (3/CA)

## (undated) DEVICE — GLOVE BIOGEL INDICATOR SZ 8 SURGICAL PF LTX - (50/BX 4BX/CA)

## (undated) DEVICE — SUTURE 4-0 ETHILON PS-2 18 (12PK/BX)"

## (undated) DEVICE — TRAY SRGPRP PVP IOD WT PRP - (20/CA)

## (undated) DEVICE — WRAP CO-FLEX 4IN X 5YD STERIL - SELF-ADHERENT (18/CA)